# Patient Record
Sex: MALE | Race: WHITE | Employment: OTHER | URBAN - METROPOLITAN AREA
[De-identification: names, ages, dates, MRNs, and addresses within clinical notes are randomized per-mention and may not be internally consistent; named-entity substitution may affect disease eponyms.]

---

## 2017-01-02 ENCOUNTER — ANTICOAGULATION THERAPY VISIT (OUTPATIENT)
Dept: ANTICOAGULATION | Facility: CLINIC | Age: 82
End: 2017-01-02
Payer: COMMERCIAL

## 2017-01-02 DIAGNOSIS — Z79.01 LONG-TERM (CURRENT) USE OF ANTICOAGULANTS: Primary | ICD-10-CM

## 2017-01-02 DIAGNOSIS — I48.20 CHRONIC ATRIAL FIBRILLATION (H): ICD-10-CM

## 2017-01-02 LAB — INR POINT OF CARE: 2.3 (ref 0.86–1.14)

## 2017-01-02 PROCEDURE — 85610 PROTHROMBIN TIME: CPT | Mod: QW

## 2017-01-02 PROCEDURE — 36416 COLLJ CAPILLARY BLOOD SPEC: CPT

## 2017-01-02 PROCEDURE — 99207 ZZC NO CHARGE NURSE ONLY: CPT

## 2017-01-02 NOTE — PROGRESS NOTES
ANTICOAGULATION FOLLOW-UP CLINIC VISIT    Patient Name:  Lori Mehta  Date:  1/2/2017  Contact Type:  Face to Face    SUBJECTIVE:     Patient Findings     Positives No Problem Findings           OBJECTIVE    INR PROTIME   Date Value Ref Range Status   01/02/2017 2.3* 0.86 - 1.14 Final       ASSESSMENT / PLAN  INR assessment THER    Recheck INR In: 6 WEEKS    INR Location Clinic      Anticoagulation Summary as of 1/2/2017     INR goal 2.0-3.0   Selected INR 2.3 (1/2/2017)   Maintenance plan 5 mg (5 mg x 1) on Tue; 7.5 mg (5 mg x 1.5) all other days   Full instructions 5 mg on Tue; 7.5 mg all other days   Weekly total 50 mg   No change documented Lakeisha Rodriges RN   Plan last modified Lakeisha Rodriges RN (11/21/2016)   Next INR check 2/14/2017   Target end date     Indications   Chronic atrial fibrillation (HCC) [I48.2]  Long-term (current) use of anticoagulants [Z79.01] [Z79.01]         Anticoagulation Episode Summary     INR check location     Preferred lab     Send INR reminders to [facility] POOL    Comments 5 mg tablets, no bandaid, takes in AM, likes BP done.       Anticoagulation Care Providers     Provider Role Specialty Phone number    Candido Freedman DO [facility] Internal Medicine [phone]            See the Encounter Report to view Anticoagulation Flowsheet and Dosing Calendar (Go to Encounters tab in chart review, and find the Anticoagulation Therapy Visit)    Dosage adjustment made based on physician directed care plan.    Lakeisha Rodriges RN

## 2017-02-10 ENCOUNTER — TRANSFERRED RECORDS (OUTPATIENT)
Dept: HEALTH INFORMATION MANAGEMENT | Facility: CLINIC | Age: 82
End: 2017-02-10

## 2017-02-14 ENCOUNTER — ANTICOAGULATION THERAPY VISIT (OUTPATIENT)
Dept: ANTICOAGULATION | Facility: CLINIC | Age: 82
End: 2017-02-14
Payer: COMMERCIAL

## 2017-02-14 VITALS — SYSTOLIC BLOOD PRESSURE: 138 MMHG | HEART RATE: 79 BPM | DIASTOLIC BLOOD PRESSURE: 73 MMHG

## 2017-02-14 DIAGNOSIS — I48.20 CHRONIC ATRIAL FIBRILLATION (H): ICD-10-CM

## 2017-02-14 DIAGNOSIS — Z79.01 LONG-TERM (CURRENT) USE OF ANTICOAGULANTS: ICD-10-CM

## 2017-02-14 LAB — INR POINT OF CARE: 2.3 (ref 0.86–1.14)

## 2017-02-14 PROCEDURE — 99207 ZZC NO CHARGE NURSE ONLY: CPT

## 2017-02-14 PROCEDURE — 36416 COLLJ CAPILLARY BLOOD SPEC: CPT

## 2017-02-14 PROCEDURE — 85610 PROTHROMBIN TIME: CPT | Mod: QW

## 2017-02-14 NOTE — PROGRESS NOTES
ANTICOAGULATION FOLLOW-UP CLINIC VISIT    Patient Name:  Lori Mehta  Date:  2/14/2017  Contact Type:  Face to Face    SUBJECTIVE:     Patient Findings     Positives No Problem Findings           OBJECTIVE    INR Protime   Date Value Ref Range Status   02/14/2017 2.3 (A) 0.86 - 1.14 Final       ASSESSMENT / PLAN  INR assessment THER    Recheck INR In: 6 WEEKS    INR Location Clinic      Anticoagulation Summary as of 2/14/2017     INR goal 2.0-3.0   Today's INR 2.3   Maintenance plan 5 mg (5 mg x 1) on Tue; 7.5 mg (5 mg x 1.5) all other days   Full instructions 5 mg on Tue; 7.5 mg all other days   Weekly total 50 mg   No change documented Lakeisha Rodriges RN   Plan last modified Lakeisha Rodriges RN (11/21/2016)   Next INR check 3/28/2017   Target end date     Indications   Chronic atrial fibrillation (HCC) [I48.2]  Long-term (current) use of anticoagulants [Z79.01] [Z79.01]         Anticoagulation Episode Summary     INR check location     Preferred lab     Send INR reminders to [facility] POOL    Comments 5 mg tablets, no bandaid, takes in AM, likes BP done.       Anticoagulation Care Providers     Provider Role Specialty Phone number    Candido Freedman DO [facility] Internal Medicine [phone]            See the Encounter Report to view Anticoagulation Flowsheet and Dosing Calendar (Go to Encounters tab in chart review, and find the Anticoagulation Therapy Visit)    Dosage adjustment made based on physician directed care plan.    Lakeisha Rodriges RN

## 2017-02-14 NOTE — MR AVS SNAPSHOT
Lori Mehta   2/14/2017 10:30 AM   Anticoagulation Therapy Visit    Description:  88 year old male   Provider:  PH ANTI COAG   Department:  Ph Anticoag           INR as of 2/14/2017     Today's INR 2.3      Anticoagulation Summary as of 2/14/2017     INR goal 2.0-3.0   Today's INR 2.3   Full instructions 5 mg on Tue; 7.5 mg all other days   Next INR check 3/28/2017    Indications   Chronic atrial fibrillation (HCC) [I48.2]  Long-term (current) use of anticoagulants [Z79.01] [Z79.01]         Your next Anticoagulation Clinic appointment(s)     Feb 14, 2017 10:30 AM CST   Anticoagulation Visit with PH ANTI COAG   [facility] ([address] [address]   [phone]            Mar 28, 2017 10:30 AM CDT   Anticoagulation Visit with PH ANTI COAG   [facility] ([address] [address]   [phone]              Contact Numbers     Clinic Number:         February 2017 Details    Sun Mon Tue Wed Thu Fri Sat        1               2               3               4                 5               6               7               8               9               10               11                 12               13               14      5 mg   See details      15      7.5 mg         16      7.5 mg         17      7.5 mg         18      7.5 mg           19      7.5 mg         20      7.5 mg         21      5 mg         22      7.5 mg         23      7.5 mg         24      7.5 mg         25      7.5 mg           26      7.5 mg         27      7.5 mg         28      5 mg              Date Details   02/14 This INR check               How to take your warfarin dose     To take:  5 mg Take 1 of the 5 mg tablets.    To take:  7.5 mg Take 1.5 of the 5 mg tablets.           March 2017 Details    Sun Mon Tue Wed Thu Fri Sat        1      7.5 mg         2      7.5 mg         3      7.5 mg          4      7.5 mg           5      7.5 mg         6      7.5 mg         7      5 mg         8      7.5 mg         9      7.5 mg         10      7.5 mg         11      7.5 mg           12      7.5 mg         13      7.5 mg         14      5 mg         15      7.5 mg         16      7.5 mg         17      7.5 mg         18      7.5 mg           19      7.5 mg         20      7.5 mg         21      5 mg         22      7.5 mg         23      7.5 mg         24      7.5 mg         25      7.5 mg           26      7.5 mg         27      7.5 mg         28            29               30               31                 Date Details   No additional details    Date of next INR:  3/28/2017         How to take your warfarin dose     To take:  5 mg Take 1 of the 5 mg tablets.    To take:  7.5 mg Take 1.5 of the 5 mg tablets.

## 2017-03-07 ENCOUNTER — OFFICE VISIT (OUTPATIENT)
Dept: INTERNAL MEDICINE | Facility: CLINIC | Age: 82
End: 2017-03-07
Payer: COMMERCIAL

## 2017-03-07 VITALS
TEMPERATURE: 97.6 F | HEIGHT: 67 IN | OXYGEN SATURATION: 96 % | HEART RATE: 98 BPM | SYSTOLIC BLOOD PRESSURE: 138 MMHG | BODY MASS INDEX: 28.41 KG/M2 | RESPIRATION RATE: 20 BRPM | WEIGHT: 181 LBS | DIASTOLIC BLOOD PRESSURE: 64 MMHG

## 2017-03-07 DIAGNOSIS — I10 HYPERTENSION GOAL BP (BLOOD PRESSURE) < 140/90: ICD-10-CM

## 2017-03-07 DIAGNOSIS — I48.20 CHRONIC ATRIAL FIBRILLATION (H): ICD-10-CM

## 2017-03-07 DIAGNOSIS — E78.5 HYPERLIPIDEMIA LDL GOAL <100: ICD-10-CM

## 2017-03-07 DIAGNOSIS — M10.00 IDIOPATHIC GOUT, UNSPECIFIED CHRONICITY, UNSPECIFIED SITE: ICD-10-CM

## 2017-03-07 DIAGNOSIS — N40.1 BENIGN NON-NODULAR PROSTATIC HYPERPLASIA WITH LOWER URINARY TRACT SYMPTOMS: ICD-10-CM

## 2017-03-07 DIAGNOSIS — Z00.00 ENCOUNTER FOR ROUTINE ADULT HEALTH EXAMINATION WITHOUT ABNORMAL FINDINGS: Primary | ICD-10-CM

## 2017-03-07 LAB
ALBUMIN SERPL-MCNC: 3.7 G/DL (ref 3.4–5)
ALP SERPL-CCNC: 69 U/L (ref 40–150)
ALT SERPL W P-5'-P-CCNC: 26 U/L (ref 0–70)
ANION GAP SERPL CALCULATED.3IONS-SCNC: 9 MMOL/L (ref 3–14)
AST SERPL W P-5'-P-CCNC: 29 U/L (ref 0–45)
BILIRUB SERPL-MCNC: 0.7 MG/DL (ref 0.2–1.3)
BUN SERPL-MCNC: 25 MG/DL (ref 7–30)
CALCIUM SERPL-MCNC: 9.5 MG/DL (ref 8.5–10.1)
CHLORIDE SERPL-SCNC: 106 MMOL/L (ref 94–109)
CHOLEST SERPL-MCNC: 151 MG/DL
CO2 SERPL-SCNC: 29 MMOL/L (ref 20–32)
CREAT SERPL-MCNC: 1.33 MG/DL (ref 0.66–1.25)
GFR SERPL CREATININE-BSD FRML MDRD: 51 ML/MIN/1.7M2
GLUCOSE SERPL-MCNC: 105 MG/DL (ref 70–99)
HDLC SERPL-MCNC: 66 MG/DL
LDLC SERPL CALC-MCNC: 69 MG/DL
NONHDLC SERPL-MCNC: 85 MG/DL
POTASSIUM SERPL-SCNC: 3.9 MMOL/L (ref 3.4–5.3)
PROT SERPL-MCNC: 7.7 G/DL (ref 6.8–8.8)
SODIUM SERPL-SCNC: 144 MMOL/L (ref 133–144)
TRIGL SERPL-MCNC: 80 MG/DL

## 2017-03-07 PROCEDURE — 80061 LIPID PANEL: CPT | Performed by: INTERNAL MEDICINE

## 2017-03-07 PROCEDURE — G0439 PPPS, SUBSEQ VISIT: HCPCS | Performed by: INTERNAL MEDICINE

## 2017-03-07 PROCEDURE — 80053 COMPREHEN METABOLIC PANEL: CPT | Performed by: INTERNAL MEDICINE

## 2017-03-07 PROCEDURE — 36415 COLL VENOUS BLD VENIPUNCTURE: CPT | Performed by: INTERNAL MEDICINE

## 2017-03-07 RX ORDER — TAMSULOSIN HYDROCHLORIDE 0.4 MG/1
0.4 CAPSULE ORAL DAILY
Qty: 90 CAPSULE | Refills: 3 | Status: SHIPPED | OUTPATIENT
Start: 2017-03-07 | End: 2018-02-13

## 2017-03-07 RX ORDER — ALLOPURINOL 100 MG/1
100 TABLET ORAL DAILY
Qty: 90 TABLET | Refills: 3 | Status: SHIPPED | OUTPATIENT
Start: 2017-03-07 | End: 2018-02-13

## 2017-03-07 RX ORDER — AMLODIPINE BESYLATE 10 MG/1
10 TABLET ORAL DAILY
Qty: 90 TABLET | Refills: 3 | Status: SHIPPED | OUTPATIENT
Start: 2017-03-07 | End: 2018-02-13

## 2017-03-07 RX ORDER — BIOTIN 800 MCG
TABLET ORAL DAILY
COMMUNITY
End: 2018-02-13

## 2017-03-07 RX ORDER — SIMVASTATIN 20 MG
60 TABLET ORAL AT BEDTIME
Qty: 270 TABLET | Refills: 3 | Status: SHIPPED | OUTPATIENT
Start: 2017-03-07 | End: 2018-02-13

## 2017-03-07 ASSESSMENT — PAIN SCALES - GENERAL: PAINLEVEL: NO PAIN (0)

## 2017-03-07 NOTE — MR AVS SNAPSHOT
After Visit Summary   3/7/2017    Lori Mehta    MRN: [id number]           Patient Information     Date Of Birth          6/27/1928        Visit Information        Provider Department      3/7/2017 8:30 AM Wale Short MD [facility]        Today's Diagnoses     Hyperlipidemia LDL goal <100          Care Instructions      Preventive Health Recommendations:       Male Ages 65 and over    Yearly exam:             See your health care provider every year in order to  o   Review health changes.   o   Discuss preventive care.    o   Review your medicines if your doctor has prescribed any.    Talk with your health care provider about whether you should have a test to screen for prostate cancer (PSA).    Every 3 years, have a diabetes test (fasting glucose). If you are at risk for diabetes, you should have this test more often.    Every 5 years, have a cholesterol test. Have this test more often if you are at risk for high cholesterol or heart disease.     Every 10 years, have a colonoscopy. Or, have a yearly FIT test (stool test). These exams will check for colon cancer.    Talk to with your health care provider about screening for Abdominal Aortic Aneurysm if you have a family history of AAA or have a history of smoking.  Shots:     Get a flu shot each year.     Get a tetanus shot every 10 years.     Talk to your doctor about your pneumonia vaccines. There are now two you should receive - Pneumovax (PPSV 23) and Prevnar (PCV 13).    Talk to your doctor about a shingles vaccine.     Talk to your doctor about the hepatitis B vaccine.  Nutrition:     Eat at least 5 servings of fruits and vegetables each day.     Eat whole-grain bread, whole-wheat pasta and brown rice instead of white grains and rice.     Talk to your doctor about Calcium and Vitamin D.   Lifestyle    Exercise for at least 150 minutes a week (30 minutes a day, 5 days a week). This will help you control your weight and  "prevent disease.     Limit alcohol to one drink per day.     No smoking.     Wear sunscreen to prevent skin cancer.     See your dentist every six months for an exam and cleaning.     See your eye doctor every 1 to 2 years to screen for conditions such as glaucoma, macular degeneration and cataracts.        Follow-ups after your visit        Your next 10 appointments already scheduled     Mar 28, 2017 10:30 AM CDT   Anticoagulation Visit with PH ANTI COAG   [facility]    [address] [place] [id number] [phone]              Who to contact     If you have questions or need follow up information about today's clinic visit or your schedule please contact [facility] directly at [phone].  Normal or non-critical lab and imaging results will be communicated to you by [employer], letter or phone within 4 business days after the clinic has received the results. If you do not hear from us within 7 days, please contact the clinic through [employer] or phone. If you have a critical or abnormal lab result, we will notify you by phone as soon as possible.  Submit refill requests through [facility] or call your pharmacy and they will forward the refill request to us. Please allow 3 business days for your refill to be completed.          Additional Information About Your Visit        [facility] Information     [facility] lets you send messages to your doctor, view your test results, renew your prescriptions, schedule appointments and more. To sign up, go to [email or web address] . Click on \"Log in\" on the left side of the screen, which will take you to the Welcome page. Then click on \"Sign up Now\" on the right side of the page.     You will be asked to enter the access code listed below, as well as some personal information. Please follow the directions to create your username and password.     Your access code is: [email or web address]  Expires: 6/5/2017  8:53 AM   " "  Your access code will  in 90 days. If you need help or a new code, please call your [facility] clinic or [phone].        Care EveryWhere ID     This is your Care EveryWhere ID. This could be used by other organizations to access your [facility] medical records  [id number]        Your Vitals Were     Pulse Temperature Respirations Height Pulse Oximetry BMI (Body Mass Index)    98 97.6  F (36.4  C) (Temporal) 20 5' 7\" (1.702 m) 96% 28.35 kg/m2       Blood Pressure from Last 3 Encounters:   17 156/68   17 138/73   16 129/78    Weight from Last 3 Encounters:   17 181 lb (82.1 kg)   16 173 lb (78.5 kg)   16 178 lb (80.7 kg)              We Performed the Following     Lipid Profile with reflex to direct LDL        Primary Care Provider Office Phone # Fax #    Wale Short [phone]       [facility] [address] [place] [address]        Thank you!     Thank you for choosing [facility]  for your care. Our goal is always to provide you with excellent care. Hearing back from our patients is one way we can continue to improve our services. Please take a few minutes to complete the written survey that you may receive in the mail after your visit with us. Thank you!             Your Updated Medication List - Protect others around you: Learn how to safely use, store and throw away your medicines at www.disposemymeds.org.          This list is accurate as of: 3/7/17  8:53 AM.  Always use your most recent med list.                   Brand Name Dispense Instructions for use    allopurinol 100 MG tablet    ZYLOPRIM    90 tablet    TAKE ONE TABLET BY MOUTH ONCE DAILY       amLODIPine 10 MG tablet    NORVASC    90 tablet    Take 1 tablet (10 mg) by mouth daily       Brewers Yeast Tabs      Take by mouth daily 7 1/2 grain - takes three       Bromelains 500 MG Tabs      Take 3 tablets by mouth. 3-4 daily       CAPSICUM (CAYENNE) PO    "   Take 450 mg by mouth daily       COENZYME Q10      1 DAILY       hydrochlorothiazide 25 MG tablet    HYDRODIURIL    90 tablet    TAKE ONE TABLET BY MOUTH ONCE DAILY       omega 3 1200 MG Caps      2 CAP DAILY       SAW PALMETTO EXTRACT PO      Take 1,000 mg by mouth       Selenium 200 MCG Caps      1 CAPSULE DAILY       simvastatin 20 MG tablet    ZOCOR    270 tablet    Take 3 tablets (60 mg) by mouth At Bedtime       tamsulosin 0.4 MG capsule    FLOMAX    60 capsule    Take 1 capsule (0.4 mg) by mouth daily       VITAMIN B12 TR 1000 MCG Tbcr      1 TAB DAILY       vitamin C 500 MG Chew      Take  by mouth. 1-3 daily       vitamin E 400 UNIT capsule      1 CAPSULE DAILY       warfarin 5 MG tablet    COUMADIN    125 tablet    Take 5 mg on Tuesday  and 7.5 mg all other days, or as directed by the coumadin clinic.       Zinc 30 MG Tabs      1 TABLET DAILY

## 2017-03-07 NOTE — NURSING NOTE
"Chief Complaint   Patient presents with     Wellness Visit       Initial /68 (BP Location: Right arm, Patient Position: Porter, Cuff Size: Adult Large)  Pulse 98  Temp 97.6  F (36.4  C) (Temporal)  Resp 20  Ht 5' 7\" (1.702 m)  Wt 181 lb (82.1 kg)  SpO2 96%  BMI 28.35 kg/m2 Estimated body mass index is 28.35 kg/(m^2) as calculated from the following:    Height as of this encounter: 5' 7\" (1.702 m).    Weight as of this encounter: 181 lb (82.1 kg).  Medication Reconciliation: complete   Adrianna Collins MA    "

## 2017-03-07 NOTE — PROGRESS NOTES
SUBJECTIVE:                                                            Lori Mehta is a 88 year old male who presents for Preventive Visit.    Are you in the first 12 months of your Medicare Part B coverage?  No    Healthy Habits:    Do you get at least three servings of calcium containing foods daily (dairy, green leafy vegetables, etc.)? Maybe two    Amount of exercise or daily activities, outside of work: no program but stays active     Problems taking medications regularly No    Medication side effects: No    Have you had an eye exam in the past two years? yes    Do you see a dentist twice per year? no    Do you have sleep apnea, excessive snoring or daytime drowsiness?no    COGNITIVE SCREEN  1) Repeat 3 items (Banana, Sunrise, Chair)    2) Clock draw: NORMAL  3) 3 item recall: Recalls 1 object   Results: NORMAL clock, 1-2 items recalled: COGNITIVE IMPAIRMENT LESS LIKELY    Mini-CogTM Copyright CARINE Hoyt. Licensed by the author for use in [facility]; reprinted with permission (rajni@[email or web address]). All rights reserved.      Was in the ER twice for vertigo, was given meclizine and never needed the medication.    Coumadin is doing ok,  BP is a little high, taking his medications.      Reviewed and updated as needed this visit by clinical staff  Tobacco  Allergies  Meds         Reviewed and updated as needed this visit by Provider        Social History   Substance Use Topics     Smoking status: Never Smoker     Smokeless tobacco: Never Used     Alcohol use No       The patient does not drink >3 drinks per day nor >7 drinks per week.    Today's PHQ-2 Score:   PHQ-2 ( 1999 [employer]) 3/7/2017 12/8/2015   Q1: Little interest or pleasure in doing things 1 0   Q2: Feeling down, depressed or hopeless 1 0   PHQ-2 Score 2 0       Do you feel safe in your environment - Yes    Do you have a Health Care Directive?: Yes: Patient states has Advance Directive and will bring in a copy to clinic.    Current providers  sharing in care for this patient include:   Patient Care Team:  Wale Short MD as PCP - General (Internal Medicine)      Hearing impairment: Yes, suppose to wear wear hearing    Ability to successfully perform activities of daily living: Yes, no assistance needed     Fall risk:  Fallen 2 or more times in the past year?: No  Any fall with injury in the past year?: No    Home safety:  none identified    The following health maintenance items are reviewed in Epic and correct as of today:  Health Maintenance   Topic Date Due     PNEUMOCOCCAL (2 of 2 - PCV13) 04/07/2010     ADVANCE DIRECTIVE PLANNING Q5 YRS (NO INBASKET)  09/20/2016     MICROALBUMIN Q1 YEAR( NO INBASKET)  10/30/2016     OP ANNUAL INR REFERRAL  05/24/2017     CREATININE Q1 YEAR (NO INBASKET)  07/11/2017     FALL RISK ASSESSMENT  07/19/2017     LIPID MONITORING Q1 YEAR( NO INBASKET)  08/16/2017     INFLUENZA VACCINE (SYSTEM ASSIGNED)  09/01/2017     TETANUS IMMUNIZATION (SYSTEM ASSIGNED)  04/07/2019         Pneumonia Vaccine:patient doesn't want the prevnar 13     ROS:  C: NEGATIVE for fever, chills, change in weight  I: NEGATIVE for worrisome rashes, moles or lesions  E: NEGATIVE for vision changes or irritation  E/M: NEGATIVE for ear, mouth and throat problems  R: NEGATIVE for significant cough or SOB  B: NEGATIVE for masses, tenderness or discharge  CV: NEGATIVE for chest pain, palpitations or peripheral edema  GI: NEGATIVE for nausea, abdominal pain, heartburn, or change in bowel habits   male :nocturia x 3  M: NEGATIVE for significant arthralgias or myalgia  N: NEGATIVE for weakness, dizziness or paresthesias  E: NEGATIVE for temperature intolerance, skin/hair changes  H: NEGATIVE for bleeding problems  P: NEGATIVE for changes in mood or affect    Problem list, Medication list, Allergies, and Medical/Social/Surgical histories reviewed in [facility] and updated as appropriate.  OBJECTIVE:                                                            BP  "156/68 (BP Location: Right arm, Patient Position: Porter, Cuff Size: Adult Large)  Pulse 98  Temp 97.6  F (36.4  C) (Temporal)  Resp 20  Ht 5' 7\" (1.702 m)  Wt 181 lb (82.1 kg)  SpO2 96%  BMI 28.35 kg/m2 Estimated body mass index is 28.35 kg/(m^2) as calculated from the following:    Height as of this encounter: 5' 7\" (1.702 m).    Weight as of this encounter: 181 lb (82.1 kg).  EXAM:   GENERAL: healthy, alert and no distress  EYES: Eyes grossly normal to inspection, PERRL and conjunctivae and sclerae normal  HENT: ear canals and TM's normal, nose and mouth without ulcers or lesions  NECK: no adenopathy, no asymmetry, masses, or scars and thyroid normal to palpation  RESP: lungs clear to auscultation - no rales, rhonchi or wheezes  CV: irregularly irregular rhythm  ABDOMEN: soft, nontender, no hepatosplenomegaly, no masses and bowel sounds normal  MS: no gross musculoskeletal defects noted, no edema  SKIN: no suspicious lesions or rashes  NEURO: Normal strength and tone, mentation intact and speech normal  PSYCH: mentation appears normal, affect normal/bright    ASSESSMENT / PLAN:                                                                ICD-10-CM    1. Encounter for routine adult health examination without abnormal findings Z00.00    2. Hyperlipidemia LDL goal <100 E78.5 Lipid Profile with reflex to direct LDL     simvastatin (ZOCOR) 20 MG tablet   3. Hypertension goal BP (blood pressure) < 140/90 I10 amLODIPine (NORVASC) 10 MG tablet     Comprehensive metabolic panel   4. Idiopathic gout, unspecified chronicity, unspecified site M10.00 allopurinol (ZYLOPRIM) 100 MG tablet   5. Benign non-nodular prostatic hyperplasia with lower urinary tract symptoms N40.1 tamsulosin (FLOMAX) 0.4 MG capsule   6. Chronic atrial fibrillation (HCC) I48.2      Taking coumadin and doing ok,  Can't take sleeping pills as he feels bad the next day.      Lipids are good today.    End of Life Planning:  Patient currently has an advanced " "directive: Yes.  Practitioner is supportive of decision.    COUNSELING:  Reviewed preventive health counseling, as reflected in patient instructions       Regular exercise       Healthy diet/nutrition        Estimated body mass index is 28.35 kg/(m^2) as calculated from the following:    Height as of this encounter: 5' 7\" (1.702 m).    Weight as of this encounter: 181 lb (82.1 kg).     reports that he has never smoked. He has never used smokeless tobacco.      Appropriate preventive services were discussed with this patient, including applicable screening as appropriate for cardiovascular disease, diabetes, osteopenia/osteoporosis, and glaucoma.  As appropriate for age/gender, discussed screening for colorectal cancer, prostate cancer, breast cancer, and cervical cancer. Checklist reviewing preventive services available has been given to the patient.    Reviewed patients plan of care and provided an AVS. The Basic Care Plan (routine screening as documented in Health Maintenance) for Lori meets the Care Plan requirement. This Care Plan has been established and reviewed with the Patient.    Counseling Resources:  ATP IV Guidelines  Pooled Cohorts Equation Calculator  Breast Cancer Risk Calculator  FRAX Risk Assessment  ICSI Preventive Guidelines  Dietary Guidelines for Americans, 2010  USDA's MyPlate  ASA Prophylaxis  Lung CA Screening    Wale Short MD  [facility]  "

## 2017-03-22 DIAGNOSIS — I10 ESSENTIAL HYPERTENSION WITH GOAL BLOOD PRESSURE LESS THAN 140/90: ICD-10-CM

## 2017-03-22 RX ORDER — HYDROCHLOROTHIAZIDE 25 MG/1
25 TABLET ORAL DAILY
Qty: 90 TABLET | Refills: 3 | Status: SHIPPED | OUTPATIENT
Start: 2017-03-22 | End: 2018-02-13

## 2017-03-22 NOTE — TELEPHONE ENCOUNTER
Reason for call:  Medication  Reason for Call:  Medication or medication refill:     Do you use a [facility]?  Name of the pharmacy and phone number for the current request:  [facility] - [phone]    Name of the medication requested: hydrochlorothiazide (HYDRODIURIL) 25 MG tablet    Other request: pt was in on 3/7/17 for a pe and the provider had forgot to refill this medication. Please advise.     Can we leave a detailed message on this number? YES    Phone number patient can be reached at: Home number on file [phone] (home)    Best Time: anytime    Call taken on 3/22/2017 at 10:21 AM by Mer Andersen

## 2017-03-28 ENCOUNTER — ANTICOAGULATION THERAPY VISIT (OUTPATIENT)
Dept: ANTICOAGULATION | Facility: CLINIC | Age: 82
End: 2017-03-28
Payer: COMMERCIAL

## 2017-03-28 VITALS — SYSTOLIC BLOOD PRESSURE: 145 MMHG | DIASTOLIC BLOOD PRESSURE: 74 MMHG | HEART RATE: 76 BPM

## 2017-03-28 DIAGNOSIS — Z79.01 LONG-TERM (CURRENT) USE OF ANTICOAGULANTS: ICD-10-CM

## 2017-03-28 DIAGNOSIS — I48.20 CHRONIC ATRIAL FIBRILLATION (H): ICD-10-CM

## 2017-03-28 LAB — INR POINT OF CARE: 2.1 (ref 0.86–1.14)

## 2017-03-28 PROCEDURE — 85610 PROTHROMBIN TIME: CPT | Mod: QW

## 2017-03-28 PROCEDURE — 36416 COLLJ CAPILLARY BLOOD SPEC: CPT

## 2017-03-28 PROCEDURE — 99207 ZZC NO CHARGE NURSE ONLY: CPT

## 2017-03-28 NOTE — PROGRESS NOTES
ANTICOAGULATION FOLLOW-UP CLINIC VISIT    Patient Name:  Lori Mehta  Date:  3/28/2017  Contact Type:  Face to Face    SUBJECTIVE:     Patient Findings     Positives No Problem Findings           OBJECTIVE    INR Protime   Date Value Ref Range Status   03/28/2017 2.1 (A) 0.86 - 1.14 Final       ASSESSMENT / PLAN  INR assessment THER    Recheck INR In: 6 WEEKS    INR Location Clinic      Anticoagulation Summary as of 3/28/2017     INR goal 2.0-3.0   Today's INR 2.1   Maintenance plan 5 mg (5 mg x 1) on Tue; 7.5 mg (5 mg x 1.5) all other days   Full instructions 5 mg on Tue; 7.5 mg all other days   Weekly total 50 mg   No change documented Lakeisha Rodriges RN   Plan last modified Lakeisha Rodriges RN (11/21/2016)   Next INR check 5/9/2017   Target end date     Indications   Chronic atrial fibrillation (HCC) [I48.2]  Long-term (current) use of anticoagulants [Z79.01] [Z79.01]         Anticoagulation Episode Summary     INR check location     Preferred lab     Send INR reminders to [facility] POOL    Comments 5 mg tablets, no bandaid, takes in AM, likes BP done.       Anticoagulation Care Providers     Provider Role Specialty Phone number    Candido Freedman DO [facility] Internal Medicine [phone]            See the Encounter Report to view Anticoagulation Flowsheet and Dosing Calendar (Go to Encounters tab in chart review, and find the Anticoagulation Therapy Visit)    Dosage adjustment made based on physician directed care plan.      Lakeisha Rodriges RN

## 2017-03-28 NOTE — MR AVS SNAPSHOT
Lori Mehta   3/28/2017 10:30 AM   Anticoagulation Therapy Visit    Description:  88 year old male   Provider:  NAWAF ANTI COAG   Department:  [facility]           INR as of 3/28/2017     Today's INR 2.1      Anticoagulation Summary as of 3/28/2017     INR goal 2.0-3.0   Today's INR 2.1   Full instructions 5 mg on Tue; 7.5 mg all other days   Next INR check 5/9/2017    Indications   Chronic atrial fibrillation (HCC) [I48.2]  Long-term (current) use of anticoagulants [Z79.01] [Z79.01]         Your next Anticoagulation Clinic appointment(s)     May 09, 2017 10:30 AM CDT   Anticoagulation [facility] with NAWAF ANTI COANAVI   [facility]    [address] [place] [address] [phone]              Contact Numbers     Clinic Number:         March 2017 Details    Sun Mon Tue Wed Thu Fri Sat        1               2               3               4                 5               6               7               8               9               10               11                 12               13               14               15               16               17               18                 19               20               21               22               23               24               25                 26               27               28      5 mg   See details      29      7.5 mg         30      7.5 mg         31      7.5 mg           Date Details   03/28 This INR check               How to take your warfarin dose     To take:  5 mg Take 1 of the 5 mg tablets.    To take:  7.5 mg Take 1.5 of the 5 mg tablets.           April 2017 Details    Sun Mon Tue Wed Thu Fri Sat           1      7.5 mg           2      7.5 mg         3      7.5 mg         4      5 mg         5      7.5 mg         6      7.5 mg         7      7.5 mg         8      7.5 mg           9      7.5 mg         10      7.5 mg         11      5 mg         12      7.5 mg         13      7.5 mg          14      7.5 mg         15      7.5 mg           16      7.5 mg         17      7.5 mg         18      5 mg         19      7.5 mg         20      7.5 mg         21      7.5 mg         22      7.5 mg           23      7.5 mg         24      7.5 mg         25      5 mg         26      7.5 mg         27      7.5 mg         28      7.5 mg         29      7.5 mg           30      7.5 mg                Date Details   No additional details            How to take your warfarin dose     To take:  5 mg Take 1 of the 5 mg tablets.    To take:  7.5 mg Take 1.5 of the 5 mg tablets.           May 2017 Details    Sun Mon Tue Wed Thu Fri Sat      1      7.5 mg         2      5 mg         3      7.5 mg         4      7.5 mg         5      7.5 mg         6      7.5 mg           7      7.5 mg         8      7.5 mg         9            10               11               12               13                 14               15               16               17               18               19               20                 21               22               23               24               25               26               27                 28               29               30               31                   Date Details   No additional details    Date of next INR:  5/9/2017         How to take your warfarin dose     To take:  5 mg Take 1 of the 5 mg tablets.    To take:  7.5 mg Take 1.5 of the 5 mg tablets.

## 2017-04-06 ENCOUNTER — HOSPITAL ENCOUNTER (OUTPATIENT)
Facility: CLINIC | Age: 82
Discharge: HOME | End: 2017-04-06
Attending: OPHTHALMOLOGY | Admitting: OPHTHALMOLOGY
Payer: MEDICARE

## 2017-04-06 ENCOUNTER — SURGERY (OUTPATIENT)
Age: 82
End: 2017-04-06

## 2017-04-06 DIAGNOSIS — H26.40 SECONDARY CATARACT: Primary | ICD-10-CM

## 2017-04-06 PROCEDURE — 66821 AFTER CATARACT LASER SURGERY: CPT | Mod: LT | Performed by: OPHTHALMOLOGY

## 2017-04-06 PROCEDURE — 25000132 ZZH RX MED GY IP 250 OP 250 PS 637: Mod: GY | Performed by: OPHTHALMOLOGY

## 2017-04-06 RX ORDER — PHENYLEPHRINE HYDROCHLORIDE 25 MG/ML
1 SOLUTION/ DROPS OPHTHALMIC ONCE
Status: COMPLETED | OUTPATIENT
Start: 2017-04-06 | End: 2017-04-06

## 2017-04-06 RX ORDER — TROPICAMIDE 10 MG/ML
1 SOLUTION/ DROPS OPHTHALMIC ONCE
Status: COMPLETED | OUTPATIENT
Start: 2017-04-06 | End: 2017-04-06

## 2017-04-06 RX ORDER — PROPARACAINE HYDROCHLORIDE 5 MG/ML
1 SOLUTION/ DROPS OPHTHALMIC ONCE
Status: COMPLETED | OUTPATIENT
Start: 2017-04-06 | End: 2017-04-06

## 2017-04-06 RX ORDER — BRIMONIDINE TARTRATE 2 MG/ML
1 SOLUTION/ DROPS OPHTHALMIC ONCE
Status: COMPLETED | OUTPATIENT
Start: 2017-04-06 | End: 2017-04-06

## 2017-04-06 RX ADMIN — PROPARACAINE HYDROCHLORIDE 1 DROP: 5 SOLUTION/ DROPS OPHTHALMIC at 08:21

## 2017-04-06 RX ADMIN — BRIMONIDINE TARTRATE 1 DROP: 2 SOLUTION/ DROPS OPHTHALMIC at 08:22

## 2017-04-06 RX ADMIN — BRIMONIDINE TARTRATE 1 DROP: 2 SOLUTION/ DROPS OPHTHALMIC at 09:08

## 2017-04-06 RX ADMIN — TROPICAMIDE 1 DROP: 10 SOLUTION/ DROPS OPHTHALMIC at 08:21

## 2017-04-06 RX ADMIN — PHENYLEPHRINE HYDROCHLORIDE 1 DROP: 25 SOLUTION/ DROPS OPHTHALMIC at 08:21

## 2017-04-06 NOTE — OP NOTE
Operative Note:    Date of Service: April 6, 2017    Attending: Mathew Croft M.D.     Preoperative diagnosis: posterior capsular opacity (PCO), left eye  Postoperative diagnosis: same  Procedure: YAG laser posterior capsulotomy, left eye  Anesthesia: Topical proparacaine  Complications: none    After the patient's identification and the correct surgical site were confirmed, the patient was seated at the YAG laser. Visual expectations were again discussed at length with patient. A drop of proparacaine was instilled to the affected eye. The pupil had been previously dilated. 34 shots at 1 pulse per burst and 2 mJ of power were used to open the posterior capsule. The patient tolerated the procedure well, and there were no apparent complications.  A single drop of brimonidine was instilled to the eye after the procedure. Return precautions were discussed with the patient, who expressed understanding.

## 2017-05-04 ENCOUNTER — TRANSFERRED RECORDS (OUTPATIENT)
Dept: HEALTH INFORMATION MANAGEMENT | Facility: CLINIC | Age: 82
End: 2017-05-04

## 2017-05-09 ENCOUNTER — ANTICOAGULATION THERAPY VISIT (OUTPATIENT)
Dept: ANTICOAGULATION | Facility: CLINIC | Age: 82
End: 2017-05-09
Payer: COMMERCIAL

## 2017-05-09 DIAGNOSIS — I48.20 CHRONIC ATRIAL FIBRILLATION (H): ICD-10-CM

## 2017-05-09 DIAGNOSIS — Z79.01 LONG-TERM (CURRENT) USE OF ANTICOAGULANTS: ICD-10-CM

## 2017-05-09 LAB — INR POINT OF CARE: 2.7 (ref 0.86–1.14)

## 2017-05-09 PROCEDURE — 85610 PROTHROMBIN TIME: CPT | Mod: QW

## 2017-05-09 PROCEDURE — 36416 COLLJ CAPILLARY BLOOD SPEC: CPT

## 2017-05-09 PROCEDURE — 99207 ZZC NO CHARGE NURSE ONLY: CPT

## 2017-05-09 NOTE — MR AVS SNAPSHOT
Lori Mehta   5/9/2017 10:30 AM   Anticoagulation Therapy Visit    Description:  88 year old male   Provider:  NAWAF ANTI COAG   Department:  [facility]           INR as of 5/9/2017     Today's INR 2.7      Anticoagulation Summary as of 5/9/2017     INR goal 2.0-3.0   Today's INR 2.7   Full instructions 5 mg on Tue; 7.5 mg all other days   Next INR check 6/20/2017    Indications   Chronic atrial fibrillation (HCC) [I48.2]  Long-term (current) use of anticoagulants [Z79.01] [Z79.01]         Your next Anticoagulation Clinic appointment(s)     Jun 20, 2017 10:30 AM CDT   An[facility]agulation [facility] with NAWAF ANTI COAG   [facility]    [address] [place] [address] [phone]              Contact Numbers     Clinic Number:         May 2017 Details    Sun Mon Tue Wed Thu Fri Sat      1               2               3               4               5               6                 7               8               9      5 mg   See details      10      7.5 mg         11      7.5 mg         12      7.5 mg         13      7.5 mg           14      7.5 mg         15      7.5 mg         16      5 mg         17      7.5 mg         18      7.5 mg         19      7.5 mg         20      7.5 mg           21      7.5 mg         22      7.5 mg         23      5 mg         24      7.5 mg         25      7.5 mg         26      7.5 mg         27      7.5 mg           28      7.5 mg         29      7.5 mg         30      5 mg         31      7.5 mg             Date Details   05/09 This INR check               How to take your warfarin dose     To take:  5 mg Take 1 of the 5 mg tablets.    To take:  7.5 mg Take 1.5 of the 5 mg tablets.           June 2017 Details    Sun Mon Tue Wed Thu Fri Sat         1      7.5 mg         2      7.5 mg         3      7.5 mg           4      7.5 mg         5      7.5 mg         6      5 mg         7      7.5 mg         8      7.5 mg         9       7.5 mg         10      7.5 mg           11      7.5 mg         12      7.5 mg         13      5 mg         14      7.5 mg         15      7.5 mg         16      7.5 mg         17      7.5 mg           18      7.5 mg         19      7.5 mg         20            21               22               23               24                 25               26               27               28               29               30                 Date Details   No additional details    Date of next INR:  6/20/2017         How to take your warfarin dose     To take:  5 mg Take 1 of the 5 mg tablets.    To take:  7.5 mg Take 1.5 of the 5 mg tablets.

## 2017-05-09 NOTE — PROGRESS NOTES
ANTICOAGULATION FOLLOW-UP CLINIC VISIT    Patient Name:  Lori Mehta  Date:  5/9/2017  Contact Type:  Face to Face    SUBJECTIVE:     Patient Findings     Positives No Problem Findings           OBJECTIVE    INR Protime   Date Value Ref Range Status   05/09/2017 2.7 (A) 0.86 - 1.14 Final       ASSESSMENT / PLAN  INR assessment THER    Recheck INR In: 6 WEEKS    INR Location Clinic      Anticoagulation Summary as of 5/9/2017     INR goal 2.0-3.0   Today's INR 2.7   Maintenance plan 5 mg (5 mg x 1) on Tue; 7.5 mg (5 mg x 1.5) all other days   Full instructions 5 mg on Tue; 7.5 mg all other days   Weekly total 50 mg   No change documented Azalia Stallings RN   Plan last modified Lakeisha Rodriges RN (11/21/2016)   Next INR check 6/20/2017   Target end date     Indications   Chronic atrial fibrillation (HCC) [I48.2]  Long-term (current) use of anticoagulants [Z79.01] [Z79.01]         Anticoagulation Episode Summary     INR check location     Preferred lab     Send INR reminders to [facility] PO[facility]    Comments 5 mg tablets, no bandaid, takes in AM, likes BP done.       Anticoagulation Care Providers     Provider Role Specialty Phone number    Candido Freedman DO [facility] [phone]            See the Encounter Report to view Anticoagulation Flowsheet and Dosing Calendar (Go to Encounters tab in chart review, and find the Anticoagulation Therapy Visit)    Dosage adjustment made based on physician directed care plan.    Azalia Stallings RN

## 2017-06-20 ENCOUNTER — ANTICOAGULATION THERAPY VISIT (OUTPATIENT)
Dept: ANTICOAGULATION | Facility: CLINIC | Age: 82
End: 2017-06-20
Payer: COMMERCIAL

## 2017-06-20 VITALS — DIASTOLIC BLOOD PRESSURE: 79 MMHG | SYSTOLIC BLOOD PRESSURE: 142 MMHG | HEART RATE: 83 BPM

## 2017-06-20 DIAGNOSIS — Z79.01 LONG-TERM (CURRENT) USE OF ANTICOAGULANTS: ICD-10-CM

## 2017-06-20 DIAGNOSIS — I48.20 CHRONIC ATRIAL FIBRILLATION (H): ICD-10-CM

## 2017-06-20 LAB — INR POINT OF CARE: 2.6 (ref 0.86–1.14)

## 2017-06-20 PROCEDURE — 99207 ZZC NO CHARGE NURSE ONLY: CPT

## 2017-06-20 PROCEDURE — 85610 PROTHROMBIN TIME: CPT | Mod: QW

## 2017-06-20 PROCEDURE — 36416 COLLJ CAPILLARY BLOOD SPEC: CPT

## 2017-06-20 RX ORDER — WARFARIN SODIUM 5 MG/1
TABLET ORAL
Qty: 125 TABLET | Refills: 3 | Status: SHIPPED | OUTPATIENT
Start: 2017-06-20 | End: 2017-06-20

## 2017-06-20 RX ORDER — WARFARIN SODIUM 5 MG/1
TABLET ORAL
Qty: 125 TABLET | Refills: 3 | Status: SHIPPED | OUTPATIENT
Start: 2017-06-20 | End: 2017-10-03

## 2017-06-20 NOTE — PROGRESS NOTES
ANTICOAGULATION FOLLOW-UP CLINIC VISIT    Patient Name:  Lori Mehta  Date:  6/20/2017  Contact Type:  Face to Face    SUBJECTIVE:     Patient Findings     Positives No Problem Findings           OBJECTIVE    INR Protime   Date Value Ref Range Status   06/20/2017 2.6 (A) 0.86 - 1.14 Final       ASSESSMENT / PLAN  INR assessment THER    Recheck INR In: 6 WEEKS    INR Location Clinic      Anticoagulation Summary as of 6/20/2017     INR goal 2.0-3.0   Today's INR 2.6   Maintenance plan 5 mg (5 mg x 1) on Tue; 7.5 mg (5 mg x 1.5) all other days   Full instructions 5 mg on Tue; 7.5 mg all other days   Weekly total 50 mg   Plan last modified Lakeisha Rodriguez RN (11/21/2016)   Next INR check 8/1/2017   Target end date     Indications   Chronic atrial fibrillation (HCC) [I48.2]  Long-term (current) use of anticoagulants [Z79.01] [Z79.01]         Anticoagulation Episode Summary     INR check location     Preferred lab     Send INR reminders to [facility] PO[facility]    Comments 5 mg tablets, no bandaid, takes in AM, likes BP done.       Anticoagulation Care Providers     Provider Role Specialty Phone number    Tano Candidoperla Ro DO [facility] Internal Medicine [phone]            See the Encounter Report to view Anticoagulation Flowsheet and Dosing Calendar (Go to Encounters tab in chart review, and find the Anticoagulation Therapy Visit)    Dosage adjustment made based on physician directed care plan.      Lakeisha Rodriguez, CAN

## 2017-06-20 NOTE — MR AVS SNAPSHOT
Lori Mehta   6/20/2017 10:30 AM   Anticoagulation Therapy Visit    Description:  88 year old male   Provider:  NAWAF ANTI COAG   Department:  [facility]           INR as of 6/20/2017     Today's INR 2.6      Anticoagulation Summary as of 6/20/2017     INR goal 2.0-3.0   Today's INR 2.6   Full instructions 5 mg on Tue; 7.5 mg all other days   Next INR check 8/1/2017    Indications   Chronic atrial fibrillation (HCC) [I48.2]  Long-term (current) use of anticoagulants [Z79.01] [Z79.01]         Your next Anticoagulation Clinic appointment(s)     Aug 01, 2017 10:30 AM CDT   Anticoagulation Visit with NAWAF ANTI COAG   [facility]    [address] [place] [address] [phone]              Contact Numbers     Clinic Number:         June 2017 Details    Sun Mon Tue Wed Thu Fri Sat         1               2               3                 4               5               6               7               8               9               10                 11               12               13               14               15               16               17                 18               19               20      5 mg   See details      21      7.5 mg         22      7.5 mg         23      7.5 mg         24      7.5 mg           25      7.5 mg         26      7.5 mg         27      5 mg         28      7.5 mg         29      7.5 mg         30      7.5 mg           Date Details   06/20 This INR check               How to take your warfarin dose     To take:  5 mg Take 1 of the 5 mg tablets.    To take:  7.5 mg Take 1.5 of the 5 mg tablets.           July 2017 Details    Sun Mon Tue Wed Thu Fri Sat           1      7.5 mg           2      7.5 mg         3      7.5 mg         4      5 mg         5      7.5 mg         6      7.5 mg         7      7.5 mg         8      7.5 mg           9      7.5 mg         10      7.5 mg         11      5 mg         12      7.5 mg          13      7.5 mg         14      7.5 mg         15      7.5 mg           16      7.5 mg         17      7.5 mg         18      5 mg         19      7.5 mg         20      7.5 mg         21      7.5 mg         22      7.5 mg           23      7.5 mg         24      7.5 mg         25      5 mg         26      7.5 mg         27      7.5 mg         28      7.5 mg         29      7.5 mg           30      7.5 mg         31      7.5 mg               Date Details   No additional details            How to take your warfarin dose     To take:  5 mg Take 1 of the 5 mg tablets.    To take:  7.5 mg Take 1.5 of the 5 mg tablets.           August 2017 Details    Sun Mon Tue Wed Thu Fri Sat       1            2               3               4               5                 6               7               8               9               10               11               12                 13               14               15               16               17               18               19                 20               21               22               23               24               25               26                 27               28               29               30               31                  Date Details   No additional details    Date of next INR:  8/1/2017         How to take your warfarin dose     To take:  5 mg Take 1 of the 5 mg tablets.

## 2017-08-01 ENCOUNTER — ANTICOAGULATION THERAPY VISIT (OUTPATIENT)
Dept: ANTICOAGULATION | Facility: CLINIC | Age: 82
End: 2017-08-01
Payer: COMMERCIAL

## 2017-08-01 ENCOUNTER — TELEPHONE (OUTPATIENT)
Dept: ANTICOAGULATION | Facility: CLINIC | Age: 82
End: 2017-08-01

## 2017-08-01 VITALS — DIASTOLIC BLOOD PRESSURE: 73 MMHG | HEART RATE: 64 BPM | SYSTOLIC BLOOD PRESSURE: 144 MMHG

## 2017-08-01 DIAGNOSIS — I48.20 CHRONIC ATRIAL FIBRILLATION (H): ICD-10-CM

## 2017-08-01 DIAGNOSIS — Z79.01 LONG-TERM (CURRENT) USE OF ANTICOAGULANTS: ICD-10-CM

## 2017-08-01 DIAGNOSIS — I48.20 CHRONIC ATRIAL FIBRILLATION (H): Primary | ICD-10-CM

## 2017-08-01 LAB — INR POINT OF CARE: 2.3 (ref 0.86–1.14)

## 2017-08-01 PROCEDURE — 36416 COLLJ CAPILLARY BLOOD SPEC: CPT

## 2017-08-01 PROCEDURE — 85610 PROTHROMBIN TIME: CPT | Mod: QW

## 2017-08-01 PROCEDURE — 99207 ZZC NO CHARGE NURSE ONLY: CPT

## 2017-08-01 NOTE — MR AVS SNAPSHOT
Lori Mehta   8/1/2017 10:30 AM   Anticoagulation Therapy Visit    Description:  89 year old male   Provider:  NAWAF ANTI COAG   Department:  [facility]           INR as of 8/1/2017     Today's INR 2.3      Anticoagulation Summary as of 8/1/2017     INR goal 2.0-3.0   Today's INR 2.3   Full instructions 5 mg on Tue; 7.5 mg all other days   Next INR check 9/5/2017    Indications   Chronic atrial fibrillation (HCC) [I48.2]  Long-term (current) use of anticoagulants [Z79.01] [Z79.01]         Your next Anticoagulation Clinic appointment(s)     Sep 05, 2017 11:15 AM CDT   Anticoagulation Visit with NAWAF ANTI COAG   [facility]    [address] [place] [address] [phone]              Contact Numbers     Clinic Number:         August 2017 Details    Sun Mon Tue Wed Thu Fri Sat       1      5 mg   See details      2      7.5 mg         3      7.5 mg         4      7.5 mg         5      7.5 mg           6      7.5 mg         7      7.5 mg         8      5 mg         9      7.5 mg         10      7.5 mg         11      7.5 mg         12      7.5 mg           13      7.5 mg         14      7.5 mg         15      5 mg         16      7.5 mg         17      7.5 mg         18      7.5 mg         19      7.5 mg           20      7.5 mg         21      7.5 mg         22      5 mg         23      7.5 mg         24      7.5 mg         25      7.5 mg         26      7.5 mg           27      7.5 mg         28      7.5 mg         29      5 mg         30      7.5 mg         31      7.5 mg            Date Details   08/01 This INR check               How to take your warfarin dose     To take:  5 mg Take 1 of the 5 mg tablets.    To take:  7.5 mg Take 1.5 of the 5 mg tablets.           September 2017 Details    Sun Mon Tue Wed Thu Fri Sat          1      7.5 mg         2      7.5 mg           3      7.5 mg         4      7.5 mg         5            6               7                8               9                 10               11               12               13               14               15               16                 17               18               19               20               21               22               23                 24               25               26               27               28               29               30                Date Details   No additional details    Date of next INR:  9/5/2017         How to take your warfarin dose     To take:  5 mg Take 1 of the 5 mg tablets.    To take:  7.5 mg Take 1.5 of the 5 mg tablets.

## 2017-08-01 NOTE — TELEPHONE ENCOUNTER
Please review and renew this patients INR referral, orders pending. Thank you!      Lakeisha Rodriguez RN

## 2017-08-01 NOTE — PROGRESS NOTES
ANTICOAGULATION FOLLOW-UP CLINIC VISIT    Patient Name:  Lori Mehta  Date:  8/1/2017  Contact Type:  Face to Face    SUBJECTIVE:     Patient Findings     Positives No Problem Findings           OBJECTIVE    INR Protime   Date Value Ref Range Status   08/01/2017 2.3 (A) 0.86 - 1.14 Final       ASSESSMENT / PLAN  INR assessment THER    Recheck INR In: 5 WEEKS    INR Location Clinic      Anticoagulation Summary as of 8/1/2017     INR goal 2.0-3.0   Today's INR 2.3   Maintenance plan 5 mg (5 mg x 1) on Tue; 7.5 mg (5 mg x 1.5) all other days   Full instructions 5 mg on Tue; 7.5 mg all other days   Weekly total 50 mg   No change documented Lakeisha Rodriguez RN   Plan last modified Lakeisha Rodriguez RN (11/21/2016)   Next INR check 9/5/2017   Target end date     Indications   Chronic atrial fibrillation (HCC) [I48.2]  Long-term (current) use of anticoagulants [Z79.01] [Z79.01]         Anticoagulation Episode Summary     INR check location     Preferred lab     Send INR reminders to [facility] POOL    Comments 5 mg tablets, no bandaid, takes in AM, likes BP done.       Anticoagulation Care Providers     Provider Role Specialty Phone number    Candido Freedman DO [facility] Internal Medicine [phone]            See the Encounter Report to view Anticoagulation Flowsheet and Dosing Calendar (Go to Encounters tab in chart review, and find the Anticoagulation Therapy Visit)    Dosage adjustment made based on physician directed care plan.        Lakeisha Rodriguez RN

## 2017-09-12 ENCOUNTER — ANTICOAGULATION THERAPY VISIT (OUTPATIENT)
Dept: ANTICOAGULATION | Facility: CLINIC | Age: 82
End: 2017-09-12
Payer: COMMERCIAL

## 2017-09-12 VITALS — SYSTOLIC BLOOD PRESSURE: 146 MMHG | DIASTOLIC BLOOD PRESSURE: 80 MMHG | HEART RATE: 84 BPM

## 2017-09-12 DIAGNOSIS — Z79.01 LONG-TERM (CURRENT) USE OF ANTICOAGULANTS: ICD-10-CM

## 2017-09-12 DIAGNOSIS — I48.20 CHRONIC ATRIAL FIBRILLATION (H): ICD-10-CM

## 2017-09-12 LAB — INR POINT OF CARE: 1.6 (ref 0.86–1.14)

## 2017-09-12 PROCEDURE — 36416 COLLJ CAPILLARY BLOOD SPEC: CPT

## 2017-09-12 PROCEDURE — 99207 ZZC NO CHARGE NURSE ONLY: CPT

## 2017-09-12 PROCEDURE — 85610 PROTHROMBIN TIME: CPT | Mod: QW

## 2017-09-12 NOTE — MR AVS SNAPSHOT
Lori Mehta   9/12/2017 10:30 AM   Anticoagulation Therapy Visit    Description:  89 year old male   Provider:  NAWAF ANTI COANAVI   Department:  [facility]           INR as of 9/12/2017     Today's INR 1.6!      Anticoagulation Summary as of 9/12/2017     INR goal 2.0-3.0   Today's INR 1.6!   Full instructions 7.5 mg every day   Next INR check 10/2/2017    Indications   Chronic atrial fibrillation (HCC) [I48.2]  Long-term (current) use of anticoagulants [Z79.01] [Z79.01]         Your next Anticoagulation Clinic appointment(s)     Oct 03, 2017  9:45 AM CDT   Anticoagulation Visit with NAWAF ANTI BILL   [facility]    [address] [place] [address] [phone]              Contact Numbers     Clinic Number:         September 2017 Details    Sun Mon Tue Wed Thu Fri Sat          1               2                 3               4               5               6               7               8               9                 10               11               12      7.5 mg   See details      13      7.5 mg         14      7.5 mg         15      7.5 mg         16      7.5 mg           17      7.5 mg         18      7.5 mg         19      7.5 mg         20      7.5 mg         21      7.5 mg         22      7.5 mg         23      7.5 mg           24      7.5 mg         25      7.5 mg         26      7.5 mg         27      7.5 mg         28      7.5 mg         29      7.5 mg         30      7.5 mg          Date Details   09/12 This INR check               How to take your warfarin dose     To take:  7.5 mg Take 1.5 of the 5 mg tablets.           October 2017 Details    Sun Mon Tue Wed Thu Fri Sat     1      7.5 mg         2            3               4               5               6               7                 8               9               10               11               12               13               14                 15               16                17               18               19               20               21                 22               23               24               25               26               27               28                 29               30               31                    Date Details   No additional details    Date of next INR:  10/2/2017         How to take your warfarin dose     To take:  7.5 mg Take 1.5 of the 5 mg tablets.

## 2017-09-12 NOTE — PROGRESS NOTES
ANTICOAGULATION FOLLOW-UP CLINIC VISIT    Patient Name:  Lori Mehta  Date:  9/12/2017  Contact Type:  Face to Face    SUBJECTIVE:     Patient Findings     Positives Change in diet/appetite (more green beans lately. ), OTC meds (started a new vitamin anout 3-4 days ago - may have Marly K, but pt doesnt rememeber the name of the vitamin )           OBJECTIVE    INR Protime   Date Value Ref Range Status   09/12/2017 1.6 (A) 0.86 - 1.14 Corrected       ASSESSMENT / PLAN  INR assessment SUB    Recheck INR In: 3 WEEKS    INR Location Clinic      Anticoagulation Summary as of 9/12/2017     INR goal 2.0-3.0   Today's INR    Maintenance plan 7.5 mg (5 mg x 1.5) every day   Full instructions 7.5 mg every day   Weekly total 52.5 mg   Plan last modified Lakeisha Rodriguez RN (9/12/2017)   Next INR check 10/2/2017   Target end date     Indications   Chronic atrial fibrillation (HCC) [I48.2]  Long-term (current) use of anticoagulants [Z79.01] [Z79.01]         Anticoagulation Episode Summary     INR check location     Preferred lab     Send INR reminders to [facility] POOL    Comments 5 mg tablets, no bandaid, takes in AM, likes BP done.       Anticoagulation Care Providers     Provider Role Specialty Phone number    Candido Freedman DO [facility] Internal Medicine [phone]            See the Encounter Report to view Anticoagulation Flowsheet and Dosing Calendar (Go to Encounters tab in chart review, and find the Anticoagulation Therapy Visit)    Dosage adjustment made based on physician directed care plan.      Lakeisha Rodriguez, CAN

## 2017-10-03 ENCOUNTER — ANTICOAGULATION THERAPY VISIT (OUTPATIENT)
Dept: ANTICOAGULATION | Facility: CLINIC | Age: 82
End: 2017-10-03
Payer: COMMERCIAL

## 2017-10-03 VITALS — SYSTOLIC BLOOD PRESSURE: 142 MMHG | HEART RATE: 72 BPM | DIASTOLIC BLOOD PRESSURE: 77 MMHG

## 2017-10-03 DIAGNOSIS — I48.20 CHRONIC ATRIAL FIBRILLATION (H): ICD-10-CM

## 2017-10-03 DIAGNOSIS — Z79.01 LONG-TERM (CURRENT) USE OF ANTICOAGULANTS: ICD-10-CM

## 2017-10-03 LAB — INR POINT OF CARE: 2.3 (ref 0.86–1.14)

## 2017-10-03 PROCEDURE — 85610 PROTHROMBIN TIME: CPT | Mod: QW

## 2017-10-03 PROCEDURE — 36416 COLLJ CAPILLARY BLOOD SPEC: CPT

## 2017-10-03 PROCEDURE — 99207 ZZC NO CHARGE NURSE ONLY: CPT

## 2017-10-03 RX ORDER — WARFARIN SODIUM 5 MG/1
TABLET ORAL
Qty: 125 TABLET | Refills: 3 | COMMUNITY
Start: 2017-10-03 | End: 2018-02-07

## 2017-10-03 NOTE — MR AVS SNAPSHOT
Lori Mehta   10/3/2017 9:45 AM   Anticoagulation Therapy Visit    Description:  89 year old male   Provider:  NAWAF ANTI COAG   Department:  [facility]           INR as of 10/3/2017     Today's INR 2.3      Anticoagulation Summary as of 10/3/2017     INR goal 2.0-3.0   Today's INR 2.3   Full instructions 7.5 mg every day   Next INR check 11/14/2017    Indications   Chronic atrial fibrillation (HCC) [I48.2]  Long-term (current) use of anticoagulants [Z79.01] [Z79.01]         Your next Anticoagulation Clinic appointment(s)     Nov 14, 2017  9:45 AM CST   Anticoagulation Visit with PH ANTI COAG   [facility]    [address] [place] [id number] [phone]              Contact Numbers     Clinic Number:         October 2017 Details    Sun Mon Tue Wed Thu Fri Sat     1               2               3      7.5 mg   See details      4      7.5 mg         5      7.5 mg         6      7.5 mg         7      7.5 mg           8      7.5 mg         9      7.5 mg         10      7.5 mg         11      7.5 mg         12      7.5 mg         13      7.5 mg         14      7.5 mg           15      7.5 mg         16      7.5 mg         17      7.5 mg         18      7.5 mg         19      7.5 mg         20      7.5 mg         21      7.5 mg           22      7.5 mg         23      7.5 mg         24      7.5 mg         25      7.5 mg         26      7.5 mg         27      7.5 mg         28      7.5 mg           29      7.5 mg         30      7.5 mg         31      7.5 mg              Date Details   10/03 This INR check               How to take your warfarin dose     To take:  7.5 mg Take 1.5 of the 5 mg tablets.           November 2017 Details    Sun Mon Tue Wed Thu Fri Sat        1      7.5 mg         2      7.5 mg         3      7.5 mg         4      7.5 mg           5      7.5 mg         6      7.5 mg         7      7.5 mg         8      7.5 mg         9      7.5  mg         10      7.5 mg         11      7.5 mg           12      7.5 mg         13      7.5 mg         14            15               16               17               18                 19               20               21               22               23               24               25                 26               27               28               29               30                  Date Details   No additional details    Date of next INR:  11/14/2017         How to take your warfarin dose     To take:  7.5 mg Take 1.5 of the 5 mg tablets.

## 2017-10-03 NOTE — PROGRESS NOTES
ANTICOAGULATION FOLLOW-UP CLINIC VISIT    Patient Name:  Lori Mehat  Date:  10/3/2017  Contact Type:  Face to Face    SUBJECTIVE:     Patient Findings     Positives No Problem Findings           OBJECTIVE    INR Protime   Date Value Ref Range Status   10/03/2017 2.3 (A) 0.86 - 1.14 Final       ASSESSMENT / PLAN  INR assessment THER    Recheck INR In: 6 WEEKS    INR Location Clinic      Anticoagulation Summary as of 10/3/2017     INR goal 2.0-3.0   Today's INR 2.3   Maintenance plan 7.5 mg (5 mg x 1.5) every day   Full instructions 7.5 mg every day   Weekly total 52.5 mg   No change documented Lakeisha Rodriguez RN   Plan last modified Lakeisha Rodriguez RN (9/12/2017)   Next INR check 11/14/2017   Target end date     Indications   Chronic atrial fibrillation (HCC) [I48.2]  Long-term (current) use of anticoagulants [Z79.01] [Z79.01]         Anticoagulation Episode Summary     INR check location     Preferred lab     Send INR reminders to [facility] POOL    Comments 5 mg tablets, no bandaid, takes in AM, likes BP done.       Anticoagulation Care Providers     Provider Role Specialty Phone number    Candido Freedman DO [facility] Internal [facility] [phone]            See the Encounter Report to view Anticoagulation Flowsheet and Dosing Calendar (Go to Encounters tab in chart review, and find the Anticoagulation Therapy Visit)    Dosage adjustment made based on physician directed care plan.        Lakeisha Rodriguez RN

## 2017-11-14 ENCOUNTER — ANTICOAGULATION THERAPY VISIT (OUTPATIENT)
Dept: ANTICOAGULATION | Facility: CLINIC | Age: 82
End: 2017-11-14
Payer: COMMERCIAL

## 2017-11-14 DIAGNOSIS — Z79.01 LONG-TERM (CURRENT) USE OF ANTICOAGULANTS: ICD-10-CM

## 2017-11-14 DIAGNOSIS — I48.20 CHRONIC ATRIAL FIBRILLATION (H): ICD-10-CM

## 2017-11-14 LAB — INR POINT OF CARE: 2.2 (ref 0.86–1.14)

## 2017-11-14 PROCEDURE — 36416 COLLJ CAPILLARY BLOOD SPEC: CPT

## 2017-11-14 PROCEDURE — 99207 ZZC NO CHARGE NURSE ONLY: CPT

## 2017-11-14 PROCEDURE — 85610 PROTHROMBIN TIME: CPT | Mod: QW

## 2017-11-14 NOTE — PROGRESS NOTES
ANTICOAGULATION FOLLOW-UP CLINIC VISIT    Patient Name:  Lori Mehta  Date:  11/14/2017  Contact Type:  Face to Face    SUBJECTIVE:     Patient Findings     Positives No Problem Findings           OBJECTIVE    INR Protime   Date Value Ref Range Status   11/14/2017 2.2 (A) 0.86 - 1.14 Final       ASSESSMENT / PLAN  INR assessment THER    Recheck INR In: 6 WEEKS    INR Location Clinic      Anticoagulation Summary as of 11/14/2017     INR goal 2.0-3.0   Today's INR 2.2   Maintenance plan 7.5 mg (5 mg x 1.5) every day   Full instructions 7.5 mg every day   Weekly total 52.5 mg   No change documented Lakeisha Rodriguez RN   Plan last modified Lakeisha Rodriguez RN (9/12/2017)   Next INR check 12/27/2017   Target end date     Indications   Chronic atrial fibrillation (HCC) [I48.2]  Long-term (current) use of anticoagulants [Z79.01] [Z79.01]         Anticoagulation Episode Summary     INR check location     Preferred lab     Send INR reminders to [facility] POOL    Comments 5 mg tablets, no bandaid, takes in AM, likes BP done.       Anticoagulation Care Providers     Provider Role Specialty Phone number    Candido Freedman DO [facility] Internal Medicine [phone]            See the Encounter Report to view Anticoagulation Flowsheet and Dosing Calendar (Go to Encounters tab in chart review, and find the Anticoagulation Therapy Visit)    Dosage adjustment made based on physician directed care plan.        Lakeisha Rodriguez RN

## 2017-11-14 NOTE — MR AVS SNAPSHOT
Lori Mehta   11/14/2017 9:45 AM   Anticoagulation Therapy Visit    Description:  89 year old male   Provider:  NAWAF ANTI COANAVI   Department:  [facility]           INR as of 11/14/2017     Today's INR 2.2      Anticoagulation Summary as of 11/14/2017     INR goal 2.0-3.0   Today's INR 2.2   Full instructions 7.5 mg every day   Next INR check 12/27/2017    Indications   Chronic atrial fibrillation (HCC) [I48.2]  Long-term (current) use of anticoagulants [Z79.01] [Z79.01]         Your next Anticoagulation Clinic appointment(s)     Dec 27, 2017  9:45 AM CST   Anticoagulation Visit with PH ANTI COAG   [facility] ([facility])    [address] [place] [id number] [phone]              Contact Numbers     Clinic Number:         November 2017 Details    Sun Mon Tue Wed Thu Fri Sat        1               2               3               4                 5               6               7               8               9               10               11                 12               13               14      7.5 mg   See details      15      7.5 mg         16      7.5 mg         17      7.5 mg         18      7.5 mg           19      7.5 mg         20      7.5 mg         21      7.5 mg         22      7.5 mg         23      7.5 mg         24      7.5 mg         25      7.5 mg           26      7.5 mg         27      7.5 mg         28      7.5 mg         29      7.5 mg         30      7.5 mg            Date Details   11/14 This INR check               How to take your warfarin dose     To take:  7.5 mg Take 1.5 of the 5 mg tablets.           December 2017 Details    Sun Mon Tue Wed Thu Fri Sat          1      7.5 mg         2      7.5 mg           3      7.5 mg         4      7.5 mg         5      7.5 mg         6      7.5 mg         7      7.5 mg         8      7.5 mg         9      7.5 mg           10      7.5 mg         11      7.5 mg         12      7.5 mg          13      7.5 mg         14      7.5 mg         15      7.5 mg         16      7.5 mg           17      7.5 mg         18      7.5 mg         19      7.5 mg         20      7.5 mg         21      7.5 mg         22      7.5 mg         23      7.5 mg           24      7.5 mg         25      7.5 mg         26      7.5 mg         27            28               29               30                 31                      Date Details   No additional details    Date of next INR:  12/27/2017         How to take your warfarin dose     To take:  7.5 mg Take 1.5 of the 5 mg tablets.

## 2017-12-13 DIAGNOSIS — I10 HYPERTENSION GOAL BP (BLOOD PRESSURE) < 140/90: ICD-10-CM

## 2017-12-13 RX ORDER — AMLODIPINE BESYLATE 10 MG/1
TABLET ORAL
Qty: 90 TABLET | Refills: 1 | Status: SHIPPED | OUTPATIENT
Start: 2017-12-13 | End: 2018-02-13

## 2017-12-13 NOTE — TELEPHONE ENCOUNTER
Requested Prescriptions   Pending Prescriptions Disp Refills     amLODIPine (NORVASC) 10 MG tablet [Pharmacy Med Name: AMLODIPINE BESYLATE 10MG TABS] 90 tablet 3     Sig: TAKE ONE TABLET BY MOUTH ONCE DAILY    Calcium Channel Blockers Protocol  Failed    12/13/2017  8:41 AM       Failed - Normal serum creatinine on file in past 12 months    Recent Labs   Lab Test  03/07/17   0823   CR  1.33*            Passed - Blood pressure under 140/90    BP Readings from Last 3 Encounters:   10/03/17 142/77   09/12/17 146/80   08/01/17 144/73                Passed - Recent or future visit with authorizing provider    Patient had office visit in the last year or has a visit in the next 30 days with authorizing provider.  See chart review.              Passed - Patient is age 18 or older        Pt calls, asking for this to be filled today, as he only has 2 pills left.

## 2017-12-13 NOTE — TELEPHONE ENCOUNTER
Prescription approved per [facility] Refill Protocol........CAN John

## 2017-12-27 ENCOUNTER — ANTICOAGULATION THERAPY VISIT (OUTPATIENT)
Dept: ANTICOAGULATION | Facility: CLINIC | Age: 82
End: 2017-12-27
Payer: COMMERCIAL

## 2017-12-27 VITALS — HEART RATE: 73 BPM | DIASTOLIC BLOOD PRESSURE: 72 MMHG | SYSTOLIC BLOOD PRESSURE: 135 MMHG

## 2017-12-27 DIAGNOSIS — Z79.01 LONG-TERM (CURRENT) USE OF ANTICOAGULANTS: ICD-10-CM

## 2017-12-27 DIAGNOSIS — I48.20 CHRONIC ATRIAL FIBRILLATION (H): ICD-10-CM

## 2017-12-27 LAB — INR POINT OF CARE: 3.3 (ref 0.86–1.14)

## 2017-12-27 PROCEDURE — 99207 ZZC NO CHARGE NURSE ONLY: CPT

## 2017-12-27 PROCEDURE — 36416 COLLJ CAPILLARY BLOOD SPEC: CPT

## 2017-12-27 PROCEDURE — 85610 PROTHROMBIN TIME: CPT | Mod: QW

## 2017-12-27 NOTE — PROGRESS NOTES
ANTICOAGULATION FOLLOW-UP CLINIC VISIT    Patient Name:  Lori Mehta  Date:  12/27/2017  Contact Type:  Face to Face    SUBJECTIVE:     Patient Findings     Positives Change in diet/appetite (Reports possibly fewer greens in diet), No Problem Findings    Comments Pt states he prefers a slight dose change as opposed to increasing greens in diet.  Pt discussed wanting to change dose to 5 mg 1 time a week and 7.5 mg ROW, advised pt if we did a dose change that I would like to see him in about 2 weeks.  Pt did not want to come back any sooner than 3 weeks.  Advised pt that normally spikes like this are small changes (such as diet with recent holiday--which he feels is accurate) that we made a slight 1x dosage change and INR is back in range at next appt.  Pt is agreeable to this plan. Advised pt to follow up sooner if any changes or concerns.             OBJECTIVE    INR Protime   Date Value Ref Range Status   12/27/2017 3.3 (A) 0.86 - 1.14 Final       ASSESSMENT / PLAN  INR assessment SUPRA    Recheck INR In: 3 WEEKS    INR Location Clinic      Anticoagulation Summary as of 12/27/2017     INR goal 2.0-3.0   Today's INR 3.3!   Maintenance plan 7.5 mg (5 mg x 1.5) every day   Full instructions 12/28: 5 mg; Otherwise 7.5 mg every day   Weekly total 52.5 mg   Plan last modified Lakeisha Rodriguez, RN (9/12/2017)   Next INR check 1/17/2018   Target end date     Indications   Chronic atrial fibrillation (HCC) [I48.2]  Long-term (current) use of anticoagulants [Z79.01] [Z79.01]         Anticoagulation Episode Summary     INR check location     Preferred lab     Send INR reminders to [facility] POOL    Comments 5 mg tablets, no bandaid, takes in AM, likes BP done.       Anticoagulation Care Providers     Provider Role Specialty Phone number    Candido Freedman DO [facility] Internal Medicine [phone]            See the Encounter Report to view Anticoagulation Flowsheet and Dosing Calendar (Go to Encounters tab  in chart review, and find the Anticoagulation Therapy Visit)    Dosage adjustment made based on physician directed care plan.    Gildardo Adame RN

## 2017-12-27 NOTE — MR AVS SNAPSHOT
Lori Mehta   12/27/2017 9:45 AM   Anticoagulation Therapy Visit    Description:  89 year old male   Provider:  NAWAF ANTI COAG   Department:  [facility]           INR as of 12/27/2017     Today's INR 3.3!      Anticoagulation Summary as of 12/27/2017     INR goal 2.0-3.0   Today's INR 3.3!   Full instructions 12/28: 5 mg; Otherwise 7.5 mg every day   Next INR check 1/17/2018    Indications   Chronic atrial fibrillation (HCC) [I48.2]  Long-term (current) use of anticoagulants [Z79.01] [Z79.01]         Your next Anticoagulation Clinic appointment(s)     Jan 17, 2018  9:45 AM CST   Anticoagulation Visit with NAWAF ANTI BILL   [facility]    [address] [place] [id number] [phone]              Contact Numbers     Clinic Number:         December 2017 Details    Sun Mon Tue Wed Thu Fri Sat          1               2                 3               4               5               6               7               8               9                 10               11               12               13               14               15               16                 17               18               19               20               21               22               23                 24               25               26               27      7.5 mg   See details      28      5 mg         29      7.5 mg         30      7.5 mg           31      7.5 mg                Date Details   12/27 This INR check               How to take your warfarin dose     To take:  5 mg Take 1 of the 5 mg tablets.    To take:  7.5 mg Take 1.5 of the 5 mg tablets.           January 2018 Details    Sun Mon Tue Wed Thu Fri Sat      1      7.5 mg         2      7.5 mg         3      7.5 mg         4      7.5 mg         5      7.5 mg         6      7.5 mg           7      7.5 mg         8      7.5 mg         9      7.5 mg         10      7.5 mg         11      7.5 mg         12       7.5 mg         13      7.5 mg           14      7.5 mg         15      7.5 mg         16      7.5 mg         17            18               19               20                 21               22               23               24               25               26               27                 28               29               30               31                   Date Details   No additional details    Date of next INR:  1/17/2018         How to take your warfarin dose     To take:  7.5 mg Take 1.5 of the 5 mg tablets.

## 2018-01-17 ENCOUNTER — ANTICOAGULATION THERAPY VISIT (OUTPATIENT)
Dept: ANTICOAGULATION | Facility: CLINIC | Age: 83
End: 2018-01-17
Payer: COMMERCIAL

## 2018-01-17 DIAGNOSIS — I48.20 CHRONIC ATRIAL FIBRILLATION (H): ICD-10-CM

## 2018-01-17 DIAGNOSIS — Z79.01 LONG-TERM (CURRENT) USE OF ANTICOAGULANTS: ICD-10-CM

## 2018-01-17 LAB — INR POINT OF CARE: 2.4 (ref 0.86–1.14)

## 2018-01-17 PROCEDURE — 99207 ZZC NO CHARGE NURSE ONLY: CPT

## 2018-01-17 PROCEDURE — 85610 PROTHROMBIN TIME: CPT | Mod: QW

## 2018-01-17 PROCEDURE — 36416 COLLJ CAPILLARY BLOOD SPEC: CPT

## 2018-01-17 NOTE — MR AVS SNAPSHOT
Lori Mehta   1/17/2018 9:45 AM   Anticoagulation Therapy Visit    Description:  89 year old male   Provider:  NAWAF ANTI COANAVI   Department:  [facility]           INR as of 1/17/2018     Today's INR 2.4      Anticoagulation Summary as of 1/17/2018     INR goal 2.0-3.0   Today's INR 2.4   Full instructions 7.5 mg every day   Next INR check 2/28/2018    Indications   Chronic atrial fibrillation (HCC) [I48.2]  Long-term (current) use of anticoagulants [Z79.01] [Z79.01]         Your next Anticoagulation Clinic appointment(s)     Feb 28, 2018  9:45 AM CST   Anticoagulation Visit with NAWAF ANTI COAG   [facility] ([facility])    [address] [place] [id number] [phone]              Contact Numbers     Clinic Number:         January 2018 Details    Sun Mon Tue Wed Thu Fri Sat      1               2               3               4               5               6                 7               8               9               10               11               12               13                 14               15               16               17      7.5 mg   See details      18      7.5 mg         19      7.5 mg         20      7.5 mg           21      7.5 mg         22      7.5 mg         23      7.5 mg         24      7.5 mg         25      7.5 mg         26      7.5 mg         27      7.5 mg           28      7.5 mg         29      7.5 mg         30      7.5 mg         31      7.5 mg             Date Details   01/17 This INR check               How to take your warfarin dose     To take:  7.5 mg Take 1.5 of the 5 mg tablets.           February 2018 Details    Sun Mon Tue Wed Thu Fri Sat         1      7.5 mg         2      7.5 mg         3      7.5 mg           4      7.5 mg         5      7.5 mg         6      7.5 mg         7      7.5 mg         8      7.5 mg         9      7.5 mg         10      7.5 mg           11      7.5 mg         12      7.5 mg         13       7.5 mg         14      7.5 mg         15      7.5 mg         16      7.5 mg         17      7.5 mg           18      7.5 mg         19      7.5 mg         20      7.5 mg         21      7.5 mg         22      7.5 mg         23      7.5 mg         24      7.5 mg           25      7.5 mg         26      7.5 mg         27      7.5 mg         28                Date Details   No additional details    Date of next INR:  2/28/2018         How to take your warfarin dose     To take:  7.5 mg Take 1.5 of the 5 mg tablets.

## 2018-01-17 NOTE — PROGRESS NOTES
ANTICOAGULATION FOLLOW-UP CLINIC VISIT    Patient Name:  Lori Mehta  Date:  1/17/2018  Contact Type:  Face to Face    SUBJECTIVE:     Patient Findings     Positives No Problem Findings           OBJECTIVE    INR Protime   Date Value Ref Range Status   01/17/2018 2.4 (A) 0.86 - 1.14 Final       ASSESSMENT / PLAN  INR assessment THER    Recheck INR In: 6 WEEKS    INR Location Clinic      Anticoagulation Summary as of 1/17/2018     INR goal 2.0-3.0   Today's INR 2.4   Maintenance plan 7.5 mg (5 mg x 1.5) every day   Full instructions 7.5 mg every day   Weekly total 52.5 mg   No change documented Lakeisha Rodriguez RN   Plan last modified Lakeisha Rodriguez RN (9/12/2017)   Next INR check 2/28/2018   Target end date     Indications   Chronic atrial fibrillation (HCC) [I48.2]  Long-term (current) use of anticoagulants [Z79.01] [Z79.01]         Anticoagulation Episode Summary     INR check location     Preferred lab     Send INR reminders to [facility] POOL    Comments 5 mg tablets, no bandaid, takes in AM, likes BP done.       Anticoagulation Care Providers     Provider Role Specialty Phone number    Candido Freedman DO [facility] Internal Medicine [phone]            See the Encounter Report to view Anticoagulation Flowsheet and Dosing Calendar (Go to Encounters tab in chart review, and find the Anticoagulation Therapy Visit)    Dosage adjustment made based on physician directed care plan.      Lakeisha Rodriguez RN

## 2018-02-07 ENCOUNTER — TELEPHONE (OUTPATIENT)
Dept: FAMILY MEDICINE | Facility: CLINIC | Age: 83
End: 2018-02-07

## 2018-02-07 DIAGNOSIS — I48.20 CHRONIC ATRIAL FIBRILLATION (H): ICD-10-CM

## 2018-02-07 RX ORDER — WARFARIN SODIUM 5 MG/1
TABLET ORAL
Qty: 130 TABLET | Refills: 0 | Status: SHIPPED | OUTPATIENT
Start: 2018-02-07 | End: 2018-08-01

## 2018-02-07 NOTE — TELEPHONE ENCOUNTER
Reason for Call:  Other prescription    Detailed comments: Lori has questions about his coumadin prescription.  He is thinking the prescription he just picked up is incorrect.  Please call and advise.    Phone Number Patient can be reached at: Home number on file [phone] (home)    Best Time: any    Can we leave a detailed message on this number? YES    Call taken on 2/7/2018 at 2:37 PM by Lakeisha Buitrago

## 2018-02-07 NOTE — TELEPHONE ENCOUNTER
Pt states that [facility] refilled his bottle as him taking 5 mg once a week and 7.5 mg the rest of the days. He is currently taking 7.5 mg daily, so he is going to run short. I have refilled a new RX that states he should be taking 1.5 tabs a day so that if he runs out early, there will be a new RX at [facility] for him  Lakeisha Rodriguez RN

## 2018-02-13 ENCOUNTER — OFFICE VISIT (OUTPATIENT)
Dept: INTERNAL MEDICINE | Facility: CLINIC | Age: 83
End: 2018-02-13
Payer: COMMERCIAL

## 2018-02-13 ENCOUNTER — TELEPHONE (OUTPATIENT)
Dept: FAMILY MEDICINE | Facility: CLINIC | Age: 83
End: 2018-02-13

## 2018-02-13 VITALS
TEMPERATURE: 97.7 F | SYSTOLIC BLOOD PRESSURE: 138 MMHG | BODY MASS INDEX: 28.72 KG/M2 | OXYGEN SATURATION: 94 % | DIASTOLIC BLOOD PRESSURE: 70 MMHG | WEIGHT: 183 LBS | RESPIRATION RATE: 20 BRPM | HEIGHT: 67 IN | HEART RATE: 98 BPM

## 2018-02-13 DIAGNOSIS — Z79.01 LONG-TERM (CURRENT) USE OF ANTICOAGULANTS: ICD-10-CM

## 2018-02-13 DIAGNOSIS — F43.22 ADJUSTMENT DISORDER WITH ANXIOUS MOOD: ICD-10-CM

## 2018-02-13 DIAGNOSIS — I10 HYPERTENSION GOAL BP (BLOOD PRESSURE) < 140/90: ICD-10-CM

## 2018-02-13 DIAGNOSIS — N18.30 CKD (CHRONIC KIDNEY DISEASE) STAGE 3, GFR 30-59 ML/MIN (H): ICD-10-CM

## 2018-02-13 DIAGNOSIS — Z00.00 ENCOUNTER FOR ROUTINE ADULT HEALTH EXAMINATION WITHOUT ABNORMAL FINDINGS: Primary | ICD-10-CM

## 2018-02-13 DIAGNOSIS — I10 ESSENTIAL HYPERTENSION WITH GOAL BLOOD PRESSURE LESS THAN 140/90: ICD-10-CM

## 2018-02-13 DIAGNOSIS — E78.5 HYPERLIPIDEMIA LDL GOAL <100: ICD-10-CM

## 2018-02-13 DIAGNOSIS — M10.00 IDIOPATHIC GOUT, UNSPECIFIED CHRONICITY, UNSPECIFIED SITE: ICD-10-CM

## 2018-02-13 DIAGNOSIS — N40.1 BENIGN NON-NODULAR PROSTATIC HYPERPLASIA WITH LOWER URINARY TRACT SYMPTOMS: ICD-10-CM

## 2018-02-13 LAB
ALBUMIN SERPL-MCNC: 3.8 G/DL (ref 3.4–5)
ALP SERPL-CCNC: 69 U/L (ref 40–150)
ALT SERPL W P-5'-P-CCNC: 24 U/L (ref 0–70)
ANION GAP SERPL CALCULATED.3IONS-SCNC: 9 MMOL/L (ref 3–14)
AST SERPL W P-5'-P-CCNC: 23 U/L (ref 0–45)
BILIRUB SERPL-MCNC: 0.6 MG/DL (ref 0.2–1.3)
BUN SERPL-MCNC: 24 MG/DL (ref 7–30)
CALCIUM SERPL-MCNC: 9.7 MG/DL (ref 8.5–10.1)
CHLORIDE SERPL-SCNC: 104 MMOL/L (ref 94–109)
CHOLEST SERPL-MCNC: 155 MG/DL
CO2 SERPL-SCNC: 27 MMOL/L (ref 20–32)
CREAT SERPL-MCNC: 1.35 MG/DL (ref 0.66–1.25)
ERYTHROCYTE [DISTWIDTH] IN BLOOD BY AUTOMATED COUNT: 13.4 % (ref 10–15)
GFR SERPL CREATININE-BSD FRML MDRD: 50 ML/MIN/1.7M2
GLUCOSE SERPL-MCNC: 113 MG/DL (ref 70–99)
HCT VFR BLD AUTO: 39.5 % (ref 40–53)
HDLC SERPL-MCNC: 66 MG/DL
HGB BLD-MCNC: 12.8 G/DL (ref 13.3–17.7)
LDLC SERPL CALC-MCNC: 70 MG/DL
MCH RBC QN AUTO: 31.1 PG (ref 26.5–33)
MCHC RBC AUTO-ENTMCNC: 32.4 G/DL (ref 31.5–36.5)
MCV RBC AUTO: 96 FL (ref 78–100)
NONHDLC SERPL-MCNC: 89 MG/DL
PLATELET # BLD AUTO: 214 10E9/L (ref 150–450)
POTASSIUM SERPL-SCNC: 3.9 MMOL/L (ref 3.4–5.3)
PROT SERPL-MCNC: 8.1 G/DL (ref 6.8–8.8)
RBC # BLD AUTO: 4.11 10E12/L (ref 4.4–5.9)
SODIUM SERPL-SCNC: 140 MMOL/L (ref 133–144)
TRIGL SERPL-MCNC: 97 MG/DL
WBC # BLD AUTO: 7.8 10E9/L (ref 4–11)

## 2018-02-13 PROCEDURE — 99213 OFFICE O/P EST LOW 20 MIN: CPT | Mod: 25 | Performed by: INTERNAL MEDICINE

## 2018-02-13 PROCEDURE — 85027 COMPLETE CBC AUTOMATED: CPT | Performed by: INTERNAL MEDICINE

## 2018-02-13 PROCEDURE — 80061 LIPID PANEL: CPT | Performed by: INTERNAL MEDICINE

## 2018-02-13 PROCEDURE — 36415 COLL VENOUS BLD VENIPUNCTURE: CPT | Performed by: INTERNAL MEDICINE

## 2018-02-13 PROCEDURE — 80053 COMPREHEN METABOLIC PANEL: CPT | Performed by: INTERNAL MEDICINE

## 2018-02-13 PROCEDURE — G0439 PPPS, SUBSEQ VISIT: HCPCS | Performed by: INTERNAL MEDICINE

## 2018-02-13 RX ORDER — HYDROCHLOROTHIAZIDE 25 MG/1
25 TABLET ORAL DAILY
Qty: 90 TABLET | Refills: 3 | Status: SHIPPED | OUTPATIENT
Start: 2018-02-13 | End: 2019-02-04

## 2018-02-13 RX ORDER — AMLODIPINE BESYLATE 10 MG/1
10 TABLET ORAL DAILY
Qty: 90 TABLET | Refills: 3 | Status: SHIPPED | OUTPATIENT
Start: 2018-02-13 | End: 2019-02-12

## 2018-02-13 RX ORDER — TAMSULOSIN HYDROCHLORIDE 0.4 MG/1
0.4 CAPSULE ORAL DAILY
Qty: 90 CAPSULE | Refills: 3 | Status: SHIPPED | OUTPATIENT
Start: 2018-02-13 | End: 2019-02-12

## 2018-02-13 RX ORDER — SIMVASTATIN 20 MG
60 TABLET ORAL AT BEDTIME
Qty: 270 TABLET | Refills: 3 | Status: SHIPPED | OUTPATIENT
Start: 2018-02-13 | End: 2019-02-12

## 2018-02-13 RX ORDER — ALLOPURINOL 100 MG/1
100 TABLET ORAL DAILY
Qty: 90 TABLET | Refills: 3 | Status: SHIPPED | OUTPATIENT
Start: 2018-02-13 | End: 2019-02-12

## 2018-02-13 RX ORDER — CITALOPRAM HYDROBROMIDE 10 MG/1
10 TABLET ORAL DAILY
Qty: 90 TABLET | Refills: 3 | Status: SHIPPED | OUTPATIENT
Start: 2018-02-13 | End: 2019-02-04

## 2018-02-13 ASSESSMENT — PAIN SCALES - GENERAL: PAINLEVEL: NO PAIN (0)

## 2018-02-13 NOTE — MR AVS SNAPSHOT
After Visit Summary   2/13/2018    Lori Mehta    MRN: [id number]           Patient Information     Date Of Birth          6/27/1928        Visit Information        Provider Department      2/13/2018 9:00 AM Wale Short MD [facility]        Today's Diagnoses     CKD (chronic kidney disease) stage 3, GFR 30-59 ml/min    -  1    Long-term (current) use of anticoagulants [Z79.01]        Essential hypertension with goal blood pressure less than 140/90          Care Instructions      Preventive Health Recommendations:       Male Ages 65 and over    Yearly exam:             See your health care provider every year in order to  o   Review health changes.   o   Discuss preventive care.    o   Review your medicines if your doctor has prescribed any.    Talk with your health care provider about whether you should have a test to screen for prostate cancer (PSA).    Every 3 years, have a diabetes test (fasting glucose). If you are at risk for diabetes, you should have this test more often.    Every 5 years, have a cholesterol test. Have this test more often if you are at risk for high cholesterol or heart disease.     Every 10 years, have a colonoscopy. Or, have a yearly FIT test (stool test). These exams will check for colon cancer.    Talk to with your health care provider about screening for Abdominal Aortic Aneurysm if you have a family history of AAA or have a history of smoking.  Shots:     Get a flu shot each year.     Get a tetanus shot every 10 years.     Talk to your doctor about your pneumonia vaccines. There are now two you should receive - Pneumovax (PPSV 23) and Prevnar (PCV 13).    Talk to your doctor about a shingles vaccine.     Talk to your doctor about the hepatitis B vaccine.  Nutrition:     Eat at least 5 servings of fruits and vegetables each day.     Eat whole-grain bread, whole-wheat pasta and brown rice instead of white grains and rice.     Talk to your doctor  "about Calcium and Vitamin D.   Lifestyle    Exercise for at least 150 minutes a week (30 minutes a day, 5 days a week). This will help you control your weight and prevent disease.     Limit alcohol to one drink per day.     No smoking.     Wear sunscreen to prevent skin cancer.     See your dentist every six months for an exam and cleaning.     See your eye doctor every 1 to 2 years to screen for conditions such as glaucoma, macular degeneration and cataracts.          Follow-ups after your visit        Your next 10 appointments already scheduled     Feb 13, 2018  9:00 AM CST   PHYSICAL with Wale Short MD   [facility] ([address] [phone]            Feb 28, 2018  9:45 AM CST   Anticoagulation Visit with PH ANTI COAG   [facility] ([address] [phone]              Who to contact     If you have questions or need follow up information about today's clinic visit or your schedule please contact [facility] directly at [phone].  Normal or non-critical lab and imaging results will be communicated to you by Exam18hart, letter or phone within 4 business days after the clinic has received the results. If you do not hear from us within 7 days, please contact the clinic through ZYOMYXt or phone. If you have a critical or abnormal lab result, we will notify you by phone as soon as possible.  Submit refill requests through "[employer] or call your pharmacy and they will forward the refill request to us. Please allow 3 business days for your refill to be completed.          Additional Information About Your Visit        Exam18hart Information     "[employer] lets you send messages to your doctor, view your test results, renew your prescriptions, schedule appointments and more. To sign up, go to [email or web address]/"[email or web address] . Click on \"Log in\" on the left " "side of the screen, which will take you to the Welcome page. Then click on \"Sign up Now\" on the right side of the page.     You will be asked to enter the access code listed below, as well as some personal information. Please follow the directions to create your username and password.     Your access code is: [email or web address]  Expires: 2018  8:47 AM     Your access code will  in 90 days. If you need help or a new code, please call your [place] clinic or [phone].        Care EveryWhere ID     This is your Care EveryWhere ID. This could be used by other organizations to access your [place] medical records  [id number]        Your Vitals Were     Pulse Temperature Respirations Height Pulse Oximetry BMI (Body Mass Index)    98 97.7  F (36.5  C) (Temporal) 20 5' 7\" (1.702 m) 94% 28.66 kg/m2       Blood Pressure from Last 3 Encounters:   18 160/70   17 135/72   10/03/17 142/77    Weight from Last 3 Encounters:   18 183 lb (83 kg)   17 181 lb (82.1 kg)   16 173 lb (78.5 kg)              We Performed the Following     CBC with platelets     Comprehensive metabolic panel     Lipid Profile        Primary Care Provider Office Phone # Fax #    Wale Short [phone]       [facility] [address] [place] [address]        Equal Access to Services     [facility] : Hadii aad ku hadasho Soomaali, waaxda luqadaha, qaybta kaalmada adeegyada, prakash hale . So [facility] [phone].    ATENCIÓN: Si habla español, tiene a rae disposición servicios gratuitos de asistencia lingüística. Llame al [phone].    We comply with applicable federal civil rights laws and [place] laws. We do not discriminate on the basis of race, color, national origin, age, disability, sex, sexual orientation, or gender identity.            Thank you!     Thank you for choosing [facility]  for your care. Our goal is always to provide " you with excellent care. Hearing back from our patients is one way we can continue to improve our services. Please take a few minutes to complete the written survey that you may receive in the mail after your visit with us. Thank you!             Your Updated Medication List - Protect others around you: Learn how to safely use, store and throw away your medicines at www.disposemymeds.org.          This list is accurate as of 2/13/18  8:47 AM.  Always use your most recent med list.                   Brand Name Dispense Instructions for use Diagnosis    allopurinol 100 MG tablet    ZYLOPRIM    90 tablet    Take 1 tablet (100 mg) by mouth daily    Idiopathic gout, unspecified chronicity, unspecified site       amLODIPine 10 MG tablet    NORVASC    90 tablet    TAKE ONE TABLET BY MOUTH ONCE DAILY    Hypertension goal BP (blood pressure) < 140/90       Bromelains 500 MG Tabs      Take 3 tablets by mouth. 3-4 daily        CAPSICUM (CAYENNE) PO      Take 450 mg by mouth daily        COENZYME Q10      1 DAILY        hydrochlorothiazide 25 MG tablet    HYDRODIURIL    90 tablet    Take 1 tablet (25 mg) by mouth daily    Essential hypertension with goal blood pressure less than 140/90       omega 3 1200 MG Caps      2 CAP DAILY        SAW PALMETTO EXTRACT PO      Take 1,000 mg by mouth        Selenium 200 MCG Caps      1 CAPSULE DAILY        simvastatin 20 MG tablet    ZOCOR    270 tablet    Take 3 tablets (60 mg) by mouth At Bedtime    Hyperlipidemia LDL goal <100       tamsulosin 0.4 MG capsule    FLOMAX    90 capsule    Take 1 capsule (0.4 mg) by mouth daily    Benign non-nodular prostatic hyperplasia with lower urinary tract symptoms       VITAMIN B12 TR 1000 MCG Tbcr      1 TAB DAILY        vitamin C 500 MG Chew      Take  by mouth. 1-3 daily        vitamin E 400 UNIT capsule      1 CAPSULE DAILY        warfarin 5 MG tablet    COUMADIN    130 tablet    Take 7.5 daily, or as directed by the coumadin clinic.    Chronic  atrial fibrillation (H)       Zinc 30 MG Tabs      1 TABLET DAILY

## 2018-02-13 NOTE — TELEPHONE ENCOUNTER
Kareem was in to see Dr Short today.  Calls to report that tamsulosin (FLOMAX) 0.4 MG capsule was ordered, and [facility].  He asks that this medication be removed form his medication list so it will not be ordered by mistake again.

## 2018-02-13 NOTE — PROGRESS NOTES
SUBJECTIVE:   Lori Mehta is a 89 year old male who presents for Preventive Visit.    Are you in the first 12 months of your Medicare Part B coverage?  No    Healthy Habits:    Do you get at least three servings of calcium containing foods daily (dairy, green leafy vegetables, etc.)? yes    Amount of exercise or daily activities, outside of work: stays active    Problems taking medications regularly No    Medication side effects: No    Have you had an eye exam in the past two years? yes    Do you see a dentist twice per year? Once a year    Do you have sleep apnea, excessive snoring or daytime drowsiness?no      Ability to successfully perform activities of daily living: Yes, no assistance needed    Home safety:  none identified     Hearing impairment: Yes, does have hearing aids but doesn't like them    Fall risk:  Fallen 2 or more times in the past year?: No  Any fall with injury in the past year?: No    He questions his allopurinol, had gout before he forgot about.    Not walking as well, legs feel tired, he blames his hip replacement from 4 years ago.    He feels some anxiousness.. Hard to sleep, worries about things.    BP is better at home he says, once was 166 systolic, normally 128-130 range.    Did labs earlier today.      COGNITIVE SCREEN  1) Repeat 3 items (Banana, Sunrise, Chair)    2) Clock draw: NORMAL  3) 3 item recall: Recalls 2 objects   Results: NORMAL clock, 1-2 items recalled: COGNITIVE IMPAIRMENT LESS LIKELY    Mini-CogTM Copyright CARINE Hoyt. Licensed by the author for use in [facility]; reprinted with permission (rajni@.[facility]). All rights reserved.        Reviewed and updated as needed this visit by clinical staff  Tobacco  Allergies  Meds         Reviewed and updated as needed this visit by Provider        Social History   Substance Use Topics     Smoking status: Never Smoker     Smokeless tobacco: Never Used     Alcohol use 0.6 oz/week     1 Standard drinks or equivalent  per week       If you drink alcohol do you typically have >3 drinks per day or >7 drinks per week? No                        Today's PHQ-2 Score:   PHQ-2 ( 1999 [employer]) 2/13/2018 3/7/2017   Q1: Little interest or pleasure in doing things 1 1   Q2: Feeling down, depressed or hopeless 1 1   PHQ-2 Score 2 2       Do you feel safe in your environment - Yes    Do you have a Health Care Directive?: Yes: Advance Directive has been received and scanned.    Current providers sharing in care for this patient include:   Patient Care Team:  Wale Short MD as PCP - General (Internal Medicine)    The following health maintenance items are reviewed in Epic and correct as of today:  Health Maintenance   Topic Date Due     PNEUMOCOCCAL (2 of 2 - PCV13) 04/07/2010     ADVANCE DIRECTIVE PLANNING Q5 YRS  09/20/2016     MICROALBUMIN Q1 YEAR  10/30/2016     OP ANNUAL INR REFERRAL  05/24/2017     INFLUENZA VACCINE (SYSTEM ASSIGNED)  09/01/2017     CREATININE Q1 YEAR  03/07/2018     FALL RISK ASSESSMENT  03/07/2018     LIPID MONITORING Q1 YEAR  03/07/2018     TETANUS IMMUNIZATION (SYSTEM ASSIGNED)  04/07/2019         Pneumonia Vaccine:patient doesn't want it    ROS:  C: NEGATIVE for fever, chills, change in weight  I: NEGATIVE for worrisome rashes, moles or lesions  E: NEGATIVE for vision changes or irritation  E/M: NEGATIVE for ear, mouth and throat problems  R: NEGATIVE for significant cough or SOB  B: NEGATIVE for masses, tenderness or discharge  CV: NEGATIVE for chest pain, palpitations or peripheral edema  GI: NEGATIVE for nausea, abdominal pain, heartburn, or change in bowel habits  : bph is a little better, less urianry frequency and better stream  MUSCULOSKELETAL:some weakness in his legs  N: NEGATIVE for weakness, dizziness or paresthesias  E: NEGATIVE for temperature intolerance, skin/hair changes  H: NEGATIVE for bleeding problems  P: NEGATIVE for changes in mood or affect    OBJECTIVE:   /70  Pulse 98  Temp 97.7  " F (36.5  C) (Temporal)  Resp 20  Ht 5' 7\" (1.702 m)  Wt 183 lb (83 kg)  SpO2 94%  BMI 28.66 kg/m2 Estimated body mass index is 28.66 kg/(m^2) as calculated from the following:    Height as of this encounter: 5' 7\" (1.702 m).    Weight as of this encounter: 183 lb (83 kg).  EXAM:   GENERAL: healthy, alert and no distress  EYES: Eyes grossly normal to inspection, PERRL and conjunctivae and sclerae normal  HENT: ear canals and TM's normal, nose and mouth without ulcers or lesions  NECK: no adenopathy, no asymmetry, masses, or scars and thyroid normal to palpation  RESP: lungs clear to auscultation - no rales, rhonchi or wheezes  CV: frequent extasystoles with compensatory pause, no murmur, click or rub, peripheral pulses strong and no peripheral edema  ABDOMEN: soft, nontender, no hepatosplenomegaly, no masses and bowel sounds normal  MS: no gross musculoskeletal defects noted, no edema  SKIN: no suspicious lesions or rashes  NEURO: Normal strength and tone, mentation intact and speech normal  PSYCH: mentation appears normal, affect normal/bright    ASSESSMENT / PLAN:       ICD-10-CM    1. Encounter for routine adult health examination without abnormal findings Z00.00    2. CKD (chronic kidney disease) stage 3, GFR 30-59 ml/min N18.3 Lipid Profile     Comprehensive metabolic panel     CBC with platelets   3. Long-term (current) use of anticoagulants [Z79.01] Z79.01 Lipid Profile     Comprehensive metabolic panel     CBC with platelets   4. Essential hypertension with goal blood pressure less than 140/90 I10 Lipid Profile     Comprehensive metabolic panel     CBC with platelets     hydrochlorothiazide (HYDRODIURIL) 25 MG tablet   5. Adjustment disorder with anxious mood F43.22 citalopram (CELEXA) 10 MG tablet   6. Hypertension goal BP (blood pressure) < 140/90 I10 amLODIPine (NORVASC) 10 MG tablet   7. Hyperlipidemia LDL goal <100 E78.5 simvastatin (ZOCOR) 20 MG tablet   8. Benign non-nodular prostatic hyperplasia " "with lower urinary tract symptoms N40.1 tamsulosin (FLOMAX) 0.4 MG capsule   9. Idiopathic gout, unspecified chronicity, unspecified site M10.00 allopurinol (ZYLOPRIM) 100 MG tablet     Patient is ok, some weakness in his legs but doesn't want PT, still upset about his hip replacement 4 years ago.      He has anxious feelings, not agreeing with his wife of 63 years. Will try some citalopram for the anxiety and see if this helps.    BPH will continue flomax for symptoms of frequency and hesitancy.  End of Life Planning:  Patient currently has an advanced directive: Yes.  Practitioner is supportive of decision.    COUNSELING:  Reviewed preventive health counseling, as reflected in patient instructions       Regular exercise        Estimated body mass index is 28.66 kg/(m^2) as calculated from the following:    Height as of this encounter: 5' 7\" (1.702 m).    Weight as of this encounter: 183 lb (83 kg).       reports that he has never smoked. He has never used smokeless tobacco.      Appropriate preventive services were discussed with this patient, including applicable screening as appropriate for cardiovascular disease, diabetes, osteopenia/osteoporosis, and glaucoma.  As appropriate for age/gender, discussed screening for colorectal cancer, prostate cancer, breast cancer, and cervical cancer. Checklist reviewing preventive services available has been given to the patient.    Reviewed patients plan of care and provided an AVS. The Basic Care Plan (routine screening as documented in Health Maintenance) for Lori meets the Care Plan requirement. This Care Plan has been established and reviewed with the Patient.    Counseling Resources:  ATP IV Guidelines  Pooled Cohorts Equation Calculator  Breast Cancer Risk Calculator  FRAX Risk Assessment  ICSI Preventive Guidelines  Dietary Guidelines for Americans, 2010  USDA's MyPlate  ASA Prophylaxis  Lung CA Screening    Wale Short MD  [facility]  "

## 2018-02-13 NOTE — NURSING NOTE
"Chief Complaint   Patient presents with     Wellness Visit       Initial /70  Pulse 98  Temp 97.7  F (36.5  C) (Temporal)  Resp 20  Ht 5' 7\" (1.702 m)  Wt 183 lb (83 kg)  SpO2 94%  BMI 28.66 kg/m2 Estimated body mass index is 28.66 kg/(m^2) as calculated from the following:    Height as of this encounter: 5' 7\" (1.702 m).    Weight as of this encounter: 183 lb (83 kg).  Medication Reconciliation: complete    "

## 2018-02-28 ENCOUNTER — ANTICOAGULATION THERAPY VISIT (OUTPATIENT)
Dept: ANTICOAGULATION | Facility: CLINIC | Age: 83
End: 2018-02-28
Payer: COMMERCIAL

## 2018-02-28 DIAGNOSIS — Z79.01 LONG-TERM (CURRENT) USE OF ANTICOAGULANTS: ICD-10-CM

## 2018-02-28 DIAGNOSIS — I48.20 CHRONIC ATRIAL FIBRILLATION (H): ICD-10-CM

## 2018-02-28 LAB — INR POINT OF CARE: 2.3 (ref 0.86–1.14)

## 2018-02-28 PROCEDURE — 99207 ZZC NO CHARGE NURSE ONLY: CPT

## 2018-02-28 PROCEDURE — 36416 COLLJ CAPILLARY BLOOD SPEC: CPT

## 2018-02-28 PROCEDURE — 85610 PROTHROMBIN TIME: CPT | Mod: QW

## 2018-02-28 NOTE — MR AVS SNAPSHOT
Lori Mehta   2/28/2018 9:45 AM   Anticoagulation Therapy Visit    Description:  89 year old male   Provider:  PH ANTI COAG   Department:  [facility]           INR as of 2/28/2018     Today's INR 2.3      Anticoagulation Summary as of 2/28/2018     INR goal 2.0-3.0   Today's INR 2.3   Full instructions 7.5 mg every day   Next INR check 4/11/2018    Indications   Chronic atrial fibrillation (HCC) [I48.2]  Long-term (current) use of anticoagulants [Z79.01] [Z79.01]         Your next Anticoagulation Clinic appointment(s)     Apr 11, 2018  9:45 AM CDT   [facility]    [address] [place] [id number] [phone]              Contact Numbers     Clinic Number:         February 2018 Details    Sun Mon Tue Wed Thu Fri Sat         1               2               3                 4               5               6               7               8               9               10                 11               12               13               14               15               16               17                 18               19               20               21               22               23               24                 25               26               27               28      7.5 mg   See details          Date Details   02/28 This INR check               How to take your warfarin dose     To take:  7.5 mg Take 1.5 of the 5 mg tablets.           March 2018 Details    Sun Mon Tue Wed Thu Fri Sat         1      7.5 mg         2      7.5 mg         3      7.5 mg           4      7.5 mg         5      7.5 mg         6      7.5 mg         7      7.5 mg         8      7.5 mg         9      7.5 mg         10      7.5 mg           11      7.5 mg         12      7.5 mg         13      7.5 mg         14      7.5 mg         15      7.5 mg         16      7.5 mg         17      7.5 mg           18      7.5 mg          19      7.5 mg         20      7.5 mg         21      7.5 mg         22      7.5 mg         23      7.5 mg         24      7.5 mg           25      7.5 mg         26      7.5 mg         27      7.5 mg         28      7.5 mg         29      7.5 mg         30      7.5 mg         31      7.5 mg          Date Details   No additional details            How to take your warfarin dose     To take:  7.5 mg Take 1.5 of the 5 mg tablets.           April 2018 Details    Sun Mon Tue Wed Thu Fri Sat     1      7.5 mg         2      7.5 mg         3      7.5 mg         4      7.5 mg         5      7.5 mg         6      7.5 mg         7      7.5 mg           8      7.5 mg         9      7.5 mg         10      7.5 mg         11            12               13               14                 15               16               17               18               19               20               21                 22               23               24               25               26               27               28                 29               30                     Date Details   No additional details    Date of next INR:  4/11/2018         How to take your warfarin dose     To take:  7.5 mg Take 1.5 of the 5 mg tablets.

## 2018-02-28 NOTE — PROGRESS NOTES
ANTICOAGULATION FOLLOW-UP CLINIC VISIT    Patient Name:  Lori Mehta  Date:  2/28/2018  Contact Type:  Face to Face    SUBJECTIVE:     Patient Findings     Positives No Problem Findings           OBJECTIVE    INR Protime   Date Value Ref Range Status   02/28/2018 2.3 (A) 0.86 - 1.14 Final       ASSESSMENT / PLAN  INR assessment THER    Recheck INR In: 6 WEEKS    INR Location Clinic      Anticoagulation Summary as of 2/28/2018     INR goal 2.0-3.0   Today's INR 2.3   Maintenance plan 7.5 mg (5 mg x 1.5) every day   Full instructions 7.5 mg every day   Weekly total 52.5 mg   No change documented Lakeisha Rodriguez RN   Plan last modified Lakeisha Rodriguez RN (9/12/2017)   Next INR check 4/11/2018   Target end date     Indications   Chronic atrial fibrillation (HCC) [I48.2]  Long-term (current) use of anticoagulants [Z79.01] [Z79.01]         Anticoagulation Episode Summary     INR check location     Preferred lab     Send INR reminders to MIHM POOL    Comments 5 mg tablets, no bandaid, takes in AM, likes BP done.       Anticoagulation Care Providers     Provider Role Specialty Phone number    Candido Freedman DO [facility] Internal Medicine [phone]            See the Encounter Report to view Anticoagulation Flowsheet and Dosing Calendar (Go to Encounters tab in chart review, and find the Anticoagulation Therapy Visit)    Dosage adjustment made based on physician directed care plan.3      Lakeisha Rodriguez RN

## 2018-03-02 ENCOUNTER — TELEPHONE (OUTPATIENT)
Dept: INTERNAL MEDICINE | Facility: CLINIC | Age: 83
End: 2018-03-02

## 2018-03-02 NOTE — TELEPHONE ENCOUNTER
Reason for Call:  Other call back    Detailed comments: Patient's wife Fatoumata called and is requesting a call back from Dr. Short. She said that she was looking over the after visit summary from Lori' last visit with Dr. Short and it said on there that he has chronic kidney disease stage 3. She is wondering what this means and how bad it is. Please call back to advise.     Phone Number Patient can be reached at: Home number on file [phone] (home)    Best Time: before 3:15pm today     Can we leave a detailed message on this number? YES    Call taken on 3/2/2018 at 1:34 PM by Yimi An

## 2018-03-02 NOTE — TELEPHONE ENCOUNTER
That is not severe but something we follow. Don't worry about it, he is good for his age at that level.  We use it to remind us to dose some medications differently.

## 2018-03-28 ENCOUNTER — OFFICE VISIT (OUTPATIENT)
Dept: FAMILY MEDICINE | Facility: CLINIC | Age: 83
End: 2018-03-28
Payer: COMMERCIAL

## 2018-03-28 VITALS
DIASTOLIC BLOOD PRESSURE: 60 MMHG | TEMPERATURE: 97.6 F | SYSTOLIC BLOOD PRESSURE: 154 MMHG | OXYGEN SATURATION: 99 % | HEART RATE: 94 BPM | WEIGHT: 186.4 LBS | BODY MASS INDEX: 29.19 KG/M2

## 2018-03-28 DIAGNOSIS — I63.9 CEREBROVASCULAR ACCIDENT (CVA), UNSPECIFIED MECHANISM (H): ICD-10-CM

## 2018-03-28 DIAGNOSIS — N18.30 CKD (CHRONIC KIDNEY DISEASE) STAGE 3, GFR 30-59 ML/MIN (H): ICD-10-CM

## 2018-03-28 DIAGNOSIS — I10 ESSENTIAL HYPERTENSION WITH GOAL BLOOD PRESSURE LESS THAN 140/90: Primary | ICD-10-CM

## 2018-03-28 PROCEDURE — 99213 OFFICE O/P EST LOW 20 MIN: CPT | Performed by: FAMILY MEDICINE

## 2018-03-28 RX ORDER — LISINOPRIL 2.5 MG/1
2.5 TABLET ORAL DAILY
Qty: 30 TABLET | Refills: 1 | Status: SHIPPED | OUTPATIENT
Start: 2018-03-28 | End: 2018-09-18

## 2018-03-28 ASSESSMENT — PAIN SCALES - GENERAL: PAINLEVEL: NO PAIN (0)

## 2018-03-28 NOTE — MR AVS SNAPSHOT
"              After Visit Summary   3/28/2018    Lori Mehta    MRN: [id number]           Patient Information     Date Of Birth          6/27/1928        Visit Information        Provider Department      3/28/2018 9:40 AM Richard Rader MD [facility]        Today's Diagnoses     Essential hypertension with goal blood pressure less than 140/90    -  1    CKD (chronic kidney disease) stage 3, GFR 30-59 ml/min        Cerebrovascular accident (CVA), unspecified mechanism (H)           Follow-ups after your visit        Follow-up notes from your care team     Return in about 3 months (around 6/28/2018).      Your next 10 appointments already scheduled     Apr 11, 2018  9:45 AM CDT   Anticoagulation Visit with [facility] COAG   [facility]    [address] [place] [id number] [phone]              Who to contact     If you have questions or need follow up information about today's clinic visit or your schedule please contact [facility] directly at [phone].  Normal or non-critical lab and imaging results will be communicated to you by MyChart, letter or phone within 4 business days after the clinic has received the results. If you do not hear from us within 7 days, please contact the clinic through [employer] or phone. If you have a critical or abnormal lab result, we will notify you by phone as soon as possible.  Submit refill requests through [employer] or call your pharmacy and they will forward the refill request to us. Please allow 3 business days for your refill to be completed.          Additional Information About Your Visit        MyChart Information     [employer] lets you send messages to your doctor, view your test results, renew your prescriptions, schedule appointments and more. To sign up, go to [email or web address] . Click on \"Log in\" on the left side of the screen, which will take you to the Welcome page. Then " "click on \"Sign up Now\" on the right side of the page.     You will be asked to enter the access code listed below, as well as some personal information. Please follow the directions to create your username and password.     Your access code is: [email or web address]  Expires: 2018  9:47 AM     Your access code will  in 90 days. If you need help or a new code, please call your [facility] clinic or [phone].        Care EveryWhere ID     This is your Care EveryWhere ID. This could be used by other organizations to access your [facility] medical records  [id number]        Your Vitals Were     Pulse Temperature Pulse Oximetry BMI (Body Mass Index)          94 97.6  F (36.4  C) (Temporal) 99% 29.19 kg/m2         Blood Pressure from Last 3 Encounters:   18 154/60   18 138/70   17 135/72    Weight from Last 3 Encounters:   18 186 lb 6.4 oz (84.6 kg)   18 183 lb (83 kg)   17 181 lb (82.1 kg)              Today, you had the following     No orders found for display         Today's Medication Changes          These changes are accurate as of 3/28/18 11:59 PM.  If you have any questions, ask your nurse or doctor.               Start taking these medicines.        Dose/Directions    lisinopril 2.5 MG tablet   Commonly known as:  PRINIVIL/Zestril   Used for:  Essential hypertension with goal blood pressure less than 140/90   Started by:  Rcihard Rader MD        Dose:  2.5 mg   Take 1 tablet (2.5 mg) by mouth daily   Quantity:  30 tablet   Refills:  1            Where to get your medicines      These medications were sent to [facility] - [place], [place] - [address]  [address], [facility] [address]     Phone:  [phone]     lisinopril 2.5 MG tablet                Primary Care Provider Office Phone # Fax #    Wale Short [phone]       [address] [facility] [address]        Equal Access to Services     DANNY GROSSMAN AH: Dallin Martinez, " waaxda luqadaha, qaybta kaalmada francisco, prakash bainaarivas ah. So [facility] [phone].    ATENCIÓN: Si myron spence, tiene a rae disposición servicios gratuitos de asistencia lingüística. Tenisha al [phone].    We comply with applicable federal civil rights laws and [place] laws. We do not discriminate on the basis of race, color, national origin, age, disability, sex, sexual orientation, or gender identity.            Thank you!     Thank you for choosing [facility]  for your care. Our goal is always to provide you with excellent care. Hearing back from our patients is one way we can continue to improve our services. Please take a few minutes to complete the written survey that you may receive in the mail after your visit with us. Thank you!             Your Updated Medication List - Protect others around you: Learn how to safely use, store and throw away your medicines at www.disposemymeds.org.          This list is accurate as of 3/28/18 11:59 PM.  Always use your most recent med list.                   Brand Name Dispense Instructions for use Diagnosis    allopurinol 100 MG tablet    ZYLOPRIM    90 tablet    Take 1 tablet (100 mg) by mouth daily    Idiopathic gout, unspecified chronicity, unspecified site       amLODIPine 10 MG tablet    NORVASC    90 tablet    Take 1 tablet (10 mg) by mouth daily    Hypertension goal BP (blood pressure) < 140/90       Bromelains 500 MG Tabs      Take 3 tablets by mouth. 3-4 daily        CAPSICUM (CAYENNE) PO      Take 450 mg by mouth daily        citalopram 10 MG tablet    celeXA    90 tablet    Take 1 tablet (10 mg) by mouth daily    Adjustment disorder with anxious mood       COENZYME Q10      1 DAILY        hydrochlorothiazide 25 MG tablet    HYDRODIURIL    90 tablet    Take 1 tablet (25 mg) by mouth daily    Essential hypertension with goal blood pressure less than 140/90       lisinopril 2.5 MG tablet    PRINIVIL/Zestril    30  tablet    Take 1 tablet (2.5 mg) by mouth daily    Essential hypertension with goal blood pressure less than 140/90       MELATONIN PO      Take 10 mg by mouth At Bedtime        omega 3 1200 MG Caps      2 CAP DAILY        SAW PALMETTO EXTRACT PO      Take 1,000 mg by mouth        Selenium 200 MCG Caps      1 CAPSULE DAILY        simvastatin 20 MG tablet    ZOCOR    270 tablet    Take 3 tablets (60 mg) by mouth At Bedtime    Hyperlipidemia LDL goal <100       tamsulosin 0.4 MG capsule    FLOMAX    90 capsule    Take 1 capsule (0.4 mg) by mouth daily    Benign non-nodular prostatic hyperplasia with lower urinary tract symptoms       VITAMIN B12 TR 1000 MCG Tbcr      1 TAB DAILY        vitamin C 500 MG Chew      Take  by mouth. 1-3 daily        vitamin E 400 UNIT capsule      1 CAPSULE DAILY        warfarin 5 MG tablet    COUMADIN    130 tablet    Take 7.5 daily, or as directed by the coumadin clinic.    Chronic atrial fibrillation (H)       Zinc 30 MG Tabs      1 TABLET DAILY

## 2018-03-28 NOTE — PROGRESS NOTES
"  SUBJECTIVE:   Lori Mehta is a 89 year old male who presents to clinic today for the following health issues:      Hypertension Follow-up      Outpatient blood pressures are being checked at home.  Results are 130's-160/60-70's .    Low Salt Diet: no added salt      Amount of exercise or physical activity: \"all the time\"    Problems taking medications regularly: No    Medication side effects: none    Diet: regular (no restrictions)    Lori is here today with his wife for follow-up on high blood pressure.  Is known to have high blood pressure, chronic kidney disease and history of TIA.  Been taking amlodipine 10 mg daily with hydrochlorothiazide 25 mg daily for high blood pressure.  He was on atenolol before and did well.  It was stopped because of bradycardia.  Been monitoring his blood pressure at home and running from 130s-160s/60s-70s, mostly 150s/60s.  He is concerned about it, he thinks it is too high.  Denies of headache or dizziness.  No chest pain or shortness of breath.  No nausea, vomiting, diarrhea or constipation.  No leg swelling, orthopnea or dyspnea.  No other concern.    Problem list and histories reviewed & adjusted, as indicated.  Additional history: as documented    Current Outpatient Prescriptions   Medication Sig Dispense Refill     MELATONIN PO Take 10 mg by mouth At Bedtime       lisinopril (PRINIVIL/ZESTRIL) 2.5 MG tablet Take 1 tablet (2.5 mg) by mouth daily 30 tablet 1     citalopram (CELEXA) 10 MG tablet Take 1 tablet (10 mg) by mouth daily 90 tablet 3     amLODIPine (NORVASC) 10 MG tablet Take 1 tablet (10 mg) by mouth daily 90 tablet 3     hydrochlorothiazide (HYDRODIURIL) 25 MG tablet Take 1 tablet (25 mg) by mouth daily 90 tablet 3     simvastatin (ZOCOR) 20 MG tablet Take 3 tablets (60 mg) by mouth At Bedtime 270 tablet 3     tamsulosin (FLOMAX) 0.4 MG capsule Take 1 capsule (0.4 mg) by mouth daily 90 capsule 3     allopurinol (ZYLOPRIM) 100 MG tablet Take 1 tablet (100 mg) " by mouth daily 90 tablet 3     warfarin (COUMADIN) 5 MG tablet Take 7.5 daily, or as directed by the coumadin clinic. 130 tablet 0     Saw Palmetto, Serenoa repens, (SAW PALMETTO EXTRACT PO) Take 1,000 mg by mouth       CAPSICUM, CAYENNE, PO Take 450 mg by mouth daily       Bromelains 500 MG TABS Take 3 tablets by mouth. 3-4 daily       Ascorbic Acid (VITAMIN C) 500 MG CHEW Take  by mouth. 1-3 daily       OMEGA 3 1200 MG OR CAPS 2 CAP DAILY  0     COENZYME Q10 1 DAILY       ZINC 30 MG OR TABS 1 TABLET DAILY       VITAMIN B12 TR 1000 MCG OR TBCR 1 TAB DAILY  0     VITAMIN E 400 UNIT OR CAPS 1 CAPSULE DAILY       SELENIUM 200 MCG OR CAPS 1 CAPSULE DAILY       Allergies   Allergen Reactions     Paroxetine      Other reaction(s): Intolerance     Terfenadine      Other reaction(s): Other  Unable to void       Reviewed and updated as needed this visit by clinical staff  Tobacco  Allergies  Meds  Med Hx  Surg Hx  Fam Hx  Soc Hx      Reviewed and updated as needed this visit by Provider         ROS:  Constitutional, HEENT, cardiovascular, pulmonary, gi and gu systems are negative, except as otherwise noted.    OBJECTIVE:     /60 (Cuff Size: Adult Regular)  Pulse 94  Temp 97.6  F (36.4  C) (Temporal)  Wt 186 lb 6.4 oz (84.6 kg)  SpO2 99%  BMI 29.19 kg/m2  Body mass index is 29.19 kg/(m^2).  GENERAL: healthy, alert and no distress  RESP: lungs clear to auscultation - no rales, rhonchi or wheezes  CV: regular rate and rhythm, no murmur, no peripheral edema and peripheral pulses strong    Diagnostic Test Results:  Results for orders placed or performed in visit on 02/28/18   INR point of care   Result Value Ref Range    INR Protime 2.3 (A) 0.86 - 1.14       ASSESSMENT/PLAN:       ICD-10-CM    1. Essential hypertension with goal blood pressure less than 140/90 I10 lisinopril (PRINIVIL/ZESTRIL) 2.5 MG tablet   2. CKD (chronic kidney disease) stage 3, GFR 30-59 ml/min N18.3    3. Cerebrovascular accident (CVA),  unspecified mechanism (H) I63.9      His renal function has been stable, stage III.  He has a history of TIA last year.  Blood pressure has been on the higher side.  Goal for his blood pressure to be in the 130s-140s/80s.  Avoid beta-blocker due to low heart rate at baseline.  Continue with amlodipine and hydrochlorothiazide.  Adding low-dose of lisinopril.  Side effect discussed.  Encouraged to continue his normal activities as tolerated.  Avoid high salt diet.  Continue to monitor blood pressure and call in if is persistently above 140/90.  Recheck the kidney function test in a month or two.  Patient feels comfortable with the plan and all his questions were answered.    Richard Schaffer Mai, MD  [facility]

## 2018-03-28 NOTE — NURSING NOTE
"Chief Complaint   Patient presents with     Hypertension       Initial Pulse 94  Temp 97.6  F (36.4  C) (Temporal)  Wt 186 lb 6.4 oz (84.6 kg)  SpO2 99%  BMI 29.19 kg/m2 Estimated body mass index is 29.19 kg/(m^2) as calculated from the following:    Height as of 2/13/18: 5' 7\" (1.702 m).    Weight as of this encounter: 186 lb 6.4 oz (84.6 kg).  Medication Reconciliation: complete   Brynn Figueroa CMA (AAMA)      "

## 2018-04-11 ENCOUNTER — MEDICAL CORRESPONDENCE (OUTPATIENT)
Dept: HEALTH INFORMATION MANAGEMENT | Facility: CLINIC | Age: 83
End: 2018-04-11

## 2018-04-11 ENCOUNTER — ANTICOAGULATION THERAPY VISIT (OUTPATIENT)
Dept: ANTICOAGULATION | Facility: CLINIC | Age: 83
End: 2018-04-11
Payer: COMMERCIAL

## 2018-04-11 VITALS — HEART RATE: 71 BPM | SYSTOLIC BLOOD PRESSURE: 115 MMHG | DIASTOLIC BLOOD PRESSURE: 66 MMHG

## 2018-04-11 DIAGNOSIS — Z79.01 LONG-TERM (CURRENT) USE OF ANTICOAGULANTS: ICD-10-CM

## 2018-04-11 DIAGNOSIS — I48.20 CHRONIC ATRIAL FIBRILLATION (H): ICD-10-CM

## 2018-04-11 LAB — INR POINT OF CARE: 2.2 (ref 0.86–1.14)

## 2018-04-11 PROCEDURE — 85610 PROTHROMBIN TIME: CPT | Mod: QW

## 2018-04-11 PROCEDURE — 36416 COLLJ CAPILLARY BLOOD SPEC: CPT

## 2018-04-11 PROCEDURE — 99207 ZZC NO CHARGE NURSE ONLY: CPT

## 2018-04-11 NOTE — MR AVS SNAPSHOT
Lori Mehta   4/11/2018 9:45 AM   Anticoagulation Therapy Visit    Description:  89 year old male   Provider:  NAWAF ANTI COAG   Department:  [facility]           INR as of 4/11/2018     Today's INR 2.2      Anticoagulation Summary as of 4/11/2018     INR goal 2.0-3.0   Today's INR 2.2   Full instructions 7.5 mg every day   Next INR check 5/23/2018    Indications   Chronic atrial fibrillation (HCC) [I48.2]  Long-term (current) use of anticoagulants [Z79.01] [Z79.01]         Your next Anticoagulation Clinic appointment(s)     May 23, 2018  9:45 AM CDT   [facility]    [address] [place] [id number] [phone]              Contact Numbers     Clinic Number:         April 2018 Details    Sun Mon Tue Wed Thu Fri Sat     1               2               3               4               5               6               7                 8               9               10               11      7.5 mg   See details      12      7.5 mg         13      7.5 mg         14      7.5 mg           15      7.5 mg         16      7.5 mg         17      7.5 mg         18      7.5 mg         19      7.5 mg         20      7.5 mg         21      7.5 mg           22      7.5 mg         23      7.5 mg         24      7.5 mg         25      7.5 mg         26      7.5 mg         27      7.5 mg         28      7.5 mg           29      7.5 mg         30      7.5 mg               Date Details   04/11 This INR check               How to take your warfarin dose     To take:  7.5 mg Take 1.5 of the 5 mg tablets.           May 2018 Details    Sun Mon Tue Wed Thu Fri Sat       1      7.5 mg         2      7.5 mg         3      7.5 mg         4      7.5 mg         5      7.5 mg           6      7.5 mg         7      7.5 mg         8      7.5 mg         9      7.5 mg         10      7.5 mg         11      7.5 mg         12      7.5 mg            13      7.5 mg         14      7.5 mg         15      7.5 mg         16      7.5 mg         17      7.5 mg         18      7.5 mg         19      7.5 mg           20      7.5 mg         21      7.5 mg         22      7.5 mg         23            24               25               26                 27               28               29               30               31                  Date Details   No additional details    Date of next INR:  5/23/2018         How to take your warfarin dose     To take:  7.5 mg Take 1.5 of the 5 mg tablets.

## 2018-04-11 NOTE — PROGRESS NOTES
ANTICOAGULATION FOLLOW-UP CLINIC VISIT    Patient Name:  Lori Mehta  Date:  4/11/2018  Contact Type:  Face to Face    SUBJECTIVE:     Patient Findings     Positives No Problem Findings           OBJECTIVE    INR Protime   Date Value Ref Range Status   04/11/2018 2.2 (A) 0.86 - 1.14 Final       ASSESSMENT / PLAN  INR assessment THER    Recheck INR In: 6 WEEKS    INR Location Clinic      Anticoagulation Summary as of 4/11/2018     INR goal 2.0-3.0   Today's INR 2.2   Maintenance plan 7.5 mg (5 mg x 1.5) every day   Full instructions 7.5 mg every day   Weekly total 52.5 mg   No change documented Lakeisha Rodriguez RN   Plan last modified Lakeisha Rodriguez RN (9/12/2017)   Next INR check 5/23/2018   Target end date     Indications   Chronic atrial fibrillation (HCC) [I48.2]  Long-term (current) use of anticoagulants [Z79.01] [Z79.01]         Anticoagulation Episode Summary     INR check location     Preferred lab     Send INR reminders to [facility] POOL    Comments 5 mg tablets, no bandaid, takes in AM, likes BP done.       Anticoagulation Care Providers     Provider Role Specialty Phone number    Candido Freedman DO [facility] Internal Medicine [phone]            See the Encounter Report to view Anticoagulation Flowsheet and Dosing Calendar (Go to Encounters tab in chart review, and find the Anticoagulation Therapy Visit)    Dosage adjustment made based on physician directed care plan.    Lakeisha Rodriguez RN

## 2018-04-16 ENCOUNTER — TELEPHONE (OUTPATIENT)
Dept: FAMILY MEDICINE | Facility: CLINIC | Age: 83
End: 2018-04-16

## 2018-04-16 NOTE — TELEPHONE ENCOUNTER
Please let patient know that his BP looks good, I am happy with it.  No change in medication.  Sorry for the delay, just got the note today.

## 2018-04-16 NOTE — TELEPHONE ENCOUNTER
Consent to communicate on file to speak to patient's wife. I have given her the message from Dr. Rader. Nanci Alex CMA ([facility])

## 2018-04-16 NOTE — TELEPHONE ENCOUNTER
Lori wife Fatoumata called and stated that during his appointment Dr Rader wanted him to monitor his blood pressure for 7-10 days, write them down and bring them in.  They dropped them off a while ago for Dr Rader to review  and have not heard back yet.  Please have Dr Rader review the blood pressure readings and give him a call to let him know if his medication should be adjusted or not.    Thank you,  Carolee SARGENT

## 2018-05-15 ENCOUNTER — TRANSFERRED RECORDS (OUTPATIENT)
Dept: HEALTH INFORMATION MANAGEMENT | Facility: CLINIC | Age: 83
End: 2018-05-15

## 2018-05-23 ENCOUNTER — ANTICOAGULATION THERAPY VISIT (OUTPATIENT)
Dept: ANTICOAGULATION | Facility: CLINIC | Age: 83
End: 2018-05-23
Payer: COMMERCIAL

## 2018-05-23 VITALS — HEART RATE: 77 BPM | DIASTOLIC BLOOD PRESSURE: 56 MMHG | SYSTOLIC BLOOD PRESSURE: 131 MMHG

## 2018-05-23 DIAGNOSIS — Z79.01 LONG-TERM (CURRENT) USE OF ANTICOAGULANTS: ICD-10-CM

## 2018-05-23 DIAGNOSIS — I48.20 CHRONIC ATRIAL FIBRILLATION (H): ICD-10-CM

## 2018-05-23 LAB — INR POINT OF CARE: 3.1 (ref 0.86–1.14)

## 2018-05-23 PROCEDURE — 36416 COLLJ CAPILLARY BLOOD SPEC: CPT

## 2018-05-23 PROCEDURE — 99207 ZZC NO CHARGE NURSE ONLY: CPT

## 2018-05-23 PROCEDURE — 85610 PROTHROMBIN TIME: CPT | Mod: QW

## 2018-05-23 NOTE — MR AVS SNAPSHOT
Lori Mehta   5/23/2018 9:45 AM   Anticoagulation Therapy Visit    Description:  89 year old male   Provider:  NAWAF ANTI COAG   Department:  [facility]           INR as of 5/23/2018     Today's INR 3.1!      Anticoagulation Summary as of 5/23/2018     INR goal 2.0-3.0   Today's INR 3.1!   Full warfarin instructions 5/24: 5 mg; Otherwise 7.5 mg every day   Next INR check 7/3/2018    Indications   Chronic atrial fibrillation (HCC) [I48.2]  Long-term (current) use of anticoagulants [Z79.01] [Z79.01]         Your next Anticoagulation Clinic appointment(s)     Jul 03, 2018  9:45 AM CDT   Anticoagulation Visit with NAWAF ANTI BILL   [facility] ([facility])    [address] [place] [id number] [phone]              Contact Numbers     Clinic Number:         May 2018 Details    Sun Mon Tue Wed Thu Fri Sat       1               2               3               4               5                 6               7               8               9               10               11               12                 13               14               15               16               17               18               19                 20               21               22               23      7.5 mg   See details      24      5 mg         25      7.5 mg         26      7.5 mg           27      7.5 mg         28      7.5 mg         29      7.5 mg         30      7.5 mg         31      7.5 mg            Date Details   05/23 This INR check               How to take your warfarin dose     To take:  5 mg Take 1 of the 5 mg tablets.    To take:  7.5 mg Take 1.5 of the 5 mg tablets.           June 2018 Details    Sun Mon Tue Wed Thu Fri Sat          1      7.5 mg         2      7.5 mg           3      7.5 mg         4      7.5 mg         5      7.5 mg         6      7.5 mg         7      7.5 mg         8      7.5 mg         9      7.5 mg           10      7.5 mg         11      7.5 mg          12      7.5 mg         13      7.5 mg         14      7.5 mg         15      7.5 mg         16      7.5 mg           17      7.5 mg         18      7.5 mg         19      7.5 mg         20      7.5 mg         21      7.5 mg         22      7.5 mg         23      7.5 mg           24      7.5 mg         25      7.5 mg         26      7.5 mg         27      7.5 mg         28      7.5 mg         29      7.5 mg         30      7.5 mg          Date Details   No additional details            How to take your warfarin dose     To take:  7.5 mg Take 1.5 of the 5 mg tablets.           July 2018 Details    Sun Mon Tue Wed Thu Fri Sat     1      7.5 mg         2      7.5 mg         3            4               5               6               7                 8               9               10               11               12               13               14                 15               16               17               18               19               20               21                 22               23               24               25               26               27               28                 29               30               31                    Date Details   No additional details    Date of next INR:  7/3/2018         How to take your warfarin dose     To take:  7.5 mg Take 1.5 of the 5 mg tablets.

## 2018-05-23 NOTE — PROGRESS NOTES
ANTICOAGULATION FOLLOW-UP CLINIC VISIT    Patient Name:  Lori Mehta  Date:  5/23/2018  Contact Type:  Face to Face    SUBJECTIVE:     Patient Findings     Positives No Problem Findings           OBJECTIVE    INR Protime   Date Value Ref Range Status   05/23/2018 3.1 (A) 0.86 - 1.14 Final       ASSESSMENT / PLAN  INR assessment THER    Recheck INR In: 6 WEEKS    INR Location Clinic      Anticoagulation Summary as of 5/23/2018     INR goal 2.0-3.0   Today's INR 3.1!   Warfarin maintenance plan 7.5 mg (5 mg x 1.5) every day   Full warfarin instructions 5/24: 5 mg; Otherwise 7.5 mg every day   Weekly warfarin total 52.5 mg   Plan last modified Lakeisha Rodriguez RN (9/12/2017)   Next INR check 7/3/2018   Target end date     Indications   Chronic atrial fibrillation (HCC) [I48.2]  Long-term (current) use of anticoagulants [Z79.01] [Z79.01]         Anticoagulation Episode Summary     INR check location     Preferred lab     Send INR reminders to [facility] POOL    Comments 5 mg tablets, no bandaid, takes in AM, likes BP done.       Anticoagulation Care Providers     Provider Role Specialty Phone number    Candido Freedman DO [facility] Internal Medicine [phone]            See the Encounter Report to view Anticoagulation Flowsheet and Dosing Calendar (Go to Encounters tab in chart review, and find the Anticoagulation Therapy Visit)    Dosage adjustment made based on physician directed care plan.      Lakeisha Rodriguez, CAN

## 2018-06-18 ENCOUNTER — OFFICE VISIT (OUTPATIENT)
Dept: OTOLARYNGOLOGY | Facility: CLINIC | Age: 83
End: 2018-06-18
Payer: COMMERCIAL

## 2018-06-18 ENCOUNTER — TELEPHONE (OUTPATIENT)
Dept: INTERNAL MEDICINE | Facility: CLINIC | Age: 83
End: 2018-06-18

## 2018-06-18 VITALS
HEIGHT: 67 IN | OXYGEN SATURATION: 95 % | HEART RATE: 90 BPM | DIASTOLIC BLOOD PRESSURE: 60 MMHG | SYSTOLIC BLOOD PRESSURE: 140 MMHG | BODY MASS INDEX: 28.25 KG/M2 | WEIGHT: 180 LBS

## 2018-06-18 DIAGNOSIS — J34.89 NASAL SEPTAL PERFORATION: ICD-10-CM

## 2018-06-18 DIAGNOSIS — R04.0 EPISTAXIS: Primary | ICD-10-CM

## 2018-06-18 DIAGNOSIS — R04.0 EPISTAXIS: ICD-10-CM

## 2018-06-18 LAB
ERYTHROCYTE [DISTWIDTH] IN BLOOD BY AUTOMATED COUNT: 13.2 % (ref 10–15)
HCT VFR BLD AUTO: 36.9 % (ref 40–53)
HGB BLD-MCNC: 12 G/DL (ref 13.3–17.7)
INR PPP: 2.31 (ref 0.86–1.14)
MCH RBC QN AUTO: 31.5 PG (ref 26.5–33)
MCHC RBC AUTO-ENTMCNC: 32.5 G/DL (ref 31.5–36.5)
MCV RBC AUTO: 97 FL (ref 78–100)
PLATELET # BLD AUTO: 202 10E9/L (ref 150–450)
RBC # BLD AUTO: 3.81 10E12/L (ref 4.4–5.9)
WBC # BLD AUTO: 7.9 10E9/L (ref 4–11)

## 2018-06-18 PROCEDURE — 85610 PROTHROMBIN TIME: CPT | Performed by: INTERNAL MEDICINE

## 2018-06-18 PROCEDURE — 31231 NASAL ENDOSCOPY DX: CPT | Performed by: OTOLARYNGOLOGY

## 2018-06-18 PROCEDURE — 85027 COMPLETE CBC AUTOMATED: CPT | Performed by: INTERNAL MEDICINE

## 2018-06-18 PROCEDURE — 36415 COLL VENOUS BLD VENIPUNCTURE: CPT | Performed by: INTERNAL MEDICINE

## 2018-06-18 PROCEDURE — 99203 OFFICE O/P NEW LOW 30 MIN: CPT | Mod: 25 | Performed by: OTOLARYNGOLOGY

## 2018-06-18 ASSESSMENT — PAIN SCALES - GENERAL: PAINLEVEL: NO PAIN (0)

## 2018-06-18 NOTE — LETTER
6/18/2018         RE: Lori Mehta  [address]  [facility] [address]        Dear Colleague,    Thank you for referring your patient, Lori Mehta, to the [facility]. Please see a copy of my visit note below.    ENT Consultation    Lori Mehta is a 89 year old male who is seen in consultation at the request of Dr. Wale Short.      History of Present Illness - Lori Mehta is a 89 year old male presents with a history of recurrent nosebleeds involving his right side.  This started a few weeks ago.  Has not had nosebleeds in the past.  Gentleman is on Coumadin.  His INR is been under 3.  It is can a spontaneous event.  He holds some pressure that helps for a while.  He also put some Vaseline and use humidification but this did not seemed to help.  Denies any significant issues with nasal breathing.  Sinus symptomatology.    Past Medical History -   Past Medical History:   Diagnosis Date     Anxiety      Atrial fibrillation (H) 1/9/2009     BPH      GERD (gastroesophageal reflux disease)      Pure hypercholesterolemia      Unspecified essential hypertension        Current Medications -   Current Outpatient Prescriptions:      allopurinol (ZYLOPRIM) 100 MG tablet, Take 1 tablet (100 mg) by mouth daily, Disp: 90 tablet, Rfl: 3     amLODIPine (NORVASC) 10 MG tablet, Take 1 tablet (10 mg) by mouth daily, Disp: 90 tablet, Rfl: 3     Ascorbic Acid (VITAMIN C) 500 MG CHEW, Take  by mouth. 1-3 daily, Disp: , Rfl:      Bromelains 500 MG TABS, Take 3 tablets by mouth. 3-4 daily, Disp: , Rfl:      CAPSICUM, CAYENNE, PO, Take 450 mg by mouth daily, Disp: , Rfl:      citalopram (CELEXA) 10 MG tablet, Take 1 tablet (10 mg) by mouth daily, Disp: 90 tablet, Rfl: 3     COENZYME Q10, 1 DAILY, Disp: , Rfl:      hydrochlorothiazide (HYDRODIURIL) 25 MG tablet, Take 1 tablet (25 mg) by mouth daily, Disp: 90 tablet, Rfl: 3     lisinopril (PRINIVIL/ZESTRIL) 2.5 MG tablet, Take 1 tablet (2.5 mg) by  mouth daily, Disp: 30 tablet, Rfl: 1     MELATONIN PO, Take 10 mg by mouth At Bedtime, Disp: , Rfl:      OMEGA 3 1200 MG OR CAPS, 2 CAP DAILY, Disp: , Rfl: 0     Saw Palmetto, Serenoa repens, (SAW PALMETTO EXTRACT PO), Take 1,000 mg by mouth, Disp: , Rfl:      SELENIUM 200 MCG OR CAPS, 1 CAPSULE DAILY, Disp: , Rfl:      simvastatin (ZOCOR) 20 MG tablet, Take 3 tablets (60 mg) by mouth At Bedtime, Disp: 270 tablet, Rfl: 3     tamsulosin (FLOMAX) 0.4 MG capsule, Take 1 capsule (0.4 mg) by mouth daily, Disp: 90 capsule, Rfl: 3     VITAMIN B12 TR 1000 MCG OR TBCR, 1 TAB DAILY, Disp: , Rfl: 0     VITAMIN E 400 UNIT OR CAPS, 1 CAPSULE DAILY, Disp: , Rfl:      warfarin (COUMADIN) 5 MG tablet, Take 7.5 daily, or as directed by the coumadin clinic., Disp: 130 tablet, Rfl: 0     ZINC 30 MG OR TABS, 1 TABLET DAILY, Disp: , Rfl:     Allergies -   Allergies   Allergen Reactions     Paroxetine      Other reaction(s): Intolerance     Terfenadine      Other reaction(s): Other  Unable to void       Social History -   Social History     Social History     Marital status:      Spouse name: N/A     Number of children: N/A     Years of education: N/A     Social History Main Topics     Smoking status: Never Smoker     Smokeless tobacco: Never Used     Alcohol use 0.6 oz/week     1 Standard drinks or equivalent per week     Drug use: No     Sexual activity: Not on file     Other Topics Concern     Not on file     Social History Narrative       Family History -   Family History   Problem Relation Age of Onset     DIABETES Brother      Cancer - colorectal No family hx of      C.A.D. Mother      DIABETES Maternal Grandmother      Circulatory Maternal Grandfather      cerebral aneurysm     Hypertension Father      CEREBROVASCULAR DISEASE Father      Breast Cancer Sister       x 2       Review of Systems - As per HPI and PMHx, otherwise review of system review of the head and neck negative.    Physical Exam  /60 (BP Location:  "Right arm, Patient Position: Chair, Cuff Size: Adult Regular)  Pulse 90  Ht 1.702 m (5' 7\")  Wt 81.6 kg (180 lb)  SpO2 95%  BMI 28.19 kg/m2  BMI: Body mass index is 28.19 kg/(m^2).    General - The patient is well nourished and well developed, and appears to have good nutritional status.  Alert and oriented to person and place, answers questions and cooperates with examination appropriately.    SKIN - No suspicious lesions or rashes.  Respiration - No respiratory distress.     Head and Face - Normocephalic and atraumatic, with no gross asymmetry noted of the contour of the facial features.  The facial nerve is intact, with strong symmetric movements.    Voice and Breathing - The patient was breathing comfortably without the use of accessory muscles. There was no wheezing, stridor, or stertor.  The patients voice was clear and strong, and had appropriate pitch and quality.    Ears - Bilateral pinna and EACs with normal appearing overlying skin. Tympanic membrane intact with good mobility on pneumatic otoscopy bilaterally. Bony landmarks of the ossicular chain are normal. The tympanic membranes are normal in appearance. No retraction, perforation, or masses.  No fluid or purulence was seen in the external canal or the middle ear.     Eyes - Extraocular movements intact.  Sclera were not icteric or injected, conjunctiva were pink and moist.    Mouth - Examination of the oral cavity showed pink, healthy oral mucosa. No lesions or ulcerations noted.  The tongue was mobile and midline, and the dentition were in good condition.      Throat - The walls of the oropharynx were smooth, pink, moist, symmetric, and had no lesions or ulcerations.  The tonsillar pillars and soft palate were symmetric.  The uvula was midline on elevation.    Neck - Normal midline excursion of the laryngotracheal complex during swallowing.  Full range of motion on passive movement.  Palpation of the occipital, submental, submandibular, internal " jugular chain, and supraclavicular nodes did not demonstrate any abnormal lymph nodes or masses.  The carotid pulse was palpable bilaterally.  Palpation of the thyroid was soft and smooth, with no nodules or goiter appreciated.  The trachea was mobile and midline.    Nose - External contour is symmetric, no gross deflection or scars.  Nasal mucosa is pink and moist with no abnormal mucus.  The septum was midline and non-obstructive, turbinates of normal size and position.  No polyps, masses, or purulence noted on examination.  I did notice on the right some clotting as well as on the left and anterior septum.    Neuro - Nonfocal neuro exam is normal, CN 2 through 12 intact, normal gait and muscle tone.    Performed in clinic today:  To further evaluate the nasal cavity, I performed rigid nasal endoscopy.  I first sprayed the nasal cavity bilaterally with a mix of lidocaine and neosynephrine.  I then began on the left side using a 2.7mm, 30 degree rigid nasal endoscope.  The septum was straight and Had large anterior perforation and the nasal airway was open.  No abnormal secretions, purulence, or polyps were noted. The left middle turbinate and middle meatus were clearly visualized and normal in appearance.  Looking up, the olfactory cleft was unobstructed.  Going further back, the sphenoethmoid recess was normal in appearance, with healthy appearing mucosa on the face of the sphenoid.  The nasopharynx was unremarkable, and the eustachian tube opening on this side was unobstructed.    I then turned my attention to the right side.  Once again, the septum was straight and Had a large anterior perforation through and through, and the airway was obstructed.  No abnormal secretions, purulence, polyps were noted.  The right middle turbinate and middle meatus were clearly visualized and normal in appearance.  Looking up, the olfactory cleft was unobstructed.  Going further back the right sphenoethmoid recess was normal in  appearance, and eustachian tube opening was unobstructed.   White - [id number] Mytamed  The edges of the perforation was somewhat irritated most likely what the source of the bleeding.  I used silver nitrate to cauterize some of those edges.        A/P - Lori Mehta is a 89 year old male with epistaxis likely coming as a result of the use of Coumadin in conjunction with a septal perforation.  Humidification of the area is essential.  Patient will use saline twice daily for the next several months.  Should like to see him back in a couple months.      Ovidio Garcia MD      Again, thank you for allowing me to participate in the care of your patient.        Sincerely,        Ovidio Garcia MD, MD

## 2018-06-18 NOTE — TELEPHONE ENCOUNTER
ENT will see Lori at 12:45 today. He will stop at lab on his way.  Patient has been notified.      Umm Collins RN

## 2018-06-18 NOTE — MR AVS SNAPSHOT
"              After Visit Summary   6/18/2018    Lori Mehta    MRN: [id number]           Patient Information     Date Of Birth          6/27/1928        Visit Information        Provider Department      6/18/2018 1:00 PM Ovidio Garcia MD [facility]         Follow-ups after your visit        Your next 10 appointments already scheduled     Jul 03, 2018  9:45 AM CDT   Anticoagulation Visit with PH ANTI COAG   [facility]    [address] [place] [id number] [phone]            Jul 16, 2018 11:15 AM CDT   Return Visit with Ovidio Garcia MD   [facility]    [address] [place] [id number] [phone]              Who to contact     If you have questions or need follow up information about today's clinic visit or your schedule please contact [facility] directly at [phone].  Normal or non-critical lab and imaging results will be communicated to you by MyChart, letter or phone within 4 business days after the clinic has received the results. If you do not hear from us within 7 days, please contact the clinic through [employer] or phone. If you have a critical or abnormal lab result, we will notify you by phone as soon as possible.  Submit refill requests through [employer] or call your pharmacy and they will forward the refill request to us. Please allow 3 business days for your refill to be completed.          Additional Information About Your Visit        Care EveryWhere ID     This is your Care EveryWhere ID. This could be used by other organizations to access your [facility] medical records  [id number]        Your Vitals Were     Pulse Height Pulse Oximetry BMI (Body Mass Index)          90 1.702 m (5' 7\") 95% 28.19 kg/m2         Blood Pressure from Last 3 Encounters:   06/18/18 140/60   05/23/18 131/56   04/11/18 115/66    Weight from Last 3 Encounters: "   06/18/18 81.6 kg (180 lb)   03/28/18 84.6 kg (186 lb 6.4 oz)   02/13/18 83 kg (183 lb)              Today, you had the following     No orders found for display       Primary Care Provider Office Phone # Fax #    Wale Short [phone] [address] [facility] [address]        Equal Access to Services     DANNY GROSSMAN : Hadii aad ku hadasho Soomaali, waaxda luqadaha, qaybta kaalmada adeegyada, waxay idiin hayaan adeeg kharash la'aan ah. So [facility] [phone].    ATENCIÓN: Si myron spence, tiene a rae disposición servicios gratuitos de asistencia lingüística. Llame al [phone].    We comply with applicable federal civil rights laws and [place] laws. We do not discriminate on the basis of race, color, national origin, age, disability, sex, sexual orientation, or gender identity.            Thank you!     Thank you for choosing [facility]  for your care. Our goal is always to provide you with excellent care. Hearing back from our patients is one way we can continue to improve our services. Please take a few minutes to complete the written survey that you may receive in the mail after your visit with us. Thank you!             Your Updated Medication List - Protect others around you: Learn how to safely use, store and throw away your medicines at www.disposemymeds.org.          This list is accurate as of 6/18/18  3:43 PM.  Always use your most recent med list.                   Brand Name Dispense Instructions for use Diagnosis    allopurinol 100 MG tablet    ZYLOPRIM    90 tablet    Take 1 tablet (100 mg) by mouth daily    Idiopathic gout, unspecified chronicity, unspecified site       amLODIPine 10 MG tablet    NORVASC    90 tablet    Take 1 tablet (10 mg) by mouth daily    Hypertension goal BP (blood pressure) < 140/90       Bromelains 500 MG Tabs      Take 3 tablets by mouth. 3-4 daily        CAPSICUM (CAYENNE) PO      Take 450 mg by mouth daily         citalopram 10 MG tablet    celeXA    90 tablet    Take 1 tablet (10 mg) by mouth daily    Adjustment disorder with anxious mood       COENZYME Q10      1 DAILY        hydrochlorothiazide 25 MG tablet    HYDRODIURIL    90 tablet    Take 1 tablet (25 mg) by mouth daily    Essential hypertension with goal blood pressure less than 140/90       lisinopril 2.5 MG tablet    PRINIVIL/Zestril    30 tablet    Take 1 tablet (2.5 mg) by mouth daily    Essential hypertension with goal blood pressure less than 140/90       MELATONIN PO      Take 10 mg by mouth At Bedtime        omega 3 1200 MG Caps      2 CAP DAILY        SAW PALMETTO EXTRACT PO      Take 1,000 mg by mouth        Selenium 200 MCG Caps      1 CAPSULE DAILY        simvastatin 20 MG tablet    ZOCOR    270 tablet    Take 3 tablets (60 mg) by mouth At Bedtime    Hyperlipidemia LDL goal <100       tamsulosin 0.4 MG capsule    FLOMAX    90 capsule    Take 1 capsule (0.4 mg) by mouth daily    Benign non-nodular prostatic hyperplasia with lower urinary tract symptoms       VITAMIN B12 TR 1000 MCG Tbcr      1 TAB DAILY        vitamin C 500 MG Chew      Take  by mouth. 1-3 daily        vitamin E 400 UNIT capsule      1 CAPSULE DAILY        warfarin 5 MG tablet    COUMADIN    130 tablet    Take 7.5 daily, or as directed by the coumadin clinic.    Chronic atrial fibrillation (H)       Zinc 30 MG Tabs      1 TABLET DAILY

## 2018-06-18 NOTE — TELEPHONE ENCOUNTER
Reason for Call:  Same Day Appointment, Requested Provider:  Wale Short MD    PCP: Wale Short    Reason for visit: chronic nose bleeds    Duration of symptoms: four days    Have you been treated for this in the past? No    Additional comments: okay to speak to [place], patient is hard of hearing    Can we leave a detailed message on this number? YES    Phone number patient can be reached at: Home number on file [phone] (home)    Best Time: anytime    Call taken on 6/18/2018 at 8:40 AM by Mirela J Carlson      Call taken on 6/18/2018 at 8:39 AM by Mirela Walters

## 2018-06-18 NOTE — PROGRESS NOTES
ENT Consultation    Lori Mehta is a 89 year old male who is seen in consultation at the request of Dr. Wale Short.      History of Present Illness - Lori Mehta is a 89 year old male presents with a history of recurrent nosebleeds involving his right side.  This started a few weeks ago.  Has not had nosebleeds in the past.  Gentleman is on Coumadin.  His INR is been under 3.  It is can a spontaneous event.  He holds some pressure that helps for a while.  He also put some Vaseline and use humidification but this did not seemed to help.  Denies any significant issues with nasal breathing.  Sinus symptomatology.    Past Medical History -   Past Medical History:   Diagnosis Date     Anxiety      Atrial fibrillation (H) 1/9/2009     BPH      GERD (gastroesophageal reflux disease)      Pure hypercholesterolemia      Unspecified essential hypertension        Current Medications -   Current Outpatient Prescriptions:      allopurinol (ZYLOPRIM) 100 MG tablet, Take 1 tablet (100 mg) by mouth daily, Disp: 90 tablet, Rfl: 3     amLODIPine (NORVASC) 10 MG tablet, Take 1 tablet (10 mg) by mouth daily, Disp: 90 tablet, Rfl: 3     Ascorbic Acid (VITAMIN C) 500 MG CHEW, Take  by mouth. 1-3 daily, Disp: , Rfl:      Bromelains 500 MG TABS, Take 3 tablets by mouth. 3-4 daily, Disp: , Rfl:      CAPSICUM, CAYENNE, PO, Take 450 mg by mouth daily, Disp: , Rfl:      citalopram (CELEXA) 10 MG tablet, Take 1 tablet (10 mg) by mouth daily, Disp: 90 tablet, Rfl: 3     COENZYME Q10, 1 DAILY, Disp: , Rfl:      hydrochlorothiazide (HYDRODIURIL) 25 MG tablet, Take 1 tablet (25 mg) by mouth daily, Disp: 90 tablet, Rfl: 3     lisinopril (PRINIVIL/ZESTRIL) 2.5 MG tablet, Take 1 tablet (2.5 mg) by mouth daily, Disp: 30 tablet, Rfl: 1     MELATONIN PO, Take 10 mg by mouth At Bedtime, Disp: , Rfl:      OMEGA 3 1200 MG OR CAPS, 2 CAP DAILY, Disp: , Rfl: 0     Saw Palmetto, Serenoa repens, (SAW PALMETTO EXTRACT PO), Take 1,000 mg by mouth,  "Disp: , Rfl:      SELENIUM 200 MCG OR CAPS, 1 CAPSULE DAILY, Disp: , Rfl:      simvastatin (ZOCOR) 20 MG tablet, Take 3 tablets (60 mg) by mouth At Bedtime, Disp: 270 tablet, Rfl: 3     tamsulosin (FLOMAX) 0.4 MG capsule, Take 1 capsule (0.4 mg) by mouth daily, Disp: 90 capsule, Rfl: 3     VITAMIN B12 TR 1000 MCG OR TBCR, 1 TAB DAILY, Disp: , Rfl: 0     VITAMIN E 400 UNIT OR CAPS, 1 CAPSULE DAILY, Disp: , Rfl:      warfarin (COUMADIN) 5 MG tablet, Take 7.5 daily, or as directed by the coumadin clinic., Disp: 130 tablet, Rfl: 0     ZINC 30 MG OR TABS, 1 TABLET DAILY, Disp: , Rfl:     Allergies -   Allergies   Allergen Reactions     Paroxetine      Other reaction(s): Intolerance     Terfenadine      Other reaction(s): Other  Unable to void       Social History -   Social History     Social History     Marital status:      Spouse name: N/A     Number of children: N/A     Years of education: N/A     Social History Main Topics     Smoking status: Never Smoker     Smokeless tobacco: Never Used     Alcohol use 0.6 oz/week     1 Standard drinks or equivalent per week     Drug use: No     Sexual activity: Not on file     Other Topics Concern     Not on file     Social History Narrative       Family History -   Family History   Problem Relation Age of Onset     DIABETES Brother      Cancer - colorectal No family hx of      C.A.D. Mother      DIABETES Maternal Grandmother      Circulatory Maternal Grandfather      cerebral aneurysm     Hypertension Father      CEREBROVASCULAR DISEASE Father      Breast Cancer Sister       x 2       Review of Systems - As per HPI and PMHx, otherwise review of system review of the head and neck negative.    Physical Exam  /60 (BP Location: Right arm, Patient Position: Chair, Cuff Size: Adult Regular)  Pulse 90  Ht 1.702 m (5' 7\")  Wt 81.6 kg (180 lb)  SpO2 95%  BMI 28.19 kg/m2  BMI: Body mass index is 28.19 kg/(m^2).    General - The patient is well nourished and well " developed, and appears to have good nutritional status.  Alert and oriented to person and place, answers questions and cooperates with examination appropriately.    SKIN - No suspicious lesions or rashes.  Respiration - No respiratory distress.     Head and Face - Normocephalic and atraumatic, with no gross asymmetry noted of the contour of the facial features.  The facial nerve is intact, with strong symmetric movements.    Voice and Breathing - The patient was breathing comfortably without the use of accessory muscles. There was no wheezing, stridor, or stertor.  The patients voice was clear and strong, and had appropriate pitch and quality.    Ears - Bilateral pinna and EACs with normal appearing overlying skin. Tympanic membrane intact with good mobility on pneumatic otoscopy bilaterally. Bony landmarks of the ossicular chain are normal. The tympanic membranes are normal in appearance. No retraction, perforation, or masses.  No fluid or purulence was seen in the external canal or the middle ear.     Eyes - Extraocular movements intact.  Sclera were not icteric or injected, conjunctiva were pink and moist.    Mouth - Examination of the oral cavity showed pink, healthy oral mucosa. No lesions or ulcerations noted.  The tongue was mobile and midline, and the dentition were in good condition.      Throat - The walls of the oropharynx were smooth, pink, moist, symmetric, and had no lesions or ulcerations.  The tonsillar pillars and soft palate were symmetric.  The uvula was midline on elevation.    Neck - Normal midline excursion of the laryngotracheal complex during swallowing.  Full range of motion on passive movement.  Palpation of the occipital, submental, submandibular, internal jugular chain, and supraclavicular nodes did not demonstrate any abnormal lymph nodes or masses.  The carotid pulse was palpable bilaterally.  Palpation of the thyroid was soft and smooth, with no nodules or goiter appreciated.  The  trachea was mobile and midline.    Nose - External contour is symmetric, no gross deflection or scars.  Nasal mucosa is pink and moist with no abnormal mucus.  The septum was midline and non-obstructive, turbinates of normal size and position.  No polyps, masses, or purulence noted on examination.  I did notice on the right some clotting as well as on the left and anterior septum.    Neuro - Nonfocal neuro exam is normal, CN 2 through 12 intact, normal gait and muscle tone.    Performed in clinic today:  To further evaluate the nasal cavity, I performed rigid nasal endoscopy.  I first sprayed the nasal cavity bilaterally with a mix of lidocaine and neosynephrine.  I then began on the left side using a 2.7mm, 30 degree rigid nasal endoscope.  The septum was straight and Had large anterior perforation and the nasal airway was open.  No abnormal secretions, purulence, or polyps were noted. The left middle turbinate and middle meatus were clearly visualized and normal in appearance.  Looking up, the olfactory cleft was unobstructed.  Going further back, the sphenoethmoid recess was normal in appearance, with healthy appearing mucosa on the face of the sphenoid.  The nasopharynx was unremarkable, and the eustachian tube opening on this side was unobstructed.    I then turned my attention to the right side.  Once again, the septum was straight and Had a large anterior perforation through and through, and the airway was obstructed.  No abnormal secretions, purulence, polyps were noted.  The right middle turbinate and middle meatus were clearly visualized and normal in appearance.  Looking up, the olfactory cleft was unobstructed.  Going further back the right sphenoethmoid recess was normal in appearance, and eustachian tube opening was unobstructed.   White - [id number] Mytamed  The edges of the perforation was somewhat irritated most likely what the source of the bleeding.  I used silver nitrate to cauterize some of those  edges.        A/P - Lori DM Mehta is a 89 year old male with epistaxis likely coming as a result of the use of Coumadin in conjunction with a septal perforation.  Humidification of the area is essential.  Patient will use saline twice daily for the next several months.  Should like to see him back in a couple months.      Ovidio Garcia MD

## 2018-06-18 NOTE — TELEPHONE ENCOUNTER
"Lori Mehta is a 89 year old male who calls with concerns with nosebleeds.      NURSING ASSESSMENT:  Description:  nosebleeds  Onset/duration:  Started Saturday. Placed cotton in nare at onset on Saturday. Took out cotton on Sunday and it restarted.  When he blows his nose it restarts.    Precip. factors:  Hx of nasal polyps removed years ago. On coumadin. States his numbers have been fine. Last checked 5/23.  Associated symptoms:  Denies light headed/dizziness.  Improves/worsens symptoms:  none  Pain scale (0-10)   0/10  LMP/preg/breast feeding:     Last exam/Treatment:     Allergies:   Allergies   Allergen Reactions     Paroxetine      Other reaction(s): Intolerance     Terfenadine      Other reaction(s): Other  Unable to void             NURSING PLAN: Routed to provider Yes   \"Recommend to seek medical care within two hours due to Taking blood-thining medications and having persistent nose bleeds.\"  Patient would like to see ENT.     RECOMMENDED DISPOSITION:  See in 4 hours, another person to drive  Will comply with recommendation: N/A  If further questions/concerns or if symptoms do not improve, worsen or new symptoms develop, call your PCP or [facility] as soon as possible.      Guideline used:Nosebleeds.  Telephone Triage Protocols for Nurses, Fifth Edition, Shelby Collins RN    "

## 2018-07-03 ENCOUNTER — ANTICOAGULATION THERAPY VISIT (OUTPATIENT)
Dept: ANTICOAGULATION | Facility: CLINIC | Age: 83
End: 2018-07-03
Payer: COMMERCIAL

## 2018-07-03 VITALS — DIASTOLIC BLOOD PRESSURE: 65 MMHG | HEART RATE: 74 BPM | SYSTOLIC BLOOD PRESSURE: 139 MMHG

## 2018-07-03 DIAGNOSIS — Z79.01 LONG-TERM (CURRENT) USE OF ANTICOAGULANTS: ICD-10-CM

## 2018-07-03 DIAGNOSIS — I48.20 CHRONIC ATRIAL FIBRILLATION (H): ICD-10-CM

## 2018-07-03 LAB — INR POINT OF CARE: 1.9 (ref 0.86–1.14)

## 2018-07-03 PROCEDURE — 36416 COLLJ CAPILLARY BLOOD SPEC: CPT

## 2018-07-03 PROCEDURE — 85610 PROTHROMBIN TIME: CPT | Mod: QW

## 2018-07-03 PROCEDURE — 99207 ZZC NO CHARGE NURSE ONLY: CPT

## 2018-07-03 NOTE — PROGRESS NOTES
ANTICOAGULATION FOLLOW-UP CLINIC VISIT    Patient Name:  Lori Mehta  Date:  7/3/2018  Contact Type:  Face to Face    SUBJECTIVE:     Patient Findings     Positives No Problem Findings           OBJECTIVE    INR Protime   Date Value Ref Range Status   07/03/2018 1.9 (A) 0.86 - 1.14 Final       ASSESSMENT / PLAN  INR assessment THER    Recheck INR In: 6 WEEKS    INR Location Clinic      Anticoagulation Summary as of 7/3/2018     INR goal 2.0-3.0   Today's INR 1.9!   Warfarin maintenance plan 7.5 mg (5 mg x 1.5) every day   Full warfarin instructions 7.5 mg every day   Weekly warfarin total 52.5 mg   No change documented Lakeisha Rodriguez RN   Plan last modified Lakeisha Rodrgiuez RN (9/12/2017)   Next INR check 8/15/2018   Target end date     Indications   Chronic atrial fibrillation (HCC) [I48.2]  Long-term (current) use of anticoagulants [Z79.01] [Z79.01]         Anticoagulation Episode Summary     INR check location     Preferred lab     Send INR reminders to [facility] POOL    Comments 5 mg tablets, no bandaid, takes in AM, likes BP done.       Anticoagulation Care Providers     Provider Role Specialty Phone number    Candido Freedman DO [facility] Internal Medicine [phone]            See the Encounter Report to view Anticoagulation Flowsheet and Dosing Calendar (Go to Encounters tab in chart review, and find the Anticoagulation Therapy Visit)    Dosage adjustment made based on physician directed care plan.      Lakeisha Rodriguez RN

## 2018-07-03 NOTE — MR AVS SNAPSHOT
Lori Mehta   7/3/2018 9:45 AM   Anticoagulation Therapy Visit    Description:  89 year old male   Provider:  [facility]   Department:  [facility]           INR as of 7/3/2018     Today's INR 1.9!      Anticoagulation Summary as of 7/3/2018     INR goal 2.0-3.0   Today's INR 1.9!   Full warfarin instructions 7.5 mg every day   Next INR check 8/15/2018    Indications   Chronic atrial fibrillation (HCC) [I48.2]  Long-term (current) use of anticoagulants [Z79.01] [Z79.01]         Your next Anticoagulation Clinic appointment(s)     Aug 15, 2018 10:30 AM CDT   Anticoagulation Visit with [facility]   [facility]    [address] [place] [id number] [phone]              Contact Numbers     Clinic Number:         July 2018 Details    Sun Mon Tue Wed Thu Fri Sat     1               2               3      7.5 mg   See details      4      7.5 mg         5      7.5 mg         6      7.5 mg         7      7.5 mg           8      7.5 mg         9      7.5 mg         10      7.5 mg         11      7.5 mg         12      7.5 mg         13      7.5 mg         14      7.5 mg           15      7.5 mg         16      7.5 mg         17      7.5 mg         18      7.5 mg         19      7.5 mg         20      7.5 mg         21      7.5 mg           22      7.5 mg         23      7.5 mg         24      7.5 mg         25      7.5 mg         26      7.5 mg         27      7.5 mg         28      7.5 mg           29      7.5 mg         30      7.5 mg         31      7.5 mg              Date Details   07/03 This INR check               How to take your warfarin dose     To take:  7.5 mg Take 1.5 of the 5 mg tablets.           August 2018 Details    Sun Mon Tue Wed Thu Fri Sat        1      7.5 mg         2      7.5 mg         3      7.5 mg         4      7.5 mg           5      7.5 mg         6      7.5 mg         7      7.5 mg         8      7.5 mg         9      7.5  mg         10      7.5 mg         11      7.5 mg           12      7.5 mg         13      7.5 mg         14      7.5 mg         15            16               17               18                 19               20               21               22               23               24               25                 26               27               28               29               30               31                 Date Details   No additional details    Date of next INR:  8/15/2018         How to take your warfarin dose     To take:  7.5 mg Take 1.5 of the 5 mg tablets.

## 2018-08-01 ENCOUNTER — TELEPHONE (OUTPATIENT)
Dept: INTERNAL MEDICINE | Facility: CLINIC | Age: 83
End: 2018-08-01

## 2018-08-01 DIAGNOSIS — I48.20 CHRONIC ATRIAL FIBRILLATION (H): ICD-10-CM

## 2018-08-01 RX ORDER — WARFARIN SODIUM 5 MG/1
TABLET ORAL
Qty: 130 TABLET | Refills: 0 | Status: SHIPPED | OUTPATIENT
Start: 2018-08-01 | End: 2018-10-16

## 2018-08-01 NOTE — TELEPHONE ENCOUNTER
Reason for Call:  Other prescription    Detailed comments: Patient is calling very frustrated that his coumadin has not been filled. He will be out as of today and would like to pick some up this morning. He doesn't understand the run around that he has been getting. Please call him back as soon as you are able.     Phone Number Patient can be reached at: Home number on file [phone] (home)    Best Time: any    Can we leave a detailed message on this number? YES    Call taken on 8/1/2018 at 8:40 AM by Leeanna Robison

## 2018-08-01 NOTE — TELEPHONE ENCOUNTER
Prescription approved per [facility] Refill Protocol.    Azalia Stallings RN, BSN

## 2018-08-02 ENCOUNTER — APPOINTMENT (OUTPATIENT)
Dept: GENERAL RADIOLOGY | Facility: CLINIC | Age: 83
End: 2018-08-02
Attending: FAMILY MEDICINE
Payer: MEDICARE

## 2018-08-02 ENCOUNTER — HOSPITAL ENCOUNTER (EMERGENCY)
Facility: CLINIC | Age: 83
Discharge: HOME | End: 2018-08-02
Attending: FAMILY MEDICINE | Admitting: FAMILY MEDICINE
Payer: MEDICARE

## 2018-08-02 VITALS
OXYGEN SATURATION: 94 % | HEART RATE: 77 BPM | BODY MASS INDEX: 27.57 KG/M2 | WEIGHT: 176 LBS | RESPIRATION RATE: 16 BRPM | TEMPERATURE: 98.1 F | DIASTOLIC BLOOD PRESSURE: 86 MMHG | SYSTOLIC BLOOD PRESSURE: 155 MMHG

## 2018-08-02 DIAGNOSIS — S93.401A SPRAIN OF RIGHT ANKLE, UNSPECIFIED LIGAMENT, INITIAL ENCOUNTER: ICD-10-CM

## 2018-08-02 DIAGNOSIS — S83.91XA SPRAIN OF RIGHT KNEE, UNSPECIFIED LIGAMENT, INITIAL ENCOUNTER: ICD-10-CM

## 2018-08-02 PROCEDURE — A9270 NON-COVERED ITEM OR SERVICE: HCPCS | Mod: GY | Performed by: FAMILY MEDICINE

## 2018-08-02 PROCEDURE — 73562 X-RAY EXAM OF KNEE 3: CPT | Mod: TC,RT

## 2018-08-02 PROCEDURE — 99284 EMERGENCY DEPT VISIT MOD MDM: CPT | Performed by: FAMILY MEDICINE

## 2018-08-02 PROCEDURE — 99284 EMERGENCY DEPT VISIT MOD MDM: CPT | Mod: Z6 | Performed by: FAMILY MEDICINE

## 2018-08-02 PROCEDURE — 73610 X-RAY EXAM OF ANKLE: CPT | Mod: TC,RT

## 2018-08-02 PROCEDURE — 25000132 ZZH RX MED GY IP 250 OP 250 PS 637: Mod: GY | Performed by: FAMILY MEDICINE

## 2018-08-02 RX ORDER — HYDROCODONE BITARTRATE AND ACETAMINOPHEN 5; 325 MG/1; MG/1
1 TABLET ORAL EVERY 8 HOURS PRN
Qty: 15 TABLET | Refills: 0 | Status: SHIPPED | OUTPATIENT
Start: 2018-08-02 | End: 2018-10-17

## 2018-08-02 RX ORDER — HYDROCODONE BITARTRATE AND ACETAMINOPHEN 5; 325 MG/1; MG/1
1-2 TABLET ORAL ONCE
Status: COMPLETED | OUTPATIENT
Start: 2018-08-02 | End: 2018-08-02

## 2018-08-02 RX ADMIN — HYDROCODONE BITARTRATE AND ACETAMINOPHEN 1 TABLET: 5; 325 TABLET ORAL at 09:45

## 2018-08-02 NOTE — ED NOTES
"Trial walk with walker per Dr Mendoza's request. Pt walked in room and out in hallways with walker, jose r well, some pain, \" a little better after pain pill\" on own with supervision only    "

## 2018-08-02 NOTE — ED AVS SNAPSHOT
[facility] Emergency Department    [address] [place] [address]    Phone:  [phone]    Fax:  [phone]                                       Lori Mehta   MRN: [id number]    Department:  [facility] Emergency Department   Date of Visit:  8/2/2018           After Visit Summary Signature Page     I have received my discharge instructions, and my questions have been answered. I have discussed any challenges I see with this plan with the nurse or doctor.    ..........................................................................................................................................  Patient/Patient Representative Signature      ..........................................................................................................................................  Patient Representative Print Name and Relationship to Patient    ..................................................               ................................................  Date                                            Time    ..........................................................................................................................................  Reviewed by Signature/Title    ...................................................              ..............................................  Date                                                            Time

## 2018-08-02 NOTE — ED PROVIDER NOTES
History     Chief Complaint   Patient presents with     Fall     HPI  Lori Mehta is a 89 year old male who resents with right knee and ankle pain.  Patient states 4 days ago he was walking outside and fell into a hole on the ground.  He had some mild pain in his ankle and his knee at the time but the next day it started hurting more.  He has been having increasing worst time getting around.  He has noticed increasing swelling of the ankle and knee.  Patient has tried some plain Tylenol but this did not help much.  He is mainly using a cane to get around with.  Patient denies any hip pain.  Patient states when he fell, he did not hit his head.    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 Previously signed by patient and sheri on 3/1/1995.  scanned and placed behind media tab on 9/14/11.  Please see advance directive for  specifics...Terra Tobin RN         HYPERLIPIDEMIA LDL GOAL <100 10/31/2010     Priority: Medium     Anxiety 12/14/2009     Priority: Medium     Hypertrophy of prostate without urinary obstruction 05/07/2004     Priority: Medium     Problem list name updated by automated process. Provider to review       Esophageal reflux 05/07/2004     Priority: Medium        Past Medical History:    Past Medical History:   Diagnosis Date     Anxiety      Atrial fibrillation (H) 1/9/2009     BPH      GERD (gastroesophageal reflux disease)      Pure hypercholesterolemia      Unspecified essential hypertension        Past Surgical History:    Past Surgical History:   Procedure Laterality Date     ARTHROPLASTY HIP ANTERIOR  11/4/2013    Procedure: ARTHROPLASTY HIP ANTERIOR;  Right Total Hip Arthroplasty - anterior approach;  Surgeon: Mathew Osorio MD;  Location: [facility] OR     CL AFF SURGICAL PATHOLOGY      nasal polyp removal     COLONOSCOPY  6/16/10     HC REMOVAL GALLBLADDER      Cholecystectomy     HC REMOVAL OF HYDROCELE,TUNICA,UNILAT       HC REPAIR ING HERNIA,5+Y/O,REDUCIBL       HC UGI ENDOSCOPY, SIMPLE EXAM  6/16/10     LAPAROSCOPIC HERNIORRHAPHY PREPERITONEAL  9/19/2012    Procedure: LAPAROSCOPIC HERNIORRHAPHY PREPERITONEAL;  Laparoscopic Preperitoneal Left Inguinal Hernia Repair;  Surgeon: Errol Navas MD;  Location: [facility] OR     LASER YAG CAPSULOTOMY Left 4/6/2017    Procedure: LASER YAG CAPSULOTOMY;  Surgeon: Mathew Croft MD;  Location: [facility] OR     OPEN REDUCTION INTERNAL FIXATION FOREARM Left 12/29/2014    Procedure: OPEN REDUCTION INTERNAL FIXATION FOREARM;  Surgeon: Mathew Osorio MD;  Location[facility]     PHACOEMULSIFICATION WITH STANDARD INTRAOCULAR LENS IMPLANT  7/19/2012    Procedure: PHACOEMULSIFICATION WITH STANDARD INTRAOCULAR LENS IMPLANT;  PHACOEMULSIFICATION WITH STANDARD INTRAOCULAR LENS IMPLANT LEFT EYE;  Surgeon: Gabriel Ross MD;  Location[facility]     PHACOEMULSIFICATION WITH  STANDARD INTRAOCULAR LENS IMPLANT  8/16/2012    Procedure: PHACOEMULSIFICATION WITH STANDARD INTRAOCULAR LENS IMPLANT;  RIGHT PHACOEMULSIFICATION WITH STANDARD INTRAOCULAR LENS IMPLANT;  Surgeon: Gabriel Ross MD;  Location: [facility] OR       Family History:    Family History   Problem Relation Age of Onset     Diabetes Brother      Cancer - colorectal No family hx of      C.A.D. Mother      Diabetes Maternal Grandmother      Circulatory Maternal Grandfather      cerebral aneurysm     Hypertension Father      Cerebrovascular Disease Father      Breast Cancer Sister       x 2       Social History:  Marital Status:   [2]  Social History   Substance Use Topics     Smoking status: Never Smoker     Smokeless tobacco: Never Used     Alcohol use 0.6 oz/week     1 Standard drinks or equivalent per week      Comment: beer once a week        Medications:      Acetaminophen (TYLENOL PO)   allopurinol (ZYLOPRIM) 100 MG tablet   amLODIPine (NORVASC) 10 MG tablet   Ascorbic Acid (VITAMIN C) 500 MG CHEW   Bromelains 500 MG TABS   CAPSICUM, CAYENNE, PO   citalopram (CELEXA) 10 MG tablet   COENZYME Q10   hydrochlorothiazide (HYDRODIURIL) 25 MG tablet   lisinopril (PRINIVIL/ZESTRIL) 2.5 MG tablet   MELATONIN PO   OMEGA 3 1200 MG OR CAPS   Saw Palmetto, Serenoa repens, (SAW PALMETTO EXTRACT PO)   SELENIUM 200 MCG OR CAPS   simvastatin (ZOCOR) 20 MG tablet   tamsulosin (FLOMAX) 0.4 MG capsule   VITAMIN B12 TR 1000 MCG OR TBCR   VITAMIN E 400 UNIT OR CAPS   warfarin (COUMADIN) 5 MG tablet   ZINC 30 MG OR TABS         Review of Systems   All other systems reviewed and are negative.      Physical Exam   BP: 123/58  Heart Rate: 96  Temp: 98.1  F (36.7  C)  Resp: 16  Weight: 79.8 kg (176 lb)  SpO2: 95 %      Physical Exam   Constitutional: He appears well-developed and well-nourished. No distress.   HENT:   Head: Normocephalic and atraumatic.   Mouth/Throat: Oropharynx is clear and moist. No oropharyngeal exudate.   Eyes: Conjunctivae  are normal.   Neck: Normal range of motion.   Cardiovascular: Normal rate, regular rhythm and normal heart sounds.    No murmur heard.  Pulmonary/Chest: Effort normal and breath sounds normal. No respiratory distress. He has no wheezes. He has no rales.   Abdominal: Soft. Bowel sounds are normal. He exhibits no distension. There is no tenderness. There is no rebound.   Musculoskeletal:        Right knee: He exhibits decreased range of motion, swelling and effusion. He exhibits no ecchymosis, no deformity, no laceration, no erythema and normal alignment. Tenderness found. Lateral joint line tenderness noted.        Right ankle: He exhibits swelling. He exhibits normal range of motion, no ecchymosis, no deformity, no laceration and normal pulse. No tenderness. Achilles tendon normal.   Skin: He is not diaphoretic.   Nursing note and vitals reviewed.      ED Course     ED Course     Procedures             Results for orders placed or performed during the hospital encounter of 08/02/18 (from the past 24 hour(s))   Ankle XR, G/E 3 views, right    Narrative    RIGHT ANKLE THREE OR MORE VIEWS;   RIGHT KNEE THREE VIEWS  8/2/2018 10:10 AM     HISTORY:  Fall, right knee pain.     COMPARISON: Right ankle x-rays dated 7/17/2015 and 5/17/2013.    FINDINGS:    Right ankle: No acute fracture or malalignment is identified. Ankle  mortise and talar dome are symmetric and well maintained and similar  to the prior study. There is soft tissue swelling around the ankle,  worse laterally and worsened as compared to the prior study dated  5/17/2013. Moderate enthesopathic spurring of the Achilles tendon  insertion at the calcaneus are again noted. Arterial calcifications  are seen and could be secondary to peripheral vascular disease  associated with diabetes mellitus or senescent changes.    Right knee: There is mild medial compartment joint space loss. No  acute fracture or malalignment. There is a moderate-sized knee joint  fluid  collection. Extensive atherosclerotic calcifications are noted,  indicating peripheral vascular disease. There is mild varus angulation  at the knee joint.      Impression    IMPRESSION:  1. No acute fracture or malalignment of the ankle or knee is seen.  2. Increasing lateral soft tissue swelling at the ankle indicates  lateral ankle sprain.  3. Mild medial compartment joint space loss of the right knee with  associated varus angulation of the knee indicates degenerative change.  4. Moderate to large-sized right knee joint effusion is seen. Internal  derangement is not excluded.  5. Peripheral vascular disease.   XR Knee Right 3 Views    Narrative    RIGHT ANKLE THREE OR MORE VIEWS;   RIGHT KNEE THREE VIEWS  8/2/2018 10:10 AM     HISTORY:  Fall, right knee pain.     COMPARISON: Right ankle x-rays dated 7/17/2015 and 5/17/2013.    FINDINGS:    Right ankle: No acute fracture or malalignment is identified. Ankle  mortise and talar dome are symmetric and well maintained and similar  to the prior study. There is soft tissue swelling around the ankle,  worse laterally and worsened as compared to the prior study dated  5/17/2013. Moderate enthesopathic spurring of the Achilles tendon  insertion at the calcaneus are again noted. Arterial calcifications  are seen and could be secondary to peripheral vascular disease  associated with diabetes mellitus or senescent changes.    Right knee: There is mild medial compartment joint space loss. No  acute fracture or malalignment. There is a moderate-sized knee joint  fluid collection. Extensive atherosclerotic calcifications are noted,  indicating peripheral vascular disease. There is mild varus angulation  at the knee joint.      Impression    IMPRESSION:  1. No acute fracture or malalignment of the ankle or knee is seen.  2. Increasing lateral soft tissue swelling at the ankle indicates  lateral ankle sprain.  3. Mild medial compartment joint space loss of the right knee  with  associated varus angulation of the knee indicates degenerative change.  4. Moderate to large-sized right knee joint effusion is seen. Internal  derangement is not excluded.  5. Peripheral vascular disease.       Medications   HYDROcodone-acetaminophen (NORCO) 5-325 MG per tablet 1-2 tablet (1 tablet Oral Given 8/2/18 [id number])   X-ray shows no fracture.  It did show some significant swelling of the ankle and knee.  This most likely related to a sprain of the ankle.  On exam patient did have some pain with varus stressing of the knee.  Patient could certainly have a injury to the lateral meniscus.  Recommend just conservative care at this point.  Ice to the knee and ankle and will will place an Ace wrap.  Patient will follow-up with his doctor next week if things are not improving.    Assessments & Plan (with Medical Decision Making)  Right ankle sprain, right knee sprain     I have reviewed the nursing notes.    I have reviewed the findings, diagnosis, plan and need for follow up with the patient.              8/2/2018   [facility] EMERGENCY DEPARTMENT     Fred Mendoza MD  08/02/18 [id number]

## 2018-08-02 NOTE — ED NOTES
Patient had right hip replacement and states he has always had some right hip pain since replacement.  No tenderness noted today with palpation.

## 2018-08-02 NOTE — ED AVS SNAPSHOT
[facility] Emergency Department    [address] [place] [address] [address] [place] [address]    Phone:  [phone]    Fax:  [phone]                                       Lori Mehta   MRN: [id number]    Department:  [facility] Emergency Department   Date of Visit:  8/2/2018           Patient Information     Date Of Birth          6/27/1928        Your diagnoses for this visit were:     Sprain of right ankle, unspecified ligament, initial encounter     Sprain of right knee, unspecified ligament, initial encounter        You were seen by Fred Mendoza MD.      Follow-up Information     Follow up with Wale Short MD. Schedule an appointment as soon as possible for a visit in 5 days.    Specialty:  Internal Medicine    Why:  If not improving.    Contact information:    [address] [place] [address]  [phone]        Discharge References/Attachments     SPRAIN, ANKLE (ADULT) (ENGLISH)    KNEE SPRAIN (ENGLISH)    ACE WRAP (ENGLISH)      Your next 10 appointments already scheduled     Aug 15, 2018 10:30 AM CDT   [facility]  [facility]    [address] [place] [id number] [phone]              24 Hour Appointment Hotline       To make an appointment at any [facility], call [phone] ([phone]). If you don't have a family doctor or clinic, we will help you find one. [facility] are conveniently located to serve the needs of you and your family.             Review of your medicines      START taking        Dose / Directions Last dose taken    HYDROcodone-acetaminophen 5-325 MG per tablet   Commonly known as:  NORCO   Dose:  1 tablet   Quantity:  15 tablet        Take 1 tablet by mouth every 8 hours as needed for severe pain   Refills:  0          Our records show that you are taking the medicines listed below. If these are incorrect, please call your family doctor or  clinic.        Dose / Directions Last dose taken    allopurinol 100 MG tablet   Commonly known as:  ZYLOPRIM   Dose:  100 mg   Quantity:  90 tablet        Take 1 tablet (100 mg) by mouth daily   Refills:  3        amLODIPine 10 MG tablet   Commonly known as:  NORVASC   Dose:  10 mg   Quantity:  90 tablet        Take 1 tablet (10 mg) by mouth daily   Refills:  3        Bromelains 500 MG Tabs   Dose:  3 tablet        Take 3 tablets by mouth. 3-4 daily   Refills:  0        CAPSICUM (CAYENNE) PO   Dose:  450 mg        Take 450 mg by mouth daily   Refills:  0        citalopram 10 MG tablet   Commonly known as:  celeXA   Dose:  10 mg   Quantity:  90 tablet        Take 1 tablet (10 mg) by mouth daily   Refills:  3        COENZYME Q10        1 DAILY   Refills:  0        hydrochlorothiazide 25 MG tablet   Commonly known as:  HYDRODIURIL   Dose:  25 mg   Quantity:  90 tablet        Take 1 tablet (25 mg) by mouth daily   Refills:  3        lisinopril 2.5 MG tablet   Commonly known as:  PRINIVIL/Zestril   Dose:  2.5 mg   Quantity:  30 tablet        Take 1 tablet (2.5 mg) by mouth daily   Refills:  1        MELATONIN PO   Dose:  10 mg        Take 10 mg by mouth At Bedtime   Refills:  0        omega 3 1200 MG Caps        2 CAP DAILY   Refills:  0        SAW PALMETTO EXTRACT PO   Dose:  1000 mg        Take 1,000 mg by mouth   Refills:  0        Selenium 200 MCG Caps        1 CAPSULE DAILY   Refills:  0        simvastatin 20 MG tablet   Commonly known as:  ZOCOR   Dose:  60 mg   Quantity:  270 tablet        Take 3 tablets (60 mg) by mouth At Bedtime   Refills:  3        tamsulosin 0.4 MG capsule   Commonly known as:  FLOMAX   Dose:  0.4 mg   Quantity:  90 capsule        Take 1 capsule (0.4 mg) by mouth daily   Refills:  3        TYLENOL PO   Dose:  1000 mg        Take 1,000 mg by mouth   Refills:  0        VITAMIN B12 TR 1000 MCG Tbcr        1 TAB DAILY   Refills:  0        vitamin C 500 MG Chew        Take  by mouth. 1-3 daily    Refills:  0        vitamin E 400 UNIT capsule        1 CAPSULE DAILY   Refills:  0        warfarin 5 MG tablet   Commonly known as:  COUMADIN   Quantity:  130 tablet        TAKE ONE AND ONE-HALF TABLETS ( 7.5 MG) DAILY OR AS DIRECTED BY COUMADIN CLINIC   Refills:  0        Zinc 30 MG Tabs        1 TABLET DAILY   Refills:  0                Information about OPIOIDS     PRESCRIPTION OPIOIDS: WHAT YOU NEED TO KNOW   We gave you an opioid (narcotic) pain medicine. It is important to manage your pain, but opioids are not always the best choice. You should first try all the other options your care team gave you. Take this medicine for as short a time (and as few doses) as possible.     These medicines have risks:    DO NOT drive when on new or higher doses of pain medicine. These medicines can affect your alertness and reaction times, and you could be arrested for driving under the influence (DUI). If you need to use opioids long-term, talk to your care team about driving.    DO NOT operate heave machinery    DO NOT do any other dangerous activities while taking these medicines.     DO NOT drink any alcohol while taking these medicines.      If the opioid prescribed includes acetaminophen, DO NOT take with any other medicines that contain acetaminophen. Read all labels carefully. Look for the word  acetaminophen  or  Tylenol.  Ask your pharmacist if you have questions or are unsure.    You can get addicted to pain medicines, especially if you have a history of addiction (chemical, alcohol or substance dependence). Talk to your care team about ways to reduce this risk.    Store your pills in a secure place, locked if possible. We will not replace any lost or stolen medicine. If you don t finish your medicine, please throw away (dispose) as directed by your pharmacist. The [place] [employer] has more information about safe disposal: https://www.pca.[facility].[place].us/living-green/managing-unwanted-medications.      All opioids tend to cause constipation. Drink plenty of water and eat foods that have a lot of fiber, such as fruits, vegetables, prune juice, apple juice and high-fiber cereal. Take a laxative (Miralax, milk of magnesia, Colace, Senna) if you don t move your bowels at least every other day.         Prescriptions were sent or printed at these locations (1 Prescription)                   [facility] - [place], [place] - [address]   [address], [facility] [address]    Telephone:  [phone]   Fax:  [phone]   Hours:                  Printed at Department/Unit printer (1 of 1)         HYDROcodone-acetaminophen (NORCO) 5-325 MG per tablet                Procedures and tests performed during your visit     Ankle XR, G/E 3 views, right    XR Knee Right 3 Views      Orders Needing Specimen Collection     None      Pending Results     Date and Time Order Name Status Description    8/2/2018 0942 Ankle XR, G/E 3 views, right Preliminary     8/2/2018 0942 XR Knee Right 3 Views Preliminary             Pending Culture Results     No orders found from 7/31/2018 to 8/3/2018.            Pending Results Instructions     If you had any lab results that were not finalized at the time of your Discharge, you can call the ED Lab Result RN at [phone]. You will be contacted by this team for any positive Lab results or changes in treatment. The nurses are available 7 days a week from 10A to 6:30P.  You can leave a message 24 hours per day and they will return your call.        Thank you for choosing [facility]       Thank you for choosing [facility] for your care. Our goal is always to provide you with excellent care. Hearing back from our patients is one way we can continue to improve our services. Please take a few minutes to complete the written survey that you may receive in the mail after you visit with us. Thank you!        Care EveryWhere ID     This is your Care EveryWhere ID. This could be used by other  organizations to access your [facility] medical records  [id number]        Equal Access to Services     DANNY GROSSMAN : Dallin Martinez, priscilla power, prakash arreola. So [facility] [phone].    ATENCIÓN: Si habla español, tiene a rae disposición servicios gratuitos de asistencia lingüística. Llame al [phone].    We comply with applicable federal civil rights laws and [place] laws. We do not discriminate on the basis of race, color, national origin, age, disability, sex, sexual orientation, or gender identity.            After Visit Summary       This is your record. Keep this with you and show to your community pharmacist(s) and doctor(s) at your next visit.

## 2018-08-15 ENCOUNTER — ANTICOAGULATION THERAPY VISIT (OUTPATIENT)
Dept: ANTICOAGULATION | Facility: CLINIC | Age: 83
End: 2018-08-15
Payer: COMMERCIAL

## 2018-08-15 ENCOUNTER — TELEPHONE (OUTPATIENT)
Dept: ANTICOAGULATION | Facility: CLINIC | Age: 83
End: 2018-08-15

## 2018-08-15 DIAGNOSIS — I48.20 CHRONIC ATRIAL FIBRILLATION (H): ICD-10-CM

## 2018-08-15 DIAGNOSIS — I63.9 CEREBROVASCULAR ACCIDENT (CVA), UNSPECIFIED MECHANISM (H): ICD-10-CM

## 2018-08-15 DIAGNOSIS — Z79.01 LONG-TERM (CURRENT) USE OF ANTICOAGULANTS: ICD-10-CM

## 2018-08-15 DIAGNOSIS — Z79.01 LONG-TERM (CURRENT) USE OF ANTICOAGULANTS: Primary | ICD-10-CM

## 2018-08-15 LAB — INR POINT OF CARE: 4.1 (ref 0.86–1.14)

## 2018-08-15 PROCEDURE — 85610 PROTHROMBIN TIME: CPT | Mod: QW

## 2018-08-15 PROCEDURE — 36416 COLLJ CAPILLARY BLOOD SPEC: CPT

## 2018-08-15 PROCEDURE — 99207 ZZC NO CHARGE NURSE ONLY: CPT

## 2018-08-15 NOTE — PROGRESS NOTES
ANTICOAGULATION FOLLOW-UP CLINIC VISIT    Patient Name:  Lori Mehta  Date:  8/15/2018  Contact Type:  Face to Face    SUBJECTIVE:     Patient Findings     Positives OTC meds (Pt sprained his ankle and has been taking some more tylenol that usual. He will monitor this closer)           OBJECTIVE    INR Protime   Date Value Ref Range Status   08/15/2018 4.1 (A) 0.86 - 1.14 Final       ASSESSMENT / PLAN  INR assessment SUPRA    Recheck INR In: 6 WEEKS    INR Location Clinic      Anticoagulation Summary as of 8/15/2018     INR goal 2.0-3.0   Today's INR 4.1!   Warfarin maintenance plan 7.5 mg (5 mg x 1.5) every day   Full warfarin instructions 8/16: Hold; Otherwise 7.5 mg every day   Weekly warfarin total 52.5 mg   Plan last modified Lakeisha Rodriguez RN (9/12/2017)   Next INR check 9/26/2018   Target end date     Indications   Chronic atrial fibrillation (HCC) [I48.2]  Long-term (current) use of anticoagulants [Z79.01] [Z79.01]         Anticoagulation Episode Summary     INR check location     Preferred lab     Send INR reminders to MIHM POOL    Comments 5 mg tablets, no bandaid, takes in AM, likes BP done.       Anticoagulation Care Providers     Provider Role Specialty Phone number    Candido Freedman DO [facility] Internal Medicine [phone]            See the Encounter Report to view Anticoagulation Flowsheet and Dosing Calendar (Go to Encounters tab in chart review, and find the Anticoagulation Therapy Visit)    Dosage adjustment made based on physician directed care plan.    Azalia Stallings RN

## 2018-08-15 NOTE — TELEPHONE ENCOUNTER
Please review and renew this patients INR referral, orders pending. Thank you!      Azalia Stallings RN, BSN

## 2018-08-15 NOTE — MR AVS SNAPSHOT
Lori Mehta   8/15/2018 10:30 AM   Anticoagulation Therapy Visit    Description:  89 year old male   Provider:  NAWAF ANTI COAG   Department:  [facility]           INR as of 8/15/2018     Today's INR 4.1!      Anticoagulation Summary as of 8/15/2018     INR goal 2.0-3.0   Today's INR 4.1!   Full warfarin instructions 8/16: Hold; Otherwise 7.5 mg every day   Next INR check 9/26/2018    Indications   Chronic atrial fibrillation (HCC) [I48.2]  Long-term (current) use of anticoagulants [Z79.01] [Z79.01]         Your next Anticoagulation Clinic appointment(s)     Sep 26, 2018 10:30 AM CDT   Anticoagulation Visit with NAWAF ANTI BILL   [facility] ([facility])    [address] [place] [id number] [phone]              Contact Numbers     Clinic Number:         August 2018 Details    Sun Mon Tue Wed Thu Fri Sat        1               2               3               4                 5               6               7               8               9               10               11                 12               13               14               15      7.5 mg   See details      16      Hold         17      7.5 mg         18      7.5 mg           19      7.5 mg         20      7.5 mg         21      7.5 mg         22      7.5 mg         23      7.5 mg         24      7.5 mg         25      7.5 mg           26      7.5 mg         27      7.5 mg         28      7.5 mg         29      7.5 mg         30      7.5 mg         31      7.5 mg           Date Details   08/15 This INR check               How to take your warfarin dose     To take:  7.5 mg Take 1.5 of the 5 mg tablets.    Hold Do not take your warfarin dose. See the Details table to the right for additional instructions.                September 2018 Details    Sun Mon Tue Wed Thu Fri Sat           1      7.5 mg           2      7.5 mg         3      7.5 mg         4      7.5 mg         5      7.5 mg         6       7.5 mg         7      7.5 mg         8      7.5 mg           9      7.5 mg         10      7.5 mg         11      7.5 mg         12      7.5 mg         13      7.5 mg         14      7.5 mg         15      7.5 mg           16      7.5 mg         17      7.5 mg         18      7.5 mg         19      7.5 mg         20      7.5 mg         21      7.5 mg         22      7.5 mg           23      7.5 mg         24      7.5 mg         25      7.5 mg         26            27               28               29                 30                      Date Details   No additional details    Date of next INR:  9/26/2018         How to take your warfarin dose     To take:  7.5 mg Take 1.5 of the 5 mg tablets.

## 2018-08-24 ENCOUNTER — HOSPITAL ENCOUNTER (EMERGENCY)
Facility: CLINIC | Age: 83
Discharge: HOME | End: 2018-08-24
Attending: EMERGENCY MEDICINE | Admitting: EMERGENCY MEDICINE
Payer: MEDICARE

## 2018-08-24 VITALS
SYSTOLIC BLOOD PRESSURE: 168 MMHG | HEIGHT: 67 IN | BODY MASS INDEX: 28.25 KG/M2 | OXYGEN SATURATION: 95 % | RESPIRATION RATE: 20 BRPM | HEART RATE: 76 BPM | TEMPERATURE: 97.9 F | WEIGHT: 180 LBS | DIASTOLIC BLOOD PRESSURE: 78 MMHG

## 2018-08-24 DIAGNOSIS — S61.012A LACERATION OF THUMB, LEFT, INITIAL ENCOUNTER: ICD-10-CM

## 2018-08-24 PROCEDURE — 25000125 ZZHC RX 250: Performed by: EMERGENCY MEDICINE

## 2018-08-24 PROCEDURE — 99284 EMERGENCY DEPT VISIT MOD MDM: CPT | Mod: 25 | Performed by: EMERGENCY MEDICINE

## 2018-08-24 PROCEDURE — 99283 EMERGENCY DEPT VISIT LOW MDM: CPT | Mod: 25 | Performed by: EMERGENCY MEDICINE

## 2018-08-24 PROCEDURE — 25000125 ZZHC RX 250

## 2018-08-24 PROCEDURE — 64450 NJX AA&/STRD OTHER PN/BRANCH: CPT | Mod: Z6 | Performed by: EMERGENCY MEDICINE

## 2018-08-24 PROCEDURE — 64450 NJX AA&/STRD OTHER PN/BRANCH: CPT | Performed by: EMERGENCY MEDICINE

## 2018-08-24 RX ORDER — BACITRACIN ZINC 500 [USP'U]/G
OINTMENT TOPICAL ONCE
Status: COMPLETED | OUTPATIENT
Start: 2018-08-24 | End: 2018-08-24

## 2018-08-24 RX ORDER — BACITRACIN ZINC 500 [USP'U]/G
OINTMENT TOPICAL
Status: COMPLETED
Start: 2018-08-24 | End: 2018-08-24

## 2018-08-24 RX ADMIN — BACITRACIN: 500 OINTMENT TOPICAL at 16:41

## 2018-08-24 RX ADMIN — LIDOCAINE HYDROCHLORIDE 5 ML: 10 INJECTION, SOLUTION INFILTRATION; PERINEURAL at 16:44

## 2018-08-24 RX ADMIN — BACITRACIN ZINC: 500 OINTMENT TOPICAL at 16:41

## 2018-08-24 ASSESSMENT — ENCOUNTER SYMPTOMS: WOUND: 1

## 2018-08-24 NOTE — DISCHARGE INSTRUCTIONS
You have an avulsion laceration to the left thumb.  I did not recommend any stitches today because of the tissue that was missing.  I would recommend keeping it clean and dry and applying antibiotic ointment and fresh nonstick bandage every couple of days.  Return to the ER if new or worsening symptoms.  Recommend follow-up in the clinic in about a week for wound check.

## 2018-08-24 NOTE — ED PROVIDER NOTES
History     Chief Complaint   Patient presents with     Hand Injury     HPI  Lori Mehta is a 89 year old male who presents to the ED complaining of a laceration. Patient states he caught his left thumb in a table saw. The tip of the thumb and part of the nail was amputated but it the blade did not appear to reach the bone. This event happened a couple hours ago. Patient's home care nurses told Lori to come into the ED immediately for treatment.    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 Previously signed by patient and notary on 3/1/1995.  scanned and placed behind media tab on 9/14/11.  Please see advance directive for specifics...Terra Tobin RN         HYPERLIPIDEMIA LDL GOAL <100 10/31/2010     Priority: Medium     Anxiety 12/14/2009     Priority: Medium     Hypertrophy of prostate without urinary obstruction  05/07/2004     Priority: Medium     Problem list name updated by automated process. Provider to review       Esophageal reflux 05/07/2004     Priority: Medium        Past Medical History:    Past Medical History:   Diagnosis Date     Anxiety      Atrial fibrillation (H) 1/9/2009     BPH      GERD (gastroesophageal reflux disease)      Pure hypercholesterolemia      Unspecified essential hypertension        Past Surgical History:    Past Surgical History:   Procedure Laterality Date     ARTHROPLASTY HIP ANTERIOR  11/4/2013    Procedure: ARTHROPLASTY HIP ANTERIOR;  Right Total Hip Arthroplasty - anterior approach;  Surgeon: Mathew Osorio MD;  Location: [facility] OR     CL AFF SURGICAL PATHOLOGY      nasal polyp removal     COLONOSCOPY  6/16/10     HC REMOVAL GALLBLADDER      Cholecystectomy     HC REMOVAL OF HYDROCELE,TUNICA,UNILAT       HC REPAIR ING HERNIA,5+Y/O,REDUCIBL       HC UGI ENDOSCOPY, SIMPLE EXAM  6/16/10     LAPAROSCOPIC HERNIORRHAPHY PREPERITONEAL  9/19/2012    Procedure: LAPAROSCOPIC HERNIORRHAPHY PREPERITONEAL;  Laparoscopic Preperitoneal Left Inguinal Hernia Repair;  Surgeon: Errol Navas MD;  Location: [facility] OR     LASER YAG CAPSULOTOMY Left 4/6/2017    Procedure: LASER YAG CAPSULOTOMY;  Surgeon: Mathew Croft MD;  Location: [facility] OR     OPEN REDUCTION INTERNAL FIXATION FOREARM Left 12/29/2014    Procedure: OPEN REDUCTION INTERNAL FIXATION FOREARM;  Surgeon: Mathew Osorio MD;  Location: [facility] OR     PHACOEMULSIFICATION WITH STANDARD INTRAOCULAR LENS IMPLANT  7/19/2012    Procedure: PHACOEMULSIFICATION WITH STANDARD INTRAOCULAR LENS IMPLANT;  PHACOEMULSIFICATION WITH STANDARD INTRAOCULAR LENS IMPLANT LEFT EYE;  Surgeon: Gabriel Ross MD;  Location: [facility] OR     PHACOEMULSIFICATION WITH STANDARD INTRAOCULAR LENS IMPLANT  8/16/2012    Procedure: PHACOEMULSIFICATION WITH STANDARD INTRAOCULAR LENS IMPLANT;  RIGHT PHACOEMULSIFICATION WITH STANDARD INTRAOCULAR LENS IMPLANT;  Surgeon: Cody  "MD Gabriel;  Location: [facility] OR       Family History:    Family History   Problem Relation Age of Onset     Diabetes Brother      Cancer - colorectal No family hx of      C.A.D. Mother      Diabetes Maternal Grandmother      Circulatory Maternal Grandfather      cerebral aneurysm     Hypertension Father      Cerebrovascular Disease Father      Breast Cancer Sister       x 2       Social History:  Marital Status:   [2]  Social History   Substance Use Topics     Smoking status: Never Smoker     Smokeless tobacco: Never Used     Alcohol use 0.6 oz/week     1 Standard drinks or equivalent per week      Comment: beer once a week        Medications:      Acetaminophen (TYLENOL PO)   allopurinol (ZYLOPRIM) 100 MG tablet   amLODIPine (NORVASC) 10 MG tablet   Ascorbic Acid (VITAMIN C) 500 MG CHEW   Bromelains 500 MG TABS   CAPSICUM, CAYENNE, PO   citalopram (CELEXA) 10 MG tablet   COENZYME Q10   hydrochlorothiazide (HYDRODIURIL) 25 MG tablet   HYDROcodone-acetaminophen (NORCO) 5-325 MG per tablet   lisinopril (PRINIVIL/ZESTRIL) 2.5 MG tablet   MELATONIN PO   OMEGA 3 1200 MG OR CAPS   Saw Palmetto, Serenoa repens, (SAW PALMETTO EXTRACT PO)   SELENIUM 200 MCG OR CAPS   simvastatin (ZOCOR) 20 MG tablet   tamsulosin (FLOMAX) 0.4 MG capsule   VITAMIN B12 TR 1000 MCG OR TBCR   VITAMIN E 400 UNIT OR CAPS   warfarin (COUMADIN) 5 MG tablet   ZINC 30 MG OR TABS         Review of Systems   Skin: Positive for wound.       Physical Exam   BP: 172/84  Pulse: 76  Heart Rate: 81  Temp: 97.9  F (36.6  C)  Resp: 20  Height: 170.2 cm (5' 7\")  Weight: 81.6 kg (180 lb)  SpO2: 95 %      Physical Exam   Constitutional: He is oriented to person, place, and time. He appears well-developed and well-nourished. No distress.   HENT:   Head: Atraumatic.   Mouth/Throat: Oropharynx is clear and moist. No oropharyngeal exudate.   Eyes: Pupils are equal, round, and reactive to light. No scleral icterus.   Cardiovascular: Normal heart sounds and intact " distal pulses.    Pulmonary/Chest: Breath sounds normal. No respiratory distress.   Abdominal: Soft. Bowel sounds are normal. There is no tenderness.   Musculoskeletal: Normal range of motion. He exhibits tenderness. He exhibits no edema.   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   Skin: Skin is warm. He is not diaphoretic.   Wound as above - avulsion laceration to the left thumb with small piece of skin flap left behind. Wound approximately 3 cm oval lesion.   Psychiatric: He has a normal mood and affect.   Nursing note and vitals reviewed.      ED Course     ED Course     Procedures     After risks/benefits discussion, a digital block was performed using 1% lidocaine without epinephrine. Wound debrided, wound cleansed, antibiotic ointment and bandage placed. No bone exposure.     No results found for this or any previous visit (from the past 24 hour(s)).    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  Recommendations for healing by secondary intention discussed.  Antibiotic ointment and nonstick bandage and tube gauze placed.  Recommend follow-up in the clinic but the patient states that he probably will not.  I had offered to tack down the piece of skin but the patient did not want me to because he did not want to follow-up in the clinic for suture removal.  He also declined tetanus vaccination update.  Return to ER precautions discussed.       I have reviewed the nursing notes.    I have reviewed the findings, diagnosis, plan and need for follow up with the patient.      Discharge Medication List as of 8/24/2018  4:55 PM          Final diagnoses:   Laceration of thumb, left, initial encounter     This document serves as a record of services personally  performed by Freddie Moscoso MD. It was created on their behalf by Donald Sanchez, a trained medical scribe. The creation of this record is based on the provider's personal observations and the statements of the patient. This document has been checked and approved by the attending provider.    Note: Chart documentation done in part with Dragon Voice Recognition software. Although reviewed after completion, some word and grammatical errors may remain.    8/24/2018   [facility] EMERGENCY DEPARTMENT     Freddie Moscoso MD  08/24/18 7851

## 2018-08-24 NOTE — ED AVS SNAPSHOT
[facility] Emergency Department    [address] [place] [address] [place] [address]    Phone:  [phone]    Fax:  [phone]                                       Lori Mehta   MRN: [id number]    Department:  [facility] Emergency Department   Date of Visit:  8/24/2018           Patient Information     Date Of Birth          6/27/1928        Your diagnoses for this visit were:     Laceration of thumb, left, initial encounter        You were seen by Freddie Moscoso MD.      Follow-up Information     Follow up with Wale Short MD. Schedule an appointment as soon as possible for a visit in 1 week.    Specialty:  Internal Medicine    Why:  ER follow up    Contact information:    [address] [place] [address] [phone]          Discharge Instructions       You have an avulsion laceration to the left thumb.  I did not recommend any stitches today because of the tissue that was missing.  I would recommend keeping it clean and dry and applying antibiotic ointment and fresh nonstick bandage every couple of days.  Return to the ER if new or worsening symptoms.  Recommend follow-up in the clinic in about a week for wound check.    Your next 10 appointments already scheduled     Sep 26, 2018 10:30 AM CDT   Anticoagulation Visit with PH ANTI COAG   [facility]    [address] [place] [address] [phone]              24 Hour Appointment Hotline       To make an appointment at any [facility] call [phone] ([phone]). If you don't have a family doctor or clinic, we will help you find one. [facility] are conveniently located to serve the needs of you and your family.             Review of your medicines      Our records show that you are taking the medicines listed below. If these are incorrect, please call your family doctor or clinic.        Dose / Directions Last dose taken    allopurinol 100 MG  tablet   Commonly known as:  ZYLOPRIM   Dose:  100 mg   Quantity:  90 tablet        Take 1 tablet (100 mg) by mouth daily   Refills:  3        amLODIPine 10 MG tablet   Commonly known as:  NORVASC   Dose:  10 mg   Quantity:  90 tablet        Take 1 tablet (10 mg) by mouth daily   Refills:  3        Bromelains 500 MG Tabs   Dose:  3 tablet        Take 3 tablets by mouth. 3-4 daily   Refills:  0        CAPSICUM (CAYENNE) PO   Dose:  450 mg        Take 450 mg by mouth daily   Refills:  0        citalopram 10 MG tablet   Commonly known as:  celeXA   Dose:  10 mg   Quantity:  90 tablet        Take 1 tablet (10 mg) by mouth daily   Refills:  3        COENZYME Q10        1 DAILY   Refills:  0        hydrochlorothiazide 25 MG tablet   Commonly known as:  HYDRODIURIL   Dose:  25 mg   Quantity:  90 tablet        Take 1 tablet (25 mg) by mouth daily   Refills:  3        HYDROcodone-acetaminophen 5-325 MG per tablet   Commonly known as:  NORCO   Dose:  1 tablet   Quantity:  15 tablet        Take 1 tablet by mouth every 8 hours as needed for severe pain   Refills:  0        lisinopril 2.5 MG tablet   Commonly known as:  PRINIVIL/Zestril   Dose:  2.5 mg   Quantity:  30 tablet        Take 1 tablet (2.5 mg) by mouth daily   Refills:  1        MELATONIN PO   Dose:  10 mg        Take 10 mg by mouth At Bedtime   Refills:  0        omega 3 1200 MG Caps        2 CAP DAILY   Refills:  0        SAW PALMETTO EXTRACT PO   Dose:  1000 mg        Take 1,000 mg by mouth   Refills:  0        Selenium 200 MCG Caps        1 CAPSULE DAILY   Refills:  0        simvastatin 20 MG tablet   Commonly known as:  ZOCOR   Dose:  60 mg   Quantity:  270 tablet        Take 3 tablets (60 mg) by mouth At Bedtime   Refills:  3        tamsulosin 0.4 MG capsule   Commonly known as:  FLOMAX   Dose:  0.4 mg   Quantity:  90 capsule        Take 1 capsule (0.4 mg) by mouth daily   Refills:  3        TYLENOL PO   Dose:  1000 mg        Take 1,000 mg by mouth   Refills:  0  "       VITAMIN B12 TR 1000 MCG Tbcr        1 TAB DAILY   Refills:  0        vitamin C 500 MG Chew        Take  by mouth. 1-3 daily   Refills:  0        vitamin E 400 UNIT capsule        1 CAPSULE DAILY   Refills:  0        warfarin 5 MG tablet   Commonly known as:  COUMADIN   Quantity:  130 tablet        TAKE ONE AND ONE-HALF TABLETS ( 7.5 MG) DAILY OR AS DIRECTED BY COUMADIN CLINIC   Refills:  0        Zinc 30 MG Tabs        1 TABLET DAILY   Refills:  0                Orders Needing Specimen Collection     None      Pending Results     No orders found from 8/22/2018 to 8/25/2018.            Pending Culture Results     No orders found from 8/22/2018 to 8/25/2018.            Pending Results Instructions     If you had any lab results that were not finalized at the time of your Discharge, you can call the ED Lab Result RN at [phone]. You will be contacted by this team for any positive Lab results or changes in treatment. The nurses are available 7 days a week from 10A to 6:30P.  You can leave a message 24 hours per day and they will return your call.        Thank you for choosing [facility]       Thank you for choosing [facility] for your care. Our goal is always to provide you with excellent care. Hearing back from our patients is one way we can continue to improve our services. Please take a few minutes to complete the written survey that you may receive in the mail after you visit with us. Thank you!        [employer] Information     [employer] lets you send messages to your doctor, view your test results, renew your prescriptions, schedule appointments and more. To sign up, go to [email or web address] . Click on \"Log in\" on the left side of the screen, which will take you to the Welcome page. Then click on \"Sign up Now\" on the right side of the page.     You will be asked to enter the access code listed below, as well as some personal information. Please follow the directions to create your username and password.   "   Your access code is: [email or web address]  Expires: 2018  4:55 PM     Your access code will  in 90 days. If you need help or a new code, please call your [facility] clinic or [phone].        Care EveryWhere ID     This is your Care EveryWhere ID. This could be used by other organizations to access your [facility] medical records  [id number]        Equal Access to Services     [facility] : Hadii melanie hamiltono Soangel, waaxda luric, qaybta kaalmada francisco, prakash hale . So [facility] [phone].    ATENCIÓN: Si habla bebe, tiene a rae disposición servicios gratuitos de asistencia lingüística. Llame al [phone].    We comply with applicable federal civil rights laws and [place] laws. We do not discriminate on the basis of race, color, national origin, age, disability, sex, sexual orientation, or gender identity.            After Visit Summary       This is your record. Keep this with you and show to your community pharmacist(s) and doctor(s) at your next visit.

## 2018-08-24 NOTE — ED AVS SNAPSHOT
[facility] Emergency Department    [address] [place] [address]    Phone:  [phone]    Fax:  [phone]                                       Lori Mehta   MRN: [id number]    Department:  [facility] Emergency Department   Date of Visit:  8/24/2018           After Visit Summary Signature Page     I have received my discharge instructions, and my questions have been answered. I have discussed any challenges I see with this plan with the nurse or doctor.    ..........................................................................................................................................  Patient/Patient Representative Signature      ..........................................................................................................................................  Patient Representative Print Name and Relationship to Patient    ..................................................               ................................................  Date                                            Time    ..........................................................................................................................................  Reviewed by Signature/Title    ...................................................              ..............................................  Date                                                            Time          22EPIC Rev 08/18

## 2018-09-18 DIAGNOSIS — I10 ESSENTIAL HYPERTENSION WITH GOAL BLOOD PRESSURE LESS THAN 140/90: ICD-10-CM

## 2018-09-18 NOTE — TELEPHONE ENCOUNTER
"Requested Prescriptions   Pending Prescriptions Disp Refills     lisinopril (PRINIVIL/ZESTRIL) 2.5 MG tablet [Pharmacy Med Name: LISINOPRIL 2.5MG TABS] 30 tablet 1     Sig: TAKE ONE TABLET BY MOUTH ONCE DAILY    ACE Inhibitors (Including Combos) Protocol Failed    9/18/2018  8:41 AM       Failed - Blood pressure under 140/90 in past 12 months    BP Readings from Last 3 Encounters:   08/24/18 168/78   08/02/18 155/86   07/03/18 139/65                Failed - Normal serum creatinine on file in past 12 months    Recent Labs   Lab Test  02/13/18   0822   CR  1.35*            Passed - Recent (12 mo) or future (30 days) visit within the authorizing provider's specialty    Patient had office visit in the last 12 months or has a visit in the next 30 days with authorizing provider or within the authorizing provider's specialty.  See \"Patient Info\" tab in inbasket, or \"Choose Columns\" in Meds & Orders section of the refill encounter.           Passed - Patient is age 18 or older       Passed - Normal serum potassium on file in past 12 months    Recent Labs   Lab Test  02/13/18   0822   POTASSIUM  3.9             Last Written Prescription Date:  3/28/18  Last Fill Quantity: 30,  # refills: 1   Last office visit: 3/28/2018 with prescribing provider:  Dior   Future Office Visit:      "

## 2018-09-19 RX ORDER — LISINOPRIL 2.5 MG/1
TABLET ORAL
Qty: 30 TABLET | Refills: 1 | Status: SHIPPED | OUTPATIENT
Start: 2018-09-19 | End: 2019-02-12

## 2018-09-19 NOTE — TELEPHONE ENCOUNTER
Routing refill request to provider for review/approval because:  Labs out of range:  Creatinine.   Blood pressures elevated above goal.     Maisha Joseph RN

## 2018-09-26 ENCOUNTER — ANTICOAGULATION THERAPY VISIT (OUTPATIENT)
Dept: ANTICOAGULATION | Facility: CLINIC | Age: 83
End: 2018-09-26
Payer: COMMERCIAL

## 2018-09-26 DIAGNOSIS — Z79.01 LONG-TERM (CURRENT) USE OF ANTICOAGULANTS: ICD-10-CM

## 2018-09-26 DIAGNOSIS — I48.20 CHRONIC ATRIAL FIBRILLATION (H): ICD-10-CM

## 2018-09-26 LAB — INR POINT OF CARE: 4.6 (ref 0.86–1.14)

## 2018-09-26 PROCEDURE — 85610 PROTHROMBIN TIME: CPT | Mod: QW

## 2018-09-26 PROCEDURE — 36416 COLLJ CAPILLARY BLOOD SPEC: CPT

## 2018-09-26 PROCEDURE — 99207 ZZC NO CHARGE NURSE ONLY: CPT

## 2018-09-26 NOTE — PROGRESS NOTES
ANTICOAGULATION FOLLOW-UP CLINIC VISIT    Patient Name:  Lori Mehta  Date:  9/26/2018  Contact Type:  Face to Face    SUBJECTIVE:     Patient Findings     Positives Unexplained INR or factor level change           OBJECTIVE    INR Protime   Date Value Ref Range Status   09/26/2018 4.6 (A) 0.86 - 1.14 Final       ASSESSMENT / PLAN  INR assessment SUPRA    Recheck INR In: 3 WEEKS    INR Location Clinic      Anticoagulation Summary as of 9/26/2018     INR goal 2.0-3.0   Today's INR 4.6!   Warfarin maintenance plan 5 mg (5 mg x 1) on Mon, Fri; 7.5 mg (5 mg x 1.5) all other days   Full warfarin instructions 9/27: Hold; Otherwise 5 mg on Mon, Fri; 7.5 mg all other days   Weekly warfarin total 47.5 mg   Plan last modified Azalia Stallings RN (9/26/2018)   Next INR check 10/16/2018   Target end date     Indications   Chronic atrial fibrillation (HCC) [I48.2]  Long-term (current) use of anticoagulants [Z79.01] [Z79.01]         Anticoagulation Episode Summary     INR check location     Preferred lab     Send INR reminders to MIHM POOL    Comments 5 mg tablets, no bandaid, takes in AM, likes BP done.       Anticoagulation Care Providers     Provider Role Specialty Phone number    Candido Freedman DO [facility] Internal Medicine [phone]            See the Encounter Report to view Anticoagulation Flowsheet and Dosing Calendar (Go to Encounters tab in chart review, and find the Anticoagulation Therapy Visit)    Dosage adjustment made based on physician directed care plan.    Azalia Stallings RN

## 2018-09-26 NOTE — MR AVS SNAPSHOT
Lori Mehta   9/26/2018 10:30 AM   Anticoagulation Therapy Visit    Description:  89 year old male   Provider:  PH ANTI COAG   Department:  Ph Anticoag           INR as of 9/26/2018     Today's INR 4.6!      Anticoagulation Summary as of 9/26/2018     INR goal 2.0-3.0   Today's INR 4.6!   Full warfarin instructions 9/27: Hold; Otherwise 5 mg on Mon, Fri; 7.5 mg all other days   Next INR check 10/16/2018    Indications   Chronic atrial fibrillation (HCC) [I48.2]  Long-term (current) use of anticoagulants [Z79.01] [Z79.01]         Your next Anticoagulation Clinic appointment(s)     Sep 26, 2018 10:30 AM CDT   Anticoagulation Visit with PH ANTI COAG   [address] [address]   [phone]            Oct 16, 2018  9:45 AM CDT   Anticoagulation Visit with PH ANTI COAG   [address] [address]   [phone]              Contact Numbers     Clinic Number:         September 2018 Details    Sun Mon Tue Wed Thu Fri Sat           1                 2               3               4               5               6               7               8                 9               10               11               12               13               14               15                 16               17               18               19               20               21               22                 23               24               25               26      7.5 mg   See details      27      Hold         28      5 mg         29      7.5 mg           30      7.5 mg                Date Details   09/26 This INR check               How to take your warfarin dose     To take:  5 mg Take 1 of the 5 mg tablets.    To take:  7.5 mg Take 1.5 of the 5 mg tablets.    Hold Do not take your warfarin dose. See the Details table to the right for additional instructions.                 October 2018 Details    Sun Mon Tue Wed Thu Fri Sat      1      5 mg         2      7.5 mg         3      7.5 mg         4      7.5 mg         5      5 mg         6      7.5 mg           7      7.5 mg         8      5 mg         9      7.5 mg         10      7.5 mg         11      7.5 mg         12      5 mg         13      7.5 mg           14      7.5 mg         15      5 mg         16            17               18               19               20                 21               22               23               24               25               26               27                 28               29               30               31                   Date Details   No additional details    Date of next INR:  10/16/2018         How to take your warfarin dose     To take:  5 mg Take 1 of the 5 mg tablets.    To take:  7.5 mg Take 1.5 of the 5 mg tablets.

## 2018-10-16 ENCOUNTER — ANTICOAGULATION THERAPY VISIT (OUTPATIENT)
Dept: ANTICOAGULATION | Facility: CLINIC | Age: 83
End: 2018-10-16
Payer: COMMERCIAL

## 2018-10-16 DIAGNOSIS — I48.20 CHRONIC ATRIAL FIBRILLATION (H): ICD-10-CM

## 2018-10-16 LAB — INR POINT OF CARE: 2.3 (ref 0.86–1.14)

## 2018-10-16 PROCEDURE — 36416 COLLJ CAPILLARY BLOOD SPEC: CPT

## 2018-10-16 PROCEDURE — 85610 PROTHROMBIN TIME: CPT | Mod: QW

## 2018-10-16 PROCEDURE — 99207 ZZC NO CHARGE NURSE ONLY: CPT

## 2018-10-16 RX ORDER — WARFARIN SODIUM 5 MG/1
TABLET ORAL
Qty: 130 TABLET | Refills: 0 | COMMUNITY
Start: 2018-10-16 | End: 2019-02-12

## 2018-10-16 NOTE — MR AVS SNAPSHOT
Lori Mehta   10/16/2018 9:45 AM   Anticoagulation Therapy Visit    Description:  89 year old male   Provider:  NAWAF ANTI COAG   Department:  [facility]           INR as of 10/16/2018     Today's INR 2.3      Anticoagulation Summary as of 10/16/2018     INR goal 2.0-3.0   Today's INR 2.3   Full warfarin instructions 5 mg on Mon, Fri; 7.5 mg all other days   Next INR check 11/13/2018    Indications   Chronic atrial fibrillation (HCC) [I48.2]  Long-term (current) use of anticoagulants [Z79.01] [Z79.01]         Your next Anticoagulation Clinic appointment(s)     Nov 13, 2018  9:15 AM CST   Anticoagulation Visit with NAWAF ANTI BILL   [facility]    [address] [place] [id number] [phone]              Contact Numbers     Clinic Number:         October 2018 Details    Sun Mon Tue Wed Thu Fri Sat      1               2               3               4               5               6                 7               8               9               10               11               12               13                 14               15               16      7.5 mg   See details      17      7.5 mg         18      7.5 mg         19      5 mg         20      7.5 mg           21      7.5 mg         22      5 mg         23      7.5 mg         24      7.5 mg         25      7.5 mg         26      5 mg         27      7.5 mg           28      7.5 mg         29      5 mg         30      7.5 mg         31      7.5 mg             Date Details   10/16 This INR check               How to take your warfarin dose     To take:  5 mg Take 1 of the 5 mg tablets.    To take:  7.5 mg Take 1.5 of the 5 mg tablets.           November 2018 Details    Sun Mon Tue Wed Thu Fri Sat         1      7.5 mg         2      5 mg         3      7.5 mg           4      7.5 mg         5      5 mg         6      7.5 mg         7      7.5 mg         8      7.5 mg         9      5 mg          10      7.5 mg           11      7.5 mg         12      5 mg         13            14               15               16               17                 18               19               20               21               22               23               24                 25               26               27               28               29               30                 Date Details   No additional details    Date of next INR:  11/13/2018         How to take your warfarin dose     To take:  5 mg Take 1 of the 5 mg tablets.    To take:  7.5 mg Take 1.5 of the 5 mg tablets.

## 2018-10-16 NOTE — PROGRESS NOTES
ANTICOAGULATION FOLLOW-UP CLINIC VISIT    Patient Name:  Lori Mehta  Date:  10/16/2018  Contact Type:  Face to Face    SUBJECTIVE:     Patient Findings     Positives No Problem Findings           OBJECTIVE    INR Protime   Date Value Ref Range Status   10/16/2018 2.3 (A) 0.86 - 1.14 Final       ASSESSMENT / PLAN  INR assessment THER    Recheck INR In: 4 WEEKS    INR Location Clinic      Anticoagulation Summary as of 10/16/2018     INR goal 2.0-3.0   Today's INR 2.3   Warfarin maintenance plan 5 mg (5 mg x 1) on Mon, Fri; 7.5 mg (5 mg x 1.5) all other days   Full warfarin instructions 5 mg on Mon, Fri; 7.5 mg all other days   Weekly warfarin total 47.5 mg   No change documented Lakeisha Rodriguez RN   Plan last modified Azalia Stallings RN (9/26/2018)   Next INR check 11/13/2018   Target end date     Indications   Chronic atrial fibrillation (HCC) [I48.2]  Long-term (current) use of anticoagulants [Z79.01] [Z79.01]         Anticoagulation Episode Summary     INR check location     Preferred lab     Send INR reminders to MIHM POOL    Comments 5 mg tablets, no bandaid, takes in AM, likes BP done.       Anticoagulation Care Providers     Provider Role Specialty Phone number    Candido Freedman DO [facility] Internal Medicine [phone]            See the Encounter Report to view Anticoagulation Flowsheet and Dosing Calendar (Go to Encounters tab in chart review, and find the Anticoagulation Therapy Visit)    Dosage adjustment made based on physician directed care plan.      Lakeisha Rodriguez, CAN

## 2018-10-17 ENCOUNTER — OFFICE VISIT (OUTPATIENT)
Dept: INTERNAL MEDICINE | Facility: CLINIC | Age: 83
End: 2018-10-17
Payer: COMMERCIAL

## 2018-10-17 VITALS
WEIGHT: 184 LBS | RESPIRATION RATE: 16 BRPM | TEMPERATURE: 98.3 F | HEART RATE: 92 BPM | DIASTOLIC BLOOD PRESSURE: 68 MMHG | SYSTOLIC BLOOD PRESSURE: 138 MMHG | BODY MASS INDEX: 28.82 KG/M2 | OXYGEN SATURATION: 97 %

## 2018-10-17 DIAGNOSIS — S61.012D LACERATION OF LEFT THUMB WITHOUT FOREIGN BODY WITHOUT DAMAGE TO NAIL, SUBSEQUENT ENCOUNTER: ICD-10-CM

## 2018-10-17 DIAGNOSIS — S69.92XA INJURY OF LEFT INDEX FINGER, INITIAL ENCOUNTER: Primary | ICD-10-CM

## 2018-10-17 PROCEDURE — 99213 OFFICE O/P EST LOW 20 MIN: CPT | Performed by: INTERNAL MEDICINE

## 2018-10-17 ASSESSMENT — PAIN SCALES - GENERAL: PAINLEVEL: NO PAIN (0)

## 2018-10-17 NOTE — MR AVS SNAPSHOT
After Visit Summary   10/17/2018    Lori Mehta    MRN: [id number]           Patient Information     Date Of Birth          6/27/1928        Visit Information        Provider Department      10/17/2018 3:15 PM Wale Short MD [facility]         Follow-ups after your visit        Your next 10 appointments already scheduled     Oct 17, 2018  3:15 PM CDT   SHORT with Wale Short MD   [facility] ([facility])    [address] [place] [id number] [phone]            Nov 13, 2018  9:15 AM CST   Anticoagulation Visit with PH ANTI COAG   [facility] ([facility])    [address] [place] [address] [phone]              Who to contact     If you have questions or need follow up information about today's clinic visit or your schedule please contact [facility] directly at [phone].  Normal or non-critical lab and imaging results will be communicated to you by MyChart, letter or phone within 4 business days after the clinic has received the results. If you do not hear from us within 7 days, please contact the clinic through MyChart or phone. If you have a critical or abnormal lab result, we will notify you by phone as soon as possible.  Submit refill requests through [employer] or call your pharmacy and they will forward the refill request to us. Please allow 3 business days for your refill to be completed.          Additional Information About Your Visit        Care EveryWhere ID     This is your Care EveryWhere ID. This could be used by other organizations to access your [facility] medical records  [id number]        Your Vitals Were     Pulse Temperature Respirations Pulse Oximetry BMI (Body Mass Index)       92 98.3  F (36.8  C) (Temporal) 16 97% 28.82 kg/m2        Blood Pressure from Last 3 Encounters:   10/17/18 138/68   08/24/18 168/78   08/02/18 155/86    Weight from  Last 3 Encounters:   10/17/18 184 lb (83.5 kg)   08/24/18 180 lb (81.6 kg)   08/02/18 176 lb (79.8 kg)              Today, you had the following     No orders found for display       Primary Care Provider Office Phone # Fax #    Wale Short [phone]       [address] [facility] [address]        Equal Access to Services     DANNY GROSSMAN : Hadii aad ku hadasho Soomaali, waaxda luqadaha, qaybta kaalmada adeegyada, waxay idiin hayaan adeeg khelainash la'samn . So [facility] [phone].    ATENCIÓN: Si cruzla bebe, tiene a rae disposición servicios gratuitos de asistencia lingüística. Llame al [phone].    We comply with applicable federal civil rights laws and [place] laws. We do not discriminate on the basis of race, color, national origin, age, disability, sex, sexual orientation, or gender identity.            Thank you!     Thank you for choosing [facility]  for your care. Our goal is always to provide you with excellent care. Hearing back from our patients is one way we can continue to improve our services. Please take a few minutes to complete the written survey that you may receive in the mail after your visit with us. Thank you!             Your Updated Medication List - Protect others around you: Learn how to safely use, store and throw away your medicines at www.disposemymeds.org.          This list is accurate as of 10/17/18  3:13 PM.  Always use your most recent med list.                   Brand Name Dispense Instructions for use Diagnosis    allopurinol 100 MG tablet    ZYLOPRIM    90 tablet    Take 1 tablet (100 mg) by mouth daily    Idiopathic gout, unspecified chronicity, unspecified site       amLODIPine 10 MG tablet    NORVASC    90 tablet    Take 1 tablet (10 mg) by mouth daily    Hypertension goal BP (blood pressure) < 140/90       Bromelains 500 MG Tabs      Take 3 tablets by mouth. 3-4 daily        CAPSICUM (CAYENNE) PO      Take 450 mg by mouth daily         citalopram 10 MG tablet    celeXA    90 tablet    Take 1 tablet (10 mg) by mouth daily    Adjustment disorder with anxious mood       COENZYME Q10      1 DAILY        hydrochlorothiazide 25 MG tablet    HYDRODIURIL    90 tablet    Take 1 tablet (25 mg) by mouth daily    Essential hypertension with goal blood pressure less than 140/90       lisinopril 2.5 MG tablet    PRINIVIL/Zestril    30 tablet    TAKE ONE TABLET BY MOUTH ONCE DAILY    Essential hypertension with goal blood pressure less than 140/90       MELATONIN PO      Take 10 mg by mouth At Bedtime        omega 3 1200 MG Caps      2 CAP DAILY        SAW PALMETTO EXTRACT PO      Take 1,000 mg by mouth        Selenium 200 MCG Caps      1 CAPSULE DAILY        simvastatin 20 MG tablet    ZOCOR    270 tablet    Take 3 tablets (60 mg) by mouth At Bedtime    Hyperlipidemia LDL goal <100       tamsulosin 0.4 MG capsule    FLOMAX    90 capsule    Take 1 capsule (0.4 mg) by mouth daily    Benign non-nodular prostatic hyperplasia with lower urinary tract symptoms       TYLENOL PO      Take 1,000 mg by mouth        VITAMIN B12 TR 1000 MCG Tbcr      1 TAB DAILY        vitamin C 500 MG Chew      Take  by mouth. 1-3 daily        vitamin E 400 UNIT capsule      1 CAPSULE DAILY        warfarin 5 MG tablet    COUMADIN    130 tablet    Take 5 mg on Mon, Fri and 7.5 mg all other days, or as directed by the coumadin clinic.    Chronic atrial fibrillation (H)       Zinc 30 MG Tabs      1 TABLET DAILY

## 2018-10-17 NOTE — PROGRESS NOTES
SUBJECTIVE:   Lori Mehta is a 89 year old male who presents to clinic today for the following health issues:    Chief Complaint   Patient presents with     Finger     having trouble with left pointer finger after thumb laceration in August     Joint Pain     Patient is here approximately a month after cutting his left thumb with a table saw.  He was seen in the emergency room evaluated and offered a stitch instead he let it heal by secondary intention.  It is doing well.    Since that time he is also had issues with his left index finger not being able to extend fully he is 30 degrees short on full extension.    Past Medical History:   Diagnosis Date     Anxiety      Atrial fibrillation (H) 1/9/2009     BPH      GERD (gastroesophageal reflux disease)      Pure hypercholesterolemia      Unspecified essential hypertension      Current Outpatient Prescriptions   Medication     Acetaminophen (TYLENOL PO)     allopurinol (ZYLOPRIM) 100 MG tablet     amLODIPine (NORVASC) 10 MG tablet     Ascorbic Acid (VITAMIN C) 500 MG CHEW     Bromelains 500 MG TABS     CAPSICUM, CAYENNE, PO     citalopram (CELEXA) 10 MG tablet     COENZYME Q10     hydrochlorothiazide (HYDRODIURIL) 25 MG tablet     lisinopril (PRINIVIL/ZESTRIL) 2.5 MG tablet     MELATONIN PO     OMEGA 3 1200 MG OR CAPS     Saw Palmetto, Serenoa repens, (SAW PALMETTO EXTRACT PO)     SELENIUM 200 MCG OR CAPS     simvastatin (ZOCOR) 20 MG tablet     tamsulosin (FLOMAX) 0.4 MG capsule     VITAMIN B12 TR 1000 MCG OR TBCR     VITAMIN E 400 UNIT OR CAPS     warfarin (COUMADIN) 5 MG tablet     ZINC 30 MG OR TABS     No current facility-administered medications for this visit.      Physical Exam  /68  Pulse 92  Temp 98.3  F (36.8  C) (Temporal)  Resp 16  Wt 184 lb (83.5 kg)  SpO2 97%  BMI 28.82 kg/m2  General Appearance-healthy, alert, no distress  Left thumb has a healing wound which is healing in nicely under the left nail, some decreased sensation to  palpation  His left index finger has strength for extension but still cannot lift the last 30 degrees of extension.    ASSESSMENT:  Patient with issues of his left index finger offered him occupational therapy to try to strengthen this he was not interested in that.  We will have him see orthopedics to discuss a possible surgery otherwise not very excited to pursue surgery but he will get their opinion.    Thumb laceration is healing nicely and should improve over time.    Electronically signed by Wale Short MD

## 2018-10-25 ENCOUNTER — RADIANT APPOINTMENT (OUTPATIENT)
Dept: GENERAL RADIOLOGY | Facility: CLINIC | Age: 83
End: 2018-10-25
Attending: ORTHOPAEDIC SURGERY
Payer: COMMERCIAL

## 2018-10-25 ENCOUNTER — OFFICE VISIT (OUTPATIENT)
Dept: ORTHOPEDICS | Facility: CLINIC | Age: 83
End: 2018-10-25
Payer: COMMERCIAL

## 2018-10-25 VITALS
HEIGHT: 67 IN | RESPIRATION RATE: 16 BRPM | WEIGHT: 184 LBS | BODY MASS INDEX: 28.88 KG/M2 | HEART RATE: 92 BPM | SYSTOLIC BLOOD PRESSURE: 140 MMHG | TEMPERATURE: 97.5 F | DIASTOLIC BLOOD PRESSURE: 62 MMHG

## 2018-10-25 DIAGNOSIS — S56.419A RUPTURE OF EXTENSOR TENDON OF FINGER: Primary | ICD-10-CM

## 2018-10-25 DIAGNOSIS — M79.642 PAIN OF LEFT HAND: ICD-10-CM

## 2018-10-25 PROCEDURE — 99203 OFFICE O/P NEW LOW 30 MIN: CPT | Performed by: ORTHOPAEDIC SURGERY

## 2018-10-25 PROCEDURE — 73130 X-RAY EXAM OF HAND: CPT | Mod: TC

## 2018-10-25 ASSESSMENT — PAIN SCALES - GENERAL: PAINLEVEL: NO PAIN (0)

## 2018-10-25 NOTE — LETTER
10/25/2018         RE: Lori Mehta  [address] [facility] [address]        Dear Colleague,    Thank you for referring your patient, Lori Mehta, to the [facility]. Please see a copy of my visit note below.    ORTHOPEDIC CONSULT      Chief Complaint: Lori Mehta is a 89 year old male who is being seen for Chief Complaint   Patient presents with     Musculoskeletal Problem     left hand pain     Consult       History of Present Illness:   Presents with a couple of months worth of issues with his left index finger.  No significant pain.  However he is unable to fully extend his finger.  He also has some difficulty with flexion.  Although feels that he can work his finger into flexion.  He injured his thumb with a small laceration a few months ago with a table saw.  It healed by what sounds like secondary intention.  He did not injure his index finger at that time.  History of a wrist open reduction internal fixation back in 2014.      Patient's past medical, surgical, social and family histories reviewed.     Past Medical History:   Diagnosis Date     Anxiety      Atrial fibrillation (H) 1/9/2009     BPH      GERD (gastroesophageal reflux disease)      Pure hypercholesterolemia      Unspecified essential hypertension        Past Surgical History:   Procedure Laterality Date     ARTHROPLASTY HIP ANTERIOR  11/4/2013    Procedure: ARTHROPLASTY HIP ANTERIOR;  Right Total Hip Arthroplasty - anterior approach;  Surgeon: Mathew Osorio MD;  Location: [facility]     CL AFF SURGICAL PATHOLOGY      nasal polyp removal     COLONOSCOPY  6/16/10     HC REMOVAL GALLBLADDER      Cholecystectomy     HC REMOVAL OF HYDROCELE,TUNICA,UNILAT       HC REPAIR ING HERNIA,5+Y/O,REDUCIBL       HC UGI ENDOSCOPY, SIMPLE EXAM  6/16/10     LAPAROSCOPIC HERNIORRHAPHY PREPERITONEAL  9/19/2012    Procedure: LAPAROSCOPIC HERNIORRHAPHY PREPERITONEAL;  Laparoscopic Preperitoneal Left Inguinal Hernia Repair;   Surgeon: Errol Navas MD;  [facility]     LASER YAG CAPSULOTOMY Left 4/6/2017    Procedure: LASER YAG CAPSULOTOMY;  Surgeon: Mathew Croft MD;  Location: [facility]     OPEN REDUCTION INTERNAL FIXATION FOREARM Left 12/29/2014    Procedure: OPEN REDUCTION INTERNAL FIXATION FOREARM;  Surgeon: Mathew Osorio MD;  [facility]     PHACOEMULSIFICATION WITH STANDARD INTRAOCULAR LENS IMPLANT  7/19/2012    Procedure: PHACOEMULSIFICATION WITH STANDARD INTRAOCULAR LENS IMPLANT;  PHACOEMULSIFICATION WITH STANDARD INTRAOCULAR LENS IMPLANT LEFT EYE;  Surgeon: Gabriel Ross MD;  [facility]     PHACOEMULSIFICATION WITH STANDARD INTRAOCULAR LENS IMPLANT  8/16/2012    Procedure: PHACOEMULSIFICATION WITH STANDARD INTRAOCULAR LENS IMPLANT;  RIGHT PHACOEMULSIFICATION WITH STANDARD INTRAOCULAR LENS IMPLANT;  Surgeon: Gabriel Ross MD;  Location[facility]       Medications:    Current Outpatient Prescriptions on File Prior to Visit:  Acetaminophen (TYLENOL PO) Take 1,000 mg by mouth   allopurinol (ZYLOPRIM) 100 MG tablet Take 1 tablet (100 mg) by mouth daily   amLODIPine (NORVASC) 10 MG tablet Take 1 tablet (10 mg) by mouth daily   Ascorbic Acid (VITAMIN C) 500 MG CHEW Take  by mouth. 1-3 daily   Bromelains 500 MG TABS Take 3 tablets by mouth. 3-4 daily   CAPSICUM, CAYENNE, PO Take 450 mg by mouth daily   citalopram (CELEXA) 10 MG tablet Take 1 tablet (10 mg) by mouth daily   COENZYME Q10 1 DAILY   hydrochlorothiazide (HYDRODIURIL) 25 MG tablet Take 1 tablet (25 mg) by mouth daily   lisinopril (PRINIVIL/ZESTRIL) 2.5 MG tablet TAKE ONE TABLET BY MOUTH ONCE DAILY   MELATONIN PO Take 10 mg by mouth At Bedtime   OMEGA 3 1200 MG OR CAPS 2 CAP DAILY   Saw Palmetto, Serenoa repens, (SAW PALMETTO EXTRACT PO) Take 1,000 mg by mouth   SELENIUM 200 MCG OR CAPS 1 CAPSULE DAILY   simvastatin (ZOCOR) 20 MG tablet Take 3 tablets (60 mg) by mouth At Bedtime   tamsulosin (FLOMAX) 0.4 MG capsule Take 1 capsule (0.4 mg) by  "mouth daily   VITAMIN B12 TR 1000 MCG OR TBCR 1 TAB DAILY   VITAMIN E 400 UNIT OR CAPS 1 CAPSULE DAILY   warfarin (COUMADIN) 5 MG tablet Take 5 mg on Mon, Fri and 7.5 mg all other days, or as directed by the coumadin clinic.   ZINC 30 MG OR TABS 1 TABLET DAILY     No current facility-administered medications on file prior to visit.     Allergies   Allergen Reactions     Paroxetine      Other reaction(s): Intolerance     Terfenadine      Other reaction(s): Other  Unable to void       Social History     Occupational History     Not on file.     Social History Main Topics     Smoking status: Never Smoker     Smokeless tobacco: Never Used     Alcohol use 0.6 oz/week     1 Standard drinks or equivalent per week      Comment: beer once a week     Drug use: No     Sexual activity: No       Family History   Problem Relation Age of Onset     Diabetes Brother      Cancer - colorectal No family hx of      C.A.D. Mother      Diabetes Maternal Grandmother      Circulatory Maternal Grandfather      cerebral aneurysm     Hypertension Father      Cerebrovascular Disease Father      Breast Cancer Sister       x 2       REVIEW OF SYSTEMS  10 point review systems performed otherwise negative as noted as per history of present illness.    Physical Exam:  Vitals: /62 (BP Location: Left arm, Patient Position: Sitting, Cuff Size: Adult Regular)  Pulse 92  Temp 97.5  F (36.4  C) (Temporal)  Resp 16  Ht 5' 7\" (1.702 m)  Wt 184 lb (83.5 kg)  BMI 28.82 kg/m2  BMI= Body mass index is 28.82 kg/(m^2).  Constitutional: healthy, alert and no acute distress   Psychiatric: mentation appears normal and affect normal/bright  NEURO: no focal deficits  RESP: Normal with easy respirations and no use of accessory muscles to breathe, no audible wheezing or retractions  CV: LUE:   no edema         Regular rate and rhythm by palpation  SKIN: No erythema, rashes, excoriation, or breakdown. No evidence of infection.   JOINT/EXTREMITIES:left hand: " No focal areas of tenderness.  Index finger is held approximately 15 degrees shy of full extension.  He is unable to actively extend the finger although passive motion is there.  I cannot appreciate any tendon excursion or movement over the MCP area.  There is no apparent subluxation.  There is no instability to the joint.  Fingers warm and dry with good capillary refill.     GAIT: not tested     Diagnostic Modalities:  left hand X-ray: No fractures or dislocations.  Advanced degenerative changes throughout the hand multiple joints.  No masses.  Previous distal radius plate in place.  Independent visualization of the images was performed.      Impression: left spontaneous index finger extensor tendon rupture    Plan:  All of the above pertinent physical exam and imaging modalities findings was reviewed with Lori and his wife.    Options discussed.  He has no pain.  Major issue is full extension.  I discussed his options.  Given his age as well as his symptoms have recommended continuing conservative care.  I would recommend against surgical repair.  He is agreeable to this.  Continue activity as tolerated.      Return to clinic PRN, or sooner as needed for changes.  Re-x-ray on return: No    Christiano Diaz D.O.    Again, thank you for allowing me to participate in the care of your patient.        Sincerely,        Jeronimo Diza, DO

## 2018-10-25 NOTE — MR AVS SNAPSHOT
"              After Visit Summary   10/25/2018    Lori Mehta    MRN: [id number]           Patient Information     Date Of Birth          6/27/1928        Visit Information        Provider Department      10/25/2018 11:20 AM Jeronimo Diaz, DO [facility]        Today's Diagnoses     Pain of left hand    -  1       Follow-ups after your visit        Your next 10 appointments already scheduled     Nov 13, 2018  9:15 AM CST   [facility] with [facility]   [facility]    [address] [place] [id number] [phone]              Who to contact     If you have questions or need follow up information about today's clinic visit or your schedule please contact [facility] directly at [phone].  Normal or non-critical lab and imaging results will be communicated to you by MyChart, letter or phone within 4 business days after the clinic has received the results. If you do not hear from us within 7 days, please contact the clinic through MyChart or phone. If you have a critical or abnormal lab result, we will notify you by phone as soon as possible.  Submit refill requests through [employer] or call your pharmacy and they will forward the refill request to us. Please allow 3 business days for your refill to be completed.          Additional Information About Your Visit        Care EveryWhere ID     This is your Care EveryWhere ID. This could be used by other organizations to access your [facility] medical records  [id number]        Your Vitals Were     Pulse Temperature Respirations Height BMI (Body Mass Index)       92 97.5  F (36.4  C) (Temporal) 16 1.702 m (5' 7\") 28.82 kg/m2        Blood Pressure from Last 3 Encounters:   10/25/18 140/62   10/17/18 138/68   08/24/18 168/78    Weight from Last 3 Encounters:   10/25/18 83.5 kg (184 lb)   10/17/18 83.5 kg (184 lb)   08/24/18 81.6 kg (180 lb)               " Primary Care Provider Office Phone # Fax #    Wale Short [phone] [address] [facility] [address]        Equal Access to Services     MIGUELLORRIE NGOC : Hadcarol ann melanie leger cindy Martinez, wawilliamsda luqadaha, qaybta kaalmada francisco, prakash myers varunjavier frye lajoanarivas mills. So [facility] [phone].    ATENCIÓN: Si habla español, tiene a rae disposición servicios gratuitos de asistencia lingüística. Llame al [phone].    We comply with applicable federal civil rights laws and [place] laws. We do not discriminate on the basis of race, color, national origin, age, disability, sex, sexual orientation, or gender identity.            Thank you!     Thank you for choosing [facility]  for your care. Our goal is always to provide you with excellent care. Hearing back from our patients is one way we can continue to improve our services. Please take a few minutes to complete the written survey that you may receive in the mail after your visit with us. Thank you!             Your Updated Medication List - Protect others around you: Learn how to safely use, store and throw away your medicines at www.disposemymeds.org.          This list is accurate as of 10/25/18 11:22 AM.  Always use your most recent med list.                   Brand Name Dispense Instructions for use Diagnosis    allopurinol 100 MG tablet    ZYLOPRIM    90 tablet    Take 1 tablet (100 mg) by mouth daily    Idiopathic gout, unspecified chronicity, unspecified site       amLODIPine 10 MG tablet    NORVASC    90 tablet    Take 1 tablet (10 mg) by mouth daily    Hypertension goal BP (blood pressure) < 140/90       Bromelains 500 MG Tabs      Take 3 tablets by mouth. 3-4 daily        CAPSICUM (CAYENNE) PO      Take 450 mg by mouth daily        citalopram 10 MG tablet    celeXA    90 tablet    Take 1 tablet (10 mg) by mouth daily    Adjustment disorder with anxious mood       COENZYME Q10      1 DAILY         hydrochlorothiazide 25 MG tablet    HYDRODIURIL    90 tablet    Take 1 tablet (25 mg) by mouth daily    Essential hypertension with goal blood pressure less than 140/90       lisinopril 2.5 MG tablet    PRINIVIL/Zestril    30 tablet    TAKE ONE TABLET BY MOUTH ONCE DAILY    Essential hypertension with goal blood pressure less than 140/90       MELATONIN PO      Take 10 mg by mouth At Bedtime        omega 3 1200 MG Caps      2 CAP DAILY        SAW PALMETTO EXTRACT PO      Take 1,000 mg by mouth        Selenium 200 MCG Caps      1 CAPSULE DAILY        simvastatin 20 MG tablet    ZOCOR    270 tablet    Take 3 tablets (60 mg) by mouth At Bedtime    Hyperlipidemia LDL goal <100       tamsulosin 0.4 MG capsule    FLOMAX    90 capsule    Take 1 capsule (0.4 mg) by mouth daily    Benign non-nodular prostatic hyperplasia with lower urinary tract symptoms       TYLENOL PO      Take 1,000 mg by mouth        VITAMIN B12 TR 1000 MCG Tbcr      1 TAB DAILY        vitamin C 500 MG Chew      Take  by mouth. 1-3 daily        vitamin E 400 UNIT capsule      1 CAPSULE DAILY        warfarin 5 MG tablet    COUMADIN    130 tablet    Take 5 mg on Mon, Fri and 7.5 mg all other days, or as directed by the coumadin clinic.    Chronic atrial fibrillation (H)       Zinc 30 MG Tabs      1 TABLET DAILY

## 2018-10-25 NOTE — PROGRESS NOTES
ORTHOPEDIC CONSULT      Chief Complaint: Lori Mehta is a 89 year old male who is being seen for Chief Complaint   Patient presents with     Musculoskeletal Problem     left hand pain     Consult       History of Present Illness:   Presents with a couple of months worth of issues with his left index finger.  No significant pain.  However he is unable to fully extend his finger.  He also has some difficulty with flexion.  Although feels that he can work his finger into flexion.  He injured his thumb with a small laceration a few months ago with a table saw.  It healed by what sounds like secondary intention.  He did not injure his index finger at that time.  History of a wrist open reduction internal fixation back in 2014.      Patient's past medical, surgical, social and family histories reviewed.     Past Medical History:   Diagnosis Date     Anxiety      Atrial fibrillation (H) 1/9/2009     BPH      GERD (gastroesophageal reflux disease)      Pure hypercholesterolemia      Unspecified essential hypertension        Past Surgical History:   Procedure Laterality Date     ARTHROPLASTY HIP ANTERIOR  11/4/2013    Procedure: ARTHROPLASTY HIP ANTERIOR;  Right Total Hip Arthroplasty - anterior approach;  Surgeon: Mathew Osorio MD;  Location: [facility]     CL AFF SURGICAL PATHOLOGY      nasal polyp removal     COLONOSCOPY  6/16/10     HC REMOVAL GALLBLADDER      Cholecystectomy     HC REMOVAL OF HYDROCELE,TUNICA,UNILAT       HC REPAIR ING HERNIA,5+Y/O,REDUCIBL       HC UGI ENDOSCOPY, SIMPLE EXAM  6/16/10     LAPAROSCOPIC HERNIORRHAPHY PREPERITONEAL  9/19/2012    Procedure: LAPAROSCOPIC HERNIORRHAPHY PREPERITONEAL;  Laparoscopic Preperitoneal Left Inguinal Hernia Repair;  Surgeon: Errol Navas MD;  [facility]     LASER YAG CAPSULOTOMY Left 4/6/2017    Procedure: LASER YAG CAPSULOTOMY;  Surgeon: Mathew Croft MD;  [facility]     OPEN REDUCTION INTERNAL FIXATION FOREARM Left 12/29/2014     Procedure: OPEN REDUCTION INTERNAL FIXATION FOREARM;  Surgeon: Mathew Osorio MD;  [facility]     PHACOEMULSIFICATION WITH STANDARD INTRAOCULAR LENS IMPLANT  7/19/2012    Procedure: PHACOEMULSIFICATION WITH STANDARD INTRAOCULAR LENS IMPLANT;  PHACOEMULSIFICATION WITH STANDARD INTRAOCULAR LENS IMPLANT LEFT EYE;  Surgeon: Gabriel Ross MD;  [facility]     PHACOEMULSIFICATION WITH STANDARD INTRAOCULAR LENS IMPLANT  8/16/2012    Procedure: PHACOEMULSIFICATION WITH STANDARD INTRAOCULAR LENS IMPLANT;  RIGHT PHACOEMULSIFICATION WITH STANDARD INTRAOCULAR LENS IMPLANT;  Surgeon: Gabriel Ross MD;  Location: [facility] OR       Medications:    Current Outpatient Prescriptions on File Prior to Visit:  Acetaminophen (TYLENOL PO) Take 1,000 mg by mouth   allopurinol (ZYLOPRIM) 100 MG tablet Take 1 tablet (100 mg) by mouth daily   amLODIPine (NORVASC) 10 MG tablet Take 1 tablet (10 mg) by mouth daily   Ascorbic Acid (VITAMIN C) 500 MG CHEW Take  by mouth. 1-3 daily   Bromelains 500 MG TABS Take 3 tablets by mouth. 3-4 daily   CAPSICUM, CAYENNE, PO Take 450 mg by mouth daily   citalopram (CELEXA) 10 MG tablet Take 1 tablet (10 mg) by mouth daily   COENZYME Q10 1 DAILY   hydrochlorothiazide (HYDRODIURIL) 25 MG tablet Take 1 tablet (25 mg) by mouth daily   lisinopril (PRINIVIL/ZESTRIL) 2.5 MG tablet TAKE ONE TABLET BY MOUTH ONCE DAILY   MELATONIN PO Take 10 mg by mouth At Bedtime   OMEGA 3 1200 MG OR CAPS 2 CAP DAILY   Saw Palmetto, Serenoa repens, (SAW PALMETTO EXTRACT PO) Take 1,000 mg by mouth   SELENIUM 200 MCG OR CAPS 1 CAPSULE DAILY   simvastatin (ZOCOR) 20 MG tablet Take 3 tablets (60 mg) by mouth At Bedtime   tamsulosin (FLOMAX) 0.4 MG capsule Take 1 capsule (0.4 mg) by mouth daily   VITAMIN B12 TR 1000 MCG OR TBCR 1 TAB DAILY   VITAMIN E 400 UNIT OR CAPS 1 CAPSULE DAILY   warfarin (COUMADIN) 5 MG tablet Take 5 mg on Mon, Fri and 7.5 mg all other days, or as directed by the coumadin clinic.   ZINC 30 MG OR TABS  "1 TABLET DAILY     No current facility-administered medications on file prior to visit.     Allergies   Allergen Reactions     Paroxetine      Other reaction(s): Intolerance     Terfenadine      Other reaction(s): Other  Unable to void       Social History     Occupational History     Not on file.     Social History Main Topics     Smoking status: Never Smoker     Smokeless tobacco: Never Used     Alcohol use 0.6 oz/week     1 Standard drinks or equivalent per week      Comment: beer once a week     Drug use: No     Sexual activity: No       Family History   Problem Relation Age of Onset     Diabetes Brother      Cancer - colorectal No family hx of      C.A.D. Mother      Diabetes Maternal Grandmother      Circulatory Maternal Grandfather      cerebral aneurysm     Hypertension Father      Cerebrovascular Disease Father      Breast Cancer Sister       x 2       REVIEW OF SYSTEMS  10 point review systems performed otherwise negative as noted as per history of present illness.    Physical Exam:  Vitals: /62 (BP Location: Left arm, Patient Position: Sitting, Cuff Size: Adult Regular)  Pulse 92  Temp 97.5  F (36.4  C) (Temporal)  Resp 16  Ht 5' 7\" (1.702 m)  Wt 184 lb (83.5 kg)  BMI 28.82 kg/m2  BMI= Body mass index is 28.82 kg/(m^2).  Constitutional: healthy, alert and no acute distress   Psychiatric: mentation appears normal and affect normal/bright  NEURO: no focal deficits  RESP: Normal with easy respirations and no use of accessory muscles to breathe, no audible wheezing or retractions  CV: LUE:   no edema         Regular rate and rhythm by palpation  SKIN: No erythema, rashes, excoriation, or breakdown. No evidence of infection.   JOINT/EXTREMITIES:left hand: No focal areas of tenderness.  Index finger is held approximately 15 degrees shy of full extension.  He is unable to actively extend the finger although passive motion is there.  I cannot appreciate any tendon excursion or movement over the MCP " area.  There is no apparent subluxation.  There is no instability to the joint.  Fingers warm and dry with good capillary refill.     GAIT: not tested     Diagnostic Modalities:  left hand X-ray: No fractures or dislocations.  Advanced degenerative changes throughout the hand multiple joints.  No masses.  Previous distal radius plate in place.  Independent visualization of the images was performed.      Impression: left spontaneous index finger extensor tendon rupture    Plan:  All of the above pertinent physical exam and imaging modalities findings was reviewed with Lori and his wife.    Options discussed.  He has no pain.  Major issue is full extension.  I discussed his options.  Given his age as well as his symptoms have recommended continuing conservative care.  I would recommend against surgical repair.  He is agreeable to this.  Continue activity as tolerated.      Return to clinic PRN, or sooner as needed for changes.  Re-x-ray on return: No    Christiano Diaz D.O.

## 2018-10-31 DIAGNOSIS — I48.20 CHRONIC ATRIAL FIBRILLATION (H): ICD-10-CM

## 2018-10-31 RX ORDER — WARFARIN SODIUM 5 MG/1
TABLET ORAL
Qty: 120 TABLET | Refills: 3 | Status: SHIPPED | OUTPATIENT
Start: 2018-10-31 | End: 2019-07-30

## 2018-11-20 ENCOUNTER — ANTICOAGULATION THERAPY VISIT (OUTPATIENT)
Dept: ANTICOAGULATION | Facility: CLINIC | Age: 83
End: 2018-11-20
Payer: COMMERCIAL

## 2018-11-20 DIAGNOSIS — I48.20 CHRONIC ATRIAL FIBRILLATION (H): ICD-10-CM

## 2018-11-20 LAB — INR POINT OF CARE: 2.2 (ref 0.86–1.14)

## 2018-11-20 PROCEDURE — 99207 ZZC NO CHARGE NURSE ONLY: CPT

## 2018-11-20 PROCEDURE — 36416 COLLJ CAPILLARY BLOOD SPEC: CPT

## 2018-11-20 PROCEDURE — 85610 PROTHROMBIN TIME: CPT | Mod: QW

## 2018-11-20 NOTE — MR AVS SNAPSHOT
Lori Mehta   11/20/2018 9:30 AM   Anticoagulation Therapy Visit    Description:  89 year old male   Provider:  NA[facility] ANTI COAG   Department:  [facility]           INR as of 11/20/2018     Today's INR 2.2      Anticoagulation Summary as of 11/20/2018     INR goal 2.0-3.0   Today's INR 2.2   Full warfarin instructions 5 mg on Mon, Fri; 7.5 mg all other days   Next INR check 12/31/2018    Indications   Chronic atrial fibrillation (HCC) [I48.2]  Long-term (current) use of anticoagulants [Z79.01] [Z79.01]         Your next Anticoagulation Clinic appointment(s)     Dec 31, 2018  9:30 AM CST   Anticoagulation Visit with NAWAF ANTI BILL   [facility] ([facility])    [address] [place] [id number] [phone]              Contact Numbers     Clinic Number:         November 2018 Details    Sun Mon Tue Wed Thu Fri Sat         1               2               3                 4               5               6               7               8               9               10                 11               12               13               14               15               16               17                 18               19               20      7.5 mg   See details      21      7.5 mg         22      7.5 mg         23      5 mg         24      7.5 mg           25      7.5 mg         26      5 mg         27      7.5 mg         28      7.5 mg         29      7.5 mg         30      5 mg           Date Details   11/20 This INR check               How to take your warfarin dose     To take:  5 mg Take 1 of the 5 mg tablets.    To take:  7.5 mg Take 1.5 of the 5 mg tablets.           December 2018 Details    Sun Mon Tue Wed Thu Fri Sat           1      7.5 mg           2      7.5 mg         3      5 mg         4      7.5 mg         5      7.5 mg         6      7.5 mg         7      5 mg         8      7.5 mg           9      7.5 mg         10      5 mg         11      7.5 mg          12      7.5 mg         13      7.5 mg         14      5 mg         15      7.5 mg           16      7.5 mg         17      5 mg         18      7.5 mg         19      7.5 mg         20      7.5 mg         21      5 mg         22      7.5 mg           23      7.5 mg         24      5 mg         25      7.5 mg         26      7.5 mg         27      7.5 mg         28      5 mg         29      7.5 mg           30      7.5 mg         31                  Date Details   No additional details    Date of next INR:  12/31/2018         How to take your warfarin dose     To take:  5 mg Take 1 of the 5 mg tablets.    To take:  7.5 mg Take 1.5 of the 5 mg tablets.

## 2018-11-20 NOTE — PROGRESS NOTES
ANTICOAGULATION FOLLOW-UP CLINIC VISIT    Patient Name:  Lori Mehta  Date:  11/20/2018  Contact Type:  Face to Face    SUBJECTIVE:     Patient Findings     Positives No Problem Findings           OBJECTIVE    INR Protime   Date Value Ref Range Status   11/20/2018 2.2 (A) 0.86 - 1.14 Final       ASSESSMENT / PLAN  INR assessment THER    Recheck INR In: 6 WEEKS    INR Location Clinic      Anticoagulation Summary as of 11/20/2018     INR goal 2.0-3.0   Today's INR 2.2   Warfarin maintenance plan 5 mg (5 mg x 1) on Mon, Fri; 7.5 mg (5 mg x 1.5) all other days   Full warfarin instructions 5 mg on Mon, Fri; 7.5 mg all other days   Weekly warfarin total 47.5 mg   No change documented Lakeisha Rodriguez RN   Plan last modified Azalia Stallings RN (9/26/2018)   Next INR check 12/31/2018   Target end date     Indications   Chronic atrial fibrillation (HCC) [I48.2]  Long-term (current) use of anticoagulants [Z79.01] [Z79.01]         Anticoagulation Episode Summary     INR check location     Preferred lab     Send INR reminders to MIHM POOL    Comments 5 mg tablets, no bandaid, takes in AM, likes BP done.       Anticoagulation Care Providers     Provider Role Specialty Phone number    Candido Freedman DO [facility] Internal Medicine [phone]            See the Encounter Report to view Anticoagulation Flowsheet and Dosing Calendar (Go to Encounters tab in chart review, and find the Anticoagulation Therapy Visit)    Dosage adjustment made based on physician directed care plan.      Lakeisha Rodriguez, CAN

## 2018-11-26 ENCOUNTER — TELEPHONE (OUTPATIENT)
Dept: FAMILY MEDICINE | Facility: CLINIC | Age: 83
End: 2018-11-26

## 2018-11-26 NOTE — TELEPHONE ENCOUNTER
Spoke with wife and she was looking to get a copy of their living will as they have misplaced theirs. They want to have it on the fridge in a folder ready to read if anything were to happen. Nydia Gloria, CMA

## 2018-11-26 NOTE — TELEPHONE ENCOUNTER
Reason for Call:  Other regarding DNR / DNI form    Detailed comments: Spouse Fatoumata calls asking how to obtain this form, as she and Kareem not not want measures taken to preserve life.     Phone Number Patient can be reached at: Home number on file [phone] (home)    Best Time: any time    Can we leave a detailed message on this number? YES    Call taken on 11/26/2018 at 2:16 PM by Sridevi Leyva

## 2018-11-26 NOTE — TELEPHONE ENCOUNTER
Call out to pt wife who reports that her and  have DNR on file but they are requesting something for their fridge that indicates the DNR status if ever something happen to her or pt at home for the ambulance or fire department.

## 2018-12-31 ENCOUNTER — ANTICOAGULATION THERAPY VISIT (OUTPATIENT)
Dept: ANTICOAGULATION | Facility: CLINIC | Age: 83
End: 2018-12-31
Payer: COMMERCIAL

## 2018-12-31 DIAGNOSIS — I48.20 CHRONIC ATRIAL FIBRILLATION (H): ICD-10-CM

## 2018-12-31 LAB — INR POINT OF CARE: 2.5 (ref 0.86–1.14)

## 2018-12-31 PROCEDURE — 99207 ZZC NO CHARGE NURSE ONLY: CPT

## 2018-12-31 PROCEDURE — 36416 COLLJ CAPILLARY BLOOD SPEC: CPT

## 2018-12-31 PROCEDURE — 85610 PROTHROMBIN TIME: CPT | Mod: QW

## 2018-12-31 NOTE — PROGRESS NOTES
ANTICOAGULATION FOLLOW-UP CLINIC VISIT    Patient Name:  Lori Mehta  Date:  2018  Contact Type:  Face to Face    SUBJECTIVE:     Patient Findings     Positives:   No Problem Findings           OBJECTIVE    INR Protime   Date Value Ref Range Status   2018 2.5 (A) 0.86 - 1.14 Final       ASSESSMENT / PLAN  INR assessment THER    Recheck INR In: 6 WEEKS    INR Location Clinic      Anticoagulation Summary  As of 2018    INR goal:   2.0-3.0   TTR:   77.9 % (2.7 y)   INR used for dosin.5 (2018)   Warfarin maintenance plan:   5 mg (5 mg x 1) every Mon, Fri; 7.5 mg (5 mg x 1.5) all other days   Full warfarin instructions:   5 mg every Mon, Fri; 7.5 mg all other days   Weekly warfarin total:   47.5 mg   No change documented:   Lakeisha Rodriguez RN   Plan last modified:   Azalia Stallings RN (2018)   Next INR check:   2/15/2019   Target end date:       Indications    Chronic atrial fibrillation (HCC) [I48.2]  Long-term (current) use of anticoagulants [Z79.01] [Z79.01]             Anticoagulation Episode Summary     INR check location:       Preferred lab:       Send INR reminders to:   [facility] JORI    Comments:   5 mg tablets, no bandaid, takes in AM, likes BP done.       Anticoagulation Care Providers     Provider Role Specialty Phone number    Candido FreedmanDO [facility] Internal Medicine [phone]            See the Encounter Report to view Anticoagulation Flowsheet and Dosing Calendar (Go to Encounters tab in chart review, and find the Anticoagulation Therapy Visit)    Dosage adjustment made based on physician directed care plan.      Lakeisha Rodriguez, CAN

## 2019-02-02 DIAGNOSIS — I10 ESSENTIAL HYPERTENSION WITH GOAL BLOOD PRESSURE LESS THAN 140/90: ICD-10-CM

## 2019-02-02 DIAGNOSIS — F43.22 ADJUSTMENT DISORDER WITH ANXIOUS MOOD: ICD-10-CM

## 2019-02-02 NOTE — TELEPHONE ENCOUNTER
Patient called today.    Patient has an upcoming appointment at the  Clinic on February 15, 2019.    Patient will be out of his medication prior to this appointment for Hydrochloothiozide and Celexa.    Please contact patient for med request.    Thank you.    Central Scheduling  Fanta DEE

## 2019-02-04 RX ORDER — HYDROCHLOROTHIAZIDE 25 MG/1
25 TABLET ORAL DAILY
Qty: 90 TABLET | Refills: 3 | Status: SHIPPED | OUTPATIENT
Start: 2019-02-04 | End: 2020-03-11

## 2019-02-04 RX ORDER — CITALOPRAM HYDROBROMIDE 10 MG/1
10 TABLET ORAL DAILY
Qty: 90 TABLET | Refills: 3 | Status: SHIPPED | OUTPATIENT
Start: 2019-02-04 | End: 2019-02-12

## 2019-02-12 ENCOUNTER — OFFICE VISIT (OUTPATIENT)
Dept: INTERNAL MEDICINE | Facility: CLINIC | Age: 84
End: 2019-02-12
Payer: COMMERCIAL

## 2019-02-12 ENCOUNTER — TELEPHONE (OUTPATIENT)
Dept: INTERNAL MEDICINE | Facility: CLINIC | Age: 84
End: 2019-02-12

## 2019-02-12 ENCOUNTER — ANTICOAGULATION THERAPY VISIT (OUTPATIENT)
Dept: ANTICOAGULATION | Facility: CLINIC | Age: 84
End: 2019-02-12
Payer: COMMERCIAL

## 2019-02-12 VITALS — DIASTOLIC BLOOD PRESSURE: 68 MMHG | SYSTOLIC BLOOD PRESSURE: 128 MMHG | HEART RATE: 85 BPM

## 2019-02-12 VITALS
HEART RATE: 84 BPM | HEIGHT: 67 IN | SYSTOLIC BLOOD PRESSURE: 136 MMHG | RESPIRATION RATE: 16 BRPM | WEIGHT: 186 LBS | BODY MASS INDEX: 29.19 KG/M2 | DIASTOLIC BLOOD PRESSURE: 60 MMHG | TEMPERATURE: 98.1 F | OXYGEN SATURATION: 94 %

## 2019-02-12 DIAGNOSIS — I10 HYPERTENSION GOAL BP (BLOOD PRESSURE) < 140/90: ICD-10-CM

## 2019-02-12 DIAGNOSIS — M10.00 IDIOPATHIC GOUT, UNSPECIFIED CHRONICITY, UNSPECIFIED SITE: ICD-10-CM

## 2019-02-12 DIAGNOSIS — R53.83 FATIGUE, UNSPECIFIED TYPE: ICD-10-CM

## 2019-02-12 DIAGNOSIS — I10 ESSENTIAL HYPERTENSION WITH GOAL BLOOD PRESSURE LESS THAN 140/90: ICD-10-CM

## 2019-02-12 DIAGNOSIS — Z00.00 MEDICARE ANNUAL WELLNESS VISIT, SUBSEQUENT: Primary | ICD-10-CM

## 2019-02-12 DIAGNOSIS — E78.5 HYPERLIPIDEMIA LDL GOAL <100: ICD-10-CM

## 2019-02-12 DIAGNOSIS — I48.20 CHRONIC ATRIAL FIBRILLATION (H): ICD-10-CM

## 2019-02-12 DIAGNOSIS — F43.22 ADJUSTMENT DISORDER WITH ANXIOUS MOOD: ICD-10-CM

## 2019-02-12 DIAGNOSIS — L29.9 ITCHING: ICD-10-CM

## 2019-02-12 DIAGNOSIS — N40.1 BENIGN NON-NODULAR PROSTATIC HYPERPLASIA WITH LOWER URINARY TRACT SYMPTOMS: ICD-10-CM

## 2019-02-12 DIAGNOSIS — Z23 NEED FOR PNEUMOCOCCAL VACCINATION: ICD-10-CM

## 2019-02-12 LAB
ALBUMIN SERPL-MCNC: 3.6 G/DL (ref 3.4–5)
ALP SERPL-CCNC: 78 U/L (ref 40–150)
ALT SERPL W P-5'-P-CCNC: 29 U/L (ref 0–70)
ANION GAP SERPL CALCULATED.3IONS-SCNC: 8 MMOL/L (ref 3–14)
AST SERPL W P-5'-P-CCNC: 31 U/L (ref 0–45)
BILIRUB SERPL-MCNC: 0.6 MG/DL (ref 0.2–1.3)
BUN SERPL-MCNC: 38 MG/DL (ref 7–30)
CALCIUM SERPL-MCNC: 9.6 MG/DL (ref 8.5–10.1)
CHLORIDE SERPL-SCNC: 104 MMOL/L (ref 94–109)
CO2 SERPL-SCNC: 27 MMOL/L (ref 20–32)
CREAT SERPL-MCNC: 1.61 MG/DL (ref 0.66–1.25)
ERYTHROCYTE [DISTWIDTH] IN BLOOD BY AUTOMATED COUNT: 14.1 % (ref 10–15)
GFR SERPL CREATININE-BSD FRML MDRD: 37 ML/MIN/{1.73_M2}
GLUCOSE SERPL-MCNC: 120 MG/DL (ref 70–99)
HCT VFR BLD AUTO: 37.4 % (ref 40–53)
HGB BLD-MCNC: 12.2 G/DL (ref 13.3–17.7)
INR POINT OF CARE: 1.8 (ref 0.9–1.1)
MCH RBC QN AUTO: 31.4 PG (ref 26.5–33)
MCHC RBC AUTO-ENTMCNC: 32.6 G/DL (ref 31.5–36.5)
MCV RBC AUTO: 96 FL (ref 78–100)
PLATELET # BLD AUTO: 217 10E9/L (ref 150–450)
POTASSIUM SERPL-SCNC: 3.9 MMOL/L (ref 3.4–5.3)
PROT SERPL-MCNC: 8.1 G/DL (ref 6.8–8.8)
RBC # BLD AUTO: 3.88 10E12/L (ref 4.4–5.9)
SODIUM SERPL-SCNC: 139 MMOL/L (ref 133–144)
T4 FREE SERPL-MCNC: 1.13 NG/DL (ref 0.76–1.46)
TSH SERPL DL<=0.005 MIU/L-ACNC: 5.06 MU/L (ref 0.4–4)
WBC # BLD AUTO: 7.1 10E9/L (ref 4–11)

## 2019-02-12 PROCEDURE — 85027 COMPLETE CBC AUTOMATED: CPT | Performed by: INTERNAL MEDICINE

## 2019-02-12 PROCEDURE — 99207 ZZC NO CHARGE NURSE ONLY: CPT

## 2019-02-12 PROCEDURE — 84439 ASSAY OF FREE THYROXINE: CPT | Performed by: INTERNAL MEDICINE

## 2019-02-12 PROCEDURE — G0009 ADMIN PNEUMOCOCCAL VACCINE: HCPCS | Performed by: INTERNAL MEDICINE

## 2019-02-12 PROCEDURE — 84443 ASSAY THYROID STIM HORMONE: CPT | Performed by: INTERNAL MEDICINE

## 2019-02-12 PROCEDURE — 90670 PCV13 VACCINE IM: CPT | Performed by: INTERNAL MEDICINE

## 2019-02-12 PROCEDURE — 80053 COMPREHEN METABOLIC PANEL: CPT | Performed by: INTERNAL MEDICINE

## 2019-02-12 PROCEDURE — 85610 PROTHROMBIN TIME: CPT | Mod: QW

## 2019-02-12 PROCEDURE — 99397 PER PM REEVAL EST PAT 65+ YR: CPT | Mod: 25 | Performed by: INTERNAL MEDICINE

## 2019-02-12 PROCEDURE — 36416 COLLJ CAPILLARY BLOOD SPEC: CPT

## 2019-02-12 RX ORDER — SIMVASTATIN 20 MG
60 TABLET ORAL AT BEDTIME
Qty: 270 TABLET | Refills: 3 | Status: SHIPPED | OUTPATIENT
Start: 2019-02-12 | End: 2020-02-12

## 2019-02-12 RX ORDER — TRIAMCINOLONE ACETONIDE 1 MG/G
CREAM TOPICAL 2 TIMES DAILY
Qty: 80 G | Refills: 1 | Status: SHIPPED | OUTPATIENT
Start: 2019-02-12 | End: 2019-05-22

## 2019-02-12 RX ORDER — ALLOPURINOL 100 MG/1
100 TABLET ORAL DAILY
Qty: 90 TABLET | Refills: 3 | Status: SHIPPED | OUTPATIENT
Start: 2019-02-12 | End: 2019-11-26

## 2019-02-12 RX ORDER — CITALOPRAM HYDROBROMIDE 20 MG/1
20 TABLET ORAL DAILY
Qty: 90 TABLET | Refills: 3 | Status: SHIPPED | OUTPATIENT
Start: 2019-02-12 | End: 2019-06-19

## 2019-02-12 RX ORDER — AMLODIPINE BESYLATE 10 MG/1
10 TABLET ORAL DAILY
Qty: 90 TABLET | Refills: 3 | Status: SHIPPED | OUTPATIENT
Start: 2019-02-12 | End: 2019-06-19

## 2019-02-12 RX ORDER — LISINOPRIL 2.5 MG/1
2.5 TABLET ORAL DAILY
Qty: 90 TABLET | Refills: 3 | Status: SHIPPED | OUTPATIENT
Start: 2019-02-12 | End: 2019-06-19

## 2019-02-12 RX ORDER — TAMSULOSIN HYDROCHLORIDE 0.4 MG/1
0.4 CAPSULE ORAL DAILY
Qty: 90 CAPSULE | Refills: 3 | Status: SHIPPED | OUTPATIENT
Start: 2019-02-12 | End: 2020-01-01

## 2019-02-12 ASSESSMENT — PAIN SCALES - GENERAL: PAINLEVEL: NO PAIN (0)

## 2019-02-12 ASSESSMENT — MIFFLIN-ST. JEOR: SCORE: 1462.32

## 2019-02-12 NOTE — PROGRESS NOTES
Screening Questionnaire for Adult Immunization    Are you sick today?   No   Do you have allergies to medications, food, a vaccine component or latex?   Yes   Have you ever had a serious reaction after receiving a vaccination?   No   Do you have a long-term health problem with heart disease, lung disease, asthma, kidney disease, metabolic disease (e.g. diabetes), anemia, or other blood disorder?   No   Do you have cancer, leukemia, HIV/AIDS, or any other immune system problem?   No   In the past 3 months, have you taken medications that affect  your immune system, such as prednisone, other steroids, or anticancer drugs; drugs for the treatment of rheumatoid arthritis, Crohn s disease, or psoriasis; or have you had radiation treatments?   No   Have you had a seizure, or a brain or other nervous system problem?   No   During the past year, have you received a transfusion of blood or blood     products, or been given immune (gamma) globulin or antiviral drug?   No   For women: Are you pregnant or is there a chance you could become        pregnant during the next month?   No   Have you received any vaccinations in the past 4 weeks?   No     Immunization questionnaire was positive for at least one answer.  Notified Yes.        Per orders of Dr. Wale Short , injection of Prevnar given by Adrianna Collins. Patient instructed to remain in clinic for 15 minutes afterwards, and to report any adverse reaction to me immediately.       Screening performed by Adrianna Collins on 2/12/2019 at 12:22 PM.

## 2019-02-12 NOTE — TELEPHONE ENCOUNTER
No answer, no machine. Try calling back tomorrow. ACase/MA        ----- Message from Wale Short MD sent at 2/12/2019  4:30 PM CST -----  Labs are good, kidneys are slightly worse then before but ok.  Continue his medications.

## 2019-02-12 NOTE — PATIENT INSTRUCTIONS
Preventive Health Recommendations:     See your health care provider every year to    Review health changes.     Discuss preventive care.      Review your medicines if your doctor has prescribed any.    Talk with your health care provider about whether you should have a test to screen for prostate cancer (PSA).    Every 3 years, have a diabetes test (fasting glucose). If you are at risk for diabetes, you should have this test more often.    Every 5 years, have a cholesterol test. Have this test more often if you are at risk for high cholesterol or heart disease.     Every 10 years, have a colonoscopy. Or, have a yearly FIT test (stool test). These exams will check for colon cancer.    Talk to with your health care provider about screening for Abdominal Aortic Aneurysm if you have a family history of AAA or have a history of smoking.  Shots:     Get a flu shot each year.     Get a tetanus shot every 10 years.     Talk to your doctor about your pneumonia vaccines. There are now two you should receive - Pneumovax (PPSV 23) and Prevnar (PCV 13).    Talk to your pharmacist about a shingles vaccine.     Talk to your doctor about the hepatitis B vaccine.  Nutrition:     Eat at least 5 servings of fruits and vegetables each day.     Eat whole-grain bread, whole-wheat pasta and brown rice instead of white grains and rice.     Get adequate Calcium and Vitamin D.   Lifestyle    Exercise for at least 150 minutes a week (30 minutes a day, 5 days a week). This will help you control your weight and prevent disease.     Limit alcohol to one drink per day.     No smoking.     Wear sunscreen to prevent skin cancer.     See your dentist every six months for an exam and cleaning.     See your eye doctor every 1 to 2 years to screen for conditions such as glaucoma, macular degeneration and cataracts.    Personalized Prevention Plan  You are due for the preventive services outlined below.  Your care team is available to assist you in  scheduling these services.  If you have already completed any of these items, please share that information with your care team to update in your medical record.    Health Maintenance Due   Topic Date Due     Diptheria Tetanus Pertussis (DTAP/TDAP/TD) Vaccine (1 - Tdap) [birth year]27/1953     Zoster (Shingles) Vaccine (1 of 2) 06/27/1978     Discuss Advance Directive Planning  09/20/2016     Microalbumin Lab - yearly  10/30/2016     INR CLINIC REFERRAL - yearly  05/24/2017     Flu Vaccine (1) 09/01/2018     Creatinine Lab - yearly  02/13/2019     Basic Metabolic Lab - yearly  02/13/2019     FALL RISK ASSESSMENT  02/13/2019     Cholesterol Lab - yearly  02/13/2019     Depression Assessment 2 - yearly  02/13/2019

## 2019-02-12 NOTE — TELEPHONE ENCOUNTER
The patient called back and information has been given. Asked for report to be mailed to home address.    Thank you,  Laurel Robison   for [facility]

## 2019-02-12 NOTE — PROGRESS NOTES
"SUBJECTIVE:   Lori Mehta is a 89 year old male who presents for Preventive Visit.  Are you in the first 12 months of your Medicare Part B coverage?  No    Physical Health:    In general, how would you rate your overall physical health? fair    Outside of work, how many days during the week do you exercise? none    Outside of work, approximately how many minutes a day do you exercise?not applicable    If you drink alcohol do you typically have >3 drinks per day or >7 drinks per week? No    Do you usually eat at least 4 servings of fruit and vegetables a day, include whole grains & fiber and avoid regularly eating high fat or \"junk\" foods? Yes    Do you have any problems taking medications regularly?  No    Do you have any side effects from medications? none    Needs assistance for the following daily activities: no assistance needed    Which of the following safety concerns are present in your home?  none identified     Hearing impairment: Yes, has hearing aids but they don't work    In the past 6 months, have you been bothered by leaking of urine? yes    Mental Health:    In general, how would you rate your overall mental or emotional health? excellent  PHQ-2 Score: 2    Different pains in his hips and right shoulder.  Left index finger has tendon rupture, ortho said do conservative therapy.    Itching in the ankles and [facility], works well.  Get the refill.      Needs more anxiety medication, 10mg helped the mood.      Do you feel safe in your environment? Yes    Do you have a Health Care Directive? No: Advance care planning was reviewed with patient; patient declined at this time.    Additional concerns to address?      Fall risk:  Fallen 2 or more times in the past year?: No  Any fall with injury in the past year?: No    Cognitive Screenin) Repeat 3 items (Leader, Season, Table)    2) Clock draw: NORMAL  3) 3 item recall: Recalls NO objects   Results: 0 items recalled: PROBABLE COGNITIVE " IMPAIRMENT, **INFORM PROVIDER**  Failing is memory  Wife thinks driving is ok, doesn't get lost.     Mini-CogTM Copyright S Evelina. Licensed by the author for use in [facility]; reprinted with permission (rajni@[facility]). All rights reserved.      Do you have sleep apnea, excessive snoring or daytime drowsiness?: no    Reviewed and updated as needed this visit by clinical staff  Tobacco  Allergies  Meds       Reviewed and updated as needed this visit by Provider        Social History     Tobacco Use     Smoking status: Never Smoker     Smokeless tobacco: Never Used   Substance Use Topics     Alcohol use: Yes     Alcohol/week: 0.6 oz     Types: 1 Standard drinks or equivalent per week     Comment: beer once a week                         Current providers sharing in care for this patient include:   Patient Care Team:  Wale Short MD as PCP - General (Internal Medicine)  Wale Short MD as PCP - Assigned PCP    The following health maintenance items are reviewed in Epic and correct as of today:  Health Maintenance   Topic Date Due     DTAP/TDAP/TD IMMUNIZATION (1 - Tdap) 06/27/1953     ZOSTER IMMUNIZATION (1 of 2) 06/27/1978     ADVANCE DIRECTIVE PLANNING Q5 YRS  09/20/2016     MICROALBUMIN Q1 YEAR  10/30/2016     OP ANNUAL INR REFERRAL  05/24/2017     INFLUENZA VACCINE (1) 09/01/2018     CREATININE Q1 YEAR  02/13/2019     BMP Q1 YR  02/13/2019     FALL RISK ASSESSMENT  02/13/2019     LIPID MONITORING Q1 YEAR  02/13/2019     PHQ-2 Q1 YR  02/13/2019     HEMOGLOBIN Q1 YR  06/18/2019     IPV IMMUNIZATION  Aged Out     MENINGITIS IMMUNIZATION  Aged Out       Pneumonia Vaccine:update prevnar 13 today    ROS:  CONSTITUTIONAL: NEGATIVE for fever, chills, change in weight  INTEGUMENTARY/SKIN: NEGATIVE for worrisome rashes, moles or lesions  EYES: NEGATIVE for vision changes or irritation  ENT/MOUTH: NEGATIVE for ear, mouth and throat problems  RESP: NEGATIVE for significant cough or SOB  BREAST: NEGATIVE  "for masses, tenderness or discharge  CV: NEGATIVE for chest pain, palpitations or peripheral edema  GI: NEGATIVE for nausea, abdominal pain, heartburn, or change in bowel habits  : NEGATIVE for frequency, dysuria, or hematuria  MUSCULOSKELETAL: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    Weight as of this encounter: 84.4 kg (186 lb).  EXAM:   GENERAL: healthy, alert and no distress  HENT: ear canals and TM's normal, nose and mouth without ulcers or lesions  NECK: no adenopathy, no asymmetry, masses, or scars and thyroid normal to palpation  RESP: lungs clear to auscultation - no rales, rhonchi or wheezes  CV: irregularly irregular rhythm, normal S1 S2, no S3 or S4, no murmur, click or rub, peripheral pulses strong and no peripheral edema  ABDOMEN: soft, nontender, no hepatosplenomegaly, no masses and bowel sounds normal  MS: no gross musculoskeletal defects noted, no edema  SKIN: mild erythema on the ankles  NEURO: Normal strength and tone, mentation intact and speech normal  PSYCH: mentation appears normal, affect normal/bright    ASSESSMENT / PLAN:       ICD-10-CM    1. Medicare annual wellness visit, subsequent Z00.00    2. Itching L29.9 triamcinolone (KENALOG) 0.1 % external cream     T4 free     T4 free   3. Fatigue, unspecified type R53.83 Comprehensive metabolic panel     CBC with platelets     TSH with free T4 reflex   4. 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 Idiopathic gout, unspecified chronicity, unspecified site M10.00 allopurinol (ZYLOPRIM) 100 MG tablet   6. Hypertension goal BP (blood pressure) < 140/90 I10 amLODIPine (NORVASC) 10 MG tablet   7. Essential hypertension with goal blood pressure less than 140/90 I10 lisinopril (PRINIVIL/ZESTRIL) 2.5 MG tablet   8. Hyperlipidemia LDL goal <100 E78.5 simvastatin (ZOCOR) 20 MG tablet   9. Benign non-nodular prostatic hyperplasia with lower urinary tract symptoms N40.1 tamsulosin (FLOMAX) 0.4 MG capsule   10. Need for pneumococcal vaccination Z23 Pneumococcal vaccine 13 valent PCV13 IM (Prevnar) [96748]     ADMIN: Vaccine, Initial (15460)       Patient is here for physical, he is doing okay getting a little bit more weakness but he is 89 years old.  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  He has a ruptured tendon of his left index finger but the recommendation is not to repair this.    We will check his labs for fatigue check a thyroid check his hemoglobin since he is on Coumadin.  Did recommend he take vitamin D and stop his vitamin E  For his mood and anxiety we will increase his citalopram it did help him last year but will increase from 10-20 mg.          End of Life Planning:  Patient currently has an advanced directive: Yes.  Practitioner is supportive of decision.    COUNSELING:  Reviewed preventive health counseling, as reflected in patient instructions       Regular exercise       Healthy diet/nutrition       Immunizations    Vaccinated for: Pneumococcal          BP Readings from Last 1 Encounters:   02/12/19 128/68     Estimated body mass index is 29.13 kg/m  as calculated from the following:    Height as of this encounter: 1.702 m (5' 7\").    Weight as of this encounter: 84.4 kg (186 lb).           reports that  has never smoked. he has never used smokeless tobacco.      Appropriate preventive services were discussed with this " patient, including applicable screening as appropriate for cardiovascular disease, diabetes, osteopenia/osteoporosis, and glaucoma.  As appropriate for age/gender, discussed screening for colorectal cancer, prostate cancer, breast cancer, and cervical cancer. Checklist reviewing preventive services available has been given to the patient.    Reviewed patients plan of care and provided an AVS. The Basic Care Plan (routine screening as documented in Health Maintenance) for Lori meets the Care Plan requirement. This Care Plan has been established and reviewed with the Patient.    Counseling Resources:  ATP IV Guidelines  Pooled Cohorts Equation Calculator  Breast Cancer Risk Calculator  FRAX Risk Assessment  ICSI Preventive Guidelines  Dietary Guidelines for Americans, 2010  USDA's MyPlate  ASA Prophylaxis  Lung CA Screening    Wale Short MD  [facility]

## 2019-02-12 NOTE — NURSING NOTE
Prior to injection, verified patient identity using patient's name and date of birth.  Due to injection administration, patient instructed to remain in clinic for 15 minutes  afterwards, and to report any adverse reaction to me immediately.    Prevnar    Drug Amount Wasted:  None.  Vial/Syringe: Single dose vial  Expiration Date:  7/20

## 2019-03-05 ENCOUNTER — ANTICOAGULATION THERAPY VISIT (OUTPATIENT)
Dept: ANTICOAGULATION | Facility: CLINIC | Age: 84
End: 2019-03-05
Payer: COMMERCIAL

## 2019-03-05 VITALS — HEART RATE: 70 BPM | SYSTOLIC BLOOD PRESSURE: 133 MMHG | DIASTOLIC BLOOD PRESSURE: 76 MMHG

## 2019-03-05 DIAGNOSIS — I48.20 CHRONIC ATRIAL FIBRILLATION (H): ICD-10-CM

## 2019-03-05 LAB — INR POINT OF CARE: 2.7 (ref 0.9–1.1)

## 2019-03-05 PROCEDURE — 85610 PROTHROMBIN TIME: CPT | Mod: QW

## 2019-03-05 PROCEDURE — 99207 ZZC NO CHARGE NURSE ONLY: CPT

## 2019-03-05 PROCEDURE — 36416 COLLJ CAPILLARY BLOOD SPEC: CPT

## 2019-03-05 NOTE — PROGRESS NOTES
ANTICOAGULATION FOLLOW-UP CLINIC VISIT    Patient Name:  Lori Mehta  Date:  3/5/2019  Contact Type:  Face to Face    SUBJECTIVE:     Patient Findings     Positives:   No Problem Findings    Comments:   No longer taking the hemp capsules (for pain)  Lakeisha Rodriguez RN             OBJECTIVE    INR Protime   Date Value Ref Range Status   2019 2.7 (A) 0.9 - 1.1 Final       ASSESSMENT / PLAN  INR assessment THER    Recheck INR In: 6 WEEKS    INR Location Clinic      Anticoagulation Summary  As of 3/5/2019    INR goal:   2.0-3.0   TTR:   77.7 % (2.9 y)   INR used for dosin.7 (3/5/2019)   Warfarin maintenance plan:   5 mg (5 mg x 1) every Mon; 7.5 mg (5 mg x 1.5) all other days   Full warfarin instructions:   5 mg every Mon; 7.5 mg all other days   Weekly warfarin total:   50 mg   No change documented:   Lakeisha Rodriguez RN   Plan last modified:   Lakeisha Rodriguez RN (2019)   Next INR check:   2019   Target end date:       Indications    Chronic atrial fibrillation (HCC) [I48.2]  Long-term (current) use of anticoagulants [Z79.01] [Z79.01]             Anticoagulation Episode Summary     INR check location:       Preferred lab:       Send INR reminders to:   [facility]    Comments:   5 mg tablets, no bandaid, takes in AM, likes BP done.       Anticoagulation Care Providers     Provider Role Specialty Phone number    Wale Short MD [facility] Internal Medicine [phone]            See the Encounter Report to view Anticoagulation Flowsheet and Dosing Calendar (Go to Encounters tab in chart review, and find the Anticoagulation Therapy Visit)    Dosage adjustment made based on physician directed care plan.    Lakeisha Rodriguez RN

## 2019-03-19 ENCOUNTER — HOSPITAL ENCOUNTER (EMERGENCY)
Facility: CLINIC | Age: 84
Discharge: HOME | End: 2019-03-19
Attending: PHYSICIAN ASSISTANT | Admitting: PHYSICIAN ASSISTANT
Payer: MEDICARE

## 2019-03-19 VITALS
HEIGHT: 67 IN | SYSTOLIC BLOOD PRESSURE: 164 MMHG | DIASTOLIC BLOOD PRESSURE: 73 MMHG | HEART RATE: 79 BPM | TEMPERATURE: 96.1 F | OXYGEN SATURATION: 98 % | BODY MASS INDEX: 30.5 KG/M2 | WEIGHT: 194.31 LBS

## 2019-03-19 DIAGNOSIS — M75.01 ADHESIVE CAPSULITIS OF RIGHT SHOULDER: ICD-10-CM

## 2019-03-19 PROCEDURE — 20610 DRAIN/INJ JOINT/BURSA W/O US: CPT | Mod: RT | Performed by: PHYSICIAN ASSISTANT

## 2019-03-19 PROCEDURE — 99283 EMERGENCY DEPT VISIT LOW MDM: CPT | Mod: 25 | Performed by: PHYSICIAN ASSISTANT

## 2019-03-19 RX ORDER — BUPIVACAINE HYDROCHLORIDE 5 MG/ML
1 INJECTION, SOLUTION EPIDURAL; INTRACAUDAL ONCE
Status: DISCONTINUED | OUTPATIENT
Start: 2019-03-19 | End: 2019-03-19 | Stop reason: HOSPADM

## 2019-03-19 RX ORDER — METHYLPREDNISOLONE ACETATE 40 MG/ML
40 INJECTION, SUSPENSION INTRA-ARTICULAR; INTRALESIONAL; INTRAMUSCULAR; SOFT TISSUE ONCE
Status: DISCONTINUED | OUTPATIENT
Start: 2019-03-19 | End: 2019-03-19 | Stop reason: HOSPADM

## 2019-03-19 ASSESSMENT — MIFFLIN-ST. JEOR: SCORE: 1500.03

## 2019-03-19 NOTE — ED TRIAGE NOTES
He has has had R shoulder pain for the past 2 months [facility] and told they would inject it for pain control but haven't  scheduled it yet.  He is here because the pain is intolerable

## 2019-03-19 NOTE — ED AVS SNAPSHOT
[facility] Emergency Department  [address] [place] [address]  Phone:  [phone]  Fax:  [phone]                                    Lori Mehta   MRN: [id number]    Department:  [facility] Emergency Department   Date of Visit:  3/19/2019           After Visit Summary Signature Page    I have received my discharge instructions, and my questions have been answered. I have discussed any challenges I see with this plan with the nurse or doctor.    ..........................................................................................................................................  Patient/Patient Representative Signature      ..........................................................................................................................................  Patient Representative Print Name and Relationship to Patient    ..................................................               ................................................  Date                                   Time    ..........................................................................................................................................  Reviewed by Signature/Title    ...................................................              ..............................................  Date                                               Time          22EPIC Rev 08/18

## 2019-03-19 NOTE — DISCHARGE INSTRUCTIONS
"It was a pleasure working with you today!  I hope your condition improves rapidly!     Please ice your shoulder for 20 minutes every 1-2 hours this evening prior to bed.  You can do this tomorrow as well.  This will help prevent a \"steroid flare \".    It is okay to use Tylenol as needed for discomfort.    Please perform the range of motion exercises on a regular basis that we discussed.    Please see Dr. Diaz for a follow-up to see how things are going.  He could provide a referral to physical therapy for you if this is needed for further treatment of your condition.  "

## 2019-03-19 NOTE — ED PROVIDER NOTES
History     Chief Complaint   Patient presents with     Shoulder Pain     The history is provided by the patient.     Lori Mehta is a 89 year old male who presents to the emergency department for shoulder pain. Patient reports having right shoulder pain for 2 months. [facility] and said they would not inject a cortisone injection with him prior to seeing the x ray results. They just received a letter in the mail regarding the x ray results informing them there was no fracture, however, he does have arthritis. So his wife called today to try and make an appointment for the cortisone injection but his doctor was not it. He is seen today in the ED due to his shoulder pain being intolerable. He is unable to reach or lift above his shoulder. He rates his pain on a 10/10. He did take 2 500 mg of Tylenol around 1200 today and it did help his pain.    Allergies:  Allergies   Allergen Reactions     Paroxetine      Other reaction(s): Intolerance     Terfenadine      Other reaction(s): Other  Unable to void       Problem List:    Patient Active Problem List    Diagnosis Date Noted     Cerebrovascular accident (CVA), unspecified mechanism (H) 07/19/2016     Priority: Medium     Essential hypertension with goal blood pressure less than 140/90 07/19/2016     Priority: Medium     Diplopia 07/11/2016     Priority: Medium     Gout 07/11/2016     Priority: Medium     Long-term (current) use of anticoagulants [Z79.01] 04/08/2016     Priority: Medium     Chronic atrial fibrillation (HCC) 12/17/2015     Priority: Medium     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 Previously signed by patient and notary on 3/1/1995.  scanned and placed behind media tab on 9/14/11.  Please see advance directive for specifics...Terra Tobin RN         HYPERLIPIDEMIA LDL GOAL <100 10/31/2010     Priority: Medium     Anxiety 12/14/2009     Priority: Medium     Hypertrophy of prostate without urinary obstruction 05/07/2004     Priority: Medium     Problem list name updated by automated process. Provider to review       Esophageal reflux 05/07/2004     Priority: Medium        Past Medical History:    Past Medical History:   Diagnosis Date     Anxiety      Atrial fibrillation (H) 1/9/2009     BPH      GERD (gastroesophageal reflux disease)      Pure hypercholesterolemia      Unspecified essential hypertension        Past Surgical History:    Past Surgical History:   Procedure Laterality Date     ARTHROPLASTY HIP ANTERIOR  11/4/2013    Procedure: ARTHROPLASTY HIP ANTERIOR;  Right Total Hip Arthroplasty - anterior approach;  Surgeon: Mathew Osorio MD;  Location: [facility]     CL AFF SURGICAL PATHOLOGY      nasal polyp removal     COLONOSCOPY  6/16/10     HC REMOVAL GALLBLADDER      Cholecystectomy     HC REMOVAL OF HYDROCELE,TUNICA,UNILAT       HC REPAIR ING HERNIA,5+Y/O,REDUCIBL       HC UGI ENDOSCOPY, SIMPLE EXAM  6/16/10     LAPAROSCOPIC HERNIORRHAPHY PREPERITONEAL  9/19/2012    Procedure: LAPAROSCOPIC HERNIORRHAPHY PREPERITONEAL;  Laparoscopic Preperitoneal Left Inguinal Hernia Repair;  Surgeon: Errol Navas MD;  [facility]     LASER YAG CAPSULOTOMY Left 4/6/2017    Procedure: LASER YAG CAPSULOTOMY;  Surgeon: Mathew Croft MD;  [facility]     OPEN REDUCTION INTERNAL FIXATION FOREARM  Left 12/29/2014    Procedure: OPEN REDUCTION INTERNAL FIXATION FOREARM;  Surgeon: Mathew Osorio MD;  [facility]     PHACOEMULSIFICATION WITH STANDARD INTRAOCULAR LENS IMPLANT  7/19/2012    Procedure: PHACOEMULSIFICATION WITH STANDARD INTRAOCULAR LENS IMPLANT;  PHACOEMULSIFICATION WITH STANDARD INTRAOCULAR LENS IMPLANT LEFT EYE;  Surgeon: Gabriel Ross MD;  [facility]     PHACOEMULSIFICATION WITH STANDARD INTRAOCULAR LENS IMPLANT  8/16/2012    Procedure: PHACOEMULSIFICATION WITH STANDARD INTRAOCULAR LENS IMPLANT;  RIGHT PHACOEMULSIFICATION WITH STANDARD INTRAOCULAR LENS IMPLANT;  Surgeon: Gabriel Ross MD;  [facility]       Family History:    Family History   Problem Relation Age of Onset     Diabetes Brother      C.A.D. Mother      Diabetes Maternal Grandmother      Circulatory Maternal Grandfather         cerebral aneurysm     Hypertension Father      Cerebrovascular Disease Father      Breast Cancer Sister          x 2     Cancer - colorectal No family hx of        Social History:  Marital Status:   [2]  Social History     Tobacco Use     Smoking status: Never Smoker     Smokeless tobacco: Never Used   Substance Use Topics     Alcohol use: Yes     Alcohol/week: 0.6 oz     Types: 1 Standard drinks or equivalent per week     Comment: beer once a week     Drug use: No        Medications:      Acetaminophen (TYLENOL PO)   allopurinol (ZYLOPRIM) 100 MG tablet   amLODIPine (NORVASC) 10 MG tablet   Ascorbic Acid (VITAMIN C) 500 MG CHEW   Bromelains 500 MG TABS   CAPSICUM, CAYENNE, PO   citalopram (CELEXA) 20 MG tablet   COENZYME Q10   hemp seed oil   hydrochlorothiazide (HYDRODIURIL) 25 MG tablet   lisinopril (PRINIVIL/ZESTRIL) 2.5 MG tablet   MELATONIN PO   OMEGA 3 1200 MG OR CAPS   Saw Palmetto, Serenoa repens, (SAW PALMETTO EXTRACT PO)   SELENIUM 200 MCG OR CAPS   simvastatin (ZOCOR) 20 MG tablet   tamsulosin (FLOMAX) 0.4 MG capsule   triamcinolone (KENALOG) 0.1 % external cream  "  VITAMIN B12 TR 1000 MCG OR TBCR   VITAMIN E 400 UNIT OR CAPS   warfarin (COUMADIN) 5 MG tablet   ZINC 30 MG OR TABS         Review of Systems   All other systems reviewed and are negative.      Physical Exam   BP: 164/73  Pulse: 79  Temp: 96.1  F (35.6  C)  Height: 170.2 cm (5' 7\")  Weight: 88.1 kg (194 lb 5 oz)  SpO2: 98 %      Physical Exam   Constitutional: He is oriented to person, place, and time. He appears well-developed and well-nourished. No distress.   HENT:   Head: Normocephalic and atraumatic.   Eyes: EOM are normal. No scleral icterus.   Neck: Normal range of motion. Neck supple.   Cardiovascular: Normal rate and regular rhythm.   Pulmonary/Chest: Effort normal and breath sounds normal.   Abdominal: Soft. Bowel sounds are normal.   Neurological: He is alert and oriented to person, place, and time.   Skin: Skin is warm and dry. No rash noted. He is not diaphoretic.   Psychiatric: He has a normal mood and affect. His behavior is normal. Thought content normal.   Nursing note and vitals reviewed.  Patient appears comfortable and in NAD.  No obvious abnormality, ecchymosis, or swelling on inspection of the shoulder.  Limited range of motion.  He can only abduct and flex to about 80 degrees without moving his body.  Has pain with extremes of motion.  No crepitus noted.  Nontender to palpation along all the bone structures of the shoulder.  No tenderness with palpation over the bony structures of the shoulder.  No deformity.  right SHOULDER EXAM:Impingment sign with internal rotation  Pain with flexion  Pain with abduction  no weakness in rotation, flexion, extension, abduction or adduction  EXT:  Strength is equal and appropriate on testing of the biceps, triceps, and  strength.  Distal pulses are 2+. Sensation intact to light touch intact.     ED Course        Procedures    PROCEDURE:  JOINT INJECTION.    After a discussion of risks, benefits and side effects of procedure, informed patient consent was " obtained.  The right posterior subacromial space was prepped and draped in the usual clean fashion with betadine swabs applied x 3.       INJECTION:  Using 9 cc of 0.5% bupivacaine mixed  with 40 mg of depo medrol (Lot # - [id number], Expiration date - 8/2019), the right posterior subacromial space was successfully injected without complication.  Patient did experience some pain relief following injection.  There was good ROM of the joint post injection.  We did discuss the possibility of a 'steroid flare' over the next 3-4 days.  Recommended ice and OTC anti-inflammatory if this occurs.  Patient will monitor for signs of infection and call or present if this occurs.  The patient indicates understanding of the above issues and was in agreement with the plan.                  Critical Care time:  none               No results found for this or any previous visit (from the past 24 hour(s)).    Medications   methylPREDNISolone (DEPO-MEDROL) injection 40 mg (40 mg INTRA-ARTICULAR Handoff 3/19/19 1829)   bupivacaine (MARCAINE) 0.5% preservative free injection (5 mg Intradermal Handoff 3/19/19 1829)       Assessments & Plan (with Medical Decision Making)  Adhesive capsulitis of right shoulder     89 year old male presents for evaluation of ongoing shoulder pain and immobility for the past 2 months without known injury.  He was seen at the [facility] 1 week ago and underwent full x-ray series which returned normal per patient report.  They received a letter stating that it was normal, and when they called to set up a steroid injection, they were told that they could do this in a couple weeks.  Patient would like something done now rather than later.  Denies any injury.  No falls.  Has had pain and lack of range of motion.  [facility] told him that there was no significant arthritis in the joint.  On exam he has no significant tenderness throughout the shoulder region.  Decreased range of motion.   He can flex to about 80 degrees and cannot abduct much more than that without moving his whole body..  Strength is intact.  His symptoms  consistent with rotator cuff tendinitis and adhesive capsulitis.  I think it is appropriate to move forward with a subacromial steroid injection to help relieve the discomfort, but I did openly discuss with him that this will not help his mobility completely.  This will require a rather extensive physical therapy course, and could and in a operative fix if not improving with physical therapy.  He wanted to proceed with the injection first.  Risks and benefits discussed.  Please see the procedure as noted above.  Injection carried out with 9 cc of 0.5% bupivacaine without epinephrine and 40 mg of Depo-Medrol with good success.  He reported improvement in his pain afterwards.  We discussed range of motion exercises to perform at home, and also discussed home physical therapy exercises that he could perform initially.  We encouraged extensive icing in the first 24 hours to prevent a steroid flare.  Other potential side effects discussed with him.  Follow-up with his orthopedist for further evaluation.  If he would comply with physical therapy, then a referral could be placed at that time.  At this time, the patient has openly stated that he would not consider physical therapy.  We did discuss that his decreased mobility would be a chronic issue if he did not pursue physical therapy and continued range of motion activities.  He was comfortable with.  His wife  and daughter were present during this conversation.  He was discharged in stable and improved condition.     I have reviewed the nursing notes.    I have reviewed the findings, diagnosis, plan and need for follow up with the patient.          Medication List      There are no discharge medications for this visit.         Final diagnoses:   Adhesive capsulitis of right shoulder     This document serves as a record of services  personally performed by Real Solano PA-C. It was created on their behalf by Angélica Schmitz, a trained medical scribe. The creation of this record is based on the provider's personal observations and the statements of the patient. This document has been checked and approved by the attending provider.    Note: Chart documentation done in part with Dragon Voice Recognition software. Although reviewed after completion, some word and grammatical errors may remain.    3/19/2019   Real Solano PA-C   [facility] EMERGENCY DEPARTMENT     Real Solano PA-C  03/19/19 2005

## 2019-04-17 ENCOUNTER — ANTICOAGULATION THERAPY VISIT (OUTPATIENT)
Dept: ANTICOAGULATION | Facility: CLINIC | Age: 84
End: 2019-04-17
Payer: COMMERCIAL

## 2019-04-17 ENCOUNTER — OFFICE VISIT (OUTPATIENT)
Dept: ORTHOPEDICS | Facility: CLINIC | Age: 84
End: 2019-04-17
Payer: COMMERCIAL

## 2019-04-17 VITALS — SYSTOLIC BLOOD PRESSURE: 134 MMHG | HEART RATE: 83 BPM | DIASTOLIC BLOOD PRESSURE: 72 MMHG

## 2019-04-17 VITALS
WEIGHT: 184 LBS | DIASTOLIC BLOOD PRESSURE: 54 MMHG | SYSTOLIC BLOOD PRESSURE: 154 MMHG | HEIGHT: 67 IN | BODY MASS INDEX: 28.88 KG/M2

## 2019-04-17 DIAGNOSIS — I48.20 CHRONIC ATRIAL FIBRILLATION (H): ICD-10-CM

## 2019-04-17 DIAGNOSIS — M12.811 ROTATOR CUFF ARTHROPATHY OF RIGHT SHOULDER: Primary | ICD-10-CM

## 2019-04-17 LAB — INR POINT OF CARE: 2.4 (ref 0.9–1.1)

## 2019-04-17 PROCEDURE — 85610 PROTHROMBIN TIME: CPT | Mod: QW

## 2019-04-17 PROCEDURE — 99213 OFFICE O/P EST LOW 20 MIN: CPT | Performed by: ORTHOPAEDIC SURGERY

## 2019-04-17 PROCEDURE — 99207 ZZC NO CHARGE NURSE ONLY: CPT

## 2019-04-17 PROCEDURE — 36416 COLLJ CAPILLARY BLOOD SPEC: CPT

## 2019-04-17 ASSESSMENT — PAIN SCALES - GENERAL: PAINLEVEL: EXTREME PAIN (8)

## 2019-04-17 ASSESSMENT — MIFFLIN-ST. JEOR: SCORE: 1453.25

## 2019-04-17 NOTE — PROGRESS NOTES
ANTICOAGULATION FOLLOW-UP CLINIC VISIT    Patient Name:  Lori Mehta  Date:  2019  Contact Type:  Face to Face    SUBJECTIVE:     Patient Findings     Comments:   The patient was assessed for diet, medication, and activity level changes, missed or extra doses, bruising or bleeding, with no problem findings.  Lakeisha Rodriguez RN               OBJECTIVE    INR Protime   Date Value Ref Range Status   2019 2.4 (A) 0.9 - 1.1 Final       ASSESSMENT / PLAN  INR assessment THER    Recheck INR In: 6 WEEKS    INR Location Clinic      Anticoagulation Summary  As of 2019    INR goal:   2.0-3.0   TTR:   78.5 % (3 y)   INR used for dosin.4 (2019)   Warfarin maintenance plan:   5 mg (5 mg x 1) every Mon; 7.5 mg (5 mg x 1.5) all other days   Full warfarin instructions:   5 mg every Mon; 7.5 mg all other days   Weekly warfarin total:   50 mg   No change documented:   Lakeisha Rodriguez RN   Plan last modified:   Lakeisha Rodriguez RN (2019)   Next INR check:   2019   Target end date:       Indications    Chronic atrial fibrillation (HCC) [I48.2]  Long-term (current) use of anticoagulants [Z79.01] [Z79.01]             Anticoagulation Episode Summary     INR check location:       Preferred lab:       Send INR reminders to:   J[facility]    Comments:   5 mg tablets, no bandaid, takes in AM, likes BP done.       Anticoagulation Care Providers     Provider Role Specialty Phone number    Wale Short MD [facility] Internal Medicine [phone]            See the Encounter Report to view Anticoagulation Flowsheet and Dosing Calendar (Go to Encounters tab in chart review, and find the Anticoagulation Therapy Visit)    Dosage adjustment made based on physician directed care plan.      Lakeisha Rodriguez RN

## 2019-04-17 NOTE — PROGRESS NOTES
ORTHOPEDIC CONSULT      Chief Complaint: Lori Mehta is a 89 year old male who is being seen for Chief Complaint   Patient presents with     Shoulder Pain     right shoulder pain       History of Present Illness:   Presents with his wife with complaints of shoulder pain.  He reports the pain is been approximately 6 weeks.  No pre-existing pain he is on Coumadin.  Moderate to severe generalized aching shoulder pain required an ER visit in a which she received a steroid injection March 19.  [facility]  Is been taken Tylenol with no improvement.      Patient's past medical, surgical, social and family histories reviewed.     Past Medical History:   Diagnosis Date     Anxiety      Atrial fibrillation (H) 1/9/2009     BPH      GERD (gastroesophageal reflux disease)      Pure hypercholesterolemia      Unspecified essential hypertension        Past Surgical History:   Procedure Laterality Date     ARTHROPLASTY HIP ANTERIOR  11/4/2013    Procedure: ARTHROPLASTY HIP ANTERIOR;  Right Total Hip Arthroplasty - anterior approach;  Surgeon: Mathew Osorio MD;  [facility]     CL AFF SURGICAL PATHOLOGY      nasal polyp removal     COLONOSCOPY  6/16/10     HC REMOVAL GALLBLADDER      Cholecystectomy     HC REMOVAL OF HYDROCELE,TUNICA,UNILAT       HC REPAIR ING HERNIA,5+Y/O,REDUCIBL       HC UGI ENDOSCOPY, SIMPLE EXAM  6/16/10     LAPAROSCOPIC HERNIORRHAPHY PREPERITONEAL  9/19/2012    Procedure: LAPAROSCOPIC HERNIORRHAPHY PREPERITONEAL;  Laparoscopic Preperitoneal Left Inguinal Hernia Repair;  Surgeon: Errol Navas MD;  [facility]     LASER YAG CAPSULOTOMY Left 4/6/2017    Procedure: LASER YAG CAPSULOTOMY;  Surgeon: Mathew Croft MD;  [facility]     OPEN REDUCTION INTERNAL FIXATION FOREARM Left 12/29/2014    Procedure: OPEN REDUCTION INTERNAL FIXATION FOREARM;  Surgeon: Mathew Osorio MD;  [facility]     PHACOEMULSIFICATION WITH STANDARD INTRAOCULAR LENS  IMPLANT  7/19/2012    Procedure: PHACOEMULSIFICATION WITH STANDARD INTRAOCULAR LENS IMPLANT;  PHACOEMULSIFICATION WITH STANDARD INTRAOCULAR LENS IMPLANT LEFT EYE;  Surgeon: Gabriel Ross MD;  [facility]     PHACOEMULSIFICATION WITH STANDARD INTRAOCULAR LENS IMPLANT  8/16/2012    Procedure: PHACOEMULSIFICATION WITH STANDARD INTRAOCULAR LENS IMPLANT;  RIGHT PHACOEMULSIFICATION WITH STANDARD INTRAOCULAR LENS IMPLANT;  Surgeon: Gabriel Ross MD;  [facility]       Medications:    Current Outpatient Medications on File Prior to Visit:  Acetaminophen (TYLENOL PO) Take 1,000 mg by mouth   allopurinol (ZYLOPRIM) 100 MG tablet Take 1 tablet (100 mg) by mouth daily   amLODIPine (NORVASC) 10 MG tablet Take 1 tablet (10 mg) by mouth daily   Ascorbic Acid (VITAMIN C) 500 MG CHEW Take  by mouth. 1-3 daily   Bromelains 500 MG TABS Take 3 tablets by mouth. 3-4 daily   CAPSICUM, CAYENNE, PO Take 450 mg by mouth daily   citalopram (CELEXA) 20 MG tablet Take 1 tablet (20 mg) by mouth daily   COENZYME Q10 1 DAILY   hemp seed oil Take by mouth daily as needed   hydrochlorothiazide (HYDRODIURIL) 25 MG tablet Take 1 tablet (25 mg) by mouth daily   lisinopril (PRINIVIL/ZESTRIL) 2.5 MG tablet Take 1 tablet (2.5 mg) by mouth daily   MELATONIN PO Take 10 mg by mouth At Bedtime   OMEGA 3 1200 MG OR CAPS 2 CAP DAILY   Saw Palmetto, Serenoa repens, (SAW PALMETTO EXTRACT PO) Take 1,000 mg by mouth   SELENIUM 200 MCG OR CAPS 1 CAPSULE DAILY   simvastatin (ZOCOR) 20 MG tablet Take 3 tablets (60 mg) by mouth At Bedtime   tamsulosin (FLOMAX) 0.4 MG capsule Take 1 capsule (0.4 mg) by mouth daily   triamcinolone (KENALOG) 0.1 % external cream Apply topically 2 times daily   VITAMIN B12 TR 1000 MCG OR TBCR 1 TAB DAILY   VITAMIN E 400 UNIT OR CAPS 1 CAPSULE DAILY   warfarin (COUMADIN) 5 MG tablet Take 5 mg on Mon, Fri and 7.5 mg all other days, or as directed by the Coumadin clinic.   ZINC 30 MG OR TABS 1 TABLET DAILY     No current  "facility-administered medications on file prior to visit.     Allergies   Allergen Reactions     Paroxetine      Other reaction(s): Intolerance     Terfenadine      Other reaction(s): Other  Unable to void       Social History     Occupational History     Not on file   Tobacco Use     Smoking status: Never Smoker     Smokeless tobacco: Never Used   Substance and Sexual Activity     Alcohol use: Yes     Alcohol/week: 0.6 oz     Types: 1 Standard drinks or equivalent per week     Comment: beer once a week     Drug use: No     Sexual activity: Never       Family History   Problem Relation Age of Onset     Diabetes Brother      C.A.D. Mother      Diabetes Maternal Grandmother      Circulatory Maternal Grandfather         cerebral aneurysm     Hypertension Father      Cerebrovascular Disease Father      Breast Cancer Sister          x 2     Cancer - colorectal No family hx of        REVIEW OF SYSTEMS  10 point review systems performed otherwise negative as noted as per history of present illness.    Physical Exam:  Vitals: /54   Ht 1.702 m (5' 7\")   Wt 83.5 kg (184 lb)   BMI 28.82 kg/m    BMI= Body mass index is 28.82 kg/m .  Constitutional: healthy, alert and no acute distress   Psychiatric: mentation appears normal and affect normal/bright  NEURO: no focal deficits  RESP: Normal with easy respirations and no use of accessory muscles to breathe, no audible wheezing or retractions  CV: RUE:  no edema         Regular rate and rhythm by palpation  SKIN: No erythema, rashes, excoriation, or breakdown. No evidence of infection.   JOINT/EXTREMITIES:right shoulder: Active motion limited to approximately 40/30.  Passively able to take him to 150/140.  Pain and weakness with supraspinatus.  Global tenderness.  No evidence of infection.     GAIT: not tested     Diagnostic Modalities:  right shoulder X-ray: Bone-on-bone arthritis glenohumeral joint with osteophyte formation.  Humeral head is high riding.  Independent " "visualization of the images was performed.      Impression: right shoulder rotator cuff arthropathy    Plan:  All of the above pertinent physical exam and imaging modalities findings was reviewed with Lori.    I discussed his options with him as well as his wife.  Unfortunately steroid injection received only provided very short-term relief.  He has been taking Tylenol.  I recommended some physical therapy at which he declined.  If he needed anything stronger than Tylenol I will defer to his family doctor given his advanced age.  Did discuss shoulder replacement certainly could place referral-\"I would die before I had surgery\".        Return to clinic PRN, or sooner as needed for changes.  Re-x-ray on return: No    Christiano Diaz D.O.  "

## 2019-04-17 NOTE — LETTER
4/17/2019         RE: Lori Mehta  [address] [facility] [address]        Dear Colleague,    Thank you for referring your patient, Lori Mehta, to the [facility]. Please see a copy of my visit note below.    ORTHOPEDIC CONSULT      Chief Complaint: Lori Mehta is a 89 year old male who is being seen for Chief Complaint   Patient presents with     Shoulder Pain     right shoulder pain       History of Present Illness:   Presents with his wife with complaints of shoulder pain.  He reports the pain is been approximately 6 weeks.  No pre-existing pain he is on Coumadin.  Moderate to severe generalized aching shoulder pain required an ER visit in a which she received a steroid injection March 19.  [facility]  Is been taken Tylenol with no improvement.      Patient's past medical, surgical, social and family histories reviewed.     Past Medical History:   Diagnosis Date     Anxiety      Atrial fibrillation (H) 1/9/2009     BPH      GERD (gastroesophageal reflux disease)      Pure hypercholesterolemia      Unspecified essential hypertension        Past Surgical History:   Procedure Laterality Date     ARTHROPLASTY HIP ANTERIOR  11/4/2013    Procedure: ARTHROPLASTY HIP ANTERIOR;  Right Total Hip Arthroplasty - anterior approach;  Surgeon: Mathew Osorio MD;  Location: [facility] OR     CL AFF SURGICAL PATHOLOGY      nasal polyp removal     COLONOSCOPY  6/16/10     HC REMOVAL GALLBLADDER      Cholecystectomy     HC REMOVAL OF HYDROCELE,TUNICA,UNILAT       HC REPAIR ING HERNIA,5+Y/O,REDUCIBL       HC UGI ENDOSCOPY, SIMPLE EXAM  6/16/10     LAPAROSCOPIC HERNIORRHAPHY PREPERITONEAL  9/19/2012    Procedure: LAPAROSCOPIC HERNIORRHAPHY PREPERITONEAL;  Laparoscopic Preperitoneal Left Inguinal Hernia Repair;  Surgeon: Errol Navas MD;  Location: [facility] OR     LASER YAG CAPSULOTOMY Left 4/6/2017    Procedure: LASER YAG CAPSULOTOMY;  Surgeon: Mathew Croft,  MD;  Location: [facility] OR     OPEN REDUCTION INTERNAL FIXATION FOREARM Left 12/29/2014    Procedure: OPEN REDUCTION INTERNAL FIXATION FOREARM;  Surgeon: Mathew Osorio MD;  Location: [facility]     PHACOEMULSIFICATION WITH STANDARD INTRAOCULAR LENS IMPLANT  7/19/2012    Procedure: PHACOEMULSIFICATION WITH STANDARD INTRAOCULAR LENS IMPLANT;  PHACOEMULSIFICATION WITH STANDARD INTRAOCULAR LENS IMPLANT LEFT EYE;  Surgeon: Gabriel Ross MD;  Location: [facility]     PHACOEMULSIFICATION WITH STANDARD INTRAOCULAR LENS IMPLANT  8/16/2012    Procedure: PHACOEMULSIFICATION WITH STANDARD INTRAOCULAR LENS IMPLANT;  RIGHT PHACOEMULSIFICATION WITH STANDARD INTRAOCULAR LENS IMPLANT;  Surgeon: Gabriel Ross MD;  Location: [facility] OR       Medications:    Current Outpatient Medications on File Prior to Visit:  Acetaminophen (TYLENOL PO) Take 1,000 mg by mouth   allopurinol (ZYLOPRIM) 100 MG tablet Take 1 tablet (100 mg) by mouth daily   amLODIPine (NORVASC) 10 MG tablet Take 1 tablet (10 mg) by mouth daily   Ascorbic Acid (VITAMIN C) 500 MG CHEW Take  by mouth. 1-3 daily   Bromelains 500 MG TABS Take 3 tablets by mouth. 3-4 daily   CAPSICUM, CAYENNE, PO Take 450 mg by mouth daily   citalopram (CELEXA) 20 MG tablet Take 1 tablet (20 mg) by mouth daily   COENZYME Q10 1 DAILY   hemp seed oil Take by mouth daily as needed   hydrochlorothiazide (HYDRODIURIL) 25 MG tablet Take 1 tablet (25 mg) by mouth daily   lisinopril (PRINIVIL/ZESTRIL) 2.5 MG tablet Take 1 tablet (2.5 mg) by mouth daily   MELATONIN PO Take 10 mg by mouth At Bedtime   OMEGA 3 1200 MG OR CAPS 2 CAP DAILY   Saw Palmetto, Serenoa repens, (SAW PALMETTO EXTRACT PO) Take 1,000 mg by mouth   SELENIUM 200 MCG OR CAPS 1 CAPSULE DAILY   simvastatin (ZOCOR) 20 MG tablet Take 3 tablets (60 mg) by mouth At Bedtime   tamsulosin (FLOMAX) 0.4 MG capsule Take 1 capsule (0.4 mg) by mouth daily   triamcinolone (KENALOG) 0.1 % external cream Apply topically 2 times daily   VITAMIN B12 TR 1000 MCG  "OR TBCR 1 TAB DAILY   VITAMIN E 400 UNIT OR CAPS 1 CAPSULE DAILY   warfarin (COUMADIN) 5 MG tablet Take 5 mg on Mon, Fri and 7.5 mg all other days, or as directed by the Coumadin clinic.   ZINC 30 MG OR TABS 1 TABLET DAILY     No current facility-administered medications on file prior to visit.     Allergies   Allergen Reactions     Paroxetine      Other reaction(s): Intolerance     Terfenadine      Other reaction(s): Other  Unable to void       Social History     Occupational History     Not on file   Tobacco Use     Smoking status: Never Smoker     Smokeless tobacco: Never Used   Substance and Sexual Activity     Alcohol use: Yes     Alcohol/week: 0.6 oz     Types: 1 Standard drinks or equivalent per week     Comment: beer once a week     Drug use: No     Sexual activity: Never       Family History   Problem Relation Age of Onset     Diabetes Brother      C.A.D. Mother      Diabetes Maternal Grandmother      Circulatory Maternal Grandfather         cerebral aneurysm     Hypertension Father      Cerebrovascular Disease Father      Breast Cancer Sister          x 2     Cancer - colorectal No family hx of        REVIEW OF SYSTEMS  10 point review systems performed otherwise negative as noted as per history of present illness.    Physical Exam:  Vitals: /54   Ht 1.702 m (5' 7\")   Wt 83.5 kg (184 lb)   BMI 28.82 kg/m     BMI= Body mass index is 28.82 kg/m .  Constitutional: healthy, alert and no acute distress   Psychiatric: mentation appears normal and affect normal/bright  NEURO: no focal deficits  RESP: Normal with easy respirations and no use of accessory muscles to breathe, no audible wheezing or retractions  CV: RUE:  no edema         Regular rate and rhythm by palpation  SKIN: No erythema, rashes, excoriation, or breakdown. No evidence of infection.   JOINT/EXTREMITIES:right shoulder: Active motion limited to approximately 40/30.  Passively able to take him to 150/140.  Pain and weakness with " "supraspinatus.  Global tenderness.  No evidence of infection.     GAIT: not tested     Diagnostic Modalities:  right shoulder X-ray: Bone-on-bone arthritis glenohumeral joint with osteophyte formation.  Humeral head is high riding.  Independent visualization of the images was performed.      Impression: right shoulder rotator cuff arthropathy    Plan:  All of the above pertinent physical exam and imaging modalities findings was reviewed with Lori.    I discussed his options with him as well as his wife.  Unfortunately steroid injection received only provided very short-term relief.  He has been taking Tylenol.  I recommended some physical therapy at which he declined.  If he needed anything stronger than Tylenol I will defer to his family doctor given his advanced age.  Did discuss shoulder replacement certainly could place referral-\"I would die before I had surgery\".        Return to clinic PRN, or sooner as needed for changes.  Re-x-ray on return: No    Christiano Diaz D.O.    Again, thank you for allowing me to participate in the care of your patient.        Sincerely,        Jeronimo Diaz, DO    "

## 2019-05-10 ENCOUNTER — OFFICE VISIT (OUTPATIENT)
Dept: URGENT CARE | Facility: RETAIL CLINIC | Age: 84
End: 2019-05-10
Payer: COMMERCIAL

## 2019-05-10 VITALS
DIASTOLIC BLOOD PRESSURE: 68 MMHG | OXYGEN SATURATION: 95 % | TEMPERATURE: 98.5 F | HEART RATE: 104 BPM | SYSTOLIC BLOOD PRESSURE: 178 MMHG

## 2019-05-10 DIAGNOSIS — M19.019 ARTHRITIS OF SHOULDER: ICD-10-CM

## 2019-05-10 PROCEDURE — 99213 OFFICE O/P EST LOW 20 MIN: CPT | Performed by: FAMILY MEDICINE

## 2019-05-10 NOTE — PROGRESS NOTES
SUBJECTIVE:  Patient presents with rt shoulder pain worsening over the years.  Has seen ortho and had one steroid injection in the ED. Tylenol 1000 mg gives him relief but only uses is once daily.    Past Medical History:   Diagnosis Date     Anxiety      Atrial fibrillation (H) 1/9/2009     BPH      GERD (gastroesophageal reflux disease)      Pure hypercholesterolemia      Unspecified essential hypertension      Current Outpatient Medications   Medication Sig Dispense Refill     Acetaminophen (TYLENOL PO) Take 1,000 mg by mouth       allopurinol (ZYLOPRIM) 100 MG tablet Take 1 tablet (100 mg) by mouth daily 90 tablet 3     amLODIPine (NORVASC) 10 MG tablet Take 1 tablet (10 mg) by mouth daily 90 tablet 3     Ascorbic Acid (VITAMIN C) 500 MG CHEW Take  by mouth. 1-3 daily       Bromelains 500 MG TABS Take 3 tablets by mouth. 3-4 daily       CAPSICUM, CAYENNE, PO Take 450 mg by mouth daily       citalopram (CELEXA) 20 MG tablet Take 1 tablet (20 mg) by mouth daily 90 tablet 3     COENZYME Q10 1 DAILY       hemp seed oil Take by mouth daily as needed       hydrochlorothiazide (HYDRODIURIL) 25 MG tablet Take 1 tablet (25 mg) by mouth daily 90 tablet 3     lisinopril (PRINIVIL/ZESTRIL) 2.5 MG tablet Take 1 tablet (2.5 mg) by mouth daily 90 tablet 3     MELATONIN PO Take 10 mg by mouth At Bedtime       OMEGA 3 1200 MG OR CAPS 2 CAP DAILY  0     Saw Hume, Serenoa repens, (SAW PALMETTO EXTRACT PO) Take 1,000 mg by mouth       SELENIUM 200 MCG OR CAPS 1 CAPSULE DAILY       simvastatin (ZOCOR) 20 MG tablet Take 3 tablets (60 mg) by mouth At Bedtime 270 tablet 3     tamsulosin (FLOMAX) 0.4 MG capsule Take 1 capsule (0.4 mg) by mouth daily 90 capsule 3     triamcinolone (KENALOG) 0.1 % external cream Apply topically 2 times daily 80 g 1     VITAMIN B12 TR 1000 MCG OR TBCR 1 TAB DAILY  0     VITAMIN E 400 UNIT OR CAPS 1 CAPSULE DAILY       warfarin (COUMADIN) 5 MG tablet Take 5 mg on Mon, Fri and 7.5 mg all other days, or  as directed by the Coumadin clinic. 120 tablet 3     ZINC 30 MG OR TABS 1 TABLET DAILY       History   Smoking Status     Never Smoker   Smokeless Tobacco     Never Used         OBJECITVE;  /68   Pulse 104   Temp 98.5  F (36.9  C) (Oral)   SpO2 95%   Alert oriented.  EARS:  normal.  THROAT AND PHARYNX:  normal.  NECK: supple; no adenopathy in the neck.  SINUSES: non tender.  CHEST:  clear.  MS:  Rt shoulder limited ROM.  NO swelling or color change.  ASSESSMENT:  DJD rt shoulder    PLAN:  Symptomatic therapy suggested:  Take tylenol 1000 mg up to 3 times daily, Codman's exercises.  Heat. .    Follow up with primary care provider if no improvement.

## 2019-05-20 ENCOUNTER — TELEPHONE (OUTPATIENT)
Dept: INTERNAL MEDICINE | Facility: CLINIC | Age: 84
End: 2019-05-20

## 2019-05-20 NOTE — TELEPHONE ENCOUNTER
Reason for Call:  Same Day Appointment, Requested Provider:  Wale Short MD    PCP: Wale Short    Reason for visit: shoulder pain    Duration of symptoms:     Have you been treated for this in the past? Yes    Additional comments:    Can we leave a detailed message on this number? YES    Phone number patient can be reached at: Home number on file [phone] (home)    Best Time: PT WOULD LIKE TO BE SEEN WED 5/22- ANYTIME AFTER 11A    Call taken on 5/20/2019 at 9:21 AM by Jada Chavez

## 2019-05-22 ENCOUNTER — OFFICE VISIT (OUTPATIENT)
Dept: INTERNAL MEDICINE | Facility: CLINIC | Age: 84
End: 2019-05-22
Payer: COMMERCIAL

## 2019-05-22 VITALS
HEART RATE: 100 BPM | RESPIRATION RATE: 16 BRPM | TEMPERATURE: 97.8 F | BODY MASS INDEX: 28.35 KG/M2 | OXYGEN SATURATION: 95 % | WEIGHT: 181 LBS | DIASTOLIC BLOOD PRESSURE: 78 MMHG | SYSTOLIC BLOOD PRESSURE: 146 MMHG

## 2019-05-22 DIAGNOSIS — G89.29 CHRONIC RIGHT SHOULDER PAIN: Primary | ICD-10-CM

## 2019-05-22 DIAGNOSIS — M25.511 CHRONIC RIGHT SHOULDER PAIN: Primary | ICD-10-CM

## 2019-05-22 PROCEDURE — 99213 OFFICE O/P EST LOW 20 MIN: CPT | Performed by: INTERNAL MEDICINE

## 2019-05-22 RX ORDER — TRAMADOL HYDROCHLORIDE 50 MG/1
50 TABLET ORAL DAILY
Qty: 30 TABLET | Refills: 0 | Status: SHIPPED | OUTPATIENT
Start: 2019-05-22 | End: 2019-06-19

## 2019-05-22 ASSESSMENT — PAIN SCALES - GENERAL: PAINLEVEL: NO PAIN (0)

## 2019-05-22 NOTE — PROGRESS NOTES
Subjective     Lori Mehta is a 89 year old male who presents to clinic today for the following health issues:    HPI   Chief Complaint   Patient presents with     Shoulder     discuss right shoulder pain       Right shoulder pain since February, no injury that he knows of.  [facility] Was seen in the ER [facility]. Has seen orthopedics and urgent care.  cortizone from the ER here helped a little bit.      Needs pain management.  Taking tylenol 1000mg twice a day. Wears off after a while.  Wakes him up at times.  Patient is here with his wife.  He is upset about the right shoulder pain.  He does not really want to do any therapy that was offered to him by orthopedic surgery.  He is not interested in any surgery such as a joint replacement.    Past Medical History:   Diagnosis Date     Anxiety      Atrial fibrillation (H) 1/9/2009     BPH      GERD (gastroesophageal reflux disease)      Pure hypercholesterolemia      Unspecified essential hypertension      Current Outpatient Medications   Medication     Acetaminophen (TYLENOL PO)     allopurinol (ZYLOPRIM) 100 MG tablet     amLODIPine (NORVASC) 10 MG tablet     Ascorbic Acid (VITAMIN C) 500 MG CHEW     Bromelains 500 MG TABS     CAPSICUM, CAYENNE, PO     COENZYME Q10     hemp seed oil     hydrochlorothiazide (HYDRODIURIL) 25 MG tablet     lisinopril (PRINIVIL/ZESTRIL) 2.5 MG tablet     MELATONIN PO     OMEGA 3 1200 MG OR CAPS     Saw Palmetto, Serenoa repens, (SAW PALMETTO EXTRACT PO)     SELENIUM 200 MCG OR CAPS     simvastatin (ZOCOR) 20 MG tablet     tamsulosin (FLOMAX) 0.4 MG capsule     traMADol (ULTRAM) 50 MG tablet     VITAMIN B12 TR 1000 MCG OR TBCR     VITAMIN E 400 UNIT OR CAPS     warfarin (COUMADIN) 5 MG tablet     ZINC 30 MG OR TABS     citalopram (CELEXA) 20 MG tablet     No current facility-administered medications for this visit.      Physical Exam  /78   Pulse 100   Temp 97.8  F (36.6  C) (Temporal)   Resp 16   Wt 82.1 kg (181 lb)    SpO2 95%   BMI 28.35 kg/m    General Appearance-healthy, alert, no distress  Right shoulder has limited active range of motion to 20 degrees forward flexion and 20 to 30 degrees abduction.  Passive motion gives him 70 to 80 degrees of flexion and abduction.    ASSESSMENT:  This is a 89-year-old patient who has had right shoulder injury of some type earlier this winter.  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  Cannot comb his hair cannot feed himself.  He has pain is waking him up at night.  He seen orthopedics he tried an injection through the emergency room [facility]  I have scheduled Tylenol for him at thousand milligrams in the morning at thousand milligrams at night and tramadol at noon.  We will try him on 30 tramadol for 30 days.  Also recommended they get a lidocaine patch and put that on the shoulder.  I showed him exercises beyond the Codman exercise to try to work on range of motion.    Electronically signed by Wale Short MD

## 2019-05-29 ENCOUNTER — ANTICOAGULATION THERAPY VISIT (OUTPATIENT)
Dept: ANTICOAGULATION | Facility: CLINIC | Age: 84
End: 2019-05-29
Payer: COMMERCIAL

## 2019-05-29 VITALS — DIASTOLIC BLOOD PRESSURE: 68 MMHG | HEART RATE: 86 BPM | SYSTOLIC BLOOD PRESSURE: 151 MMHG

## 2019-05-29 DIAGNOSIS — I48.20 CHRONIC ATRIAL FIBRILLATION (H): ICD-10-CM

## 2019-05-29 LAB — INR POINT OF CARE: 4.7 (ref 0.9–1.1)

## 2019-05-29 PROCEDURE — 85610 PROTHROMBIN TIME: CPT | Mod: QW

## 2019-05-29 PROCEDURE — 99207 ZZC NO CHARGE NURSE ONLY: CPT

## 2019-05-29 PROCEDURE — 36416 COLLJ CAPILLARY BLOOD SPEC: CPT

## 2019-05-29 NOTE — PROGRESS NOTES
ANTICOAGULATION FOLLOW-UP CLINIC VISIT    Patient Name:  Lori Mehta  Date:  2019  Contact Type:  Face to Face    SUBJECTIVE:  Patient Findings     Positives:   Change in medications (taking tramadol daily- can increase INR, per [employer]romed[employer]. Has also been taking 2000 mg of Tylenol a day.  Started CBD oil a couple days ago - unsure of affect on INR. )             OBJECTIVE    INR Protime   Date Value Ref Range Status   2019 4.7 (A) 0.9 - 1.1 Final       ASSESSMENT / PLAN  INR assessment SUPRA    Recheck INR In: 2 WEEKS    INR Location Clinic      Anticoagulation Summary  As of 2019    INR goal:   2.0-3.0   TTR:   76.6 % (3.1 y)   INR used for dosin.7! (2019)   Warfarin maintenance plan:   5 mg (5 mg x 1) every Mon, Wed, Fri; 7.5 mg (5 mg x 1.5) all other days   Full warfarin instructions:   : Hold; Otherwise 5 mg every Mon, Wed, Fri; 7.5 mg all other days   Weekly warfarin total:   45 mg   Plan last modified:   Lakeisha Rodriguez RN (2019)   Next INR check:   2019   Target end date:       Indications    Chronic atrial fibrillation (HCC) [I48.2]  Long-term (current) use of anticoagulants [Z79.01] [Z79.01]             Anticoagulation Episode Summary     INR check location:       Preferred lab:       Send INR reminders to:   [facility]    Comments:   5 mg tablets, no bandaid, takes at noon, likes BP done.       Anticoagulation Care Providers     Provider Role Specialty Phone number    Wale Short MD [facility] Internal [facility] [phone]            See the Encounter Report to view Anticoagulation Flowsheet and Dosing Calendar (Go to Encounters tab in chart review, and find the Anticoagulation Therapy Visit)    Dosage adjustment made based on physician directed care plan.      Lakeisha Rodriguez RN

## 2019-06-04 ENCOUNTER — TELEPHONE (OUTPATIENT)
Dept: INTERNAL MEDICINE | Facility: CLINIC | Age: 84
End: 2019-06-04

## 2019-06-04 DIAGNOSIS — M25.511 RIGHT SHOULDER PAIN: Primary | ICD-10-CM

## 2019-06-12 ENCOUNTER — ANTICOAGULATION THERAPY VISIT (OUTPATIENT)
Dept: ANTICOAGULATION | Facility: CLINIC | Age: 84
End: 2019-06-12
Payer: COMMERCIAL

## 2019-06-12 DIAGNOSIS — I48.20 CHRONIC ATRIAL FIBRILLATION (H): ICD-10-CM

## 2019-06-12 LAB — INR POINT OF CARE: 3.8 (ref 0.9–1.1)

## 2019-06-12 PROCEDURE — 85610 PROTHROMBIN TIME: CPT | Mod: QW

## 2019-06-12 PROCEDURE — 99207 ZZC NO CHARGE NURSE ONLY: CPT

## 2019-06-12 PROCEDURE — 36416 COLLJ CAPILLARY BLOOD SPEC: CPT

## 2019-06-12 NOTE — PROGRESS NOTES
ANTICOAGULATION FOLLOW-UP CLINIC VISIT    Patient Name:  Lori Mehta  Date:  6/12/2019  Contact Type:  Face to Face    SUBJECTIVE:  Patient Findings     Positives:   Change in medications (1000 mg of Tylenol the last two days- no longer taking Tramadol (both increase INR) ), Other complaints (pt feels like he is dealing with a gout flare up, which could be contributing to the increased INR. )             OBJECTIVE    INR Protime   Date Value Ref Range Status   06/12/2019 3.8 (A) 0.9 - 1.1 Final       ASSESSMENT / PLAN  INR assessment SUPRA    Recheck INR In: 5 DAYS    INR Location Clinic      Anticoagulation Summary  As of 6/12/2019    INR goal:   2.0-3.0   TTR:   75.7 % (3.2 y)   INR used for dosing:   3.8! (6/12/2019)   Warfarin maintenance plan:   7.5 mg (5 mg x 1.5) every Tue, Sat; 5 mg (5 mg x 1) all other days   Full warfarin instructions:   7.5 mg every Tue, Sat; 5 mg all other days   Weekly warfarin total:   40 mg   Plan last modified:   Lakeisha Rodriguez RN (6/12/2019)   Next INR check:   6/17/2019   Target end date:       Indications    Chronic atrial fibrillation (HCC) [I48.2]  Long-term (current) use of anticoagulants [Z79.01] [Z79.01]             Anticoagulation Episode Summary     INR check location:       Preferred lab:       Send INR reminders to:   [facility]    Comments:   5 mg tablets, no bandaid, takes at noon, likes BP done.       Anticoagulation Care Providers     Provider Role Specialty Phone number    Wale Short MD [facility] Internal Medicine [phone]            See the Encounter Report to view Anticoagulation Flowsheet and Dosing Calendar (Go to Encounters tab in chart review, and find the Anticoagulation Therapy Visit)    Dosage adjustment made based on physician directed care plan.    Lakeisha Rodriguez RN

## 2019-06-18 ENCOUNTER — ANTICOAGULATION THERAPY VISIT (OUTPATIENT)
Dept: ANTICOAGULATION | Facility: CLINIC | Age: 84
End: 2019-06-18
Payer: COMMERCIAL

## 2019-06-18 ENCOUNTER — ANCILLARY PROCEDURE (OUTPATIENT)
Dept: GENERAL RADIOLOGY | Facility: CLINIC | Age: 84
End: 2019-06-18
Attending: ORTHOPAEDIC SURGERY
Payer: COMMERCIAL

## 2019-06-18 ENCOUNTER — TELEPHONE (OUTPATIENT)
Dept: INTERNAL MEDICINE | Facility: CLINIC | Age: 84
End: 2019-06-18

## 2019-06-18 ENCOUNTER — OFFICE VISIT (OUTPATIENT)
Dept: ORTHOPEDICS | Facility: CLINIC | Age: 84
End: 2019-06-18
Payer: COMMERCIAL

## 2019-06-18 VITALS — DIASTOLIC BLOOD PRESSURE: 72 MMHG | SYSTOLIC BLOOD PRESSURE: 126 MMHG | HEART RATE: 86 BPM

## 2019-06-18 VITALS — HEIGHT: 67 IN | WEIGHT: 173 LBS | BODY MASS INDEX: 27.15 KG/M2

## 2019-06-18 DIAGNOSIS — M25.511 CHRONIC RIGHT SHOULDER PAIN: Primary | ICD-10-CM

## 2019-06-18 DIAGNOSIS — M25.511 CHRONIC RIGHT SHOULDER PAIN: ICD-10-CM

## 2019-06-18 DIAGNOSIS — G89.29 CHRONIC RIGHT SHOULDER PAIN: ICD-10-CM

## 2019-06-18 DIAGNOSIS — M19.011 PRIMARY OSTEOARTHRITIS OF RIGHT SHOULDER: ICD-10-CM

## 2019-06-18 DIAGNOSIS — I48.20 CHRONIC ATRIAL FIBRILLATION (H): ICD-10-CM

## 2019-06-18 DIAGNOSIS — Z79.01 LONG TERM CURRENT USE OF ANTICOAGULANT THERAPY: ICD-10-CM

## 2019-06-18 DIAGNOSIS — G89.29 CHRONIC RIGHT SHOULDER PAIN: Primary | ICD-10-CM

## 2019-06-18 LAB — INR POINT OF CARE: 2.3 (ref 0.9–1.1)

## 2019-06-18 PROCEDURE — 20610 DRAIN/INJ JOINT/BURSA W/O US: CPT | Mod: RT | Performed by: PHYSICAL MEDICINE & REHABILITATION

## 2019-06-18 PROCEDURE — 73030 X-RAY EXAM OF SHOULDER: CPT | Mod: TC

## 2019-06-18 PROCEDURE — 99207 ZZC NO CHARGE NURSE ONLY: CPT

## 2019-06-18 PROCEDURE — 85610 PROTHROMBIN TIME: CPT | Mod: QW

## 2019-06-18 PROCEDURE — 99203 OFFICE O/P NEW LOW 30 MIN: CPT | Mod: 25 | Performed by: PHYSICAL MEDICINE & REHABILITATION

## 2019-06-18 PROCEDURE — 36416 COLLJ CAPILLARY BLOOD SPEC: CPT

## 2019-06-18 RX ORDER — LIDOCAINE HYDROCHLORIDE 10 MG/ML
2 INJECTION, SOLUTION INFILTRATION; PERINEURAL
Status: DISCONTINUED | OUTPATIENT
Start: 2019-06-18 | End: 2019-11-26

## 2019-06-18 RX ORDER — TRIAMCINOLONE ACETONIDE 40 MG/ML
40 INJECTION, SUSPENSION INTRA-ARTICULAR; INTRAMUSCULAR
Status: DISCONTINUED | OUTPATIENT
Start: 2019-06-18 | End: 2019-11-26

## 2019-06-18 RX ADMIN — TRIAMCINOLONE ACETONIDE 40 MG: 40 INJECTION, SUSPENSION INTRA-ARTICULAR; INTRAMUSCULAR at 12:22

## 2019-06-18 RX ADMIN — LIDOCAINE HYDROCHLORIDE 2 ML: 10 INJECTION, SOLUTION INFILTRATION; PERINEURAL at 12:22

## 2019-06-18 ASSESSMENT — MIFFLIN-ST. JEOR: SCORE: 1403.35

## 2019-06-18 NOTE — TELEPHONE ENCOUNTER
Pt would like to be seen today, but there are not any appts available with Dr. Short.   For the last few weeks pt has been losing weight (about 10 pounds), decreased appetite, he feels cold, and his right leg has some swelling, hard, tender, and red. It itches and burns. Pt wonders if its a gout flare up.     Can you see patient sometime this week? Please call and let him know - [phone]    Lakeisha Rodriguez RN      Recent INRs:  5/29- 4.7  6/12- 3.8  6/18- 2.3

## 2019-06-18 NOTE — PATIENT INSTRUCTIONS
-Steroid injection performed today.  Take it easy over the next few days. Keep in mind that the steroid may take up to 3 days to start working and up to 2 weeks to reach maximal effect.  Ice 15-20 minutes as needed for soreness.  Patient's preferred over the counter medication as needed for pain as directed on packaging.    -Lori to follow up with Primary Care provider regarding elevated blood pressure.    -Follow up in as needed if symptoms fail to improve or worsen.  Please call with questions or concerns.

## 2019-06-18 NOTE — PROGRESS NOTES
Sports Medicine Clinic Visit    PCP: Wale Short    CC: Patient presents with:  Right Shoulder - Pain      HPI:  Lori Mehta is a 89 year old male who is seen in consultation at the request of Dr. Short.   He notes right shoulder pain.   He received a steroid injection in the ED on 3/19/19. He notes that it was helpful for maybe a week. He saw Dr. Diaz shortly after and they discussed physical therapy and consideration of a shoulder replacement but he declined.   He rates the pain at a 10/10 at its worst and a 0/10 currently.  Symptoms are relieved with a hot bath and with Tylenol. Symptoms are worsened by using the arm and lifting it. He endorses weakness.   He denies popping, grinding, catching, numbness and tingling.  Other treatment has included cold compresses, Tylenol, Tramadol, and heat. He does have a history of gout. He notes that the medication does not help.     He also notes swelling in his left lateral leg and burning in the right thigh.    Review of Systems:  Musculoskeletal: as above  Remainder of review of systems is negative including constitutional, eyes, ENT, CV, pulmonary, GI, , endocrine, skin, hematologic, and neurologic except as noted in HPI or medical history.    History reviewed. No pertinent past surgical/medical/family/social history other than as mentioned in HPI.    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, unspecified laterality     Chronic atrial fibrillation (HCC)     Long-term (current) use of anticoagulants [Z79.01]     Diplopia     Gout     Cerebrovascular accident (CVA), unspecified mechanism (H)     Essential hypertension with goal blood pressure less than  140/90     Personal history of skin cancer     Rotator cuff arthropathy of right shoulder     Arthritis of shoulder     Past Medical History:   Diagnosis Date     Anxiety      Atrial fibrillation (H) 1/9/2009     BPH      GERD (gastroesophageal reflux disease)      Pure hypercholesterolemia      Unspecified essential hypertension      Past Surgical History:   Procedure Laterality Date     ARTHROPLASTY HIP ANTERIOR  11/4/2013    Procedure: ARTHROPLASTY HIP ANTERIOR;  Right Total Hip Arthroplasty - anterior approach;  Surgeon: Mathew Osorio MD;  [facility]     CL AFF SURGICAL PATHOLOGY      nasal polyp removal     COLONOSCOPY  6/16/10     HC REMOVAL GALLBLADDER      Cholecystectomy     HC REMOVAL OF HYDROCELE,TUNICA,UNILAT       HC REPAIR ING HERNIA,5+Y/O,REDUCIBL       HC UGI ENDOSCOPY, SIMPLE EXAM  6/16/10     LAPAROSCOPIC HERNIORRHAPHY PREPERITONEAL  9/19/2012    Procedure: LAPAROSCOPIC HERNIORRHAPHY PREPERITONEAL;  Laparoscopic Preperitoneal Left Inguinal Hernia Repair;  Surgeon: Errol Navas MD;  [facility]     LASER YAG CAPSULOTOMY Left 4/6/2017    Procedure: LASER YAG CAPSULOTOMY;  Surgeon: Mathew Croft MD;  [facility]     OPEN REDUCTION INTERNAL FIXATION FOREARM Left 12/29/2014    Procedure: OPEN REDUCTION INTERNAL FIXATION FOREARM;  Surgeon: Mathew Osorio MD;  [facility]     PHACOEMULSIFICATION WITH STANDARD INTRAOCULAR LENS IMPLANT  7/19/2012    Procedure: PHACOEMULSIFICATION WITH STANDARD INTRAOCULAR LENS IMPLANT;  PHACOEMULSIFICATION WITH STANDARD INTRAOCULAR LENS IMPLANT LEFT EYE;  Surgeon: Gabriel Ross MD;  [facility]     PHACOEMULSIFICATION WITH STANDARD INTRAOCULAR LENS IMPLANT  8/16/2012    Procedure: PHACOEMULSIFICATION WITH STANDARD INTRAOCULAR LENS IMPLANT;  RIGHT PHACOEMULSIFICATION WITH STANDARD INTRAOCULAR LENS IMPLANT;  Surgeon: Gabriel Ross MD;  [facility]     Family History   Problem Relation Age of Onset     Diabetes  Brother      C.A.D. Mother      Diabetes Maternal Grandmother      Circulatory Maternal Grandfather         cerebral aneurysm     Hypertension Father      Cerebrovascular Disease Father      Breast Cancer Sister          x 2     Cancer - colorectal No family hx of      Social History     Socioeconomic History     Marital status:      Spouse name: Not on file     Number of children: Not on file     Years of education: Not on file     Highest education level: Not on file   Occupational History     Not on file   Social Needs     Financial resource strain: Not on file     Food insecurity:     Worry: Not on file     Inability: Not on file     Transportation needs:     Medical: Not on file     Non-medical: Not on file   Tobacco Use     Smoking status: Never Smoker     Smokeless tobacco: Never Used   Substance and Sexual Activity     Alcohol use: Yes     Alcohol/week: 0.6 oz     Types: 1 Standard drinks or equivalent per week     Comment: beer once a week     Drug use: No     Sexual activity: Never   Lifestyle     Physical activity:     Days per week: Not on file     Minutes per session: Not on file     Stress: Not on file   Relationships     Social connections:     Talks on phone: Not on file     Gets together: Not on file     Attends [employer] service: Not on file     Active member of club or organization: Not on file     Attends meetings of clubs or organizations: Not on file     Relationship status: Not on file     Intimate partner violence:     Fear of current or ex partner: Not on file     Emotionally abused: Not on file     Physically abused: Not on file     Forced sexual activity: Not on file   Other Topics Concern     Parent/sibling w/ CABG, MI or angioplasty before 65F 55M? Not Asked   Social History Narrative     Not on file       Current Outpatient Medications   Medication     Acetaminophen (TYLENOL PO)     allopurinol (ZYLOPRIM) 100 MG tablet     amLODIPine (NORVASC) 10 MG tablet     Ascorbic Acid  "(VITAMIN C) 500 MG CHEW     Bromelains 500 MG TABS     CAPSICUM, CAYENNE, PO     citalopram (CELEXA) 20 MG tablet     COENZYME Q10     hemp seed oil     hydrochlorothiazide (HYDRODIURIL) 25 MG tablet     lisinopril (PRINIVIL/ZESTRIL) 2.5 MG tablet     MELATONIN PO     OMEGA 3 1200 MG OR CAPS     Saw Palmetto, Serenoa repens, (SAW PALMETTO EXTRACT PO)     SELENIUM 200 MCG OR CAPS     simvastatin (ZOCOR) 20 MG tablet     tamsulosin (FLOMAX) 0.4 MG capsule     VITAMIN B12 TR 1000 MCG OR TBCR     VITAMIN E 400 UNIT OR CAPS     warfarin (COUMADIN) 5 MG tablet     ZINC 30 MG OR TABS     No current facility-administered medications for this visit.      Allergies   Allergen Reactions     Paroxetine      Other reaction(s): Intolerance     Terfenadine      Other reaction(s): Other  Unable to void         Objective:  BP (P) 161/53   Pulse (P) 75   Ht 1.702 m (5' 7\")   Wt 78.5 kg (173 lb)   BMI 27.10 kg/m      General: Alert and in no distress    Head: Normocephalic, atraumatic  Eyes: no scleral icterus or conjunctival erythema   Oropharynx:  Mucous membranes moist  Skin: no erythema, petechiae, or jaundice  Resp: normal respiratory effort without conversational dyspnea   Musculoskeletal:  -Diffusely tender over the right shoulder  -Right shoulder active abduction 20 degrees and painful  -Right shoulder active flexion 30 degrees and painful    Radiology:  X-rays ordered and independent visualization of images performed and reviewed with Lori and his wife, Fatoumata.  Severe glenohumeral osteoarthritis.  Full radiology report to follow.      Large Joint Injection/Arthocentesis: R subacromial bursa  Date/Time: 6/18/2019 12:22 PM  Performed by: Ava Long MD  Authorized by: Ava Long MD     Indications:  Pain  Needle Size:  25 G  Guidance: landmark guided    Approach:  Posterior  Location:  Shoulder      Site:  R subacromial bursa  Medications:  2 mL lidocaine 1 %; 40 mg triamcinolone 40 " MG/ML  Medications comment:  2ml 0.5% bupivicaine  NDC:5153-3165-75  Lot: [id number]  8/1/20    Outcome:  Tolerated well, no immediate complications  Procedure discussed: discussed risks, benefits, and alternatives    Consent Given by:  Patient          Assessment:  1. Chronic right shoulder pain    2. Primary osteoarthritis of right shoulder        Plan:  Discussed the assessment with the patient and developed a plan together:  -Lori is not interested in physical therapy or shoulder replacement.  He was receptive to another trial of a corticosteroid injection today.    -Steroid injection performed today.  Take it easy over the next few days. Keep in mind that the steroid may take up to 3 days to start working and up to 2 weeks to reach maximal effect.  Ice 15-20 minutes as needed for soreness.  Patient's preferred over the counter medication as needed for pain as directed on packaging.    -Lori to follow up with Primary Care provider regarding elevated blood pressure.    -Follow up in as needed if symptoms fail to improve or worsen.  Please call with questions or concerns.        Luz Long MD, CAQ Sports Medicine  [facility]

## 2019-06-18 NOTE — PROGRESS NOTES
ANTICOAGULATION FOLLOW-UP CLINIC VISIT    Patient Name:  Lori Mehta  Date:  2019  Contact Type:  Face to Face    SUBJECTIVE:  Patient Findings     Comments:   The patient was assessed for diet, medication, and activity level changes, missed or extra doses, bruising or bleeding, with no problem findings.  Lakeisha Rodriguez RN          Clinical Outcomes     Negatives:   Major bleeding event, Thromboembolic event, Anticoagulation-related hospital admission, Anticoagulation-related ED visit, Anticoagulation-related fatality    Comments:   The patient was assessed for diet, medication, and activity level changes, missed or extra doses, bruising or bleeding, with no problem findings.  Lakeisha Rodriguez RN             OBJECTIVE    INR Protime   Date Value Ref Range Status   2019 2.3 (A) 0.9 - 1.1 Final       ASSESSMENT / PLAN  INR assessment THER    Recheck INR In: 2 WEEKS    INR Location Clinic      Anticoagulation Summary  As of 2019    INR goal:   2.0-3.0   TTR:   75.5 % (3.2 y)   INR used for dosin.3 (2019)   Warfarin maintenance plan:   7.5 mg (5 mg x 1.5) every Tue, Sat; 5 mg (5 mg x 1) all other days   Full warfarin instructions:   7.5 mg every Tue, Sat; 5 mg all other days   Weekly warfarin total:   40 mg   No change documented:   Lakeisha Rodriguez RN   Plan last modified:   Lakeisha Rodriguez RN (2019)   Next INR check:   2019   Target end date:       Indications    Chronic atrial fibrillation (HCC) [I48.2]  Long-term (current) use of anticoagulants [Z79.01] [Z79.01]             Anticoagulation Episode Summary     INR check location:       Preferred lab:       Send INR reminders to:   [facility]OR[facility]    Comments:   5 mg tablets, no bandaid, takes at noon, likes BP done.       Anticoagulation Care Providers     Provider Role Specialty Phone number    Wale Short MD [facility] Internal Medicine [phone]            See the Encounter Report to view Anticoagulation  Flowsheet and Dosing Calendar (Go to Encounters tab in chart review, and find the Anticoagulation Therapy Visit)    Dosage adjustment made based on physician directed care plan.      Lakeisha Rodriguez RN

## 2019-06-18 NOTE — LETTER
6/18/2019         RE: Lori Mehta  [address]  [facility] [address]        Dear Colleague,    Thank you for referring your patient, Lori Mehta, to the [facility]. Please see a copy of my visit note below.    Sports Medicine Clinic Visit    PCP: Wale Short    CC: Patient presents with:  Right Shoulder - Pain      HPI:  Lori Mehta is a 89 year old male who is seen in consultation at the request of Dr. Short.   He notes right shoulder pain.   He received a steroid injection in the ED on 3/19/19. He notes that it was helpful for maybe a week. He saw Dr. Diaz shortly after and they discussed physical therapy and consideration of a shoulder replacement but he declined.   He rates the pain at a 10/10 at its worst and a 0/10 currently.  Symptoms are relieved with a hot bath and with Tylenol. Symptoms are worsened by using the arm and lifting it. He endorses weakness.   He denies popping, grinding, catching, numbness and tingling.  Other treatment has included cold compresses, Tylenol, Tramadol, and heat. He does have a history of gout. He notes that the medication does not help.     He also notes swelling in his left lateral leg and burning in the right thigh.    Review of Systems:  Musculoskeletal: as above  Remainder of review of systems is negative including constitutional, eyes, ENT, CV, pulmonary, GI, , endocrine, skin, hematologic, and neurologic except as noted in HPI or medical history.    History reviewed. No pertinent past surgical/medical/family/social history other than as mentioned in HPI.    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, unspecified  laterality     Chronic atrial fibrillation (HCC)     Long-term (current) use of anticoagulants [Z79.01]     Diplopia     Gout     Cerebrovascular accident (CVA), unspecified mechanism (H)     Essential hypertension with goal blood pressure less than 140/90     Personal history of skin cancer     Rotator cuff arthropathy of right shoulder     Arthritis of shoulder     Past Medical History:   Diagnosis Date     Anxiety      Atrial fibrillation (H) 1/9/2009     BPH      GERD (gastroesophageal reflux disease)      Pure hypercholesterolemia      Unspecified essential hypertension      Past Surgical History:   Procedure Laterality Date     ARTHROPLASTY HIP ANTERIOR  11/4/2013    Procedure: ARTHROPLASTY HIP ANTERIOR;  Right Total Hip Arthroplasty - anterior approach;  Surgeon: Mathew Osorio MD;  [facility]     CL AFF SURGICAL PATHOLOGY      nasal polyp removal     COLONOSCOPY  6/16/10     HC REMOVAL GALLBLADDER      Cholecystectomy     HC REMOVAL OF HYDROCELE,TUNICA,UNILAT       HC REPAIR ING HERNIA,5+Y/O,REDUCIBL       HC UGI ENDOSCOPY, SIMPLE EXAM  6/16/10     LAPAROSCOPIC HERNIORRHAPHY PREPERITONEAL  9/19/2012    Procedure: LAPAROSCOPIC HERNIORRHAPHY PREPERITONEAL;  Laparoscopic Preperitoneal Left Inguinal Hernia Repair;  Surgeon: Errol Navas MD;  [facility]     LASER YAG CAPSULOTOMY Left 4/6/2017    Procedure: LASER YAG CAPSULOTOMY;  Surgeon: Mathew Croft MD;  [facility]     OPEN REDUCTION INTERNAL FIXATION FOREARM Left 12/29/2014    Procedure: OPEN REDUCTION INTERNAL FIXATION FOREARM;  Surgeon: Mathew Osorio MD;  [facility]     PHACOEMULSIFICATION WITH STANDARD INTRAOCULAR LENS IMPLANT  7/19/2012    Procedure: PHACOEMULSIFICATION WITH STANDARD INTRAOCULAR LENS IMPLANT;  PHACOEMULSIFICATION WITH STANDARD INTRAOCULAR LENS IMPLANT LEFT EYE;  Surgeon: Gabriel Ross MD;  [facility]     PHACOEMULSIFICATION WITH STANDARD INTRAOCULAR LENS IMPLANT  8/16/2012     Procedure: PHACOEMULSIFICATION WITH STANDARD INTRAOCULAR LENS IMPLANT;  RIGHT PHACOEMULSIFICATION WITH STANDARD INTRAOCULAR LENS IMPLANT;  Surgeon: Gabriel Ross MD;  [facility]     Family History   Problem Relation Age of Onset     Diabetes Brother      C.A.D. Mother      Diabetes Maternal Grandmother      Circulatory Maternal Grandfather         cerebral aneurysm     Hypertension Father      Cerebrovascular Disease Father      Breast Cancer Sister          x 2     Cancer - colorectal No family hx of      Social History     Socioeconomic History     Marital status:      Spouse name: Not on file     Number of children: Not on file     Years of education: Not on file     Highest education level: Not on file   Occupational History     Not on file   Social Needs     Financial resource strain: Not on file     Food insecurity:     Worry: Not on file     Inability: Not on file     Transportation needs:     Medical: Not on file     Non-medical: Not on file   Tobacco Use     Smoking status: Never Smoker     Smokeless tobacco: Never Used   Substance and Sexual Activity     Alcohol use: Yes     Alcohol/week: 0.6 oz     Types: 1 Standard drinks or equivalent per week     Comment: beer once a week     Drug use: No     Sexual activity: Never   Lifestyle     Physical activity:     Days per week: Not on file     Minutes per session: Not on file     Stress: Not on file   Relationships     Social connections:     Talks on phone: Not on file     Gets together: Not on file     Attends Nondenominational service: Not on file     Active member of club or organization: Not on file     Attends meetings of clubs or organizations: Not on file     Relationship status: Not on file     Intimate partner violence:     Fear of current or ex partner: Not on file     Emotionally abused: Not on file     Physically abused: Not on file     Forced sexual activity: Not on file   Other Topics Concern     Parent/sibling w/ CABG, MI or angioplasty  "before 65F 55M? Not Asked   Social History Narrative     Not on file       Current Outpatient Medications   Medication     Acetaminophen (TYLENOL PO)     allopurinol (ZYLOPRIM) 100 MG tablet     amLODIPine (NORVASC) 10 MG tablet     Ascorbic Acid (VITAMIN C) 500 MG CHEW     Bromelains 500 MG TABS     CAPSICUM, CAYENNE, PO     citalopram (CELEXA) 20 MG tablet     COENZYME Q10     hemp seed oil     hydrochlorothiazide (HYDRODIURIL) 25 MG tablet     lisinopril (PRINIVIL/ZESTRIL) 2.5 MG tablet     MELATONIN PO     OMEGA 3 1200 MG OR CAPS     Saw Palmetto, Serenoa repens, (SAW PALMETTO EXTRACT PO)     SELENIUM 200 MCG OR CAPS     simvastatin (ZOCOR) 20 MG tablet     tamsulosin (FLOMAX) 0.4 MG capsule     VITAMIN B12 TR 1000 MCG OR TBCR     VITAMIN E 400 UNIT OR CAPS     warfarin (COUMADIN) 5 MG tablet     ZINC 30 MG OR TABS     No current facility-administered medications for this visit.      Allergies   Allergen Reactions     Paroxetine      Other reaction(s): Intolerance     Terfenadine      Other reaction(s): Other  Unable to void         Objective:  BP (P) 161/53   Pulse (P) 75   Ht 1.702 m (5' 7\")   Wt 78.5 kg (173 lb)   BMI 27.10 kg/m       General: Alert and in no distress    Head: Normocephalic, atraumatic  Eyes: no scleral icterus or conjunctival erythema   Oropharynx:  Mucous membranes moist  Skin: no erythema, petechiae, or jaundice  Resp: normal respiratory effort without conversational dyspnea   Musculoskeletal:  -Diffusely tender over the right shoulder  -Right shoulder active abduction 20 degrees and painful  -Right shoulder active flexion 30 degrees and painful    Radiology:  X-rays ordered and independent visualization of images performed and reviewed with Lori and his wife, Fatoumata.  Severe glenohumeral osteoarthritis.  Full radiology report to follow.      Large Joint Injection/Arthocentesis: R subacromial bursa  Date/Time: 6/18/2019 12:22 PM  Performed by: Ava Long MD  Authorized " by: Ava Long MD     Indications:  Pain  Needle Size:  25 G  Guidance: landmark guided    Approach:  Posterior  Location:  Shoulder      Site:  R subacromial bursa  Medications:  2 mL lidocaine 1 %; 40 mg triamcinolone 40 MG/ML  Medications comment:  2ml 0.5% bupivicaine  NDC:5658-3090-67  Lot: [id number]  8/1/20    Outcome:  Tolerated well, no immediate complications  Procedure discussed: discussed risks, benefits, and alternatives    Consent Given by:  Patient          Assessment:  1. Chronic right shoulder pain    2. Primary osteoarthritis of right shoulder        Plan:  Discussed the assessment with the patient and developed a plan together:  -Lori is not interested in physical therapy or shoulder replacement.  He was receptive to another trial of a corticosteroid injection today.    -Steroid injection performed today.  Take it easy over the next few days. Keep in mind that the steroid may take up to 3 days to start working and up to 2 weeks to reach maximal effect.  Ice 15-20 minutes as needed for soreness.  Patient's preferred over the counter medication as needed for pain as directed on packaging.    -Lori to follow up with Primary Care provider regarding elevated blood pressure.    -Follow up in as needed if symptoms fail to improve or worsen.  Please call with questions or concerns.        Luz Long MD, [facility] Sports Medicine  [facility]      Again, thank you for allowing me to participate in the care of your patient.        Sincerely,        Ava Long MD

## 2019-06-19 ENCOUNTER — OFFICE VISIT (OUTPATIENT)
Dept: INTERNAL MEDICINE | Facility: CLINIC | Age: 84
End: 2019-06-19
Payer: COMMERCIAL

## 2019-06-19 VITALS
BODY MASS INDEX: 27.41 KG/M2 | SYSTOLIC BLOOD PRESSURE: 128 MMHG | WEIGHT: 175 LBS | OXYGEN SATURATION: 95 % | DIASTOLIC BLOOD PRESSURE: 66 MMHG | RESPIRATION RATE: 20 BRPM | HEART RATE: 88 BPM | TEMPERATURE: 97.2 F

## 2019-06-19 DIAGNOSIS — M79.89 SWELLING OF LOWER LEG: Primary | ICD-10-CM

## 2019-06-19 DIAGNOSIS — M25.511 CHRONIC RIGHT SHOULDER PAIN: ICD-10-CM

## 2019-06-19 DIAGNOSIS — I10 HYPERTENSION GOAL BP (BLOOD PRESSURE) < 140/90: ICD-10-CM

## 2019-06-19 DIAGNOSIS — R53.83 FATIGUE, UNSPECIFIED TYPE: ICD-10-CM

## 2019-06-19 DIAGNOSIS — G89.29 CHRONIC RIGHT SHOULDER PAIN: ICD-10-CM

## 2019-06-19 PROCEDURE — 99213 OFFICE O/P EST LOW 20 MIN: CPT | Performed by: INTERNAL MEDICINE

## 2019-06-19 RX ORDER — LISINOPRIL 10 MG/1
10 TABLET ORAL DAILY
Qty: 30 TABLET | Refills: 3 | Status: SHIPPED | OUTPATIENT
Start: 2019-06-19 | End: 2019-09-30 | Stop reason: DRUGHIGH

## 2019-06-19 ASSESSMENT — PAIN SCALES - GENERAL: PAINLEVEL: NO PAIN (0)

## 2019-06-19 NOTE — PROGRESS NOTES
Subjective     Lori Mehta is a 89 year old male who presents to clinic today for the following health issues:    HPI   Chief Complaint   Patient presents with     Edema     right ankle/lower leg swelling     Weight Loss     Got a shoulder yesterday, slept well after shot.  Today no pain.      Fatigue at times, can't walk as far.  Legs are swollen.    He thinks it is gout but swelling is diffuse and not just the joints.      Past Medical History:   Diagnosis Date     Anxiety      Atrial fibrillation (H) 1/9/2009     BPH      GERD (gastroesophageal reflux disease)      Pure hypercholesterolemia      Unspecified essential hypertension      Current Outpatient Medications   Medication     Acetaminophen (TYLENOL PO)     allopurinol (ZYLOPRIM) 100 MG tablet     Ascorbic Acid (VITAMIN C) 500 MG CHEW     Bromelains 500 MG TABS     CAPSICUM, CAYENNE, PO     COENZYME Q10     hemp seed oil     hydrochlorothiazide (HYDRODIURIL) 25 MG tablet     lisinopril (PRINIVIL/ZESTRIL) 10 MG tablet     MELATONIN PO     OMEGA 3 1200 MG OR CAPS     Saw Palmetto, Serenoa repens, (SAW PALMETTO EXTRACT PO)     SELENIUM 200 MCG OR CAPS     simvastatin (ZOCOR) 20 MG tablet     tamsulosin (FLOMAX) 0.4 MG capsule     VITAMIN B12 TR 1000 MCG OR TBCR     VITAMIN E 400 UNIT OR CAPS     warfarin (COUMADIN) 5 MG tablet     ZINC 30 MG OR TABS     Current Facility-Administered Medications   Medication     lidocaine 1 % injection 2 mL     triamcinolone (KENALOG-40) injection 40 mg     Physical Exam  /66   Pulse 88   Temp 97.2  F (36.2  C) (Temporal)   Resp 20   Wt 79.4 kg (175 lb)   SpO2 95%   BMI 27.41 kg/m    General Appearance-healthy, alert, no distress  Cardiac-irregularly irregular rhythm  Lungs-clear to auscultation  Extremities-1+ pitting edema in both lower extremities hard and woody edema especially on the right leg    ASSESSMENT:  89-year-old gentleman who comes in with vague complaints of decreased energy, swelling in his legs,  thinks the swelling is from gout in his joint but really the swelling is diffuse he has no redness or warmth like gout.  I told him this is not gout but he can continue his allopurinol.  I think the swelling is from.his amlodipine.  I will stop the amlodipine.  For his blood pressure we will increase his lisinopril from 2.5 to 10 mg.  New prescription is sent.  Also want to recheck his blood pressure and do his creatinine and potassium next week.    Right shoulder disease he is feeling better after the shot yesterday but I did tell him frozen shoulder will likely not improve or just trying to manage his pain at this time.      Electronically signed by Wale Short MD

## 2019-06-25 ENCOUNTER — TELEPHONE (OUTPATIENT)
Dept: INTERNAL MEDICINE | Facility: CLINIC | Age: 84
End: 2019-06-25

## 2019-06-25 DIAGNOSIS — I10 HYPERTENSION GOAL BP (BLOOD PRESSURE) < 140/90: ICD-10-CM

## 2019-06-25 LAB
ALBUMIN SERPL-MCNC: 3.1 G/DL (ref 3.4–5)
ALP SERPL-CCNC: 89 U/L (ref 40–150)
ALT SERPL W P-5'-P-CCNC: 32 U/L (ref 0–70)
ANION GAP SERPL CALCULATED.3IONS-SCNC: 5 MMOL/L (ref 3–14)
AST SERPL W P-5'-P-CCNC: 26 U/L (ref 0–45)
BILIRUB SERPL-MCNC: 0.5 MG/DL (ref 0.2–1.3)
BUN SERPL-MCNC: 33 MG/DL (ref 7–30)
CALCIUM SERPL-MCNC: 9.2 MG/DL (ref 8.5–10.1)
CHLORIDE SERPL-SCNC: 108 MMOL/L (ref 94–109)
CO2 SERPL-SCNC: 28 MMOL/L (ref 20–32)
CREAT SERPL-MCNC: 1.28 MG/DL (ref 0.66–1.25)
ERYTHROCYTE [DISTWIDTH] IN BLOOD BY AUTOMATED COUNT: 15.2 % (ref 10–15)
GFR SERPL CREATININE-BSD FRML MDRD: 49 ML/MIN/{1.73_M2}
GLUCOSE SERPL-MCNC: 116 MG/DL (ref 70–99)
HCT VFR BLD AUTO: 33.9 % (ref 40–53)
HGB BLD-MCNC: 10.7 G/DL (ref 13.3–17.7)
MCH RBC QN AUTO: 31.4 PG (ref 26.5–33)
MCHC RBC AUTO-ENTMCNC: 31.6 G/DL (ref 31.5–36.5)
MCV RBC AUTO: 99 FL (ref 78–100)
PLATELET # BLD AUTO: 217 10E9/L (ref 150–450)
POTASSIUM SERPL-SCNC: 4.2 MMOL/L (ref 3.4–5.3)
PROT SERPL-MCNC: 7.3 G/DL (ref 6.8–8.8)
RBC # BLD AUTO: 3.41 10E12/L (ref 4.4–5.9)
SODIUM SERPL-SCNC: 141 MMOL/L (ref 133–144)
WBC # BLD AUTO: 8.8 10E9/L (ref 4–11)

## 2019-06-25 PROCEDURE — 36415 COLL VENOUS BLD VENIPUNCTURE: CPT | Performed by: INTERNAL MEDICINE

## 2019-06-25 PROCEDURE — 80053 COMPREHEN METABOLIC PANEL: CPT | Performed by: INTERNAL MEDICINE

## 2019-06-25 PROCEDURE — 85027 COMPLETE CBC AUTOMATED: CPT | Performed by: INTERNAL MEDICINE

## 2019-06-25 NOTE — TELEPHONE ENCOUNTER
The patient called back and information has been given.   Thank you,  Laurel Robison   for [facility]

## 2019-06-25 NOTE — TELEPHONE ENCOUNTER
----- Message from Wale Short MD sent at 6/25/2019  1:07 PM CDT -----  His labs are good, hopefully his swelling is better  Continue the medication changes.

## 2019-06-25 NOTE — TELEPHONE ENCOUNTER
Tried to reach patient, unable to leave a message. Try again later.    Rodrick Cortez, [facility]

## 2019-07-02 ENCOUNTER — TELEPHONE (OUTPATIENT)
Dept: INTERNAL MEDICINE | Facility: CLINIC | Age: 84
End: 2019-07-02

## 2019-07-02 ENCOUNTER — ANTICOAGULATION THERAPY VISIT (OUTPATIENT)
Dept: ANTICOAGULATION | Facility: CLINIC | Age: 84
End: 2019-07-02
Payer: COMMERCIAL

## 2019-07-02 DIAGNOSIS — Z79.01 LONG TERM CURRENT USE OF ANTICOAGULANT THERAPY: ICD-10-CM

## 2019-07-02 DIAGNOSIS — I48.20 CHRONIC ATRIAL FIBRILLATION (H): ICD-10-CM

## 2019-07-02 LAB — INR POINT OF CARE: 1.8 (ref 0.86–1.14)

## 2019-07-02 PROCEDURE — 36416 COLLJ CAPILLARY BLOOD SPEC: CPT

## 2019-07-02 PROCEDURE — 99207 ZZC NO CHARGE NURSE ONLY: CPT

## 2019-07-02 PROCEDURE — 85610 PROTHROMBIN TIME: CPT | Mod: QW

## 2019-07-02 NOTE — TELEPHONE ENCOUNTER
Fatoumata is called.  She is informed that gunjan do have some Vitamin K, it is low and should not cause significant trouble with his INR readings.    Fatoumata understands this information.  Closing this encounter.  ALEJANDRA AyonN, RN

## 2019-07-02 NOTE — PROGRESS NOTES
ANTICOAGULATION FOLLOW-UP CLINIC VISIT    Patient Name:  Lori Mehta  Date:  2019  Contact Type:  Face to Face    SUBJECTIVE:  Patient Findings         Clinical Outcomes     Negatives:   Major bleeding event, Thromboembolic event, Anticoagulation-related hospital admission, Anticoagulation-related ED visit, Anticoagulation-related fatality           OBJECTIVE    INR Protime   Date Value Ref Range Status   2019 1.8 (A) 0.86 - 1.14 Final       ASSESSMENT / PLAN  INR assessment THER    Recheck INR In: 4 WEEKS    INR Location Clinic      Anticoagulation Summary  As of 2019    INR goal:   2.0-3.0   TTR:   75.3 % (3.2 y)   INR used for dosin.8! (2019)   Warfarin maintenance plan:   7.5 mg (5 mg x 1.5) every Tue, Sat; 5 mg (5 mg x 1) all other days   Full warfarin instructions:   7.5 mg every Tue, Sat; 5 mg all other days   Weekly warfarin total:   40 mg   No change documented:   Noehre, Taylor, RN   Plan last modified:   Lakeisha Rodriguez RN (2019)   Next INR check:   2019   Target end date:       Indications    Chronic atrial fibrillation (HCC) [I48.2]  Long-term (current) use of anticoagulants [Z79.01] [Z79.01]             Anticoagulation Episode Summary     INR check location:       Preferred lab:       Send INR reminders to:   [facility] [place]    Comments:   5 mg tablets, no bandaid, takes at noon, likes BP done.       Anticoagulation Care Providers     Provider Role Specialty Phone number    Wale Short MD [facility] Internal Medicine [phone]            See the Encounter Report to view Anticoagulation Flowsheet and Dosing Calendar (Go to Encounters tab in chart review, and find the Anticoagulation Therapy Visit)    Dosage adjustment made based on physician directed care plan.    Taylor Marte, CAN

## 2019-07-02 NOTE — TELEPHONE ENCOUNTER
Reason for Call:  Other     Detailed comments: Fatoumata calls back to let Coumadin nurse know that he has been eating a lot of cherries lately and is wondering if that could have an effect on his numbers.    Phone Number Patient can be reached at: Home number on file [phone] (home)    Best Time: any    Can we leave a detailed message on this number? YES    Call taken on 7/2/2019 at 3:11 PM by Lakeisha Buitrago

## 2019-07-30 ENCOUNTER — ANTICOAGULATION THERAPY VISIT (OUTPATIENT)
Dept: ANTICOAGULATION | Facility: CLINIC | Age: 84
End: 2019-07-30
Payer: COMMERCIAL

## 2019-07-30 VITALS — HEART RATE: 64 BPM | SYSTOLIC BLOOD PRESSURE: 132 MMHG | DIASTOLIC BLOOD PRESSURE: 62 MMHG

## 2019-07-30 DIAGNOSIS — I48.20 CHRONIC ATRIAL FIBRILLATION (H): ICD-10-CM

## 2019-07-30 DIAGNOSIS — Z79.01 LONG TERM CURRENT USE OF ANTICOAGULANT THERAPY: ICD-10-CM

## 2019-07-30 LAB — INR POINT OF CARE: 1.5 (ref 0.9–1.1)

## 2019-07-30 PROCEDURE — 85610 PROTHROMBIN TIME: CPT | Mod: QW

## 2019-07-30 PROCEDURE — 99207 ZZC NO CHARGE NURSE ONLY: CPT

## 2019-07-30 PROCEDURE — 36416 COLLJ CAPILLARY BLOOD SPEC: CPT

## 2019-07-30 RX ORDER — WARFARIN SODIUM 5 MG/1
TABLET ORAL
Qty: 120 TABLET | Refills: 1 | Status: SHIPPED | OUTPATIENT
Start: 2019-07-30 | End: 2019-08-23

## 2019-07-30 NOTE — PROGRESS NOTES
ANTICOAGULATION FOLLOW-UP CLINIC VISIT    Patient Name:  Lori Mehta  Date:  2019  Contact Type:  Face to Face    SUBJECTIVE:  Patient Findings     Positives:   Change in diet/appetite (more Marly K recently - has a garden)             OBJECTIVE    INR Protime   Date Value Ref Range Status   2019 1.5 (A) 0.9 - 1.1 Final       ASSESSMENT / PLAN  INR assessment SUB    Recheck INR In: 2 WEEKS    INR Location Clinic      Anticoagulation Summary  As of 2019    INR goal:   2.0-3.0   TTR:   73.6 % (3.3 y)   INR used for dosin.5! (2019)   Warfarin maintenance plan:   5 mg (5 mg x 1) every Mon, Wed, Thu; 7.5 mg (5 mg x 1.5) all other days   Full warfarin instructions:   : 10 mg; Otherwise 5 mg every Mon, Wed, Thu; 7.5 mg all other days   Weekly warfarin total:   45 mg   Plan last modified:   Lakeisha Rodriguez RN (2019)   Next INR check:   2019   Target end date:       Indications    Chronic atrial fibrillation (HCC) [I48.2]  Long-term (current) use of anticoagulants [Z79.01] [Z79.01]             Anticoagulation Episode Summary     INR check location:       Preferred lab:       Send INR reminders to:   [facility] [place]    Comments:   5 mg tablets, no bandaid, takes at noon, likes BP done.       Anticoagulation Care Providers     Provider Role Specialty Phone number    Wale Short MD [facility] Internal Medicine [phone]            See the Encounter Report to view Anticoagulation Flowsheet and Dosing Calendar (Go to Encounters tab in chart review, and find the Anticoagulation Therapy Visit)    Dosage adjustment made based on physician directed care plan.      Lakeisha Rodriguez, RN

## 2019-08-08 ENCOUNTER — TELEPHONE (OUTPATIENT)
Dept: INTERNAL MEDICINE | Facility: CLINIC | Age: 84
End: 2019-08-08

## 2019-08-08 NOTE — TELEPHONE ENCOUNTER
Reason for call:  Other   Patient called regarding (reason for call): call back  Additional comments: Right shoulder pain - frozen shoulder is hurting very bad, also worried about spots on his back, refused next available, states patient needs to be seen sooner.     Phone number to reach patient:  Home number on file [phone] (home)    Best Time:  ASAP     Can we leave a detailed message on this number?  YES

## 2019-08-09 NOTE — TELEPHONE ENCOUNTER
Patient made an appt for the Right shoulder 1:20 on 8/12 with Jayjay Reveles PA-C      Wife wants him to see  for the spots on his back. They have been a while and thinks they should be looked at. Can you work pt in for that?        Can someone call patient and triage the spots on his back.  MP/MA

## 2019-08-09 NOTE — TELEPHONE ENCOUNTER
"Phone call to patient and spoke with his wife Fatoumata. She states pt has had black spots on his back for \"a long time,\" and she noticed that one of them has changed recently. She states there are several smaller ones that are flat, and one that is raised and now seems to have some white/lightness to the top of it. 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 Wife states she is going to call there and make him an appointment. Writer encouraged this appointment as it does sound like something a provider should see as soon as possible. She assured she will make this appointment to coincide with her daughter's appointment there and they will \"go together.\"      Lima Payne RN on 8/9/2019 at 10:55 AM    "

## 2019-08-12 ENCOUNTER — OFFICE VISIT (OUTPATIENT)
Dept: ORTHOPEDICS | Facility: CLINIC | Age: 84
End: 2019-08-12
Payer: COMMERCIAL

## 2019-08-12 VITALS
BODY MASS INDEX: 27.15 KG/M2 | WEIGHT: 173 LBS | SYSTOLIC BLOOD PRESSURE: 133 MMHG | DIASTOLIC BLOOD PRESSURE: 74 MMHG | HEIGHT: 67 IN

## 2019-08-12 DIAGNOSIS — M12.811 ROTATOR CUFF ARTHROPATHY OF RIGHT SHOULDER: Primary | ICD-10-CM

## 2019-08-12 PROCEDURE — 99213 OFFICE O/P EST LOW 20 MIN: CPT | Mod: 25 | Performed by: PHYSICIAN ASSISTANT

## 2019-08-12 PROCEDURE — 20610 DRAIN/INJ JOINT/BURSA W/O US: CPT | Mod: RT | Performed by: PHYSICIAN ASSISTANT

## 2019-08-12 RX ORDER — TRIAMCINOLONE ACETONIDE 40 MG/ML
40 INJECTION, SUSPENSION INTRA-ARTICULAR; INTRAMUSCULAR ONCE
Status: COMPLETED | OUTPATIENT
Start: 2019-08-12 | End: 2019-08-12

## 2019-08-12 RX ADMIN — TRIAMCINOLONE ACETONIDE 40 MG: 40 INJECTION, SUSPENSION INTRA-ARTICULAR; INTRAMUSCULAR at 14:08

## 2019-08-12 ASSESSMENT — PAIN SCALES - GENERAL: PAINLEVEL: NO PAIN (0)

## 2019-08-12 ASSESSMENT — MIFFLIN-ST. JEOR: SCORE: 1398.35

## 2019-08-12 NOTE — PROGRESS NOTES
Prior to injection, verified patient identity using patient's name and date of birth.  Due to injection administration, patient instructed to remain in clinic for 15 minutes  afterwards, and to report any adverse reaction to me immediately.    Joint injection was performed.      Drug Amount Wasted:  None.  Vial/Syringe: Single dose vial  : [employer]  EXPIRATION DATE:  3/2021  NDC: [address]-5  Lot   [id number]  Jayjay Reveles PA-C

## 2019-08-12 NOTE — PROGRESS NOTES
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  He is here with his wife.    He is being seen for   Chief Complaints and History of Present Illnesses   Patient presents with     Consult     rt shoulder Dr. Long pt          History of Present Illness:   Mechanism of Injury: No specific injury patient is unsure when he started to not be able to lift over his shoulder level.  Location: Right shoulder  Duration of Pain: Years.  They are difficult historian so I was not able to get exactly how many.  Rating of Pain: Moderate to severe  Pain Quality: Achy  Pain is better with: Rest  Pain is worse with: Range of motion or use of the arm  Treatment so far consists of: Cortisone injections done on 3/19/2019 and another one on 6/18/2019.  First 1 was done in the emergency department by Xiomara MOHR and the second 1 was done by Dr. Long in the subacromial space.  Patient felt that the last injection only lasted a few days.  Patient is also tried several different rubs on his shoulder.  He has tried Tylenol without much luck.  Patient does not want to do formal physical therapy and does not want to have surgery.  Associated Features: Inability to use arm away from body or above shoulder level.  Decreased range of motion.  Denies numbness or tingling.  Prior history of related problems: Patient does not recall specific injury when he could not use his arm above his shoulder level however in November he did pull on a rope and then fell backwards but he did not feel he hurt his shoulder and he did not recall that his shoulder did not work after that.  Pain is Limiting: Activity with the right upper extremity.  Here to: Orthopedic consultation and possible injection  The Pain Has: Been about the same  Additional History: Patient has multiple other questions about a bump in his armpit and also a question about  his moles on his back and also question about his foot and hip.  Patient is with his wife today who helps out quite a bit.      Patient's past medical, surgical, social and family histories reviewed.     Past Medical History:   Diagnosis Date     Anxiety      Atrial fibrillation (H) 1/9/2009     BPH      GERD (gastroesophageal reflux disease)      Pure hypercholesterolemia      Unspecified essential hypertension         Past Surgical History:   Procedure Laterality Date     ARTHROPLASTY HIP ANTERIOR  11/4/2013    Procedure: ARTHROPLASTY HIP ANTERIOR;  Right Total Hip Arthroplasty - anterior approach;  Surgeon: Mathew Osorio MD;  Location: [facility] OR     CL AFF SURGICAL PATHOLOGY      nasal polyp removal     COLONOSCOPY  6/16/10     HC REMOVAL GALLBLADDER      Cholecystectomy     HC REMOVAL OF HYDROCELE,TUNICA,UNILAT       HC REPAIR ING HERNIA,5+Y/O,REDUCIBL       HC UGI ENDOSCOPY, SIMPLE EXAM  6/16/10     LAPAROSCOPIC HERNIORRHAPHY PREPERITONEAL  9/19/2012    Procedure: LAPAROSCOPIC HERNIORRHAPHY PREPERITONEAL;  Laparoscopic Preperitoneal Left Inguinal Hernia Repair;  Surgeon: Errol Navas MD;  Location: [facility] OR     LASER YAG CAPSULOTOMY Left 4/6/2017    Procedure: LASER YAG CAPSULOTOMY;  Surgeon: Mathew Croft MD;  Location: [facility] OR     OPEN REDUCTION INTERNAL FIXATION FOREARM Left 12/29/2014    Procedure: OPEN REDUCTION INTERNAL FIXATION FOREARM;  Surgeon: Mathew Osorio MD;  Location: [facility] OR     PHACOEMULSIFICATION WITH STANDARD INTRAOCULAR LENS IMPLANT  7/19/2012    Procedure: PHACOEMULSIFICATION WITH STANDARD INTRAOCULAR LENS IMPLANT;  PHACOEMULSIFICATION WITH STANDARD INTRAOCULAR LENS IMPLANT LEFT EYE;  Surgeon: Gabriel Ross MD;  Location: [facility] OR     PHACOEMULSIFICATION WITH STANDARD INTRAOCULAR LENS IMPLANT  8/16/2012    Procedure: PHACOEMULSIFICATION WITH STANDARD INTRAOCULAR LENS IMPLANT;  RIGHT PHACOEMULSIFICATION WITH STANDARD INTRAOCULAR LENS IMPLANT;  Surgeon: Gabriel Ross MD;   Location:  OR       Medications:    Current Outpatient Medications on File Prior to Visit:  Acetaminophen (TYLENOL PO) Take 1,000 mg by mouth   allopurinol (ZYLOPRIM) 100 MG tablet Take 1 tablet (100 mg) by mouth daily   Ascorbic Acid (VITAMIN C) 500 MG CHEW Take  by mouth. 1-3 daily   Bromelains 500 MG TABS Take 3 tablets by mouth. 3-4 daily   CAPSICUM, CAYENNE, PO Take 450 mg by mouth daily   COENZYME Q10 1 DAILY   hemp seed oil Take by mouth daily as needed   hydrochlorothiazide (HYDRODIURIL) 25 MG tablet Take 1 tablet (25 mg) by mouth daily   lisinopril (PRINIVIL/ZESTRIL) 10 MG tablet Take 1 tablet (10 mg) by mouth daily   MELATONIN PO Take 10 mg by mouth At Bedtime   OMEGA 3 1200 MG OR CAPS 2 CAP DAILY   Saw Palmetto, Serenoa repens, (SAW PALMETTO EXTRACT PO) Take 1,000 mg by mouth   SELENIUM 200 MCG OR CAPS 1 CAPSULE DAILY   simvastatin (ZOCOR) 20 MG tablet Take 3 tablets (60 mg) by mouth At Bedtime   tamsulosin (FLOMAX) 0.4 MG capsule Take 1 capsule (0.4 mg) by mouth daily   VITAMIN B12 TR 1000 MCG OR TBCR 1 TAB DAILY   VITAMIN E 400 UNIT OR CAPS 1 CAPSULE DAILY   warfarin (COUMADIN) 5 MG tablet Take 5 mg on Mon, Wed, Fri and 7.5 mg all other days, or as directed by the Coumadin clinic.   ZINC 30 MG OR TABS 1 TABLET DAILY     Current Facility-Administered Medications on File Prior to Visit:  lidocaine 1 % injection 2 mL   triamcinolone (KENALOG-40) injection 40 mg       Allergies   Allergen Reactions     Paroxetine      Other reaction(s): Intolerance     Terfenadine      Other reaction(s): Other  Unable to void       Social History     Occupational History     Not on file   Tobacco Use     Smoking status: Never Smoker     Smokeless tobacco: Never Used   Substance and Sexual Activity     Alcohol use: Yes     Alcohol/week: 0.6 oz     Types: 1 Standard drinks or equivalent per week     Comment: beer once a week     Drug use: No     Sexual activity: Never       Family History   Problem Relation Age of Onset  "    Diabetes Brother      C.A.D. Mother      Diabetes Maternal Grandmother      Circulatory Maternal Grandfather         cerebral aneurysm     Hypertension Father      Cerebrovascular Disease Father      Breast Cancer Sister          x 2     Cancer - colorectal No family hx of        REVIEW OF SYSTEMS  10 point review systems performed otherwise negative as noted as per history of present illness.    Physical Exam:  Vitals: /74   Ht 1.702 m (5' 7\")   Wt 78.5 kg (173 lb)   BMI 27.10 kg/m    BMI= Body mass index is 27.1 kg/m .    Constitutional: healthy, alert and no acute distress   Psychiatric: mentation appears normal and affect normal/bright  NEURO: no focal deficits, CMS intact right upper extremity  RESP: Normal with easy respirations and no use of accessory muscles to breathe, no audible wheezing or retractions  CV: +2 radial pulse and his hand is warm to palpation.   SKIN: No erythema, rashes, excoriation, or breakdown. No evidence of infection.   MUSCULOSKELETAL:    INSPECTION of right shoulder: No gross deformities, erythema, edema, ecchymosis, atrophy or fasciculations.     PALPATION: No tenderness to palpation of the AC joint, proximal biceps tendon, clavicle, lateral shoulder, posterior shoulder, trapezius area. No increased warmth noted.     ROM: Forward flexion to approximately actively only about 10 to 15 degrees and passively to about 90 degrees contralateral shoulder goes to about 170 degrees, abduction to approximately actively may be 10 degrees and passively to about 90 degrees and the contralateral shoulder will go to approximately 165 degrees, external rotation to approximately approximately 20 degrees and the contralateral at about 90 degrees, internal rotation to back pocket.  All range of motion is without catching or locking however the patient does have pain especially at maximal range of motion especially with coming down from forward flexion or abduction.     STRENGTH: 5 out of 5 " , internal and 0 out of 5 external rotation     SPECIAL TEST: Patient has a negative belly press and negative liftoff test, patient can even do an empty can or full can test, positive drop test  GAIT: non-antalgic  Lymph: no palpable lymph nodes    Diagnostic Modalities:  I did review the x-rays that were done on 6/18/2019 which are 3 views of the right shoulder showing severe degenerative joint disease.  We also see a high riding humerus.  No fracture no dislocation no tumor.    Independent visualization of the images was performed.    Impression: 1.  Right shoulder rotator cuff arthropathy    Plan:  All of the above pertinent physical exam and imaging modalities findings was reviewed with Lori and his wife.                                          INJECTION PROCEDURE:  The patient was counseled about an  injection, including discussion of risks (including infection), contents of the injection, rationale for performing the injection, and expected benefits of the injection. The skin was prepped with alcohol and betadine and then utilizing sterile technique an injection of the right shoulder glenohumeral  joint from the anterior approach was performed. I used preston chloride spray prior to doing the injection. The injection consisted 1ml of Kenalog (40mg per 1ml) with 8ml 1% lidocaine plain. The patient tolerated the injection well, and there were no complications. The injection site was covered with a Band-Aid. The injection was performed by Alfredo Reveles PA-C    Patient Instructions:  1.  X-ray: We reviewed x-rays that were done on 6/18/2019.  We can see that you have a high riding humerus and some severe arthritis in your shoulder joint.  When your humerus is up often times that means that your rotator cuff is not functioning.  2.  Exam: On exam I can definitely tell that your rotator cuff does not work.  At some point you must of torn this it might not have been an injury rather than just giving way.  3.   Cortisone injection: So far this year you have had 2 cortisone injections one by DIMA Solano on 3/19/2019 and another one that was done by Dr. Long on 18 2019.  Dr. Long's injection was subacromial and it sounds like it lasted only a few days.  We could do a subacromial injection because you do not have an intact rotator cuff however since it only lasted a few days we will try a glenohumeral injection which means into the joint in clinic today.  4. We can do 4 cortisone injections in a years time but no more than that. We can do the first 3 injections within 6 weeks of each other but the fourth injection we usually wait 2-3 months. This helps protect the cartilage in your joint.  5.  Therapy: I do believe Dr. Diaz wanted you to do therapy to strengthen the muscles around the rotator cuff.  You would like to avoid therapy and do not feel would do you much good.  6.  Medication: You are on Coumadin so we cannot give you NSAIDs like ibuprofen Mobic or Voltaren.  I recommended topical rubs like Aspercreme but you have tried many of those.  Unfortunately you are left with just Tylenol.  I would suggest arthritis Tylenol.  7.  Surgery: We know you do not want to have surgery but if you were to have surgery Dr. Godfrey who comes to [place] could do it and would do a reverse total shoulder.  This would be a shoulder replacement.  It is done in patients that are 89.  8.  Plan: If you want to do one more cortisone injection this year I would recommend trying to do it through radiology because they can watch the needle right into the joint.  Since her rotator cuff is probably not intact we should be able to just do a subacromial in clinic but since you have not had good luck in clinic then we might as will try something different.  9. Follow up with Jayjay Reveles PA-C on an as needed basis.   Re-x-ray on return: No    BP Readings from Last 1 Encounters:   08/12/19 133/74       BP noted to be well controlled today in  office.      Patient does not use Tobacco products.    This note was dictated with Advaction.    Jayjay Reveles PA-C

## 2019-08-12 NOTE — PROGRESS NOTES
Lori Mehta is a 89 year old male who is seen in consultation at the request of .  History of Present illness:  Lori presents for evaluation of:  1.) rt shoulder   Onset: January 2019  Symptoms brought on by: nothing.   Character:  sharp and stabbing.    Progression of symptoms:  worse.    Previous similar pain: no .   Pain Level:  1/10.   Previous treatments:  ice, acetaminophen and steroid.  Currently on Blood thinners? Yes  Diagnosis of Diabetes? No

## 2019-08-12 NOTE — LETTER
8/12/2019         RE: Lori Mehta  [address] [facility] [id number]        Dear Colleague,    Thank you for referring your patient, Lori Mehta, to the [facility]. Please see a copy of my visit note below.    Lori Mehta is a 89 year old male who is seen in consultation at the request of .  History of Present illness:  Lori presents for evaluation of:  1.) rt shoulder   Onset: January 2019  Symptoms brought on by: nothing.   Character:  sharp and stabbing.    Progression of symptoms:  worse.    Previous similar pain: no .   Pain Level:  1/10.   Previous treatments:  ice, acetaminophen and steroid.  Currently on Blood thinners? Yes  Diagnosis of Diabetes? No              Prior to injection, verified patient identity using patient's name and date of birth.  Due to injection administration, patient instructed to remain in clinic for 15 minutes  afterwards, and to report any adverse reaction to me immediately.    Joint injection was performed.      Drug Amount Wasted:  None.  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  He is here with his wife.    He is being seen for   Chief Complaints and History of Present Illnesses   Patient presents with     Consult     rt shoulder Dr. Long pt          History of Present Illness:   Mechanism of Injury: No specific injury patient is unsure when he started to not be able to lift over his shoulder level.  Location: Right shoulder  Duration of Pain: Years.  They are difficult historian so I was not able to get exactly how many.  Rating of Pain: Moderate to severe  Pain Quality: Achy  Pain is better with: Rest  Pain is worse with: Range of motion or use of the  arm  Treatment so far consists of: Cortisone injections done on 3/19/2019 and another one on 6/18/2019.  First 1 was done in the emergency department by Xiomara MOHR and the second 1 was done by Dr. Long in the subacromial space.  Patient felt that the last injection only lasted a few days.  Patient is also tried several different rubs on his shoulder.  He has tried Tylenol without much luck.  Patient does not want to do formal physical therapy and does not want to have surgery.  Associated Features: Inability to use arm away from body or above shoulder level.  Decreased range of motion.  Denies numbness or tingling.  Prior history of related problems: Patient does not recall specific injury when he could not use his arm above his shoulder level however in November he did pull on a rope and then fell backwards but he did not feel he hurt his shoulder and he did not recall that his shoulder did not work after that.  Pain is Limiting: Activity with the right upper extremity.  Here to: Orthopedic consultation and possible injection  The Pain Has: Been about the same  Additional History: Patient has multiple other questions about a bump in his armpit and also a question about his moles on his back and also question about his foot and hip.  Patient is with his wife today who helps out quite a bit.      Patient's past medical, surgical, social and family histories reviewed.     Past Medical History:   Diagnosis Date     Anxiety      Atrial fibrillation (H) 1/9/2009     BPH      GERD (gastroesophageal reflux disease)      Pure hypercholesterolemia      Unspecified essential hypertension         Past Surgical History:   Procedure Laterality Date     ARTHROPLASTY HIP ANTERIOR  11/4/2013    Procedure: ARTHROPLASTY HIP ANTERIOR;  Right Total Hip Arthroplasty - anterior approach;  Surgeon: Mathew Osorio MD;  Location: [facility] OR     CL AFF SURGICAL PATHOLOGY      nasal polyp removal     COLONOSCOPY  6/16/10     HC REMOVAL  GALLBLADDER      Cholecystectomy     HC REMOVAL OF HYDROCELE,TUNICA,UNILAT       HC REPAIR ING HERNIA,5+Y/O,REDUCIBL       HC UGI ENDOSCOPY, SIMPLE EXAM  6/16/10     LAPAROSCOPIC HERNIORRHAPHY PREPERITONEAL  9/19/2012    Procedure: LAPAROSCOPIC HERNIORRHAPHY PREPERITONEAL;  Laparoscopic Preperitoneal Left Inguinal Hernia Repair;  Surgeon: Errol Navas MD;  Location: [facility]     LASER YAG CAPSULOTOMY Left 4/6/2017    Procedure: LASER YAG CAPSULOTOMY;  Surgeon: Mathew Croft MD;  Location: [facility]     OPEN REDUCTION INTERNAL FIXATION FOREARM Left 12/29/2014    Procedure: OPEN REDUCTION INTERNAL FIXATION FOREARM;  Surgeon: Mathew Osorio MD;  [facility]     PHACOEMULSIFICATION WITH STANDARD INTRAOCULAR LENS IMPLANT  7/19/2012    Procedure: PHACOEMULSIFICATION WITH STANDARD INTRAOCULAR LENS IMPLANT;  PHACOEMULSIFICATION WITH STANDARD INTRAOCULAR LENS IMPLANT LEFT EYE;  Surgeon: Gabriel Ross MD;  Location: [facility]     PHACOEMULSIFICATION WITH STANDARD INTRAOCULAR LENS IMPLANT  8/16/2012    Procedure: PHACOEMULSIFICATION WITH STANDARD INTRAOCULAR LENS IMPLANT;  RIGHT PHACOEMULSIFICATION WITH STANDARD INTRAOCULAR LENS IMPLANT;  Surgeon: Gabriel Ross MD;  Location: [facility]       Medications:    Current Outpatient Medications on File Prior to Visit:  Acetaminophen (TYLENOL PO) Take 1,000 mg by mouth   allopurinol (ZYLOPRIM) 100 MG tablet Take 1 tablet (100 mg) by mouth daily   Ascorbic Acid (VITAMIN C) 500 MG CHEW Take  by mouth. 1-3 daily   Bromelains 500 MG TABS Take 3 tablets by mouth. 3-4 daily   CAPSICUM, CAYENNE, PO Take 450 mg by mouth daily   COENZYME Q10 1 DAILY   hemp seed oil Take by mouth daily as needed   hydrochlorothiazide (HYDRODIURIL) 25 MG tablet Take 1 tablet (25 mg) by mouth daily   lisinopril (PRINIVIL/ZESTRIL) 10 MG tablet Take 1 tablet (10 mg) by mouth daily   MELATONIN PO Take 10 mg by mouth At Bedtime   OMEGA 3 1200 MG OR CAPS 2 CAP DAILY   Saw Palmetto, Serenoa repens,  "(SAW PALMETTO EXTRACT PO) Take 1,000 mg by mouth   SELENIUM 200 MCG OR CAPS 1 CAPSULE DAILY   simvastatin (ZOCOR) 20 MG tablet Take 3 tablets (60 mg) by mouth At Bedtime   tamsulosin (FLOMAX) 0.4 MG capsule Take 1 capsule (0.4 mg) by mouth daily   VITAMIN B12 TR 1000 MCG OR TBCR 1 TAB DAILY   VITAMIN E 400 UNIT OR CAPS 1 CAPSULE DAILY   warfarin (COUMADIN) 5 MG tablet Take 5 mg on Mon, Wed, Fri and 7.5 mg all other days, or as directed by the Coumadin clinic.   ZINC 30 MG OR TABS 1 TABLET DAILY     Current Facility-Administered Medications on File Prior to Visit:  lidocaine 1 % injection 2 mL   triamcinolone (KENALOG-40) injection 40 mg       Allergies   Allergen Reactions     Paroxetine      Other reaction(s): Intolerance     Terfenadine      Other reaction(s): Other  Unable to void       Social History     Occupational History     Not on file   Tobacco Use     Smoking status: Never Smoker     Smokeless tobacco: Never Used   Substance and Sexual Activity     Alcohol use: Yes     Alcohol/week: 0.6 oz     Types: 1 Standard drinks or equivalent per week     Comment: beer once a week     Drug use: No     Sexual activity: Never       Family History   Problem Relation Age of Onset     Diabetes Brother      C.A.D. Mother      Diabetes Maternal Grandmother      Circulatory Maternal Grandfather         cerebral aneurysm     Hypertension Father      Cerebrovascular Disease Father      Breast Cancer Sister          x 2     Cancer - colorectal No family hx of        REVIEW OF SYSTEMS  10 point review systems performed otherwise negative as noted as per history of present illness.    Physical Exam:  Vitals: /74   Ht 1.702 m (5' 7\")   Wt 78.5 kg (173 lb)   BMI 27.10 kg/m     BMI= Body mass index is 27.1 kg/m .    Constitutional: healthy, alert and no acute distress   Psychiatric: mentation appears normal and affect normal/bright  NEURO: no focal deficits, CMS intact right upper extremity  RESP: Normal with easy " respirations and no use of accessory muscles to breathe, no audible wheezing or retractions  CV: +2 radial pulse and his hand is warm to palpation.   SKIN: No erythema, rashes, excoriation, or breakdown. No evidence of infection.   MUSCULOSKELETAL:    INSPECTION of right shoulder: No gross deformities, erythema, edema, ecchymosis, atrophy or fasciculations.     PALPATION: No tenderness to palpation of the AC joint, proximal biceps tendon, clavicle, lateral shoulder, posterior shoulder, trapezius area. No increased warmth noted.     ROM: Forward flexion to approximately actively only about 10 to 15 degrees and passively to about 90 degrees contralateral shoulder goes to about 170 degrees, abduction to approximately actively may be 10 degrees and passively to about 90 degrees and the contralateral shoulder will go to approximately 165 degrees, external rotation to approximately approximately 20 degrees and the contralateral at about 90 degrees, internal rotation to back pocket.  All range of motion is without catching or locking however the patient does have pain especially at maximal range of motion especially with coming down from forward flexion or abduction.     STRENGTH: 5 out of 5 , internal and 0 out of 5 external rotation     SPECIAL TEST: Patient has a negative belly press and negative liftoff test, patient can even do an empty can or full can test, positive drop test  GAIT: non-antalgic  Lymph: no palpable lymph nodes    Diagnostic Modalities:  I did review the x-rays that were done on 6/18/2019 which are 3 views of the right shoulder showing severe degenerative joint disease.  We also see a high riding humerus.  No fracture no dislocation no tumor.    Independent visualization of the images was performed.    Impression: 1.  Right shoulder rotator cuff arthropathy    Plan:  All of the above pertinent physical exam and imaging modalities findings was reviewed with Lori and his wife.                                           INJECTION PROCEDURE:  The patient was counseled about an  injection, including discussion of risks (including infection), contents of the injection, rationale for performing the injection, and expected benefits of the injection. The skin was prepped with alcohol and betadine and then utilizing sterile technique an injection of the right shoulder glenohumeral  joint from the anterior approach was performed. I used preston chloride spray prior to doing the injection. The injection consisted 1ml of Kenalog (40mg per 1ml) with 8ml 1% lidocaine plain. The patient tolerated the injection well, and there were no complications. The injection site was covered with a Band-Aid. The injection was performed by Alfredo Reveles PA-C    Patient Instructions:  1.  X-ray: We reviewed x-rays that were done on 6/18/2019.  We can see that you have a high riding humerus and some severe arthritis in your shoulder joint.  When your humerus is up often times that means that your rotator cuff is not functioning.  2.  Exam: On exam I can definitely tell that your rotator cuff does not work.  At some point you must of torn this it might not have been an injury rather than just giving way.  3.  Cortisone injection: So far this year you have had 2 cortisone injections one by DIMA Solano on 3/19/2019 and another one that was done by Dr. Long on 18 2019.  Dr. Long's injection was subacromial and it sounds like it lasted only a few days.  We could do a subacromial injection because you do not have an intact rotator cuff however since it only lasted a few days we will try a glenohumeral injection which means into the joint in clinic today.  4. We can do 4 cortisone injections in a years time but no more than that. We can do the first 3 injections within 6 weeks of each other but the fourth injection we usually wait 2-3 months. This helps protect the cartilage in your joint.  5.  Therapy: I do believe Dr. Diaz wanted you to do  therapy to strengthen the muscles around the rotator cuff.  You would like to avoid therapy and do not feel would do you much good.  6.  Medication: You are on Coumadin so we cannot give you NSAIDs like ibuprofen Mobic or Voltaren.  I recommended topical rubs like Aspercreme but you have tried many of those.  Unfortunately you are left with just Tylenol.  I would suggest arthritis Tylenol.  7.  Surgery: We know you do not want to have surgery but if you were to have surgery Dr. Godfrey who comes to [place] could do it and would do a reverse total shoulder.  This would be a shoulder replacement.  It is done in patients that are 91.  8.  Plan: If you want to do one more cortisone injection this year I would recommend trying to do it through radiology because they can watch the needle right into the joint.  Since her rotator cuff is probably not intact we should be able to just do a subacromial in clinic but since you have not had good luck in clinic then we might as will try something different.  9. Follow up with Jayjay Reveles PA-C on an as needed basis.   Re-x-ray on return: No    BP Readings from Last 1 Encounters:   08/12/19 133/74       BP noted to be well controlled today in office.      Patient does not use Tobacco products.    This note was dictated with [employer].    Jayjay Reveles PA-C          Again, thank you for allowing me to participate in the care of your patient.        Sincerely,        Jayjay Reveles PA-C

## 2019-08-12 NOTE — PATIENT INSTRUCTIONS
Encounter Diagnosis   Name Primary?     Rotator cuff arthropathy of right shoulder Yes     Rest, ice and elevate above heart level as needed for pain control  1.  X-ray: We reviewed x-rays that were done on 6/18/2019.  We can see that you have a high riding humerus and some severe arthritis in your shoulder joint.  When your humerus is up often times that means that your rotator cuff is not functioning.  2.  Exam: On exam I can definitely tell that your rotator cuff does not work.  At some point you must of torn this it might not have been an injury rather than just giving way.  3.  Cortisone injection: So far this year you have had 2 cortisone injections one by DIMA Solano on 3/19/2019 and another one that was done by Dr. Long on 18 2019.  Dr. Long's injection was subacromial and it sounds like it lasted only a few days.  We could do a subacromial injection because you do not have an intact rotator cuff however since it only lasted a few days we will try a glenohumeral injection which means into the joint in clinic today.  4. We can do 4 cortisone injections in a years time but no more than that. We can do the first 3 injections within 6 weeks of each other but the fourth injection we usually wait 2-3 months. This helps protect the cartilage in your joint.  5.  Therapy: I do believe Dr. Diaz wanted you to do therapy to strengthen the muscles around the rotator cuff.  You would like to avoid therapy and do not feel would do you much good.  6.  Medication: You are on Coumadin so we cannot give you NSAIDs like ibuprofen Mobic or Voltaren.  I recommended topical rubs like Aspercreme but you have tried many of those.  Unfortunately you are left with just Tylenol.  I would suggest arthritis Tylenol.  7.  Surgery: We know you do not want to have surgery but if you were to have surgery Dr. Godfrey who comes to [place] could do it and would do a reverse total shoulder.  This would be a shoulder replacement.  It is  done in patients that are 91.  8.  Plan: If you want to do one more cortisone injection this year I would recommend trying to do it through radiology because they can watch the needle right into the joint.  Since her rotator cuff is probably not intact we should be able to just do a subacromial in clinic but since you have not had good luck in clinic then we might as will try something different.  9. Follow up with Jayjay Reveles PA-C on an as needed basis.   Cortisone Instructions:     1. You received an injection of cortisone into your right shoulder glenohumeral joint today.  2. The joint(s) may be more painful for the first 1-2 days.  3. We ask you to continue to rest the joint(s) for a few more days before resuming regular activities.  4. Pain Medications you can take (as long as your primary care provider allows these meds and you do not have kidney or liver conditions):  Tylenol  Take 1000 mg by mouth every 6 hours as needed; maximum dose 4000 mg a day  Ibuprofen  600 mg every 6 hours as needed; maximum 2400 mg a day  (OK to take tylenol and ibuprofen at the same time)  5. Rest, ice and elevate as needed for pain control  6. Watch for these signs of infection: redness, swelling, drainage, warmth to touch, increased pain, or fever. Call the clinic or make an appointment to be seen if you think you have an infection.  7. If you are diabetic, make sure you keep a close eye on your blood sugars, they can get elevated with cortisone injections.   8. Sometimes it can take 1-2 weeks for it to reach its full effect.    Cortisone Injections  Cortisone is a type of steroid. It can greatly reduce swelling, redness, and irritation (inflammation) and pain. Being injected with cortisone is simple and doesn t take long. Your doctor may ask you questions about your health. Certain health conditions, such as diabetes, can be affected by cortisone.     Your pain may be relieved by a cortisone injection.   Why have a cortisone  injection?  Injecting cortisone can relieve pain for anything from a sports injury to arthritis. Your doctor may suggest an injection if rest, splints, or oral medicine doesn t relieve your pain. Injecting cortisone is simpler than having surgery. And cortisone may provide the lasting pain relief that can help you get out and enjoy life again.  Getting the injection  Your doctor will start by cleaning and occasionally numbing your skin at the injection site. Next you ll be injected with local anesthetics (for short-term pain relief) and cortisone. The injection may last a few moments. A small bandage will be put over the injection site. You ll then be ready to go home.  After your injection  After being injected, make sure you don t injure the treated region. But stay active. Enjoy a walk or some other mild activity. Just be careful not to strain the region that gave you trouble.  The next day  Some people feel more pain after being injected. This is normal, and it will go away soon. Applying ice for 20 minutes at a time to your injury may reduce the increased pain. Rest for the first day or two. You don t need to stay in bed. But avoid tasks that may strain the injured region.  If you have diabetes  Cortisone injections can cause blood sugar to be increased for several days after the injection. If you have diabetes, you should follow your blood  sugar closely during this time. Follow your regular plan for what to do when your blood sugar is elevated.     1166-6100 [employer]. [address] [place] [address], [place] [id number]. All rights reserved. This information is not intended as a substitute for professional medical care. Always follow your healthcare professional's instructions.     [employer] and [employer] may offer reliable information regarding your diagnosis and treatment plan.    THANK YOU for coming in today. If you receive a survey via [employer] or mail please let us know if there was anything you  especially appreciated today or if there is any way we can improve our clinic. We appreciate your input.    GENERAL INFORMATION:  Our hours are:  Monday :     Clinic 7:30 AM-430 PM ([facility])  Tuesday:      Operating Room All Day ([facility])  Wednesday: Clinic 7:30 AM - 11:15 AM ([facility])             Clinic 1:00 PM - 4:00PM ([facility])  Thursday:     Administrative Day  Friday:          Clinic 7:30 AM - 11:15 AM ([facility])            Clinic 1:00 PM - 4:00 PM ([facility])      [facility] for any issues or concerns: [phone]      We are not in the office Thursdays. Therefore non- urgent calls and medical messages received on Thursday will be addressed when we are back in the office on Wednesday. Urgent matters will be reviewed and addressed by one of our partners in the office as needed.    If lab work was done today as part of your evaluation you will generally be contacted via [employer], mail, or phone with the results within 1-5 days. If there is an alarming result we will contact you by phone. Lab results come back at varying times, I generally wait until all labs are resulted before making comments on results. Please note labs are automatically released to [employer] (if you have signed up for it) once available-at times you may see these prior to my having a chance to review them as well.    If you need refills please contact your pharmacist. They will send a refill request to me to review. Please allow 3 business days for us to process all refill requests. All narcotic refills should be handled in the clinic at the time of your visit.

## 2019-08-13 ENCOUNTER — ANTICOAGULATION THERAPY VISIT (OUTPATIENT)
Dept: ANTICOAGULATION | Facility: CLINIC | Age: 84
End: 2019-08-13
Payer: COMMERCIAL

## 2019-08-13 VITALS — SYSTOLIC BLOOD PRESSURE: 143 MMHG | HEART RATE: 83 BPM | DIASTOLIC BLOOD PRESSURE: 65 MMHG

## 2019-08-13 DIAGNOSIS — I48.20 CHRONIC ATRIAL FIBRILLATION (H): ICD-10-CM

## 2019-08-13 DIAGNOSIS — Z79.01 LONG TERM CURRENT USE OF ANTICOAGULANT THERAPY: ICD-10-CM

## 2019-08-13 LAB — INR POINT OF CARE: 1.7 (ref 0.9–1.1)

## 2019-08-13 PROCEDURE — 99207 ZZC NO CHARGE NURSE ONLY: CPT

## 2019-08-13 PROCEDURE — 36416 COLLJ CAPILLARY BLOOD SPEC: CPT

## 2019-08-13 PROCEDURE — 85610 PROTHROMBIN TIME: CPT | Mod: QW

## 2019-08-14 NOTE — PROGRESS NOTES
Sports Medicine Clinic Visit    PCP: Wale Short    CC: Patient presents with:  Right Ankle - Pain  Lower Back - Pain      HPI:  Lori Mehta is a 89 year old male who is seen as a self referral for new issues.   He notes right leg numbness when he is on one of his riding lawn mowers.  This occurs on his smaller riding  but not his bigger riding .  He started noticing this summer. Once he gets up the numbness goes away.  He denies back pain and leg pain. He rates the pain at a 0/10 currently.  Symptoms are relieved with standing.  He endorses numbness, tingling and weakness (temporary).   He denies swelling, bruising, popping, grinding, catching and locking.  Other treatment has included nothing.     He continues to have issues with the right shoulder. He has difficulty and pain with raising the shoulder above the head. He notes he had ~ 1 day of relief with the last steroid injection.       Review of Systems:  Musculoskeletal: as above  Remainder of review of systems is negative including constitutional, eyes, ENT, CV, pulmonary, GI, , endocrine, skin, hematologic, and neurologic except as noted in HPI or medical history.    History reviewed. No pertinent past surgical/medical/family/social history other than as mentioned in HPI.    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, unspecified laterality     Chronic atrial fibrillation (HCC)     Long-term (current) use of anticoagulants [Z79.01]     Diplopia     Gout     Cerebrovascular accident (CVA), unspecified mechanism (H)     Essential hypertension with goal blood pressure less than 140/90     Personal history of skin cancer      Rotator cuff arthropathy of right shoulder     Arthritis of shoulder     Past Medical History:   Diagnosis Date     Anxiety      Atrial fibrillation (H) 1/9/2009     BPH      GERD (gastroesophageal reflux disease)      Pure hypercholesterolemia      Unspecified essential hypertension      Past Surgical History:   Procedure Laterality Date     ARTHROPLASTY HIP ANTERIOR  11/4/2013    Procedure: ARTHROPLASTY HIP ANTERIOR;  Right Total Hip Arthroplasty - anterior approach;  Surgeon: Mathew Osorio MD;  [facility]     CL AFF SURGICAL PATHOLOGY      nasal polyp removal     COLONOSCOPY  6/16/10     HC REMOVAL GALLBLADDER      Cholecystectomy     HC REMOVAL OF HYDROCELE,TUNICA,UNILAT       HC REPAIR ING HERNIA,5+Y/O,REDUCIBL       HC UGI ENDOSCOPY, SIMPLE EXAM  6/16/10     LAPAROSCOPIC HERNIORRHAPHY PREPERITONEAL  9/19/2012    Procedure: LAPAROSCOPIC HERNIORRHAPHY PREPERITONEAL;  Laparoscopic Preperitoneal Left Inguinal Hernia Repair;  Surgeon: Errol Navas MD;  [facility]     LASER YAG CAPSULOTOMY Left 4/6/2017    Procedure: LASER YAG CAPSULOTOMY;  Surgeon: Mathew Croft MD;  [facility]     OPEN REDUCTION INTERNAL FIXATION FOREARM Left 12/29/2014    Procedure: OPEN REDUCTION INTERNAL FIXATION FOREARM;  Surgeon: Mathew Osorio MD;  [facility]     PHACOEMULSIFICATION WITH STANDARD INTRAOCULAR LENS IMPLANT  7/19/2012    Procedure: PHACOEMULSIFICATION WITH STANDARD INTRAOCULAR LENS IMPLANT;  PHACOEMULSIFICATION WITH STANDARD INTRAOCULAR LENS IMPLANT LEFT EYE;  Surgeon: Gabriel Ross MD;  [facility]     PHACOEMULSIFICATION WITH STANDARD INTRAOCULAR LENS IMPLANT  8/16/2012    Procedure: PHACOEMULSIFICATION WITH STANDARD INTRAOCULAR LENS IMPLANT;  RIGHT PHACOEMULSIFICATION WITH STANDARD INTRAOCULAR LENS IMPLANT;  Surgeon: Gabriel Ross MD;  [facility]     Family History   Problem Relation Age of Onset     Diabetes Brother      C.A.D. Mother      Diabetes Maternal  Grandmother      Circulatory Maternal Grandfather         cerebral aneurysm     Hypertension Father      Cerebrovascular Disease Father      Breast Cancer Sister          x 2     Cancer - colorectal No family hx of      Social History     Socioeconomic History     Marital status:      Spouse name: Not on file     Number of children: Not on file     Years of education: Not on file     Highest education level: Not on file   Occupational History     Not on file   Social Needs     Financial resource strain: Not on file     Food insecurity:     Worry: Not on file     Inability: Not on file     Transportation needs:     Medical: Not on file     Non-medical: Not on file   Tobacco Use     Smoking status: Never Smoker     Smokeless tobacco: Never Used   Substance and Sexual Activity     Alcohol use: Yes     Alcohol/week: 0.6 oz     Types: 1 Standard drinks or equivalent per week     Comment: beer once a week     Drug use: No     Sexual activity: Never   Lifestyle     Physical activity:     Days per week: Not on file     Minutes per session: Not on file     Stress: Not on file   Relationships     Social connections:     Talks on phone: Not on file     Gets together: Not on file     Attends Baptism service: Not on file     Active member of club or organization: Not on file     Attends meetings of clubs or organizations: Not on file     Relationship status: Not on file     Intimate partner violence:     Fear of current or ex partner: Not on file     Emotionally abused: Not on file     Physically abused: Not on file     Forced sexual activity: Not on file   Other Topics Concern     Parent/sibling w/ CABG, MI or angioplasty before 65F 55M? Not Asked   Social History Narrative     Not on file         Current Outpatient Medications   Medication     Acetaminophen (TYLENOL PO)     allopurinol (ZYLOPRIM) 100 MG tablet     Ascorbic Acid (VITAMIN C) 500 MG CHEW     Bromelains 500 MG TABS     CAPSICUM, CAYENNE, PO     COENZYME  "Q10     hemp seed oil     hydrochlorothiazide (HYDRODIURIL) 25 MG tablet     lisinopril (PRINIVIL/ZESTRIL) 10 MG tablet     MELATONIN PO     OMEGA 3 1200 MG OR CAPS     Saw Palmetto, Serenoa repens, (SAW PALMETTO EXTRACT PO)     SELENIUM 200 MCG OR CAPS     simvastatin (ZOCOR) 20 MG tablet     tamsulosin (FLOMAX) 0.4 MG capsule     VITAMIN B12 TR 1000 MCG OR TBCR     VITAMIN E 400 UNIT OR CAPS     warfarin (COUMADIN) 5 MG tablet     ZINC 30 MG OR TABS     Current Facility-Administered Medications   Medication     lidocaine 1 % injection 2 mL     triamcinolone (KENALOG-40) injection 40 mg     Allergies   Allergen Reactions     Paroxetine      Other reaction(s): Intolerance     Terfenadine      Other reaction(s): Other  Unable to void         Objective:  BP (!) 148/86   Pulse 76   Ht 1.702 m (5' 7\")   Wt 78.5 kg (173 lb)   BMI 27.10 kg/m      General: Alert and in no distress    Head: Normocephalic, atraumatic  Eyes: no scleral icterus or conjunctival erythema   Oropharynx:  Mucous membranes moist  Skin: no erythema, petechiae, or jaundice  CV: regular rhythm by palpation, 2+ distal pulses  Resp: normal respiratory effort without conversational dyspnea   Psych: normal mood and affect    Neuro: Motor strength and sensation as noted below    Musculoskeletal:    Bilateral Knee exam    Inspection:  No erythema, ecchymosis, warmth, or edema    Palpation: No tenderness to palpation of the bilateral legs    ROM: Full seated active knee extension and flexion bilaterally.  Mildly decreased right hip flexion.    Strength:    Left:  5/5 hip flexion/abduction/adduction, 5/5 knee extension/flexion, 5/5 ankle dorsiflexion/plantarflexion, 5/5 great toe extension/flexion  Right:  5/5 hip flexion/abduction/adduction, 5/5 knee extension/flexion, 5/5 ankle dorsiflexion/plantarflexion, 5/5 great toe extension/flexion    Sensation:  Intact to light touch in the bilateral lower extremities      Radiology:  No imaging obtained " today    Assessment:  1. 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  Did discuss EMG but given the symptoms are only temporary I am not sure it would be helpful - Lori is not interested in pursuing this route.  Had extensive conversation about the ergonomics of the .   Recommended he try various cushions/pillows and seating positions. Asked if he could take more frequent breaks, but sounds like this happens within 10-15 minutes of him sitting on the .  Also suggested he just use the other  but that one is too big for some areas.  Additionally asked if someone could do that part of the lawn that required a smaller .      -Lori to follow up with his primary care provider regarding elevated blood pressure.    -Follow up with me as needed.  Please call with questions or concerns.        Luz Long MD, CAQ Sports Medicine  [facility]

## 2019-08-23 ENCOUNTER — OFFICE VISIT (OUTPATIENT)
Dept: ORTHOPEDICS | Facility: CLINIC | Age: 84
End: 2019-08-23
Payer: COMMERCIAL

## 2019-08-23 ENCOUNTER — ANTICOAGULATION THERAPY VISIT (OUTPATIENT)
Dept: ANTICOAGULATION | Facility: CLINIC | Age: 84
End: 2019-08-23
Payer: COMMERCIAL

## 2019-08-23 VITALS
SYSTOLIC BLOOD PRESSURE: 148 MMHG | HEIGHT: 67 IN | DIASTOLIC BLOOD PRESSURE: 86 MMHG | HEART RATE: 76 BPM | BODY MASS INDEX: 27.15 KG/M2 | WEIGHT: 173 LBS

## 2019-08-23 DIAGNOSIS — I48.20 CHRONIC ATRIAL FIBRILLATION (H): ICD-10-CM

## 2019-08-23 DIAGNOSIS — R20.2 NUMBNESS AND TINGLING OF RIGHT LEG: Primary | ICD-10-CM

## 2019-08-23 DIAGNOSIS — R20.0 NUMBNESS AND TINGLING OF RIGHT LEG: Primary | ICD-10-CM

## 2019-08-23 DIAGNOSIS — Z79.01 LONG TERM CURRENT USE OF ANTICOAGULANT THERAPY: ICD-10-CM

## 2019-08-23 LAB — INR POINT OF CARE: 2 (ref 0.9–1.1)

## 2019-08-23 PROCEDURE — 99213 OFFICE O/P EST LOW 20 MIN: CPT | Performed by: PHYSICAL MEDICINE & REHABILITATION

## 2019-08-23 PROCEDURE — 36416 COLLJ CAPILLARY BLOOD SPEC: CPT

## 2019-08-23 PROCEDURE — 85610 PROTHROMBIN TIME: CPT | Mod: QW

## 2019-08-23 PROCEDURE — 99207 ZZC NO CHARGE NURSE ONLY: CPT

## 2019-08-23 RX ORDER — WARFARIN SODIUM 5 MG/1
TABLET ORAL
Qty: 120 TABLET | Refills: 1 | COMMUNITY
Start: 2019-08-23 | End: 2020-01-16

## 2019-08-23 ASSESSMENT — MIFFLIN-ST. JEOR: SCORE: 1398.35

## 2019-08-23 NOTE — LETTER
8/23/2019         RE: Lori Mehta  [address] [facility] [address]        Dear Colleague,    Thank you for referring your patient, Lori Mehta, to the [facility]. Please see a copy of my visit note below.    Sports Medicine Clinic Visit    PCP: Wale Short    CC: Patient presents with:  Right Ankle - Pain  Lower Back - Pain      HPI:  Lori Mehta is a 89 year old male who is seen as a self referral for new issues.   He notes right leg numbness when he is on one of his riding lawn mowers.  This occurs on his smaller riding  but not his bigger riding .  He started noticing this summer. Once he gets up the numbness goes away.  He denies back pain and leg pain. He rates the pain at a 0/10 currently.  Symptoms are relieved with standing.  He endorses numbness, tingling and weakness (temporary).   He denies swelling, bruising, popping, grinding, catching and locking.  Other treatment has included nothing.     He continues to have issues with the right shoulder. He has difficulty and pain with raising the shoulder above the head. He notes he had ~ 1 day of relief with the last steroid injection.       Review of Systems:  Musculoskeletal: as above  Remainder of review of systems is negative including constitutional, eyes, ENT, CV, pulmonary, GI, , endocrine, skin, hematologic, and neurologic except as noted in HPI or medical history.    History reviewed. No pertinent past surgical/medical/family/social history other than as mentioned in HPI.    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, unspecified laterality     Chronic atrial fibrillation (HCC)      Long-term (current) use of anticoagulants [Z79.01]     Diplopia     Gout     Cerebrovascular accident (CVA), unspecified mechanism (H)     Essential hypertension with goal blood pressure less than 140/90     Personal history of skin cancer     Rotator cuff arthropathy of right shoulder     Arthritis of shoulder     Past Medical History:   Diagnosis Date     Anxiety      Atrial fibrillation (H) 1/9/2009     BPH      GERD (gastroesophageal reflux disease)      Pure hypercholesterolemia      Unspecified essential hypertension      Past Surgical History:   Procedure Laterality Date     ARTHROPLASTY HIP ANTERIOR  11/4/2013    Procedure: ARTHROPLASTY HIP ANTERIOR;  Right Total Hip Arthroplasty - anterior approach;  Surgeon: Mathew Osorio MD;  [facility]     CL AFF SURGICAL PATHOLOGY      nasal polyp removal     COLONOSCOPY  6/16/10     HC REMOVAL GALLBLADDER      Cholecystectomy     HC REMOVAL OF HYDROCELE,TUNICA,UNILAT       HC REPAIR ING HERNIA,5+Y/O,REDUCIBL       HC UGI ENDOSCOPY, SIMPLE EXAM  6/16/10     LAPAROSCOPIC HERNIORRHAPHY PREPERITONEAL  9/19/2012    Procedure: LAPAROSCOPIC HERNIORRHAPHY PREPERITONEAL;  Laparoscopic Preperitoneal Left Inguinal Hernia Repair;  Surgeon: Errol Navas MD;  [facility]     LASER YAG CAPSULOTOMY Left 4/6/2017    Procedure: LASER YAG CAPSULOTOMY;  Surgeon: Mathew Croft MD;  [facility]     OPEN REDUCTION INTERNAL FIXATION FOREARM Left 12/29/2014    Procedure: OPEN REDUCTION INTERNAL FIXATION FOREARM;  Surgeon: Mathew Osorio MD;  [facility]     PHACOEMULSIFICATION WITH STANDARD INTRAOCULAR LENS IMPLANT  7/19/2012    Procedure: PHACOEMULSIFICATION WITH STANDARD INTRAOCULAR LENS IMPLANT;  PHACOEMULSIFICATION WITH STANDARD INTRAOCULAR LENS IMPLANT LEFT EYE;  Surgeon: Gabriel Ross MD;  [facility]     PHACOEMULSIFICATION WITH STANDARD INTRAOCULAR LENS IMPLANT  8/16/2012    Procedure: PHACOEMULSIFICATION WITH STANDARD INTRAOCULAR  LENS IMPLANT;  RIGHT PHACOEMULSIFICATION WITH STANDARD INTRAOCULAR LENS IMPLANT;  Surgeon: Gabriel Ross MD;  [facility]     Family History   Problem Relation Age of Onset     Diabetes Brother      C.A.D. Mother      Diabetes Maternal Grandmother      Circulatory Maternal Grandfather         cerebral aneurysm     Hypertension Father      Cerebrovascular Disease Father      Breast Cancer Sister          x 2     Cancer - colorectal No family hx of      Social History     Socioeconomic History     Marital status:      Spouse name: Not on file     Number of children: Not on file     Years of education: Not on file     Highest education level: Not on file   Occupational History     Not on file   Social Needs     Financial resource strain: Not on file     Food insecurity:     Worry: Not on file     Inability: Not on file     Transportation needs:     Medical: Not on file     Non-medical: Not on file   Tobacco Use     Smoking status: Never Smoker     Smokeless tobacco: Never Used   Substance and Sexual Activity     Alcohol use: Yes     Alcohol/week: 0.6 oz     Types: 1 Standard drinks or equivalent per week     Comment: beer once a week     Drug use: No     Sexual activity: Never   Lifestyle     Physical activity:     Days per week: Not on file     Minutes per session: Not on file     Stress: Not on file   Relationships     Social connections:     Talks on phone: Not on file     Gets together: Not on file     Attends Cheondoism service: Not on file     Active member of club or organization: Not on file     Attends meetings of clubs or organizations: Not on file     Relationship status: Not on file     Intimate partner violence:     Fear of current or ex partner: Not on file     Emotionally abused: Not on file     Physically abused: Not on file     Forced sexual activity: Not on file   Other Topics Concern     Parent/sibling w/ CABG, MI or angioplasty before 65F 55M? Not Asked   Social History Narrative      "Not on file         Current Outpatient Medications   Medication     Acetaminophen (TYLENOL PO)     allopurinol (ZYLOPRIM) 100 MG tablet     Ascorbic Acid (VITAMIN C) 500 MG CHEW     Bromelains 500 MG TABS     CAPSICUM, CAYENNE, PO     COENZYME Q10     hemp seed oil     hydrochlorothiazide (HYDRODIURIL) 25 MG tablet     lisinopril (PRINIVIL/ZESTRIL) 10 MG tablet     MELATONIN PO     OMEGA 3 1200 MG OR CAPS     Saw Palmetto, Serenoa repens, (SAW PALMETTO EXTRACT PO)     SELENIUM 200 MCG OR CAPS     simvastatin (ZOCOR) 20 MG tablet     tamsulosin (FLOMAX) 0.4 MG capsule     VITAMIN B12 TR 1000 MCG OR TBCR     VITAMIN E 400 UNIT OR CAPS     warfarin (COUMADIN) 5 MG tablet     ZINC 30 MG OR TABS     Current Facility-Administered Medications   Medication     lidocaine 1 % injection 2 mL     triamcinolone (KENALOG-40) injection 40 mg     Allergies   Allergen Reactions     Paroxetine      Other reaction(s): Intolerance     Terfenadine      Other reaction(s): Other  Unable to void         Objective:  BP (!) 148/86   Pulse 76   Ht 1.702 m (5' 7\")   Wt 78.5 kg (173 lb)   BMI 27.10 kg/m       General: Alert and in no distress    Head: Normocephalic, atraumatic  Eyes: no scleral icterus or conjunctival erythema   Oropharynx:  Mucous membranes moist  Skin: no erythema, petechiae, or jaundice  CV: regular rhythm by palpation, 2+ distal pulses  Resp: normal respiratory effort without conversational dyspnea   Psych: normal mood and affect    Neuro: Motor strength and sensation as noted below    Musculoskeletal:    Bilateral Knee exam    Inspection:  No erythema, ecchymosis, warmth, or edema    Palpation: No tenderness to palpation of the bilateral legs    ROM: Full seated active knee extension and flexion bilaterally.  Mildly decreased right hip flexion.    Strength:    Left:  5/5 hip flexion/abduction/adduction, 5/5 knee extension/flexion, 5/5 ankle dorsiflexion/plantarflexion, 5/5 great toe extension/flexion  Right:  5/5 hip " flexion/abduction/adduction, 5/5 knee extension/flexion, 5/5 ankle dorsiflexion/plantarflexion, 5/5 great toe extension/flexion    Sensation:  Intact to light touch in the bilateral lower extremities      Radiology:  No imaging obtained today    Assessment:  1. 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  Did discuss EMG but given the symptoms are only temporary I am not sure it would be helpful - Lori is not interested in pursuing this route.  Had extensive conversation about the ergonomics of the .   Recommended he try various cushions/pillows and seating positions. Asked if he could take more frequent breaks, but sounds like this happens within 10-15 minutes of him sitting on the .  Also suggested he just use the other  but that one is too big for some areas.  Additionally asked if someone could do that part of the lawn that required a smaller .      -Lori to follow up with his primary care provider regarding elevated blood pressure.    -Follow up with me as needed.  Please call with questions or concerns.        Luz Long MD, [facility] Sports Medicine  [facility]    Again, thank you for allowing me to participate in the care of your patient.        Sincerely,        Ava Long MD

## 2019-08-23 NOTE — PROGRESS NOTES
ANTICOAGULATION FOLLOW-UP CLINIC VISIT    Patient Name:  Lori Mehta  Date:  2019  Contact Type:  Face to Face    SUBJECTIVE:  Patient Findings     Comments:   The patient was assessed for diet, medication, and activity level changes, missed or extra doses, bruising or bleeding, with no problem findings.  Lakeisha Rodriguez RN          Clinical Outcomes     Negatives:   Major bleeding event, Thromboembolic event, Anticoagulation-related hospital admission, Anticoagulation-related ED visit, Anticoagulation-related fatality    Comments:   The patient was assessed for diet, medication, and activity level changes, missed or extra doses, bruising or bleeding, with no problem findings.  Lakeisha Rodriguez RN             OBJECTIVE    INR Protime   Date Value Ref Range Status   2019 2.0 (A) 0.9 - 1.1 Final       ASSESSMENT / PLAN  INR assessment THER    Recheck INR In: 4 WEEKS    INR Location Clinic      Anticoagulation Summary  As of 2019    INR goal:   2.0-3.0   TTR:   72.1 % (3.3 y)   INR used for dosin.0 (2019)   Warfarin maintenance plan:   5 mg (5 mg x 1) every Mon; 7.5 mg (5 mg x 1.5) all other days   Full warfarin instructions:   5 mg every Mon; 7.5 mg all other days   Weekly warfarin total:   50 mg   No change documented:   Lakeisha Rodriguez RN   Plan last modified:   Lakeisha Rodriguez RN (2019)   Next INR check:   2019   Target end date:       Indications    Chronic atrial fibrillation (HCC) [I48.2]  Long-term (current) use of anticoagulants [Z79.01] [Z79.01]             Anticoagulation Episode Summary     INR check location:       Preferred lab:       Send INR reminders to:   [facility]    Comments:   5 mg tablets, no bandaid, takes at noon, likes BP done.       Anticoagulation Care Providers     Provider Role Specialty Phone number    Wale Short MD [facility] Internal Medicine [phone]            See the Encounter Report to view Anticoagulation  Flowsheet and Dosing Calendar (Go to Encounters tab in chart review, and find the Anticoagulation Therapy Visit)    Dosage adjustment made based on physician directed care plan.      Lakeisha Rodriguez RN

## 2019-09-17 ENCOUNTER — ANTICOAGULATION THERAPY VISIT (OUTPATIENT)
Dept: ANTICOAGULATION | Facility: CLINIC | Age: 84
End: 2019-09-17
Payer: COMMERCIAL

## 2019-09-17 DIAGNOSIS — Z79.01 LONG TERM CURRENT USE OF ANTICOAGULANT THERAPY: ICD-10-CM

## 2019-09-17 DIAGNOSIS — I48.20 CHRONIC ATRIAL FIBRILLATION (H): ICD-10-CM

## 2019-09-17 LAB — INR POINT OF CARE: 2.2 (ref 0.9–1.1)

## 2019-09-17 PROCEDURE — 85610 PROTHROMBIN TIME: CPT | Mod: QW

## 2019-09-17 PROCEDURE — 36416 COLLJ CAPILLARY BLOOD SPEC: CPT

## 2019-09-17 PROCEDURE — 99207 ZZC NO CHARGE NURSE ONLY: CPT

## 2019-09-17 NOTE — PROGRESS NOTES
ANTICOAGULATION FOLLOW-UP CLINIC VISIT    Patient Name:  Lori Mehta  Date:  2019  Contact Type:  Face to Face    SUBJECTIVE:  Patient Findings     Comments:   The patient was assessed for diet, medication, and activity level changes, missed or extra doses, bruising or bleeding, with no problem findings.  Lakeisha Rodriguez RN          Clinical Outcomes     Negatives:   Major bleeding event, Thromboembolic event, Anticoagulation-related hospital admission, Anticoagulation-related ED visit, Anticoagulation-related fatality    Comments:   The patient was assessed for diet, medication, and activity level changes, missed or extra doses, bruising or bleeding, with no problem findings.  Lakeisha Rodriguez RN             OBJECTIVE    INR Protime   Date Value Ref Range Status   2019 2.2 (A) 0.9 - 1.1 Final       ASSESSMENT / PLAN  INR assessment THER    Recheck INR In: 4 WEEKS    INR Location Clinic      Anticoagulation Summary  As of 2019    INR goal:   2.0-3.0   TTR:   72.7 % (3.4 y)   INR used for dosin.2 (2019)   Warfarin maintenance plan:   5 mg (5 mg x 1) every Mon; 7.5 mg (5 mg x 1.5) all other days   Full warfarin instructions:   5 mg every Mon; 7.5 mg all other days   Weekly warfarin total:   50 mg   No change documented:   Lakeisha Rodriguez RN   Plan last modified:   Lakeisha Rodriguez RN (2019)   Next INR check:   10/15/2019   Target end date:       Indications    Chronic atrial fibrillation (HCC) [I48.2]  Long-term (current) use of anticoagulants [Z79.01] [Z79.01]             Anticoagulation Episode Summary     INR check location:       Preferred lab:       Send INR reminders to:   [facility]    Comments:   5 mg tablets, no bandaid, takes at noon, likes BP done.       Anticoagulation Care Providers     Provider Role Specialty Phone number    Wale Short MD [facility] Internal Medicine [phone]            See the Encounter Report to view Anticoagulation  Flowsheet and Dosing Calendar (Go to Encounters tab in chart review, and find the Anticoagulation Therapy Visit)    Dosage adjustment made based on physician directed care plan.      Lakeisha Rodriguez RN

## 2019-09-30 ENCOUNTER — NURSE TRIAGE (OUTPATIENT)
Dept: INTERNAL MEDICINE | Facility: CLINIC | Age: 84
End: 2019-09-30

## 2019-09-30 DIAGNOSIS — I10 ESSENTIAL HYPERTENSION WITH GOAL BLOOD PRESSURE LESS THAN 140/90: Primary | ICD-10-CM

## 2019-09-30 RX ORDER — LISINOPRIL 5 MG/1
5 TABLET ORAL DAILY
Qty: 90 TABLET | Refills: 1 | Status: SHIPPED | OUTPATIENT
Start: 2019-09-30 | End: 2020-03-11

## 2019-09-30 NOTE — TELEPHONE ENCOUNTER
"Nurse Triage SBAR    Situation: Lori Mehta has questions about care advice given per protocol. Patient states his diastolic pressure has been low at times and requesting/needing  medication review. He would like to suggest we stop the lisinopril or go to 5 mg a day.    Background: patient on lisinipril for hypertension. Pressured have ranged form 122//53. Pulse 88 today. He states he feels fine today but some days he is lightheaded like he is going to black out and legs are weaker. His leg swelling is better.    Assessment:  (See information below for more triage details.)    Recommendation: Routing to provider for recommendation on medication or if appt needed.    Protocol Recommended Disposition: Telephone advice    Nurse Triage Follow Up: Patient informed of recommendations and reasons to call back or seek care in the Clinic. Patient verbalized understanding and agrees with the plan..    Additional Information    Systolic BP < 90 and NOT dizzy, lightheaded or weak    Diastolic BP < 50 mm Hg    Answer Assessment - Initial Assessment Questions  1. BLOOD PRESSURE: \"What is the blood pressure?\" \"Did you take at least two measurements 5 minutes apart?\"      Today was 122/63, pulse 88  2. ONSET: \"When did you take your blood pressure?\"      This am  3. HOW: \"How did you obtain the blood pressure?\" (e.g., visiting nurse, automatic home BP monitor)      Home bp  4. HISTORY: \"Do you have a history of low blood pressure?\" \"What is your blood pressure normally?\"      No. Its been getting lower and lower,   5. MEDICATIONS: \"Are you taking any medications for blood pressure?\" If yes: \"Have they been changed recently?\"      Lisinopril 10mg started in June.  6. PULSE RATE: \"Do you know what your pulse rate is?\"       88.  7. OTHER SYMPTOMS: \"Have you been sick recently?\" \"Have you had a recent injury?\"      I feel good today. Some times my legs are tires and weak but I have a big yard. I also feel lightheaded some " "days.  8. PREGNANCY: \"Is there any chance you are pregnant?\" \"When was your last menstrual period?\"      No.    Protocols used: LOW BLOOD PRESSURE-A-OH    "

## 2019-09-30 NOTE — TELEPHONE ENCOUNTER
I called pt and informed him of the below msg. He states he doesn't have many left, so I informed him that we will send in the new dose.  Taylor Ty MA

## 2019-09-30 NOTE — TELEPHONE ENCOUNTER
Reason for call:  Patient reporting a symptom    Symptom or request: BP concerns. Bottom # has been low. Around 53-55.     Duration (how long have symptoms been present): 2 weeks     Have you been treated for this before? No    Additional comments: Pt's wife calling. He does get lightheaded once in a while. She would like a call to discuss.     Phone Number patient can be reached at:  Home number on file [phone] (home)    Best Time:  Any     Can we leave a detailed message on this number:  YES    Call taken on 9/30/2019 at 10:01 AM by Tatyana Mccarthy

## 2019-10-15 ENCOUNTER — ANTICOAGULATION THERAPY VISIT (OUTPATIENT)
Dept: ANTICOAGULATION | Facility: CLINIC | Age: 84
End: 2019-10-15
Payer: COMMERCIAL

## 2019-10-15 VITALS — DIASTOLIC BLOOD PRESSURE: 75 MMHG | HEART RATE: 75 BPM | SYSTOLIC BLOOD PRESSURE: 138 MMHG

## 2019-10-15 DIAGNOSIS — I48.20 CHRONIC ATRIAL FIBRILLATION (H): ICD-10-CM

## 2019-10-15 DIAGNOSIS — Z79.01 LONG TERM CURRENT USE OF ANTICOAGULANT THERAPY: ICD-10-CM

## 2019-10-15 LAB — INR POINT OF CARE: 2.5 (ref 0.9–1.1)

## 2019-10-15 PROCEDURE — 99207 ZZC NO CHARGE NURSE ONLY: CPT

## 2019-10-15 PROCEDURE — 36416 COLLJ CAPILLARY BLOOD SPEC: CPT

## 2019-10-15 PROCEDURE — 85610 PROTHROMBIN TIME: CPT | Mod: QW

## 2019-10-15 NOTE — PROGRESS NOTES
ANTICOAGULATION FOLLOW-UP CLINIC VISIT    Patient Name:  Lori Mehta  Date:  10/15/2019  Contact Type:  Face to Face    SUBJECTIVE:  Patient Findings     Comments:   The patient was assessed for diet, medication, and activity level changes, missed or extra doses, bruising or bleeding, with no problem findings.  Lakeisha Rodriguez RN          Clinical Outcomes     Negatives:   Major bleeding event, Thromboembolic event, Anticoagulation-related hospital admission, Anticoagulation-related ED visit, Anticoagulation-related fatality    Comments:   The patient was assessed for diet, medication, and activity level changes, missed or extra doses, bruising or bleeding, with no problem findings.  Lakeisha Rodriguez RN             OBJECTIVE    INR Protime   Date Value Ref Range Status   10/15/2019 2.5 (A) 0.9 - 1.1 Final       ASSESSMENT / PLAN  INR assessment THER    Recheck INR In: 6 WEEKS    INR Location Clinic      Anticoagulation Summary  As of 10/15/2019    INR goal:   2.0-3.0   TTR:   73.3 % (3.5 y)   INR used for dosin.5 (10/15/2019)   Warfarin maintenance plan:   5 mg (5 mg x 1) every Mon; 7.5 mg (5 mg x 1.5) all other days   Full warfarin instructions:   5 mg every Mon; 7.5 mg all other days   Weekly warfarin total:   50 mg   No change documented:   Lakeisha Rodriguez RN   Plan last modified:   Lakeisha Rodriguez RN (2019)   Next INR check:   2019   Target end date:       Indications    Chronic atrial fibrillation (HCC) [I48.20]  Long-term (current) use of anticoagulants [Z79.01] [Z79.01]             Anticoagulation Episode Summary     INR check location:       Preferred lab:       Send INR reminders to:   [facility] [facility]    Comments:   5 mg tablets, no bandaid, takes at noon, likes BP done.       Anticoagulation Care Providers     Provider Role Specialty Phone number    Wale Short MD [facility] Internal Medicine [phone]            See the Encounter Report to view Anticoagulation  Flowsheet and Dosing Calendar (Go to Encounters tab in chart review, and find the Anticoagulation Therapy Visit)    Dosage adjustment made based on physician directed care plan.      Lakeisha Rodriguez RN

## 2019-10-31 ENCOUNTER — TRANSFERRED RECORDS (OUTPATIENT)
Dept: HEALTH INFORMATION MANAGEMENT | Facility: CLINIC | Age: 84
End: 2019-10-31

## 2019-11-26 ENCOUNTER — OFFICE VISIT (OUTPATIENT)
Dept: INTERNAL MEDICINE | Facility: CLINIC | Age: 84
End: 2019-11-26
Payer: COMMERCIAL

## 2019-11-26 ENCOUNTER — ALLIED HEALTH/NURSE VISIT (OUTPATIENT)
Dept: FAMILY MEDICINE | Facility: CLINIC | Age: 84
End: 2019-11-26
Payer: COMMERCIAL

## 2019-11-26 ENCOUNTER — ANTICOAGULATION THERAPY VISIT (OUTPATIENT)
Dept: ANTICOAGULATION | Facility: CLINIC | Age: 84
End: 2019-11-26
Payer: COMMERCIAL

## 2019-11-26 VITALS
DIASTOLIC BLOOD PRESSURE: 76 MMHG | WEIGHT: 177 LBS | SYSTOLIC BLOOD PRESSURE: 174 MMHG | RESPIRATION RATE: 16 BRPM | BODY MASS INDEX: 27.72 KG/M2 | TEMPERATURE: 97.9 F | HEART RATE: 106 BPM | OXYGEN SATURATION: 98 %

## 2019-11-26 VITALS — HEART RATE: 73 BPM | SYSTOLIC BLOOD PRESSURE: 210 MMHG | DIASTOLIC BLOOD PRESSURE: 82 MMHG

## 2019-11-26 VITALS — DIASTOLIC BLOOD PRESSURE: 76 MMHG | SYSTOLIC BLOOD PRESSURE: 152 MMHG | HEART RATE: 68 BPM

## 2019-11-26 DIAGNOSIS — Z79.01 LONG TERM CURRENT USE OF ANTICOAGULANT THERAPY: ICD-10-CM

## 2019-11-26 DIAGNOSIS — I48.20 CHRONIC ATRIAL FIBRILLATION (H): ICD-10-CM

## 2019-11-26 DIAGNOSIS — I10 ESSENTIAL HYPERTENSION WITH GOAL BLOOD PRESSURE LESS THAN 140/90: Primary | ICD-10-CM

## 2019-11-26 DIAGNOSIS — F41.9 ANXIETY: ICD-10-CM

## 2019-11-26 LAB — INR POINT OF CARE: 2.5 (ref 0.9–1.1)

## 2019-11-26 PROCEDURE — 99207 ZZC NO CHARGE NURSE ONLY: CPT

## 2019-11-26 PROCEDURE — 36416 COLLJ CAPILLARY BLOOD SPEC: CPT

## 2019-11-26 PROCEDURE — 85610 PROTHROMBIN TIME: CPT | Mod: QW

## 2019-11-26 PROCEDURE — 99213 OFFICE O/P EST LOW 20 MIN: CPT | Performed by: INTERNAL MEDICINE

## 2019-11-26 RX ORDER — CITALOPRAM HYDROBROMIDE 10 MG/1
10 TABLET ORAL DAILY
Qty: 90 TABLET | Refills: 3 | Status: SHIPPED | OUTPATIENT
Start: 2019-11-26 | End: 2020-03-11

## 2019-11-26 ASSESSMENT — PAIN SCALES - GENERAL: PAINLEVEL: NO PAIN (0)

## 2019-11-26 NOTE — PROGRESS NOTES
Subjective     Lori Mehta is a 89 year old male who presents to clinic today for the following health issues:    HPI   Chief Complaint   Patient presents with     Hypertension     discuss some elevated blood pressure readings       BP he follows at home on his meter, as 114/58, measures at the elbow on the left arm.  Then at coumadin clinic was up to 200 systolic, Feels ok today.     Last week was dizzy sometimes.      Has some nerves and is very edgy.      Past Medical History:   Diagnosis Date     Anxiety      Atrial fibrillation (H) 1/9/2009     BPH      GERD (gastroesophageal reflux disease)      Pure hypercholesterolemia      Unspecified essential hypertension      Current Outpatient Medications   Medication     Acetaminophen (TYLENOL PO)     Ascorbic Acid (VITAMIN C) 500 MG CHEW     Bromelains 500 MG TABS     CAPSICUM, CAYENNE, PO     citalopram (CELEXA) 10 MG tablet     COENZYME Q10     hemp seed oil     hydrochlorothiazide (HYDRODIURIL) 25 MG tablet     lisinopril (PRINIVIL/ZESTRIL) 5 MG tablet     MELATONIN PO     OMEGA 3 1200 MG OR CAPS     Saw Palmetto, Serenoa repens, (SAW PALMETTO EXTRACT PO)     SELENIUM 200 MCG OR CAPS     simvastatin (ZOCOR) 20 MG tablet     tamsulosin (FLOMAX) 0.4 MG capsule     VITAMIN B12 TR 1000 MCG OR TBCR     VITAMIN E 400 UNIT OR CAPS     warfarin (COUMADIN) 5 MG tablet     ZINC 30 MG OR TABS     No current facility-administered medications for this visit.      Physical Exam  BP (!) 174/76   Pulse 106   Temp 97.9  F (36.6  C) (Temporal)   Resp 16   Wt 80.3 kg (177 lb)   SpO2 98%   BMI 27.72 kg/m    General Appearance-healthy, alert, no distress  Cardiac-regular rate and rhythm  with normal S1, S2 ; no murmur, rub or gallops  Lungs-clear to auscultation        ASSESSMENT:  Patient who has a long history of hypertension is on lisinopril and hydrochlorothiazide.  He is gone up and down with his lisinopril back down to 5 mg a day.  His home blood pressure readings are  down to 114/58 in the morning but I think as he gets anxious through the day that his blood pressure goes up.  We will keep his medications the same the day at this time is 174/76.  We did discuss his anxiety were going to try him on some Celexa for that.  I think he is reassured by his blood pressure would like to keep it under 150 and he will monitor this tomorrow at home.  He has no symptoms of chest pain, lightheadedness, dizziness at this time.

## 2019-11-26 NOTE — PROGRESS NOTES
ANTICOAGULATION FOLLOW-UP CLINIC VISIT    Patient Name:  Lori Mehta  Date:  2019  Contact Type:  Face to Face    SUBJECTIVE:  Patient Findings     Comments:   The patient was assessed for diet, medication, and activity level changes, missed or extra doses, bruising or bleeding, with no problem findings.          Clinical Outcomes     Negatives:   Major bleeding event, Thromboembolic event, Anticoagulation-related hospital admission, Anticoagulation-related ED visit, Anticoagulation-related fatality    Comments:   The patient was assessed for diet, medication, and activity level changes, missed or extra doses, bruising or bleeding, with no problem findings.             OBJECTIVE    INR Protime   Date Value Ref Range Status   2019 2.5 (A) 0.9 - 1.1 Final       ASSESSMENT / PLAN  INR assessment THER    Recheck INR In: 6 WEEKS    INR Location Clinic      Anticoagulation Summary  As of 2019    INR goal:   2.0-3.0   TTR:   66.4 % (1 y)   INR used for dosin.5 (2019)   Warfarin maintenance plan:   5 mg (5 mg x 1) every Mon; 7.5 mg (5 mg x 1.5) all other days   Full warfarin instructions:   5 mg every Mon; 7.5 mg all other days   Weekly warfarin total:   50 mg   No change documented:   Lakeisha Rodriguez, RN   Plan last modified:   Lakeisha Rodriguez RN (2019)   Next INR check:   2020   Priority:   Maintenance   Target end date:       Indications    Chronic atrial fibrillation (HCC) [I48.20]  Long-term (current) use of anticoagulants [Z79.01] [Z79.01]             Anticoagulation Episode Summary     INR check location:       Preferred lab:       Send INR reminders to:   [facility] [place]    Comments:   5 mg tablets, no bandaid, takes at noon, likes BP done.       Anticoagulation Care Providers     Provider Role Specialty Phone number    Wale Short MD [facility] Internal Medicine [phone]            See the Encounter Report to view Anticoagulation Flowsheet and Dosing  Calendar (Go to Encounters tab in chart review, and find the Anticoagulation Therapy Visit)    Dosage adjustment made based on physician directed care plan.    Lakeisha Rodriguez RN

## 2019-11-26 NOTE — PROGRESS NOTES
Lori Mehta is a 89 year old patient who comes in today for a Blood Pressure check.  Initial BP:  BP (!) 202/80 (BP Location: Left arm, Patient Position: Sitting, Cuff Size: Adult Regular)   Pulse 73      73  Disposition: provider notified while patient in the clinic.  Patient stated that he was dizzy last Wednesday and Thursday but he does not have symptoms today.    Patient declined being transferred to 's schedule for today. He stated he was leaving. Was informed that at the very least he should schedule an appointment with Dr. Short or another provider to follow up on his blood pressure. Patient's wife was with him and stated that she would try to schedule an appointment for him.    Rodrick Cortez, CMA

## 2020-01-01 ENCOUNTER — OFFICE VISIT (OUTPATIENT)
Dept: INTERNAL MEDICINE | Facility: CLINIC | Age: 85
End: 2020-01-01
Payer: COMMERCIAL

## 2020-01-01 ENCOUNTER — TELEPHONE (OUTPATIENT)
Dept: INTERNAL MEDICINE | Facility: CLINIC | Age: 85
End: 2020-01-01

## 2020-01-01 ENCOUNTER — VIRTUAL VISIT (OUTPATIENT)
Dept: INTERNAL MEDICINE | Facility: CLINIC | Age: 85
End: 2020-01-01
Payer: COMMERCIAL

## 2020-01-01 ENCOUNTER — HOSPITAL ENCOUNTER (OUTPATIENT)
Dept: ULTRASOUND IMAGING | Facility: CLINIC | Age: 85
Discharge: HOME | End: 2020-08-24
Attending: INTERNAL MEDICINE | Admitting: INTERNAL MEDICINE
Payer: MEDICARE

## 2020-01-01 ENCOUNTER — TELEPHONE (OUTPATIENT)
Dept: ANTICOAGULATION | Facility: CLINIC | Age: 85
End: 2020-01-01

## 2020-01-01 ENCOUNTER — ANTICOAGULATION THERAPY VISIT (OUTPATIENT)
Dept: ANTICOAGULATION | Facility: CLINIC | Age: 85
End: 2020-01-01

## 2020-01-01 ENCOUNTER — OFFICE VISIT (OUTPATIENT)
Dept: CARDIOLOGY | Facility: CLINIC | Age: 85
End: 2020-01-01
Payer: COMMERCIAL

## 2020-01-01 ENCOUNTER — ANTICOAGULATION THERAPY VISIT (OUTPATIENT)
Dept: ANTICOAGULATION | Facility: OTHER | Age: 85
End: 2020-01-01

## 2020-01-01 ENCOUNTER — HOSPITAL ENCOUNTER (OUTPATIENT)
Dept: CARDIOLOGY | Facility: CLINIC | Age: 85
Discharge: HOME | End: 2020-10-26
Attending: INTERNAL MEDICINE | Admitting: INTERNAL MEDICINE
Payer: MEDICARE

## 2020-01-01 ENCOUNTER — NURSE TRIAGE (OUTPATIENT)
Dept: NURSING | Facility: CLINIC | Age: 85
End: 2020-01-01

## 2020-01-01 ENCOUNTER — HOSPITAL ENCOUNTER (OUTPATIENT)
Dept: CARDIOLOGY | Facility: CLINIC | Age: 85
Discharge: HOME | End: 2020-11-18
Attending: INTERNAL MEDICINE | Admitting: INTERNAL MEDICINE
Payer: MEDICARE

## 2020-01-01 ENCOUNTER — NURSE TRIAGE (OUTPATIENT)
Dept: INTERNAL MEDICINE | Facility: CLINIC | Age: 85
End: 2020-01-01

## 2020-01-01 ENCOUNTER — HOSPITAL ENCOUNTER (EMERGENCY)
Facility: CLINIC | Age: 85
Discharge: HOME | End: 2020-08-25
Attending: EMERGENCY MEDICINE | Admitting: EMERGENCY MEDICINE
Payer: MEDICARE

## 2020-01-01 VITALS
OXYGEN SATURATION: 96 % | SYSTOLIC BLOOD PRESSURE: 158 MMHG | TEMPERATURE: 98.5 F | RESPIRATION RATE: 18 BRPM | HEART RATE: 81 BPM | DIASTOLIC BLOOD PRESSURE: 109 MMHG

## 2020-01-01 VITALS
TEMPERATURE: 98.3 F | DIASTOLIC BLOOD PRESSURE: 66 MMHG | WEIGHT: 173 LBS | HEART RATE: 94 BPM | BODY MASS INDEX: 27.1 KG/M2 | SYSTOLIC BLOOD PRESSURE: 138 MMHG | OXYGEN SATURATION: 96 % | RESPIRATION RATE: 16 BRPM

## 2020-01-01 VITALS
RESPIRATION RATE: 16 BRPM | WEIGHT: 186 LBS | BODY MASS INDEX: 29.13 KG/M2 | TEMPERATURE: 98.2 F | DIASTOLIC BLOOD PRESSURE: 62 MMHG | HEART RATE: 90 BPM | SYSTOLIC BLOOD PRESSURE: 102 MMHG | OXYGEN SATURATION: 97 %

## 2020-01-01 VITALS
DIASTOLIC BLOOD PRESSURE: 80 MMHG | RESPIRATION RATE: 20 BRPM | OXYGEN SATURATION: 93 % | BODY MASS INDEX: 29.44 KG/M2 | HEART RATE: 98 BPM | WEIGHT: 188 LBS | SYSTOLIC BLOOD PRESSURE: 146 MMHG | TEMPERATURE: 97.7 F

## 2020-01-01 VITALS
OXYGEN SATURATION: 96 % | HEIGHT: 67 IN | WEIGHT: 184.5 LBS | HEART RATE: 90 BPM | DIASTOLIC BLOOD PRESSURE: 60 MMHG | BODY MASS INDEX: 28.96 KG/M2 | SYSTOLIC BLOOD PRESSURE: 158 MMHG

## 2020-01-01 VITALS
RESPIRATION RATE: 16 BRPM | HEART RATE: 84 BPM | TEMPERATURE: 98 F | BODY MASS INDEX: 27.88 KG/M2 | DIASTOLIC BLOOD PRESSURE: 74 MMHG | OXYGEN SATURATION: 97 % | WEIGHT: 178 LBS | SYSTOLIC BLOOD PRESSURE: 128 MMHG

## 2020-01-01 VITALS
WEIGHT: 175.44 LBS | BODY MASS INDEX: 27.48 KG/M2 | DIASTOLIC BLOOD PRESSURE: 56 MMHG | SYSTOLIC BLOOD PRESSURE: 136 MMHG | RESPIRATION RATE: 24 BRPM | HEART RATE: 84 BPM | OXYGEN SATURATION: 96 % | TEMPERATURE: 97.7 F

## 2020-01-01 DIAGNOSIS — I48.20 CHRONIC ATRIAL FIBRILLATION (H): ICD-10-CM

## 2020-01-01 DIAGNOSIS — Z79.01 LONG TERM CURRENT USE OF ANTICOAGULANT THERAPY: ICD-10-CM

## 2020-01-01 DIAGNOSIS — I50.9 ACUTE CONGESTIVE HEART FAILURE, UNSPECIFIED HEART FAILURE TYPE (H): ICD-10-CM

## 2020-01-01 DIAGNOSIS — E78.5 HYPERLIPIDEMIA LDL GOAL <100: ICD-10-CM

## 2020-01-01 DIAGNOSIS — I50.9 ACUTE CONGESTIVE HEART FAILURE, UNSPECIFIED HEART FAILURE TYPE (H): Primary | ICD-10-CM

## 2020-01-01 DIAGNOSIS — R06.02 SOB (SHORTNESS OF BREATH): ICD-10-CM

## 2020-01-01 DIAGNOSIS — F99 INSOMNIA DUE TO OTHER MENTAL DISORDER: ICD-10-CM

## 2020-01-01 DIAGNOSIS — M79.89 LEG SWELLING: ICD-10-CM

## 2020-01-01 DIAGNOSIS — F51.05 INSOMNIA DUE TO OTHER MENTAL DISORDER: Primary | ICD-10-CM

## 2020-01-01 DIAGNOSIS — R06.02 SOB (SHORTNESS OF BREATH): Primary | ICD-10-CM

## 2020-01-01 DIAGNOSIS — F99 INSOMNIA DUE TO OTHER MENTAL DISORDER: Primary | ICD-10-CM

## 2020-01-01 DIAGNOSIS — N40.1 BENIGN NON-NODULAR PROSTATIC HYPERPLASIA WITH LOWER URINARY TRACT SYMPTOMS: ICD-10-CM

## 2020-01-01 DIAGNOSIS — R05.9 COUGH: ICD-10-CM

## 2020-01-01 DIAGNOSIS — I10 ESSENTIAL HYPERTENSION WITH GOAL BLOOD PRESSURE LESS THAN 140/90: ICD-10-CM

## 2020-01-01 DIAGNOSIS — L98.9 SKIN LESION: ICD-10-CM

## 2020-01-01 DIAGNOSIS — Z79.01 LONG TERM CURRENT USE OF ANTICOAGULANT THERAPY: Primary | ICD-10-CM

## 2020-01-01 DIAGNOSIS — I35.0 AORTIC VALVE STENOSIS, ETIOLOGY OF CARDIAC VALVE DISEASE UNSPECIFIED: Primary | ICD-10-CM

## 2020-01-01 DIAGNOSIS — F41.9 ANXIETY: ICD-10-CM

## 2020-01-01 DIAGNOSIS — R53.83 FATIGUE, UNSPECIFIED TYPE: ICD-10-CM

## 2020-01-01 DIAGNOSIS — N18.30 CKD (CHRONIC KIDNEY DISEASE) STAGE 3, GFR 30-59 ML/MIN (H): ICD-10-CM

## 2020-01-01 DIAGNOSIS — N30.00 ACUTE CYSTITIS WITHOUT HEMATURIA: Primary | ICD-10-CM

## 2020-01-01 DIAGNOSIS — H04.202 WATERING OF LEFT EYE: ICD-10-CM

## 2020-01-01 DIAGNOSIS — R04.0 EPISTAXIS: ICD-10-CM

## 2020-01-01 DIAGNOSIS — N18.30 CKD (CHRONIC KIDNEY DISEASE) STAGE 3, GFR 30-59 ML/MIN (H): Primary | ICD-10-CM

## 2020-01-01 DIAGNOSIS — D64.9 ANEMIA, UNSPECIFIED TYPE: ICD-10-CM

## 2020-01-01 DIAGNOSIS — F51.05 INSOMNIA DUE TO OTHER MENTAL DISORDER: ICD-10-CM

## 2020-01-01 DIAGNOSIS — Z12.5 SCREENING FOR PROSTATE CANCER: ICD-10-CM

## 2020-01-01 DIAGNOSIS — I35.0 AORTIC VALVE STENOSIS, ETIOLOGY OF CARDIAC VALVE DISEASE UNSPECIFIED: ICD-10-CM

## 2020-01-01 DIAGNOSIS — R04.0 EPISTAXIS: Primary | ICD-10-CM

## 2020-01-01 DIAGNOSIS — N18.32 STAGE 3B CHRONIC KIDNEY DISEASE (H): ICD-10-CM

## 2020-01-01 LAB
ALBUMIN SERPL-MCNC: 3.3 G/DL (ref 3.4–5)
ALBUMIN SERPL-MCNC: 3.5 G/DL (ref 3.4–5)
ALBUMIN SERPL-MCNC: 3.7 G/DL (ref 3.4–5)
ALBUMIN UR-MCNC: 100 MG/DL
ALP SERPL-CCNC: 60 U/L (ref 40–150)
ALP SERPL-CCNC: 64 U/L (ref 40–150)
ALP SERPL-CCNC: 75 U/L (ref 40–150)
ALT SERPL W P-5'-P-CCNC: 23 U/L (ref 0–70)
ALT SERPL W P-5'-P-CCNC: 24 U/L (ref 0–70)
ALT SERPL W P-5'-P-CCNC: 27 U/L (ref 0–70)
ANION GAP SERPL CALCULATED.3IONS-SCNC: 4 MMOL/L (ref 3–14)
ANION GAP SERPL CALCULATED.3IONS-SCNC: 5 MMOL/L (ref 3–14)
ANION GAP SERPL CALCULATED.3IONS-SCNC: 7 MMOL/L (ref 3–14)
APPEARANCE UR: ABNORMAL
AST SERPL W P-5'-P-CCNC: 23 U/L (ref 0–45)
AST SERPL W P-5'-P-CCNC: 28 U/L (ref 0–45)
AST SERPL W P-5'-P-CCNC: 29 U/L (ref 0–45)
BACTERIA SPEC CULT: NORMAL
BASOPHILS # BLD AUTO: 0.1 10E9/L (ref 0–0.2)
BASOPHILS NFR BLD AUTO: 0.7 %
BILIRUB SERPL-MCNC: 0.5 MG/DL (ref 0.2–1.3)
BILIRUB SERPL-MCNC: 0.6 MG/DL (ref 0.2–1.3)
BILIRUB SERPL-MCNC: 0.7 MG/DL (ref 0.2–1.3)
BILIRUB UR QL STRIP: NEGATIVE
BUN SERPL-MCNC: 32 MG/DL (ref 7–30)
BUN SERPL-MCNC: 36 MG/DL (ref 7–30)
BUN SERPL-MCNC: 41 MG/DL (ref 7–30)
BUN SERPL-MCNC: 42 MG/DL (ref 7–30)
BUN SERPL-MCNC: 48 MG/DL (ref 7–30)
CALCIUM SERPL-MCNC: 10.2 MG/DL (ref 8.5–10.1)
CALCIUM SERPL-MCNC: 10.4 MG/DL (ref 8.5–10.1)
CALCIUM SERPL-MCNC: 9.5 MG/DL (ref 8.5–10.1)
CALCIUM SERPL-MCNC: 9.7 MG/DL (ref 8.5–10.1)
CALCIUM SERPL-MCNC: 9.9 MG/DL (ref 8.5–10.1)
CAPILLARY BLOOD COLLECTION: NORMAL
CHLORIDE SERPL-SCNC: 106 MMOL/L (ref 94–109)
CHLORIDE SERPL-SCNC: 107 MMOL/L (ref 94–109)
CHLORIDE SERPL-SCNC: 107 MMOL/L (ref 94–109)
CHOLEST SERPL-MCNC: 172 MG/DL
CO2 SERPL-SCNC: 26 MMOL/L (ref 20–32)
CO2 SERPL-SCNC: 27 MMOL/L (ref 20–32)
CO2 SERPL-SCNC: 28 MMOL/L (ref 20–32)
CO2 SERPL-SCNC: 30 MMOL/L (ref 20–32)
CO2 SERPL-SCNC: 30 MMOL/L (ref 20–32)
COLOR UR AUTO: YELLOW
CREAT SERPL-MCNC: 1.98 MG/DL (ref 0.66–1.25)
CREAT SERPL-MCNC: 2.03 MG/DL (ref 0.66–1.25)
CREAT SERPL-MCNC: 2.09 MG/DL (ref 0.66–1.25)
CREAT SERPL-MCNC: 2.24 MG/DL (ref 0.66–1.25)
CREAT SERPL-MCNC: 2.44 MG/DL (ref 0.66–1.25)
DIFFERENTIAL METHOD BLD: ABNORMAL
EOSINOPHIL NFR BLD AUTO: 2.4 %
ERYTHROCYTE [DISTWIDTH] IN BLOOD BY AUTOMATED COUNT: 13.2 % (ref 10–15)
ERYTHROCYTE [DISTWIDTH] IN BLOOD BY AUTOMATED COUNT: 14.6 % (ref 10–15)
ERYTHROCYTE [DISTWIDTH] IN BLOOD BY AUTOMATED COUNT: 15.2 % (ref 10–15)
GFR SERPL CREATININE-BSD FRML MDRD: 22 ML/MIN/{1.73_M2}
GFR SERPL CREATININE-BSD FRML MDRD: 24 ML/MIN/{1.73_M2}
GFR SERPL CREATININE-BSD FRML MDRD: 27 ML/MIN/{1.73_M2}
GFR SERPL CREATININE-BSD FRML MDRD: 28 ML/MIN/{1.73_M2}
GFR SERPL CREATININE-BSD FRML MDRD: 28 ML/MIN/{1.73_M2}
GLUCOSE SERPL-MCNC: 104 MG/DL (ref 70–99)
GLUCOSE SERPL-MCNC: 109 MG/DL (ref 70–99)
GLUCOSE SERPL-MCNC: 116 MG/DL (ref 70–99)
GLUCOSE SERPL-MCNC: 122 MG/DL (ref 70–99)
GLUCOSE SERPL-MCNC: 98 MG/DL (ref 70–99)
GLUCOSE UR STRIP-MCNC: NEGATIVE MG/DL
HCT VFR BLD AUTO: 32.3 % (ref 40–53)
HCT VFR BLD AUTO: 32.5 % (ref 40–53)
HCT VFR BLD AUTO: 36.6 % (ref 40–53)
HDLC SERPL-MCNC: 74 MG/DL
HGB BLD-MCNC: 10.7 G/DL (ref 13.3–17.7)
HGB BLD-MCNC: 11.3 G/DL (ref 13.3–17.7)
HGB BLD-MCNC: 9.8 G/DL (ref 13.3–17.7)
HGB UR QL STRIP: NEGATIVE
IMM GRANULOCYTES # BLD: 0 10E9/L (ref 0–0.4)
IMM GRANULOCYTES NFR BLD: 0.3 %
INR BLD: 1.4 (ref 0.86–1.14)
INR BLD: 1.7 (ref 0.86–1.14)
INR BLD: 2 (ref 0.86–1.14)
INR BLD: 2.1 (ref 0.86–1.14)
INR BLD: 2.2 (ref 0.86–1.14)
INR BLD: 2.3 (ref 0.86–1.14)
INR BLD: 2.4 (ref 0.86–1.14)
INR BLD: 2.5 (ref 0.86–1.14)
INR BLD: 2.5 (ref 0.86–1.14)
INR BLD: 2.6 (ref 0.86–1.14)
INR BLD: 2.7 (ref 0.86–1.14)
INR BLD: 3.9 (ref 0.86–1.14)
INR BLD: 5 (ref 0.86–1.14)
INR PPP: 1.84 (ref 0.86–1.14)
INR PPP: 2.62 (ref 0.86–1.14)
INR PPP: 3.01 (ref 0.86–1.14)
IRON SATN MFR SERPL: 20 % (ref 15–46)
IRON SERPL-MCNC: 76 UG/DL (ref 35–180)
KETONES UR STRIP-MCNC: NEGATIVE MG/DL
LDLC SERPL CALC-MCNC: 85 MG/DL
LEUKOCYTE ESTERASE UR QL STRIP: ABNORMAL
LYMPHOCYTES # BLD AUTO: 1.1 10E9/L (ref 0.8–5.3)
LYMPHOCYTES NFR BLD AUTO: 12.4 %
Lab: NORMAL
MCH RBC QN AUTO: 31.7 PG (ref 26.5–33)
MCH RBC QN AUTO: 31.7 PG (ref 26.5–33)
MCH RBC QN AUTO: 31.9 PG (ref 26.5–33)
MCHC RBC AUTO-ENTMCNC: 30.3 G/DL (ref 31.5–36.5)
MCHC RBC AUTO-ENTMCNC: 30.9 G/DL (ref 31.5–36.5)
MCHC RBC AUTO-ENTMCNC: 32.9 G/DL (ref 31.5–36.5)
MCV RBC AUTO: 103 FL (ref 78–100)
MCV RBC AUTO: 105 FL (ref 78–100)
MCV RBC AUTO: 97 FL (ref 78–100)
MONOCYTES # BLD AUTO: 0.6 10E9/L (ref 0–1.3)
MONOCYTES NFR BLD AUTO: 7.4 %
MUCOUS THREADS #/AREA URNS LPF: PRESENT /LPF
NEUTROPHILS # BLD AUTO: 6.6 10E9/L (ref 1.6–8.3)
NEUTROPHILS NFR BLD AUTO: 76.8 %
NITRATE UR QL: NEGATIVE
NONHDLC SERPL-MCNC: 98 MG/DL
NRBC # BLD AUTO: 0 10*3/UL
NRBC BLD AUTO-RTO: 0 /100
NT-PROBNP SERPL-MCNC: ABNORMAL PG/ML (ref 0–450)
PH UR STRIP: 7 PH (ref 5–7)
PLATELET # BLD AUTO: 158 10E9/L (ref 150–450)
PLATELET # BLD AUTO: 182 10E9/L (ref 150–450)
PLATELET # BLD AUTO: 198 10E9/L (ref 150–450)
POTASSIUM SERPL-SCNC: 4.1 MMOL/L (ref 3.4–5.3)
POTASSIUM SERPL-SCNC: 4.1 MMOL/L (ref 3.4–5.3)
POTASSIUM SERPL-SCNC: 4.2 MMOL/L (ref 3.4–5.3)
POTASSIUM SERPL-SCNC: 4.3 MMOL/L (ref 3.4–5.3)
POTASSIUM SERPL-SCNC: 4.4 MMOL/L (ref 3.4–5.3)
PROT SERPL-MCNC: 7.3 G/DL (ref 6.8–8.8)
PROT SERPL-MCNC: 7.6 G/DL (ref 6.8–8.8)
PROT SERPL-MCNC: 8 G/DL (ref 6.8–8.8)
PSA SERPL-ACNC: 4.39 UG/L (ref 0–4)
RBC # BLD AUTO: 3.09 10E12/L (ref 4.4–5.9)
RBC # BLD AUTO: 3.35 10E12/L (ref 4.4–5.9)
RBC # BLD AUTO: 3.56 10E12/L (ref 4.4–5.9)
RBC #/AREA URNS AUTO: 4 /HPF (ref 0–2)
RETICS # AUTO: 54.1 10E9/L (ref 25–95)
RETICS/RBC NFR AUTO: 1.5 % (ref 0.5–2)
SODIUM SERPL-SCNC: 140 MMOL/L (ref 133–144)
SODIUM SERPL-SCNC: 140 MMOL/L (ref 133–144)
SODIUM SERPL-SCNC: 141 MMOL/L (ref 133–144)
SOURCE: ABNORMAL
SP GR UR STRIP: 1.01 (ref 1–1.03)
SPECIMEN SOURCE: NORMAL
T4 FREE SERPL-MCNC: 1.1 NG/DL (ref 0.76–1.46)
TIBC SERPL-MCNC: 371 UG/DL (ref 240–430)
TRIGL SERPL-MCNC: 63 MG/DL
TSH SERPL DL<=0.005 MIU/L-ACNC: 4.76 MU/L (ref 0.4–4)
UROBILINOGEN UR STRIP-MCNC: 0 MG/DL (ref 0–2)
WBC # BLD AUTO: 6.4 10E9/L (ref 4–11)
WBC # BLD AUTO: 7.2 10E9/L (ref 4–11)
WBC # BLD AUTO: 8.6 10E9/L (ref 4–11)
WBC #/AREA URNS AUTO: 31 /HPF (ref 0–5)

## 2020-01-01 PROCEDURE — 85610 PROTHROMBIN TIME: CPT | Performed by: INTERNAL MEDICINE

## 2020-01-01 PROCEDURE — 99207 PR NO CHARGE NURSE ONLY: CPT | Performed by: INTERNAL MEDICINE

## 2020-01-01 PROCEDURE — 36416 COLLJ CAPILLARY BLOOD SPEC: CPT | Performed by: INTERNAL MEDICINE

## 2020-01-01 PROCEDURE — 76770 US EXAM ABDO BACK WALL COMP: CPT

## 2020-01-01 PROCEDURE — 85610 PROTHROMBIN TIME: CPT | Mod: QW | Performed by: INTERNAL MEDICINE

## 2020-01-01 PROCEDURE — 99207 ZZC NO CHARGE NURSE ONLY: CPT | Performed by: INTERNAL MEDICINE

## 2020-01-01 PROCEDURE — 83540 ASSAY OF IRON: CPT | Performed by: INTERNAL MEDICINE

## 2020-01-01 PROCEDURE — 36415 COLL VENOUS BLD VENIPUNCTURE: CPT | Performed by: INTERNAL MEDICINE

## 2020-01-01 PROCEDURE — 85045 AUTOMATED RETICULOCYTE COUNT: CPT | Performed by: INTERNAL MEDICINE

## 2020-01-01 PROCEDURE — 30903 CONTROL OF NOSEBLEED: CPT | Mod: 50 | Performed by: EMERGENCY MEDICINE

## 2020-01-01 PROCEDURE — 81001 URINALYSIS AUTO W/SCOPE: CPT | Performed by: INTERNAL MEDICINE

## 2020-01-01 PROCEDURE — 99214 OFFICE O/P EST MOD 30 MIN: CPT | Performed by: INTERNAL MEDICINE

## 2020-01-01 PROCEDURE — 99213 OFFICE O/P EST LOW 20 MIN: CPT | Performed by: INTERNAL MEDICINE

## 2020-01-01 PROCEDURE — 99284 EMERGENCY DEPT VISIT MOD MDM: CPT | Mod: 25 | Performed by: EMERGENCY MEDICINE

## 2020-01-01 PROCEDURE — 93306 TTE W/DOPPLER COMPLETE: CPT

## 2020-01-01 PROCEDURE — 93306 TTE W/DOPPLER COMPLETE: CPT | Mod: 26 | Performed by: INTERNAL MEDICINE

## 2020-01-01 PROCEDURE — 17110 DESTRUCTION B9 LES UP TO 14: CPT | Performed by: INTERNAL MEDICINE

## 2020-01-01 PROCEDURE — 85610 PROTHROMBIN TIME: CPT | Performed by: EMERGENCY MEDICINE

## 2020-01-01 PROCEDURE — 87086 URINE CULTURE/COLONY COUNT: CPT | Performed by: INTERNAL MEDICINE

## 2020-01-01 PROCEDURE — 80061 LIPID PANEL: CPT | Performed by: INTERNAL MEDICINE

## 2020-01-01 PROCEDURE — 93005 ELECTROCARDIOGRAM TRACING: CPT | Performed by: REHABILITATION PRACTITIONER

## 2020-01-01 PROCEDURE — G0103 PSA SCREENING: HCPCS | Performed by: INTERNAL MEDICINE

## 2020-01-01 PROCEDURE — 85025 COMPLETE CBC W/AUTO DIFF WBC: CPT | Performed by: EMERGENCY MEDICINE

## 2020-01-01 PROCEDURE — 99204 OFFICE O/P NEW MOD 45 MIN: CPT | Performed by: INTERNAL MEDICINE

## 2020-01-01 PROCEDURE — 85610 PROTHROMBIN TIME: CPT | Mod: QW | Performed by: EMERGENCY MEDICINE

## 2020-01-01 PROCEDURE — 93010 ELECTROCARDIOGRAM REPORT: CPT | Mod: 76 | Performed by: INTERNAL MEDICINE

## 2020-01-01 PROCEDURE — 80053 COMPREHEN METABOLIC PANEL: CPT | Performed by: INTERNAL MEDICINE

## 2020-01-01 PROCEDURE — 99214 OFFICE O/P EST MOD 30 MIN: CPT | Mod: 25 | Performed by: INTERNAL MEDICINE

## 2020-01-01 PROCEDURE — 36416 COLLJ CAPILLARY BLOOD SPEC: CPT | Performed by: EMERGENCY MEDICINE

## 2020-01-01 PROCEDURE — 99213 OFFICE O/P EST LOW 20 MIN: CPT | Mod: 95 | Performed by: INTERNAL MEDICINE

## 2020-01-01 PROCEDURE — 84439 ASSAY OF FREE THYROXINE: CPT | Performed by: INTERNAL MEDICINE

## 2020-01-01 PROCEDURE — 83550 IRON BINDING TEST: CPT | Performed by: INTERNAL MEDICINE

## 2020-01-01 PROCEDURE — 83880 ASSAY OF NATRIURETIC PEPTIDE: CPT | Performed by: INTERNAL MEDICINE

## 2020-01-01 PROCEDURE — 80048 BASIC METABOLIC PNL TOTAL CA: CPT | Performed by: INTERNAL MEDICINE

## 2020-01-01 PROCEDURE — 84443 ASSAY THYROID STIM HORMONE: CPT | Performed by: INTERNAL MEDICINE

## 2020-01-01 PROCEDURE — 85027 COMPLETE CBC AUTOMATED: CPT | Performed by: INTERNAL MEDICINE

## 2020-01-01 RX ORDER — WARFARIN SODIUM 5 MG/1
TABLET ORAL
Qty: 120 TABLET | Refills: 1 | Status: SHIPPED | OUTPATIENT
Start: 2020-01-01 | End: 2021-01-01

## 2020-01-01 RX ORDER — FUROSEMIDE 20 MG
20 TABLET ORAL DAILY
Qty: 30 TABLET | Refills: 0 | Status: SHIPPED | OUTPATIENT
Start: 2020-01-01 | End: 2020-01-01

## 2020-01-01 RX ORDER — MIRTAZAPINE 15 MG/1
15 TABLET, FILM COATED ORAL AT BEDTIME
Qty: 90 TABLET | Refills: 3 | Status: ON HOLD | OUTPATIENT
Start: 2020-01-01 | End: 2021-01-01

## 2020-01-01 RX ORDER — CODEINE PHOSPHATE AND GUAIFENESIN 10; 100 MG/5ML; MG/5ML
1 SOLUTION ORAL EVERY 4 HOURS PRN
Qty: 180 OZ | Refills: 0 | Status: ON HOLD | OUTPATIENT
Start: 2020-01-01 | End: 2021-01-01

## 2020-01-01 RX ORDER — SIMVASTATIN 40 MG
TABLET ORAL
Qty: 90 TABLET | Refills: 3 | Status: SHIPPED | OUTPATIENT
Start: 2020-01-01 | End: 2021-01-01

## 2020-01-01 RX ORDER — AMLODIPINE BESYLATE 5 MG/1
5 TABLET ORAL DAILY
Qty: 90 TABLET | Refills: 0 | Status: SHIPPED | OUTPATIENT
Start: 2020-01-01 | End: 2020-01-01

## 2020-01-01 RX ORDER — CALCIUM CARBONATE/VITAMIN D3 500-10/5ML
LIQUID (ML) ORAL DAILY
Status: ON HOLD | COMMUNITY
End: 2021-01-01

## 2020-01-01 RX ORDER — MIRTAZAPINE 15 MG/1
15 TABLET, FILM COATED ORAL AT BEDTIME
Qty: 30 TABLET | Refills: 3 | Status: SHIPPED | OUTPATIENT
Start: 2020-01-01 | End: 2020-01-01

## 2020-01-01 RX ORDER — FUROSEMIDE 20 MG
20 TABLET ORAL DAILY
Qty: 30 TABLET | Refills: 11 | Status: ON HOLD | OUTPATIENT
Start: 2020-01-01 | End: 2021-01-01

## 2020-01-01 RX ORDER — AMLODIPINE BESYLATE 5 MG/1
5 TABLET ORAL DAILY
Qty: 90 TABLET | Refills: 3 | Status: SHIPPED | OUTPATIENT
Start: 2020-01-01 | End: 2020-01-01

## 2020-01-01 RX ORDER — TAMSULOSIN HYDROCHLORIDE 0.4 MG/1
0.4 CAPSULE ORAL DAILY
Qty: 90 CAPSULE | Refills: 3 | Status: SHIPPED | OUTPATIENT
Start: 2020-01-01 | End: 2021-01-01

## 2020-01-01 RX ORDER — CYANOCOBALAMIN (VITAMIN B-12) 500 MCG
400 LOZENGE ORAL DAILY
COMMUNITY
End: 2021-01-01

## 2020-01-01 RX ORDER — WARFARIN SODIUM 5 MG/1
TABLET ORAL
Qty: 120 TABLET | Refills: 1 | Status: SHIPPED | OUTPATIENT
Start: 2020-01-01 | End: 2020-01-01

## 2020-01-01 RX ORDER — GLUCOSAMINE/CHONDR SU A SOD 750-600 MG
TABLET ORAL DAILY
COMMUNITY
End: 2021-01-01

## 2020-01-01 ASSESSMENT — PAIN SCALES - GENERAL
PAINLEVEL: NO PAIN (0)

## 2020-01-01 ASSESSMENT — MIFFLIN-ST. JEOR: SCORE: 1445.52

## 2020-01-06 ENCOUNTER — TELEPHONE (OUTPATIENT)
Dept: INTERNAL MEDICINE | Facility: CLINIC | Age: 85
End: 2020-01-06

## 2020-01-06 NOTE — TELEPHONE ENCOUNTER
"Reason for Call:  Same Day Appointment, Requested Provider:  Wale Short MD    PCP: Wale Short    Reason for visit: Lori took a fall yesterday and hit his face on the floor.  He seems to be feeling fine per his wife, but they are concerned about why he fell and felt like he had \"no control\".    Duration of symptoms: 1 day    Have you been treated for this in the past? No    Additional comments: Fatoumata would like him to be seen tomorrow with Dr Short. Lori has an appointment with [facility] tomorrow at 10:30am if he could be seen around that.    Can we leave a detailed message on this number? YES    Phone number patient can be reached at: Home number on file [phone] (home)    Best Time: any    Call taken on 1/6/2020 at 10:49 AM by Lakeisha Buitrago      "

## 2020-01-06 NOTE — TELEPHONE ENCOUNTER
Patient is scheduled at 10:45 am on 1/7/2020 with Dr. Short for a quick visit. Patient's wife (marilu) said that would be fine.    Rodrick Cortez, [facility]

## 2020-01-07 ENCOUNTER — ANTICOAGULATION THERAPY VISIT (OUTPATIENT)
Dept: ANTICOAGULATION | Facility: CLINIC | Age: 85
End: 2020-01-07
Payer: COMMERCIAL

## 2020-01-07 ENCOUNTER — OFFICE VISIT (OUTPATIENT)
Dept: INTERNAL MEDICINE | Facility: CLINIC | Age: 85
End: 2020-01-07
Payer: COMMERCIAL

## 2020-01-07 VITALS
DIASTOLIC BLOOD PRESSURE: 84 MMHG | HEART RATE: 90 BPM | TEMPERATURE: 97.6 F | SYSTOLIC BLOOD PRESSURE: 179 MMHG | OXYGEN SATURATION: 96 % | WEIGHT: 179 LBS | BODY MASS INDEX: 28.09 KG/M2 | HEIGHT: 67 IN | RESPIRATION RATE: 16 BRPM

## 2020-01-07 DIAGNOSIS — M79.89 SWELLING OF LIMB: Primary | ICD-10-CM

## 2020-01-07 DIAGNOSIS — I48.20 CHRONIC ATRIAL FIBRILLATION (H): ICD-10-CM

## 2020-01-07 DIAGNOSIS — Z79.01 LONG TERM CURRENT USE OF ANTICOAGULANT THERAPY: ICD-10-CM

## 2020-01-07 DIAGNOSIS — R05.9 COUGH: ICD-10-CM

## 2020-01-07 DIAGNOSIS — I10 ESSENTIAL HYPERTENSION WITH GOAL BLOOD PRESSURE LESS THAN 140/90: ICD-10-CM

## 2020-01-07 LAB — INR POINT OF CARE: 2.2 (ref 0.9–1.1)

## 2020-01-07 PROCEDURE — 99214 OFFICE O/P EST MOD 30 MIN: CPT | Performed by: INTERNAL MEDICINE

## 2020-01-07 PROCEDURE — 85610 PROTHROMBIN TIME: CPT | Mod: QW

## 2020-01-07 PROCEDURE — 36416 COLLJ CAPILLARY BLOOD SPEC: CPT

## 2020-01-07 PROCEDURE — 99207 ZZC NO CHARGE NURSE ONLY: CPT

## 2020-01-07 RX ORDER — CODEINE PHOSPHATE AND GUAIFENESIN 10; 100 MG/5ML; MG/5ML
1-2 SOLUTION ORAL EVERY 4 HOURS PRN
Qty: 180 ML | Refills: 0 | Status: SHIPPED | OUTPATIENT
Start: 2020-01-07 | End: 2020-03-11

## 2020-01-07 RX ORDER — FUROSEMIDE 20 MG
20 TABLET ORAL WEEKLY
Qty: 30 TABLET | Refills: 0 | Status: SHIPPED | OUTPATIENT
Start: 2020-01-07 | End: 2020-03-11

## 2020-01-07 ASSESSMENT — MIFFLIN-ST. JEOR: SCORE: 1425.57

## 2020-01-07 ASSESSMENT — PAIN SCALES - GENERAL: PAINLEVEL: NO PAIN (0)

## 2020-01-07 NOTE — PROGRESS NOTES
Subjective     Lori Mehta is a 89 year old male who presents to clinic today for the following health issues:    HPI   Chief Complaint   Patient presents with     Fall     discuss a fall that he had two days ago     Knee Pain     right knee swelling       He has had right knee swelling for many months. No pain during the day, just pain at night, hard to sleep.      2 days ago, was leaning forward down to his knees.  He fell forward on to his face. No syncope. Just no strength or use of his arms to catch himself.      BP has been high here. Home readings are good, 116 to 136.       He couldn't take the Celexa, had to stop it.     Past Medical History:   Diagnosis Date     Anxiety      Atrial fibrillation (H) 1/9/2009     BPH      GERD (gastroesophageal reflux disease)      Pure hypercholesterolemia      Unspecified essential hypertension      Current Outpatient Medications   Medication     Acetaminophen (TYLENOL PO)     Ascorbic Acid (VITAMIN C) 500 MG CHEW     Bromelains 500 MG TABS     CAPSICUM, CAYENNE, PO     COENZYME Q10     hemp seed oil     hydrochlorothiazide (HYDRODIURIL) 25 MG tablet     lisinopril (PRINIVIL/ZESTRIL) 5 MG tablet     MELATONIN PO     OMEGA 3 1200 MG OR CAPS     Saw Palmetto, Serenoa repens, (SAW PALMETTO EXTRACT PO)     SELENIUM 200 MCG OR CAPS     simvastatin (ZOCOR) 20 MG tablet     tamsulosin (FLOMAX) 0.4 MG capsule     VITAMIN B12 TR 1000 MCG OR TBCR     VITAMIN E 400 UNIT OR CAPS     warfarin (COUMADIN) 5 MG tablet     ZINC 30 MG OR TABS     citalopram (CELEXA) 10 MG tablet     No current facility-administered medications for this visit.      Social History     Tobacco Use     Smoking status: Never Smoker     Smokeless tobacco: Never Used   Substance Use Topics     Alcohol use: Yes     Alcohol/week: 1.0 standard drinks     Types: 1 Standard drinks or equivalent per week     Comment: beer once a week     Drug use: No     Review of Systems  Constitutional-No fevers, chills,  +  "weight gain  Cardiac-No chest pain or palpitations.  Respiratory-No cough, sob, or hemoptysis.  GI-No nausea, vomitting, diarrhea, constipation, or blood in the stool.  Musculoskeletal-right knee swelling, side swelling .    Physical Exam  BP (!) 179/84   Pulse 90   Temp 97.6  F (36.4  C) (Temporal)   Resp 16   Ht 1.702 m (5' 7\")   Wt 81.2 kg (179 lb)   SpO2 96%   BMI 28.04 kg/m    General Appearance-healthy, alert, no distress  ENT-ENT exam normal, no neck nodes or sinus tenderness  Cardiac-irregularly irregular rhythm  Lungs-clear to auscultation  Extremities-no peripheral edema, peripheral pulses normal  Musculoskeletal-right knee is swollen, suprapatellar swelling.     ASSESSMENT:  Patient who has some swelling on his right knee.  He feels his whole right side is swollen.  He does have increase of his weight by 4 to 5 pounds.  He also had a couple episodes where he sort of fell forward did not pass out no loss of consciousness.  His exam is benign other than atrial fibrillation with an irregular rhythm, his neck has no bruits, his ears are clear.  Due to the fluid retention we will put him on Lasix 20 mg just once a week to try to pull the fluid off.  He can have an extra banana or high potassium foods.  His last potassium check was 4.2.  We will see him back in March for his physical.  He will let us know if he has any more episodes of falling or any syncope.    Electronically signed by Wale Short MD    "

## 2020-01-07 NOTE — PROGRESS NOTES
ANTICOAGULATION FOLLOW-UP CLINIC VISIT    Patient Name:  Lori Mehta  Date:  2020  Contact Type:  Face to Face    SUBJECTIVE:  Patient Findings     Comments:   The patient was assessed for diet, medication, and activity level changes, missed or extra doses, bruising or bleeding, with no problem findings.  Lakeisha Rodriguez RN          Clinical Outcomes     Negatives:   Major bleeding event, Thromboembolic event, Anticoagulation-related hospital admission, Anticoagulation-related ED visit, Anticoagulation-related fatality    Comments:   The patient was assessed for diet, medication, and activity level changes, missed or extra doses, bruising or bleeding, with no problem findings.  Lakeisha Rodriguez RN             OBJECTIVE    INR Protime   Date Value Ref Range Status   2020 2.2 (A) 0.9 - 1.1 Final       ASSESSMENT / PLAN  INR assessment THER    Recheck INR In: 6 WEEKS    INR Location Clinic      Anticoagulation Summary  As of 2020    INR goal:   2.0-3.0   TTR:   66.4 % (1 y)   INR used for dosin.2 (2020)   Warfarin maintenance plan:   5 mg (5 mg x 1) every Mon; 7.5 mg (5 mg x 1.5) all other days   Full warfarin instructions:   5 mg every Mon; 7.5 mg all other days   Weekly warfarin total:   50 mg   No change documented:   Lakeisha Rodriguez RN   Plan last modified:   Lakeisha Rodriguez RN (2019)   Next INR check:   2020   Priority:   Maintenance   Target end date:       Indications    Chronic atrial fibrillation (HCC) [I48.20]  Long-term (current) use of anticoagulants [Z79.01] [Z79.01]             Anticoagulation Episode Summary     INR check location:       Preferred lab:       Send INR reminders to:   [facility] [place]    Comments:   5 mg tablets, no bandaid, takes at noon, likes BP done.       Anticoagulation Care Providers     Provider Role Specialty Phone number    Wale Short MD [facility] Internal Medicine [phone]            See the Encounter Report to view  Anticoagulation Flowsheet and Dosing Calendar (Go to Encounters tab in chart review, and find the Anticoagulation Therapy Visit)    Dosage adjustment made based on physician directed care plan.    Lakeisha Rodriguez RN

## 2020-01-16 DIAGNOSIS — I48.20 CHRONIC ATRIAL FIBRILLATION (H): ICD-10-CM

## 2020-01-16 RX ORDER — WARFARIN SODIUM 5 MG/1
TABLET ORAL
Qty: 120 TABLET | Refills: 1 | Status: SHIPPED | OUTPATIENT
Start: 2020-01-16 | End: 2020-01-01

## 2020-01-28 ENCOUNTER — OFFICE VISIT (OUTPATIENT)
Dept: FAMILY MEDICINE | Facility: OTHER | Age: 85
End: 2020-01-28
Payer: COMMERCIAL

## 2020-01-28 VITALS
DIASTOLIC BLOOD PRESSURE: 72 MMHG | TEMPERATURE: 97.7 F | SYSTOLIC BLOOD PRESSURE: 136 MMHG | RESPIRATION RATE: 16 BRPM | HEART RATE: 88 BPM | OXYGEN SATURATION: 95 % | WEIGHT: 179.9 LBS | BODY MASS INDEX: 28.18 KG/M2

## 2020-01-28 DIAGNOSIS — M17.10 ARTHRITIS OF KNEE: Primary | ICD-10-CM

## 2020-01-28 PROCEDURE — 99213 OFFICE O/P EST LOW 20 MIN: CPT | Performed by: INTERNAL MEDICINE

## 2020-01-28 ASSESSMENT — PAIN SCALES - GENERAL: PAINLEVEL: NO PAIN (0)

## 2020-01-28 NOTE — PROGRESS NOTES
"Subjective     Lori Mehta is a 89 year old male who presents to clinic today for the following health issues:    HPI   Joint Pain    Onset: \"months\"    Description:   Location: right knee and right shoulder  Character: Sharp    Intensity: severe    Progression of Symptoms: intermittent    Accompanying Signs & Symptoms:  Other symptoms: swelling    History:   Previous similar pain: YES      Precipitating factors:   Trauma or overuse: no     Alleviating factors:  Improved by: standing up    Therapies Tried and outcome: tylenol, injections, rest                      Chief Complaint         The patient is a 89-year-old homosapien male who presents today on the referral of another provider.  He has persistent knee pain as result of arthritis.  He was told that I was the \"joint doctor\".  He has had previous injections, he has an internist, he has been seen recently by orthopedics, he refuses physical therapy, and he is on Coumadin.  I have offered steroid arthrocentesis and he refuses stating it does not hurt that bad at this moment.  When I suggested to the patient that there really was no medication for his ailment and that he should treat it topically, he decided that \"this visit is a waste of time\".  I have explained to the patient that positioning his knee with a slight flexion at nighttime might help.  He disagrees.  I explained to the patient that topical capsaicin or lidocaine may have be of some benefit.  He already has some over-the-counter herbal cream and notes that that is good enough.  After about 15 minutes of bantering, it became evident that I truly had nothing to offer him and that this was truly \"a waste of time\".  I offered him my best wishes and suggested that he follows up with his internist, orthopedists, etc.  He voiced some concern that he drove all the way to [place] and I apologized that they put [place] so far from his home and in the future, I would try to move it closer.                   "                             Decision Making    1. Arthritis of knee                             FOLLOW UP   I have asked the patient to make an appointment for followup with his regular cadre of physicians as needed          DO EDITH Wellington    Portions of this note were produced using [employer]  Although every attempt at real-time proof reading has been made, occasional grammar/syntax errors may have been missed.

## 2020-02-12 DIAGNOSIS — E78.5 HYPERLIPIDEMIA LDL GOAL <100: ICD-10-CM

## 2020-02-12 RX ORDER — SIMVASTATIN 20 MG
TABLET ORAL
Qty: 90 TABLET | Refills: 0 | Status: SHIPPED | OUTPATIENT
Start: 2020-02-12 | End: 2020-03-13

## 2020-02-12 NOTE — TELEPHONE ENCOUNTER
"Requested Prescriptions   Pending Prescriptions Disp Refills     simvastatin (ZOCOR) 20 MG tablet [Pharmacy Med Name: SIMVASTATIN 20MG TABS] 270 tablet 3     Sig: TAKE THREE TABLETS BY MOUTH AT BEDTIME   Last Written Prescription Date:  2/12/19  Last Fill Quantity: 270,  # refills: 3   Last office visit: 11/26/19 [facility]  Future Office Visit:   Next 5 appointments (look out 90 days)    Mar 11, 2020  9:30 AM CDT  PHYSICAL with Wale Short MD  [facility] ([address] [id number] [phone]             Statins Protocol Failed - 2/12/2020  8:37 AM        Failed - LDL on file in past 12 months     Recent Labs   Lab Test 02/13/18  0822   LDL 70           Passed - No abnormal creatine kinase in past 12 months     No lab results found.             Passed - Recent (12 mo) or future (30 days) visit within the authorizing provider's specialty     Patient has had an office visit with the authorizing provider or a provider within the authorizing providers department within the previous 12 mos or has a future within next 30 days. See \"Patient Info\" tab in inbasket, or \"Choose Columns\" in Meds & Orders section of the refill encounter.              Passed - Medication is active on med list        Passed - Patient is age 18 or older          "

## 2020-02-12 NOTE — TELEPHONE ENCOUNTER
Medication is being filled for 1 time refill only due to:  Patient needs to be seen because it has been more than one year since last visit.     Patient has an appointment made.    Yecenia Crum RN on 2/12/2020 at 1:10 PM

## 2020-02-12 NOTE — TELEPHONE ENCOUNTER
Patient stopped at the clinic to find out about his prescription and why it has not been filled.  He was quite upset it has not been filled and said he has never had to wait for a prescription it is always filled the same day.    It was explained to him in detail about the 72 hours we are allowed to fill prescriptions.    Carolee SARGENT

## 2020-02-18 ENCOUNTER — ANTICOAGULATION THERAPY VISIT (OUTPATIENT)
Dept: ANTICOAGULATION | Facility: CLINIC | Age: 85
End: 2020-02-18
Payer: COMMERCIAL

## 2020-02-18 ENCOUNTER — TELEPHONE (OUTPATIENT)
Dept: ANTICOAGULATION | Facility: CLINIC | Age: 85
End: 2020-02-18

## 2020-02-18 DIAGNOSIS — I48.20 CHRONIC ATRIAL FIBRILLATION (H): ICD-10-CM

## 2020-02-18 DIAGNOSIS — Z79.01 LONG TERM CURRENT USE OF ANTICOAGULANT THERAPY: ICD-10-CM

## 2020-02-18 LAB — INR POINT OF CARE: 3.3 (ref 0.9–1.1)

## 2020-02-18 PROCEDURE — 36416 COLLJ CAPILLARY BLOOD SPEC: CPT

## 2020-02-18 PROCEDURE — 99207 ZZC NO CHARGE NURSE ONLY: CPT

## 2020-02-18 PROCEDURE — 85610 PROTHROMBIN TIME: CPT | Mod: QW

## 2020-02-18 NOTE — TELEPHONE ENCOUNTER
Pt fell on the ice two weeks ago. He hit the top of his head head and reports bleeding a lot. He refused to be seen in the ER, per wife. He denies any LOC, headache, N/V, blacking out, etc. He states the biggest concern was the bleeding. Currently he has a hard bump about the size of a dime on the top of his head. Last week he start to experience a day-long episode of severe dizziness. He took his vertigo medication, which didn't work right away, though he did experience relief later on in the day.   I advised pt to be seen in the ER if he hits his head again because of the risk of a brain bleed.     His INR today was 3.3.     His wife is wondering if there is anything that can be done at this point? Imagining?     Please advise    Lakeisha Rodriguez RN

## 2020-02-18 NOTE — TELEPHONE ENCOUNTER
If he is feeling ok, then nothing else to do. Otherwise could be seen and do a ct scan.  Do you want to call him or our team?

## 2020-02-18 NOTE — PROGRESS NOTES
ANTICOAGULATION FOLLOW-UP CLINIC VISIT    Patient Name:  Lori Mehta  Date:  2/18/2020  Contact Type:  Face to Face    SUBJECTIVE:  Patient Findings     Positives:   Change in diet/appetite (decreased appetite )    Comments:             Clinical Outcomes     Comments:                OBJECTIVE    INR Protime   Date Value Ref Range Status   02/18/2020 3.3 (A) 0.9 - 1.1 Final       ASSESSMENT / PLAN  INR assessment SUPRA    Recheck INR In: 3 WEEKS    INR Location Clinic      Anticoagulation Summary  As of 2/18/2020    INR goal:   2.0-3.0   TTR:   67.9 % (1 y)   INR used for dosing:   3.3! (2/18/2020)   Warfarin maintenance plan:   5 mg (5 mg x 1) every Mon; 7.5 mg (5 mg x 1.5) all other days   Full warfarin instructions:   2/18: 2.5 mg; Otherwise 5 mg every Mon; 7.5 mg all other days   Weekly warfarin total:   50 mg   Plan last modified:   Lakeisha Rodriguez RN (8/13/2019)   Next INR check:   3/11/2020   Priority:   Maintenance   Target end date:       Indications    Chronic atrial fibrillation (HCC) [I48.20]  Long-term (current) use of anticoagulants [Z79.01] [Z79.01]             Anticoagulation Episode Summary     INR check location:       Preferred lab:       Send INR reminders to:   [facility] [place]    Comments:   5 mg tablets, no bandaid, takes at noon, likes BP done.       Anticoagulation Care Providers     Provider Role Specialty Phone number    Wale Short MD [facility] Internal Medicine [phone]            See the Encounter Report to view Anticoagulation Flowsheet and Dosing Calendar (Go to Encounters tab in chart review, and find the Anticoagulation Therapy Visit)    Dosage adjustment made based on physician directed care plan.      Lakeisha Rodriguez, CAN

## 2020-02-18 NOTE — TELEPHONE ENCOUNTER
I spoke with wife, Fatoumata, who said that for now they will hold off on an appt/imaging, but if she feels concerned about it before his original appt on 3/11 she will call back  Lakeisha Rodriguez RN

## 2020-02-24 ENCOUNTER — TRANSFERRED RECORDS (OUTPATIENT)
Dept: HEALTH INFORMATION MANAGEMENT | Facility: CLINIC | Age: 85
End: 2020-02-24

## 2020-03-10 ENCOUNTER — TRANSFERRED RECORDS (OUTPATIENT)
Dept: HEALTH INFORMATION MANAGEMENT | Facility: CLINIC | Age: 85
End: 2020-03-10

## 2020-03-10 LAB
ALT SERPL-CCNC: 20 U/L (ref 0–55)
AST SERPL-CCNC: 27 U/L (ref 5–40)
CHOLEST SERPL-MCNC: 162 MG/DL
CREAT SERPL-MCNC: 2.1 MG/DL (ref 0.5–1.5)
GFR SERPL CREATININE-BSD FRML MDRD: 30 ML/MIN/1.73M2
GLUCOSE SERPL-MCNC: 105 MG/DL (ref 70–100)
HDLC SERPL-MCNC: 61 MG/DL
LDLC SERPL CALC-MCNC: 85 MG/DL
POTASSIUM SERPL-SCNC: 4.6 MMOL/L (ref 3.5–5)
TRIGL SERPL-MCNC: 79 MG/DL

## 2020-03-11 ENCOUNTER — ANTICOAGULATION THERAPY VISIT (OUTPATIENT)
Dept: ANTICOAGULATION | Facility: CLINIC | Age: 85
End: 2020-03-11
Payer: COMMERCIAL

## 2020-03-11 ENCOUNTER — OFFICE VISIT (OUTPATIENT)
Dept: INTERNAL MEDICINE | Facility: CLINIC | Age: 85
End: 2020-03-11
Payer: COMMERCIAL

## 2020-03-11 VITALS
SYSTOLIC BLOOD PRESSURE: 152 MMHG | BODY MASS INDEX: 27.72 KG/M2 | HEART RATE: 54 BPM | RESPIRATION RATE: 16 BRPM | TEMPERATURE: 96.8 F | DIASTOLIC BLOOD PRESSURE: 74 MMHG | WEIGHT: 177 LBS | OXYGEN SATURATION: 95 %

## 2020-03-11 DIAGNOSIS — I48.20 CHRONIC ATRIAL FIBRILLATION (H): ICD-10-CM

## 2020-03-11 DIAGNOSIS — E78.5 HYPERLIPIDEMIA LDL GOAL <100: ICD-10-CM

## 2020-03-11 DIAGNOSIS — R53.83 FATIGUE, UNSPECIFIED TYPE: ICD-10-CM

## 2020-03-11 DIAGNOSIS — Z00.00 MEDICARE ANNUAL WELLNESS VISIT, SUBSEQUENT: ICD-10-CM

## 2020-03-11 DIAGNOSIS — N18.30 CKD (CHRONIC KIDNEY DISEASE) STAGE 3, GFR 30-59 ML/MIN (H): ICD-10-CM

## 2020-03-11 DIAGNOSIS — I10 ESSENTIAL HYPERTENSION WITH GOAL BLOOD PRESSURE LESS THAN 140/90: ICD-10-CM

## 2020-03-11 DIAGNOSIS — M25.561 CHRONIC PAIN OF RIGHT KNEE: Primary | ICD-10-CM

## 2020-03-11 DIAGNOSIS — Z79.01 LONG TERM CURRENT USE OF ANTICOAGULANT THERAPY: ICD-10-CM

## 2020-03-11 DIAGNOSIS — G89.29 CHRONIC PAIN OF RIGHT KNEE: Primary | ICD-10-CM

## 2020-03-11 LAB — INR POINT OF CARE: 2.2 (ref 0.9–1.1)

## 2020-03-11 PROCEDURE — 99213 OFFICE O/P EST LOW 20 MIN: CPT | Mod: 25 | Performed by: INTERNAL MEDICINE

## 2020-03-11 PROCEDURE — 36416 COLLJ CAPILLARY BLOOD SPEC: CPT

## 2020-03-11 PROCEDURE — 99207 ZZC NO CHARGE NURSE ONLY: CPT

## 2020-03-11 PROCEDURE — 20610 DRAIN/INJ JOINT/BURSA W/O US: CPT | Mod: RT | Performed by: INTERNAL MEDICINE

## 2020-03-11 PROCEDURE — 85610 PROTHROMBIN TIME: CPT | Mod: QW

## 2020-03-11 PROCEDURE — 99397 PER PM REEVAL EST PAT 65+ YR: CPT | Mod: 25 | Performed by: INTERNAL MEDICINE

## 2020-03-11 RX ORDER — AMLODIPINE BESYLATE 5 MG/1
5 TABLET ORAL DAILY
Qty: 30 TABLET | Refills: 3 | Status: SHIPPED | OUTPATIENT
Start: 2020-03-11 | End: 2020-01-01

## 2020-03-11 RX ORDER — MECLIZINE HYDROCHLORIDE 25 MG/1
25 TABLET ORAL PRN
Status: ON HOLD | COMMUNITY
End: 2021-01-01

## 2020-03-11 RX ORDER — HYDROCHLOROTHIAZIDE 25 MG/1
25 TABLET ORAL DAILY
Qty: 90 TABLET | Refills: 3 | Status: ON HOLD | OUTPATIENT
Start: 2020-03-11 | End: 2021-01-01

## 2020-03-11 ASSESSMENT — PAIN SCALES - GENERAL: PAINLEVEL: NO PAIN (1)

## 2020-03-11 ASSESSMENT — ACTIVITIES OF DAILY LIVING (ADL): CURRENT_FUNCTION: NO ASSISTANCE NEEDED

## 2020-03-11 NOTE — PROGRESS NOTES
"SUBJECTIVE:   Lori Mehta is a 89 year old male who presents for Preventive Visit.  Are you in the first 12 months of your Medicare coverage?  No    Healthy Habits:     In general, how would you rate your overall health?  Fair    Frequency of exercise:  None    Duration of exercise:  Less than 15 minutes    Do you usually eat at least 4 servings of fruit and vegetables a day, include whole grains    & fiber and avoid regularly eating high fat or \"junk\" foods?  Yes    Taking medications regularly:  No    Barriers to taking medications:  None    Medication side effects:  None    Ability to successfully perform activities of daily living:  No assistance needed    Home Safety:  No safety concerns identified    Hearing impairment symptoms: hearing aids have been ordered.    In the past 6 months, have you been bothered by leaking of urine? Yes    In general, how would you rate your overall mental or emotional health?  Fair      PHQ-2 Total Score: 2    Additional concerns today:  Yes    [facility], previously was 1.28.    Right upper leg has been swollen, wonders about an injection.     Coumadin has been up and down, fell and had some bleeding on his scalp.     [facility] and [facility].    Do you feel safe in your environment? Yes    Have you ever done Advance Care Planning? (For example, a Health Directive, POLST, or a discussion with a medical provider or your loved ones about your wishes): Yes, advance care planning is on file.    Fall risk  Fallen 2 or more times in the past year?: No  Any fall with injury in the past year?: No    Cognitive Screening   1) Repeat 3 items (Leader, Season, Table)    2) Clock draw: NORMAL  3) 3 item recall: Recalls 1 object   Results: NORMAL clock, 1-2 items recalled: COGNITIVE IMPAIRMENT LESS LIKELY    Mini-CogTM Copyright CARINE Hoyt. Licensed by the author for use in [facility]; reprinted with permission " (rajni@[facility]). All rights reserved.      Do you have sleep apnea, excessive snoring or daytime drowsiness?: no    Reviewed and updated as needed this visit by clinical staff  Tobacco  Allergies  Meds         Reviewed and updated as needed this visit by Provider        Social History     Tobacco Use     Smoking status: Never Smoker     Smokeless tobacco: Never Used   Substance Use Topics     Alcohol use: Not Currently     Alcohol/week: 1.0 standard drinks     Types: 1 Standard drinks or equivalent per week     Comment: beer once a week     If you drink alcohol do you typically have >3 drinks per day or >7 drinks per week? No    Alcohol Use 3/7/2017   Prescreen: >3 drinks/day or >7 drinks/week? The patient does not drink >3 drinks per day nor >7 drinks per week.       Current providers sharing in care for this patient include:   Patient Care Team:  Wale Short MD as PCP - General (Internal Medicine)  Wale Short MD as Assigned PCP    The following health maintenance items are reviewed in Epic and correct as of today:  Health Maintenance   Topic Date Due     URINE DRUG SCREEN  [birth year]28     DTAP/TDAP/TD IMMUNIZATION (1 - Tdap) 06/27/1939     ZOSTER IMMUNIZATION (1 of 2) 06/27/1978     ADVANCE CARE PLANNING  09/20/2016     MICROALBUMIN  10/30/2016     OP ANNUAL INR REFERRAL  05/24/2017     LIPID  02/13/2019     INFLUENZA VACCINE (1) 09/01/2019     PHQ-2  01/01/2020     FALL RISK ASSESSMENT  02/12/2020     MEDICARE ANNUAL WELLNESS VISIT  02/12/2020     BMP  06/25/2020     CREATININE  06/25/2020     PNEUMOCOCCAL IMMUNIZATION 65+ LOW/MEDIUM RISK  Completed     IPV IMMUNIZATION  Aged Out     MENINGITIS IMMUNIZATION  Aged Out     [facility]  Labs reviewed in EPIC      Review of Systems  Weight is stable  Denies eye ears nose problems  Cardiac no chest pain  Pulmonary no shortness of breath  GI negative  Extremities right leg is swelling, chronic  Skin is negative  Mental health denies anxiety or  "depression    OBJECTIVE:   BP (!) 152/74   Pulse 54   Temp 96.8  F (36  C) (Temporal)   Resp 16   Wt 80.3 kg (177 lb)   SpO2 95%   BMI 27.72 kg/m   Estimated body mass index is 27.72 kg/m  as calculated from the following:    Height as of 1/7/20: 1.702 m (5' 7\").    Weight as of this encounter: 80.3 kg (177 lb).  Physical Exam  GENERAL: healthy, alert and no distress  EYES: Eyes grossly normal to inspection, PERRL and conjunctivae and sclerae normal  HENT: ear canals and TM's normal, nose and mouth without ulcers or lesions  NECK: no adenopathy, no asymmetry, masses, or scars and thyroid normal to palpation  RESP: lungs clear to auscultation - no rales, rhonchi or wheezes  CV: regular rate and rhythm, normal S1 S2, no S3 or S4, no murmur, click or rub, no peripheral edema and peripheral pulses strong  ABDOMEN: soft, nontender, no hepatosplenomegaly, no masses and bowel sounds normal  MS: right leg/knee is swollen above the knee, mild pain   SKIN: no suspicious lesions or rashes  NEURO: Normal strength and tone, mentation intact and speech normal  PSYCH: mentation appears normal, affect normal/bright        ASSESSMENT / PLAN:       ICD-10-CM    1. Chronic pain of right knee  M25.561     G89.29    2. Essential hypertension with goal blood pressure less than 140/90  I10 amLODIPine (NORVASC) 5 MG tablet     hydrochlorothiazide (HYDRODIURIL) 25 MG tablet     **Basic metabolic panel FUTURE anytime   3. Hyperlipidemia LDL goal <100  E78.5    4. Fatigue, unspecified type  R53.83 **TSH with free T4 reflex FUTURE anytime   5. Medicare annual wellness visit, subsequent  Z00.00      Patient's blood pressure is running high.  [facility] is [facility] to 2.1 it had been 1.4 so.  Working to stop his lisinopril and replace his blood pressure medication with amlodipine 5 mg.  I went over his medications multiple times with him today.  I will recheck his potassium, creatinine, blood pressure in 1 week.  Right " "knee pain he would like a injection of this I would like to do this next week as we had multiple issues today for his Medicare wellness exam.  Fatigue we will be checking his thyroid and kidney function next week again.  Patient is overall upset [facility] how things change why we cannot do labs at certain times.  I tried to reassure him that we are looking out for his best interest.  He does have overly complex demands but will continue to provide the best care we can give him.        COUNSELING:  Reviewed preventive health counseling, as reflected in patient instructions       Regular exercise       Healthy diet/nutrition    Estimated body mass index is 27.72 kg/m  as calculated from the following:    Height as of 1/7/20: 1.702 m (5' 7\").    Weight as of this encounter: 80.3 kg (177 lb).         reports that he has never smoked. He has never used smokeless tobacco.      Appropriate preventive services were discussed with this patient, including applicable screening as appropriate for cardiovascular disease, diabetes, osteopenia/osteoporosis, and glaucoma.  As appropriate for age/gender, discussed screening for colorectal cancer, prostate cancer, breast cancer, and cervical cancer. Checklist reviewing preventive services available has been given to the patient.    Reviewed patients plan of care and provided an AVS. The Basic Care Plan (routine screening as documented in Health Maintenance) for Lori meets the Care Plan requirement. This Care Plan has been established and reviewed with the Patient and spouse.    Counseling Resources:  ATP IV Guidelines  Pooled Cohorts Equation Calculator  Breast Cancer Risk Calculator  FRAX Risk Assessment  ICSI Preventive Guidelines  Dietary Guidelines for Americans, 2010  USDA's MyPlate  ASA Prophylaxis  Lung CA Screening    Wale Short MD  [facility]    Identified Health Risks:  "

## 2020-03-11 NOTE — PROGRESS NOTES
ANTICOAGULATION FOLLOW-UP CLINIC VISIT    Patient Name:  Lori Mehta  Date:  3/11/2020  Contact Type:  Face to Face    SUBJECTIVE:  Patient Findings     Comments:   The patient was assessed for diet, medication, and activity level changes, missed or extra doses, bruising or bleeding, with no problem findings..  Lakeisha Rodriguez RN          Clinical Outcomes     Negatives:   Major bleeding event, Thromboembolic event, Anticoagulation-related hospital admission, Anticoagulation-related ED visit, Anticoagulation-related fatality    Comments:   The patient was assessed for diet, medication, and activity level changes, missed or extra doses, bruising or bleeding, with no problem findings..  Lakeisha Rodriguez RN             OBJECTIVE    INR Protime   Date Value Ref Range Status   2020 2.2 (A) 0.9 - 1.1 Final       ASSESSMENT / PLAN  INR assessment THER    Recheck INR In: 6 WEEKS    INR Location Clinic      Anticoagulation Summary  As of 3/11/2020    INR goal:   2.0-3.0   TTR:   66.2 % (1 y)   INR used for dosin.2 (3/11/2020)   Warfarin maintenance plan:   5 mg (5 mg x 1) every Mon; 7.5 mg (5 mg x 1.5) all other days   Full warfarin instructions:   5 mg every Mon; 7.5 mg all other days   Weekly warfarin total:   50 mg   No change documented:   Lakeisha Rodriguez RN   Plan last modified:   Lakeisha Rodriguez RN (2019)   Next INR check:   2020   Priority:   Maintenance   Target end date:       Indications    Chronic atrial fibrillation (HCC) [I48.20]  Long-term (current) use of anticoagulants [Z79.01] [Z79.01]             Anticoagulation Episode Summary     INR check location:       Preferred lab:       Send INR reminders to:   [facility] [place]    Comments:   5 mg tablets, no bandaid, takes at noon, likes BP done.       Anticoagulation Care Providers     Provider Role Specialty Phone number    Wale Short MD [facility] Internal Medicine [phone]            See the Encounter Report to  view Anticoagulation Flowsheet and Dosing Calendar (Go to Encounters tab in chart review, and find the Anticoagulation Therapy Visit)    Dosage adjustment made based on physician directed care plan.      Lakeisha Rodriguez RN

## 2020-03-13 ENCOUNTER — TELEPHONE (OUTPATIENT)
Dept: INTERNAL MEDICINE | Facility: CLINIC | Age: 85
End: 2020-03-13

## 2020-03-13 DIAGNOSIS — E78.5 HYPERLIPIDEMIA LDL GOAL <100: ICD-10-CM

## 2020-03-13 RX ORDER — SIMVASTATIN 20 MG
TABLET ORAL
Qty: 270 TABLET | Refills: 1 | Status: SHIPPED | OUTPATIENT
Start: 2020-03-13 | End: 2020-01-01 | Stop reason: DRUGHIGH

## 2020-03-13 RX ORDER — SIMVASTATIN 20 MG
TABLET ORAL
Qty: 90 TABLET | Refills: 0 | Status: SHIPPED | OUTPATIENT
Start: 2020-03-13 | End: 2020-03-13

## 2020-03-13 NOTE — TELEPHONE ENCOUNTER
Patients wife calling, there is a consent to communicate on file, would like this today if possible, please call to advise

## 2020-03-13 NOTE — TELEPHONE ENCOUNTER
Fatoumata calling back. [facility] states he can  Only take 1 per day, doctor needs to clarify with them that Loir needs 3 per day.

## 2020-03-16 ENCOUNTER — TELEPHONE (OUTPATIENT)
Dept: INTERNAL MEDICINE | Facility: CLINIC | Age: 85
End: 2020-03-16

## 2020-03-16 DIAGNOSIS — E78.5 HYPERLIPIDEMIA LDL GOAL <100: ICD-10-CM

## 2020-03-16 RX ORDER — SIMVASTATIN 20 MG
TABLET ORAL
Qty: 90 TABLET | Refills: 0 | OUTPATIENT
Start: 2020-03-16

## 2020-03-16 NOTE — TELEPHONE ENCOUNTER
[employer] changed over to a new plan called [employer], Patient takes simvastatin (ZOCOR) 3 20 mg before bed this company will only allow 1. They want Han to call [facility] and tell them that he needs the 3 a day and you allow it.. and order it for a year they said they haven't heard from you. [employer] [phone].  Please advise

## 2020-03-16 NOTE — TELEPHONE ENCOUNTER
Spoke with Wife (Fatoumata) and they need to use [employer] [facility]. She was confused and thought that [facility] was [facility] now. Patient needs a PA for Simvastatin for 3 tablets a day.    Rodrick Cortez, CMA

## 2020-03-16 NOTE — TELEPHONE ENCOUNTER
This medication was sent to the wrong pharmacy. He uses [facility] in [place]. Please resend to the correct pharmacy.   Thank you,  Laurel Robison   for [facility]

## 2020-03-17 ENCOUNTER — TELEPHONE (OUTPATIENT)
Dept: INTERNAL MEDICINE | Facility: CLINIC | Age: 85
End: 2020-03-17

## 2020-03-17 ENCOUNTER — OFFICE VISIT (OUTPATIENT)
Dept: INTERNAL MEDICINE | Facility: CLINIC | Age: 85
End: 2020-03-17
Payer: COMMERCIAL

## 2020-03-17 VITALS
DIASTOLIC BLOOD PRESSURE: 78 MMHG | BODY MASS INDEX: 27.88 KG/M2 | TEMPERATURE: 97.6 F | OXYGEN SATURATION: 91 % | WEIGHT: 178 LBS | RESPIRATION RATE: 16 BRPM | SYSTOLIC BLOOD PRESSURE: 138 MMHG | HEART RATE: 56 BPM

## 2020-03-17 DIAGNOSIS — G89.29 CHRONIC PAIN OF RIGHT KNEE: Primary | ICD-10-CM

## 2020-03-17 DIAGNOSIS — I10 ESSENTIAL HYPERTENSION WITH GOAL BLOOD PRESSURE LESS THAN 140/90: ICD-10-CM

## 2020-03-17 DIAGNOSIS — I10 ESSENTIAL HYPERTENSION WITH GOAL BLOOD PRESSURE LESS THAN 140/90: Primary | ICD-10-CM

## 2020-03-17 DIAGNOSIS — R53.83 FATIGUE, UNSPECIFIED TYPE: ICD-10-CM

## 2020-03-17 DIAGNOSIS — E78.5 HYPERLIPIDEMIA LDL GOAL <100: ICD-10-CM

## 2020-03-17 DIAGNOSIS — M25.561 CHRONIC PAIN OF RIGHT KNEE: Primary | ICD-10-CM

## 2020-03-17 LAB
ANION GAP SERPL CALCULATED.3IONS-SCNC: 6 MMOL/L (ref 3–14)
BUN SERPL-MCNC: 41 MG/DL (ref 7–30)
CALCIUM SERPL-MCNC: 9.8 MG/DL (ref 8.5–10.1)
CHLORIDE SERPL-SCNC: 108 MMOL/L (ref 94–109)
CO2 SERPL-SCNC: 27 MMOL/L (ref 20–32)
CREAT SERPL-MCNC: 1.72 MG/DL (ref 0.66–1.25)
GFR SERPL CREATININE-BSD FRML MDRD: 34 ML/MIN/{1.73_M2}
GLUCOSE SERPL-MCNC: 111 MG/DL (ref 70–99)
POTASSIUM SERPL-SCNC: 4.5 MMOL/L (ref 3.4–5.3)
SODIUM SERPL-SCNC: 141 MMOL/L (ref 133–144)
T4 FREE SERPL-MCNC: 0.89 NG/DL (ref 0.76–1.46)
TSH SERPL DL<=0.005 MIU/L-ACNC: 5.47 MU/L (ref 0.4–4)

## 2020-03-17 PROCEDURE — 20610 DRAIN/INJ JOINT/BURSA W/O US: CPT | Mod: RT | Performed by: INTERNAL MEDICINE

## 2020-03-17 PROCEDURE — 84439 ASSAY OF FREE THYROXINE: CPT | Performed by: INTERNAL MEDICINE

## 2020-03-17 PROCEDURE — 80048 BASIC METABOLIC PNL TOTAL CA: CPT | Performed by: INTERNAL MEDICINE

## 2020-03-17 PROCEDURE — 99213 OFFICE O/P EST LOW 20 MIN: CPT | Mod: 25 | Performed by: INTERNAL MEDICINE

## 2020-03-17 PROCEDURE — 84443 ASSAY THYROID STIM HORMONE: CPT | Performed by: INTERNAL MEDICINE

## 2020-03-17 PROCEDURE — 36415 COLL VENOUS BLD VENIPUNCTURE: CPT | Performed by: INTERNAL MEDICINE

## 2020-03-17 RX ORDER — TRIAMCINOLONE ACETONIDE 40 MG/ML
40 INJECTION, SUSPENSION INTRA-ARTICULAR; INTRAMUSCULAR ONCE
Status: COMPLETED | OUTPATIENT
Start: 2020-03-17 | End: 2020-03-17

## 2020-03-17 RX ORDER — SIMVASTATIN 40 MG
40 TABLET ORAL AT BEDTIME
Qty: 90 TABLET | Refills: 1 | Status: SHIPPED | OUTPATIENT
Start: 2020-03-17 | End: 2020-01-01

## 2020-03-17 RX ADMIN — TRIAMCINOLONE ACETONIDE 40 MG: 40 INJECTION, SUSPENSION INTRA-ARTICULAR; INTRAMUSCULAR at 12:23

## 2020-03-17 ASSESSMENT — PAIN SCALES - GENERAL: PAINLEVEL: NO PAIN (0)

## 2020-03-17 NOTE — TELEPHONE ENCOUNTER
Spoke with wife. Would like labs done before his Rx runs and to check his levels with the dose change of the statin.  Orders pending.

## 2020-03-17 NOTE — TELEPHONE ENCOUNTER
Prior Authorization Retail Medication Request - KEY [id number]    Medication/Dose: simvastatin (ZOCOR) 20 MG tablet  ICD code (if different than what is on RX):    Previously Tried and Failed:    Rationale:      Insurance Name:  [employer]  Insurance ID:  [id number]      Pharmacy Information (if different than what is on RX)  Name:    Phone:

## 2020-03-17 NOTE — PROGRESS NOTES
Subjective     Lori Mehta is a 89 year old male who presents to clinic today for the following health issues:    HPI   Chief Complaint   Patient presents with     Knee Pain     would like injection in knee       [facility], today is 1.7 so improved off the lisinopril.    More issues with his simvastatin taking 60 mg they will fill 320 mg pills.    Physical Exam  /78   Pulse 56   Temp 97.6  F (36.4  C) (Temporal)   Resp 16   Wt 80.7 kg (178 lb)   SpO2 91%   BMI 27.88 kg/m    General Appearance-healthy, alert, no distress  Right knee is swollen    ASSESSMENT:  Chronic right knee pain-patient will have an injection today.    Kidney disease creatinine was 2 is down to 1.7 off lisinopril amlodipine is working for his blood pressure  Hyperlipidemia we will try him on 40 mg of simvastatin instead of the 60 since is difficult to get 3 pills of the 20 mg.    Procedure note right knee injection-  Consent was obtained  Area was prepped with ChloraPrep  Using a 25-gauge 1-1/2 inch needle I injected 40 mg of Kenalog and 2 cc of lidocaine both from new vials.  No complications aftercare instructions given to the patient.    Electronically signed by Wale Short MD

## 2020-03-17 NOTE — TELEPHONE ENCOUNTER
Central Prior Authorization Team   Phone: [phone]      Prior Authorization Approval    Authorization Effective Date: 2/16/2020  Authorization Expiration Date: 3/17/2023  Medication: simvastatin (ZOCOR) 20 MG tablet - APPROVED  Approved Dose/Quantity: 270 FOR 90  Reference #:     Insurance Company: [employer] - Phone [phone] Fax [phone]  Expected CoPay:       CoPay Card Available:      Foundation Assistance Needed:    Which Pharmacy is filling the prescription (Not needed for infusion/clinic administered): [facility] - [place], [place] - [address]  Pharmacy Notified: Yes  Patient Notified: Yes (**Instructed pharmacy to notify patient when script is ready to /ship.**)

## 2020-04-02 NOTE — TELEPHONE ENCOUNTER
Reason for Call:  Medication or medication refill:    Do you use a [facility]?  Name of the pharmacy and phone number for the current request:  [facility] - [phone]    Name of the medication requested: warfarin ANTICOAGULANT (COUMADIN) 5 MG tablet     Other request: patient calling states that he is running low on this Rx. Patient states that he only has 3 days left. Patient is aware that Dr. Short is out of the office today, but would like another provider to ok this. Please call to advise     Can we leave a detailed message on this number? YES    Phone number patient can be reached at: Home number on file [phone] (home)    Best Time: any    Call taken on 4/2/2020 at 8:37 AM by Ophelia Acosta

## 2020-04-02 NOTE — TELEPHONE ENCOUNTER
Reason for Call:  Other call back    Detailed comments: says mediation qty is wrong, please call ..says sb 1 year.amlodipine    Phone Number Patient can be reached at: Home number on file [phone] (home)    Best Time: any    Can we leave a detailed message on this number? YES    Call taken on 4/2/2020 at 1:16 PM by Indira Chiang

## 2020-04-02 NOTE — TELEPHONE ENCOUNTER
Attempted to reach patient to clarify on medication. I see in his chart that Amlodipine 5mg tablets was sent on 3/11/2020 advised to take 1 tablet by mouth daily.     Shikha Islas MA

## 2020-04-02 NOTE — TELEPHONE ENCOUNTER
"Requested Prescriptions   Pending Prescriptions Disp Refills     amLODIPine (NORVASC) 5 MG tablet 90 tablet 1     Sig: Take 1 tablet (5 mg) by mouth daily   Last Written Prescription Date:  3/11/2020  Last Fill Quantity: 30,  # refills: 3   Last office visit: 3/17/2020 with prescribing provider:     Future Office Visit:        Calcium Channel Blockers Protocol  Failed - 4/2/2020  1:39 PM        Failed - Normal serum creatinine on file in past 12 months     Recent Labs   Lab Test 03/17/20  1124   CR 1.72*     Ok to refill medication if creatinine is low          Passed - Blood pressure under 140/90 in past 12 months     BP Readings from Last 3 Encounters:   03/17/20 138/78   03/11/20 (!) 152/74   01/28/20 136/72           Passed - Recent (12 mo) or future (30 days) visit within the authorizing provider's specialty     Patient has had an office visit with the authorizing provider or a provider within the authorizing providers department within the previous 12 mos or has a future within next 30 days. See \"Patient Info\" tab in inbasket, or \"Choose Columns\" in Meds & Orders section of the refill encounter.            Passed - Medication is active on med list        Passed - Patient is age 18 or older         Resent prescription refill per request  Dina Hester RN    "

## 2020-04-21 NOTE — PROGRESS NOTES
ANTICOAGULATION FOLLOW-UP CLINIC VISIT    Patient Name:  Lori Mehta  Date:  2020  Contact Type:  Telephone    SUBJECTIVE:  Patient Findings     Comments:   I left a detailed voicemail with the orders reflected in flowsheet. I have also requested a call back if there have been any missed doses, concerns, illness, fever, or if there have been any changes in medications, activity level, or diet  Lakeisha Rodriguez RN          Clinical Outcomes     Comments:   I left a detailed voicemail with the orders reflected in flowsheet. I have also requested a call back if there have been any missed doses, concerns, illness, fever, or if there have been any changes in medications, activity level, or diet  Lakeisha Rodriguez RN             OBJECTIVE    INR Point of Care   Date Value Ref Range Status   2020 2.5 (H) 0.86 - 1.14 Final     Comment:     This test is intended for monitoring Coumadin therapy.  Results are not   accurate in patients with prolonged INR due to factor deficiency.         ASSESSMENT / PLAN  INR assessment THER    Recheck INR In: 9 WEEKS    INR Location Outside lab      Anticoagulation Summary  As of 2020    INR goal:   2.0-3.0   TTR:   66.3 % (1 y)   INR used for dosin.5 (2020)   Warfarin maintenance plan:   5 mg (5 mg x 1) every Mon; 7.5 mg (5 mg x 1.5) all other days   Full warfarin instructions:   5 mg every Mon; 7.5 mg all other days   Weekly warfarin total:   50 mg   No change documented:   Lakeisha Rodriguez RN   Plan last modified:   Lakeisha Rodriguez RN (2019)   Next INR check:   2020   Priority:   Maintenance   Target end date:       Indications    Chronic atrial fibrillation (HCC) [I48.20]  Long-term (current) use of anticoagulants [Z79.01] [Z79.01]             Anticoagulation Episode Summary     INR check location:       Preferred lab:       Send INR reminders to:   [facility] [place]    Comments:   5 mg tablets, no bandaid, takes at noon, likes BP done.        Anticoagulation Care Providers     Provider Role Specialty Phone number    Wale Short MD [facility] Internal Medicine [phone]            See the Encounter Report to view Anticoagulation Flowsheet and Dosing Calendar (Go to Encounters tab in chart review, and find the Anticoagulation Therapy Visit)    Dosage adjustment made based on physician directed care plan.        Lakeisha Rodriguez RN

## 2020-07-01 NOTE — PROGRESS NOTES
ANTICOAGULATION FOLLOW-UP CLINIC VISIT    Patient Name:  Lori Mehta  Date:  2020  Contact Type:  Telephone    SUBJECTIVE:  Patient Findings     Comments:   I left a detailed voicemail with the orders reflected in flowsheet. I have also requested a call back if there have been any missed doses, concerns, illness, fever, or if there have been any changes in medications, activity level, or diet  Lakeisha Rodriguez RN          Clinical Outcomes     Comments:   I left a detailed voicemail with the orders reflected in flowsheet. I have also requested a call back if there have been any missed doses, concerns, illness, fever, or if there have been any changes in medications, activity level, or diet  Lakeisha Rodriguez RN             OBJECTIVE    Recent labs: (last 7 days)     20  0948   INR 2.2*       ASSESSMENT / PLAN  INR assessment THER    Recheck INR In: 8 WEEKS    INR Location Outside lab      Anticoagulation Summary  As of 2020    INR goal:   2.0-3.0   TTR:   81.0 % (1 y)   INR used for dosin.2 (2020)   Warfarin maintenance plan:   5 mg (5 mg x 1) every Mon; 7.5 mg (5 mg x 1.5) all other days   Full warfarin instructions:   5 mg every Mon; 7.5 mg all other days   Weekly warfarin total:   50 mg   No change documented:   Lakeisha Rodriguez, RN   Plan last modified:   Lakeisha Rodriguez, RN (2019)   Next INR check:   2020   Priority:   Maintenance   Target end date:       Indications    Chronic atrial fibrillation (HCC) [I48.20]  Long-term (current) use of anticoagulants [Z79.01] [Z79.01]             Anticoagulation Episode Summary     INR check location:       Preferred lab:       Send INR reminders to:   [facility] [place]    Comments:   5 mg tablets, no bandaid, takes at noon, likes BP done.       Anticoagulation Care Providers     Provider Role Specialty Phone number    Wale Short MD [facility] Internal Medicine [phone]            See the Encounter Report to view  Anticoagulation Flowsheet and Dosing Calendar (Go to Encounters tab in chart review, and find the Anticoagulation Therapy Visit)    Dosage adjustment made based on physician directed care plan.      Lakeisha Rodriguez RN

## 2020-08-03 NOTE — TELEPHONE ENCOUNTER
Mediation approved but he should have some type of visit with Dr Short soon to discuss kidney function which is reduced since last check.  This should be in the week or two after Dr Short returns.  Riley Deng MD

## 2020-08-03 NOTE — PROGRESS NOTES
ANTICOAGULATION FOLLOW-UP CLINIC VISIT    Patient Name:  Lori Mehta  Date:  8/3/2020  Contact Type:  Telephone    SUBJECTIVE:  Patient Findings     Comments:   I left a detailed voicemail with the orders reflected in flowsheet. I have also requested a call back if there have been any missed doses, concerns, illness, fever, or if there have been any changes in medications, activity level, or diet  Lakeisha Rodriguez RN          Clinical Outcomes     Comments:   I left a detailed voicemail with the orders reflected in flowsheet. I have also requested a call back if there have been any missed doses, concerns, illness, fever, or if there have been any changes in medications, activity level, or diet  Lakeisha Rodriguez RN             OBJECTIVE    Recent labs: (last 7 days)     20  0901   INR 2.62*       ASSESSMENT / PLAN  INR assessment THER    Recheck INR In: 8 WEEKS    INR Location Outside lab      Anticoagulation Summary  As of 8/3/2020    INR goal:   2.0-3.0   TTR:   90.0 % (1 y)   Prior goal:   2.0-3.0   INR used for dosin.62 (8/3/2020)   Warfarin maintenance plan:   5 mg (5 mg x 1) every Mon; 7.5 mg (5 mg x 1.5) all other days   Full warfarin instructions:   5 mg every Mon; 7.5 mg all other days   Weekly warfarin total:   50 mg   No change documented:   Lakeisha Rodriguez, RN   Plan last modified:   Lakeisha Rodriguez RN (2019)   Next INR check:   2020   Priority:   Maintenance   Target end date:   Indefinite    Indications    Chronic atrial fibrillation (HCC) [I48.20]  Long-term (current) use of anticoagulants [Z79.01] [Z79.01]             Anticoagulation Episode Summary     INR check location:       Preferred lab:       Send INR reminders to:   [facility] [place]    Comments:   5 mg tablets, no bandaid, takes at noon, likes BP done.       Anticoagulation Care Providers     Provider Role Specialty Phone number    Wale Short MD Referring Internal Medicine [phone]            See  the Encounter Report to view Anticoagulation Flowsheet and Dosing Calendar (Go to Encounters tab in chart review, and find the Anticoagulation Therapy Visit)    Dosage adjustment made based on physician directed care plan.      Lakeisha Rodriguez RN

## 2020-08-03 NOTE — TELEPHONE ENCOUNTER
Routing refill request to provider for review/approval because:  Labs out of range:  Creatinine    ALEJANDRA MachadoN, RN  [facility]

## 2020-08-03 NOTE — RESULT ENCOUNTER NOTE
Mildly diminished GFR from before with slight increase of creatinine but not of BUN.  I suspect this is normal for patient and age but have suggested a follow-up with Dr. Short in August.

## 2020-08-24 NOTE — TELEPHONE ENCOUNTER
Patient's wife was informed that his kidneys are about the same. His urine shows a possible infection which may be causing his urinary symptoms. Dr. Short would like him to go on Keflex 250 mg twice a day for 7 days. They would like this sent to [facility].    Please review and approve prescription if appropriate.    Thank you, Rodrick Cortez, CMA

## 2020-08-24 NOTE — PROGRESS NOTES
Subjective     Lori Mehta is a 89 year old male who presents to clinic today for the following health issues:    HPI     Hyperlipidemia Follow-Up      Are you regularly taking any medication or supplement to lower your cholesterol?   Yes- simvastatin and fish oil     Are you having muscle aches or other side effects that you think could be caused by your cholesterol lowering medication?  Yes- muscle aches in legs at night     Hypertension Follow-up      Do you check your blood pressure regularly outside of the clinic? Yes     Are you following a low salt diet? No    Are your blood pressures ever more than 140 on the top number (systolic) OR more   than 90 on the bottom number (diastolic), for example 140/90? No      How many servings of fruits and vegetables do you eat daily?  2-3    On average, how many sweetened beverages do you drink each day (Examples: soda, juice, sweet tea, etc.  Do NOT count diet or artificially sweetened beverages)?   0    How many days per week do you exercise enough to make your heart beat faster? walking    How many minutes a day do you exercise enough to make your heart beat faster? walking    How many days per week do you miss taking your medication? 0      Concern: Skin on arm, prostate,constipation and if should take ginko biloba.     He had a blood test and his creatinine was up some.      Urination is frequency.      Review of Systems   CONSTITUTIONAL: NEGATIVE for fever, chills, change in weight  INTEGUMENTARY/SKIN: NEGATIVE for worrisome rashes, moles or lesions  EYES: NEGATIVE for vision changes or irritation  ENT/MOUTH: NEGATIVE for ear, mouth and throat problems  RESP: NEGATIVE for significant cough or SOB  CV: NEGATIVE for chest pain, palpitations or peripheral edema  GI: NEGATIVE for nausea, abdominal pain, heartburn, or change in bowel habits   male :frequency  MUSCULOSKELETAL: NEGATIVE for significant arthralgias or myalgia  NEURO: NEGATIVE for weakness, dizziness  or paresthesias  ENDOCRINE: NEGATIVE for temperature intolerance, skin/hair changes  HEME: NEGATIVE for bleeding problems  PSYCHIATRIC: NEGATIVE for changes in mood or affect      Objective    /56 (BP Location: Right arm, Patient Position: Chair, Cuff Size: Adult Regular)   Pulse 84   Temp 97.7  F (36.5  C) (Temporal)   Resp 24   Wt 79.6 kg (175 lb 7 oz)   SpO2 96%   BMI 27.48 kg/m    Body mass index is 27.48 kg/m .  Physical Exam   GENERAL: healthy, alert and no distress  NECK: no adenopathy, no asymmetry, masses, or scars and thyroid normal to palpation  RESP: lungs clear to auscultation - no rales, rhonchi or wheezes  CV: regular rate and rhythm, normal S1 S2, no S3 or S4, no murmur, click or rub, no peripheral edema and peripheral pulses strong  ABDOMEN: soft, nontender, no hepatosplenomegaly, no masses and bowel sounds normal  MS: no gross musculoskeletal defects noted, no edema  NEURO: Normal strength and tone, mentation intact and speech normal  PSYCH: mentation appears normal, affect normal/bright            Assessment & Plan     Lori was seen today for recheck.    Diagnoses and all orders for this visit:    CKD (chronic kidney disease) stage 3, GFR 30-59 ml/min (H)  -     Comprehensive metabolic panel  -     *UA reflex to Microscopic and Culture ([facility]; [facility]; [facility]; [facility]; [place]; [place])  -     US Renal Complete; Future    Hyperlipidemia LDL goal <100  -     simvastatin (ZOCOR) 40 MG tablet; TAKE ONE TABLET BY MOUTH AT BEDTIME    Essential hypertension with goal blood pressure less than 140/90  -     amLODIPine (NORVASC) 5 MG tablet; Take 1 tablet (5 mg) by mouth daily    Benign non-nodular prostatic hyperplasia with lower urinary tract symptoms  -     PSA, screen  -     *UA reflex to Microscopic and Culture ([facility]; [facility]; [facility]; [facility]; [facility]; [place]; [place])    Screening  for prostate cancer  -     PSA, screen      89-year-old gentleman who had some blood test done in August.  He has high blood pressure and needs medication refills.  The meds are refilled for the next year as his labs were pretty good.  Unfortunately his creatinine is gone up to 2.  He does have some chronic kidney disease last creatinine about 1.6.  He is on hydrochlorothiazide.  I would like to recheck his creatinine and BUN today.  Also do a ultrasound of his kidneys to make sure there is no blockage since he is having more BPH symptoms despite being on Flomax.    We discussed his supplements and vitamins and I recommended he stop those so they do not affect his kidneys they will stop the ginkgo and possibly magnesium but continue most of them.        Return in about 3 months (around 11/24/2020) for Routine Visit.    Wale Short MD  [facility]

## 2020-08-24 NOTE — TELEPHONE ENCOUNTER
----- Message from Wale Short MD sent at 8/24/2020 11:17 AM CDT -----  Please let him know his kidneys are about the same.  His urine shows a possible infection which may be causing his urinary symptoms.  I like him to go on Keflex 250 mg twice a day for 7 days.  Please find out what pharmacy he wants and set up the prescription.

## 2020-08-25 NOTE — ED AVS SNAPSHOT
[facility]  [address] [place] [address]  Phone:  [phone]  Fax:  [phone]                                    Lori Mehta   MRN: [id number]    Department:  [facility]   Date of Visit:  8/25/2020           After Visit Summary Signature Page    I have received my discharge instructions, and my questions have been answered. I have discussed any challenges I see with this plan with the nurse or doctor.    ..........................................................................................................................................  Patient/Patient Representative Signature      ..........................................................................................................................................  Patient Representative Print Name and Relationship to Patient    ..................................................               ................................................  Date                                   Time    ..........................................................................................................................................  Reviewed by Signature/Title    ...................................................              ..............................................  Date                                               Time          22EPIC Rev 08/18

## 2020-08-25 NOTE — ED TRIAGE NOTES
Patient's airway, breathing, circulation, and disability/mental status (ABCDs) intact/WDL during triage.

## 2020-08-25 NOTE — TELEPHONE ENCOUNTER
Patient wife reports patient is having a bloody nose.     Put ice on neck (back).  Still bleeding.  Started about an hour ago.  Stuffed cotton in nose.      Advised proper technique to stop nose bleed.  Advised to call back if does not stop after holding for 15 min x 2 and 10 min with nasal decongestant packing.     Advised to see PCP within 24 hours.     Mer Hernandez RN/[facility]    COVID 19 Nurse Triage Plan/Patient Instructions    Please be aware that novel coronavirus (COVID-19) may be circulating in the community. If you develop symptoms such as fever, cough, or SOB or if you have concerns about the presence of another infection including coronavirus (COVID-19), please contact your health care provider or visit [email or web address].     Disposition/Instructions    In-Person Visit with provider recommended. Reference Visit Selection Guide.    Thank you for taking steps to prevent the spread of this virus.  o Limit your contact with others.  o Wear a simple mask to cover your cough.  o Wash your hands well and often.    Resources    [facility]: About COVID-19: [email or web address]    CDC: What to Do If You're Sick: www.cdc.gov/coronavirus/2019-ncov/about/steps-when-sick.html    CDC: Ending Home Isolation: www.cdc.gov/coronavirus/2019-ncov/hcp/disposition-in-home-patients.html     CDC: Caring for Someone: www.cdc.gov/coronavirus/2019-ncov/if-you-are-sick/care-for-someone.html     [facility]: Interim Guidance for Hospital Discharge to Home: www.health.[facility].[place]/diseases/coronavirus/hcp/hospdischarge.pdf    [facility] clinical trials (COVID-19 research studies): [email or web address]     Below are the COVID-19 hotlines at the [place] [facility] ([facility]). Interpreters are available.   o For health questions: Call 880-725-3265 or 1-557.624.7067 (7 a.m. to 7 p.m.)  o For questions about schools and childcare: Call 740-026-6716 or  9-794-118-1641 (7 a.m. to 7 p.m.)     Additional Information    Negative: Fainted or too weak to stand following large blood loss    Negative: Sounds like a life-threatening emergency to the triager    Negative: Nosebleed followed a nose injury    [1] Nosebleed AND [2] bleeding now BUT [3] not using correct technique    Negative: [1] Bleeding present > 30 minutes AND [2] using correct method of direct pressure    Negative: [1] Bleeding now AND [2] second call after being instructed in correct technique of direct pressure    Negative: Dizziness or lightheadedness    Negative: Pale skin (pallor) of new onset or worsening    Negative: [1] Has nasal packing (inserted by health care provider to control bleeding) AND [2] new rash    Negative: [1] Has nasal packing AND [2] now bleeding around the packing (Exception: few drops or ooze)    Negative: Patient sounds very sick or weak to the triager    Taking Coumadin (warfarin) or other strong blood thinner, or known bleeding disorder (e.g., thrombocytopenia)    Negative: [1] Bleeding recurs 3 or more times in 24 hours AND [2] direct pressure applied correctly    Negative: [1] Skin bruises or bleeding gums AND [2] not caused by an injury    Negative: [1] Large amount of blood has been lost (e.g., 1 cup or 240 ml) AND [2] bleeding now controlled (stopped)    Negative: [1] Has nasal packing AND [2] fever > 100.5 F (38.1 C)    Protocols used: NOSEBLEED-A-

## 2020-08-25 NOTE — DISCHARGE INSTRUCTIONS
Nasal packing until follow-up on Friday in the clinic.  Do not take your Coumadin today but she can restart that tomorrow.  A order has been placed for your INR next Tuesday.  If you have recurrent bleeding that she cannot control at home you should return to the emergency room.

## 2020-08-25 NOTE — ED PROVIDER NOTES
History     Chief Complaint   Patient presents with     Epistaxis     HPI  Lori Mehta is a 89 year old male who presents with bilateral nosebleeds.  Patient states a couple days ago he picked a scab on the left nares and then has had bleeding since then.  Now he is noted blood coming from the right side also.  No injury to the right side.  He is not been sick.  No sinus drainage prior to removing the scab.  Denies other unusual bleeding or bruising.  He is on Coumadin.  His INR 3 weeks ago was 2.62.  No change in his dosing but did eat a lot of beets in the last few days.  He has not had cough or chest pain.  He has chronic A. fib and previous CVA.  He is on long-term anticoagulants.  Not noticed any dark or bloody stools.  He said no fever or chills.  Denies headache.  Denies lightheadedness.  No focal motor weakness.  Denies tobacco use.    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     Priority: Medium     Rotator cuff arthropathy of right shoulder 04/17/2019     Priority: Medium     Cerebrovascular accident (CVA), unspecified mechanism (H) 07/19/2016     Priority: Medium     Essential hypertension with goal blood pressure less than 140/90 07/19/2016     Priority: Medium     Diplopia 07/11/2016     Priority: Medium     Gout 07/11/2016     Priority: Medium     Long-term (current) use of anticoagulants [Z79.01] 04/08/2016     Priority: Medium     Chronic atrial fibrillation (HCC) 12/17/2015     Priority: Medium     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 Previously signed by patient and notary on 3/1/1995.  scanned and placed behind media tab on 9/14/11.  Please see advance directive for specifics...Terra Tobin RN         HYPERLIPIDEMIA LDL GOAL <100 10/31/2010     Priority: Medium     Anxiety 12/14/2009     Priority: Medium     Hypertrophy of prostate without urinary obstruction 05/07/2004     Priority: Medium     Problem list name updated by automated process. Provider to review       Esophageal reflux 05/07/2004     Priority: Medium        Past Medical History:    Past Medical History:   Diagnosis Date     Anxiety      Atrial fibrillation (H) 1/9/2009     BPH      GERD (gastroesophageal reflux disease)      Pure hypercholesterolemia      Unspecified essential hypertension        Past Surgical History:    Past Surgical History:   Procedure Laterality Date     ARTHROPLASTY HIP ANTERIOR  11/4/2013    Procedure: ARTHROPLASTY HIP ANTERIOR;  Right Total Hip Arthroplasty - anterior approach;  Surgeon: Mathew Osorio MD;  Location: [facility]     CL AFF SURGICAL PATHOLOGY      nasal polyp removal     COLONOSCOPY  6/16/10     HC REMOVAL GALLBLADDER      Cholecystectomy     HC REMOVAL OF HYDROCELE,TUNICA,UNILAT       HC REPAIR ING HERNIA,5+Y/O,REDUCIBL       HC UGI ENDOSCOPY, SIMPLE EXAM  6/16/10     LAPAROSCOPIC HERNIORRHAPHY PREPERITONEAL  9/19/2012    Procedure: LAPAROSCOPIC HERNIORRHAPHY PREPERITONEAL;  Laparoscopic Preperitoneal Left Inguinal Hernia Repair;  Surgeon: Errol Navas MD;  Location: [facility]     LASER YAG CAPSULOTOMY Left 4/6/2017    Procedure: LASER YAG CAPSULOTOMY;  Surgeon: Lang  Mathew Presley MD;  Location: [facility]     OPEN REDUCTION INTERNAL FIXATION FOREARM Left 12/29/2014    Procedure: OPEN REDUCTION INTERNAL FIXATION FOREARM;  Surgeon: Mathew Osorio MD;  [facility]     PHACOEMULSIFICATION WITH STANDARD INTRAOCULAR LENS IMPLANT  7/19/2012    Procedure: PHACOEMULSIFICATION WITH STANDARD INTRAOCULAR LENS IMPLANT;  PHACOEMULSIFICATION WITH STANDARD INTRAOCULAR LENS IMPLANT LEFT EYE;  Surgeon: Gabriel Ross MD;  [facility]     PHACOEMULSIFICATION WITH STANDARD INTRAOCULAR LENS IMPLANT  8/16/2012    Procedure: PHACOEMULSIFICATION WITH STANDARD INTRAOCULAR LENS IMPLANT;  RIGHT PHACOEMULSIFICATION WITH STANDARD INTRAOCULAR LENS IMPLANT;  Surgeon: Gabriel Ross MD;  Location: [facility]       Family History:    Family History   Problem Relation Age of Onset     Diabetes Brother      C.A.D. Mother      Diabetes Maternal Grandmother      Circulatory Maternal Grandfather         cerebral aneurysm     Hypertension Father      Cerebrovascular Disease Father      Breast Cancer Sister          x 2     Cancer - colorectal No family hx of        Social History:  Marital Status:   [2]  Social History     Tobacco Use     Smoking status: Never Smoker     Smokeless tobacco: Never Used   Substance Use Topics     Alcohol use: Not Currently     Alcohol/week: 1.0 standard drinks     Types: 1 Standard drinks or equivalent per week     Comment: beer once a week     Drug use: No        Medications:    Acetaminophen (TYLENOL PO)  amLODIPine (NORVASC) 5 MG tablet  Ascorbic Acid (VITAMIN C) 500 MG CHEW  Bromelains 500 MG TABS  CAPSICUM, CAYENNE, PO  cephALEXin (KEFLEX) 250 MG capsule  COENZYME Q10  hydrochlorothiazide (HYDRODIURIL) 25 MG tablet  meclizine (ANTIVERT) 25 MG tablet  MELATONIN PO  Multiple Vitamins-Minerals (PRESERVISION AREDS 2 PO)  OMEGA 3 1200 MG OR CAPS  Saw Palmetto, Serenoa repens, (SAW PALMETTO EXTRACT PO)  SELENIUM 200 MCG OR CAPS  simvastatin (ZOCOR) 20 MG  tablet  simvastatin (ZOCOR) 40 MG tablet  tamsulosin (FLOMAX) 0.4 MG capsule  VITAMIN B12 TR 1000 MCG OR TBCR  warfarin ANTICOAGULANT (COUMADIN) 5 MG tablet  ZINC 30 MG OR TABS          Review of Systems all other systems are reviewed and are negative.    Physical Exam   BP: (!) 160/93  Pulse: 92  Temp: 98.5  F (36.9  C)  Resp: 18  SpO2: 95 %      Physical Exam alert cooperative male in mild to moderate stress.  His main complaint that he cannot breathe through his nose.  HEENT shows no facial swelling.  He has no tenderness of the frontal or maxillary sinuses.  He has packing in both nares.  The right side is soaked with blood .  Orally there is no postnasal bleeding currently.  No significant skin bruising or lesions.  Patient is able speak in complete sentences.  He has no neck stridor.    ED Course        Procedures        The patient's nasal packing was removed.  He had a large clot adherent to the dressing.  The septum was diffusely raw and oozing blood.  A 4.5 Rhino Rocket was placed without difficulty.  On the right side the packing was removed and showed some blood in the inferior aspect of the anterior canal.  No active bleeding site was noted.  There was a small clot noted on the cotton swab that was removed.       Critical Care time:  none               Results for orders placed or performed during the hospital encounter of 08/25/20 (from the past 24 hour(s))   CBC with platelets differential   Result Value Ref Range    WBC 8.6 4.0 - 11.0 10e9/L    RBC Count 3.35 (L) 4.4 - 5.9 10e12/L    Hemoglobin 10.7 (L) 13.3 - 17.7 g/dL    Hematocrit 32.5 (L) 40.0 - 53.0 %    MCV 97 78 - 100 fl    MCH 31.9 26.5 - 33.0 pg    MCHC 32.9 31.5 - 36.5 g/dL    RDW 13.2 10.0 - 15.0 %    Platelet Count 198 150 - 450 10e9/L    Diff Method Automated Method     % Neutrophils 76.8 %    % Lymphocytes 12.4 %    % Monocytes 7.4 %    % Eosinophils 2.4 %    % Basophils 0.7 %    % Immature Granulocytes 0.3 %    Nucleated RBCs 0 0 /100     Absolute Neutrophil 6.6 1.6 - 8.3 10e9/L    Absolute Lymphocytes 1.1 0.8 - 5.3 10e9/L    Absolute Monocytes 0.6 0.0 - 1.3 10e9/L    Absolute Basophils 0.1 0.0 - 0.2 10e9/L    Abs Immature Granulocytes 0.0 0 - 0.4 10e9/L    Absolute Nucleated RBC 0.0    INR   Result Value Ref Range    INR 3.01 (H) 0.86 - 1.14       Medications - No data to display  CBC and INR obtained.  Review of his records show that his hemoglobin a year ago was 10.7 with normal platelets.  At 9:10 AM on recheck.  No active bleeding from the right side.  No active bleeding from the left side with the nasal packing.  At 9:30 AM and at 9:45 AM on recheck no active bleeding from either side.  Patient ambulated to the restroom without assistance.  No active bleeding thereafter.  Assessments & Plan (with Medical Decision Making)   Lori Mehta is a 89 year old male who presents with bilateral nosebleeds.  Patient states a couple days ago he picked a scab on the left nares and then has had bleeding since then.  Now he is noted blood coming from the right side also.  No injury to the right side.  He is not been sick.  No sinus drainage prior to removing the scab.  Denies other unusual bleeding or bruising.  He is on Coumadin.  His INR 3 weeks ago was 2.62.  No change in his dosing but did eat a lot of beets in the last few days.  He has not had cough or chest pain.  He has chronic A. fib and previous CVA.  He is on long-term anticoagulants.  Not noticed any dark or bloody stools.  He said no fever or chills.  Denies headache.  Denies lightheadedness.  No focal motor weakness.  Denies tobacco use.  Patient had active bleeding in the left nares.  No localized lesion to cauterize.  Improved with 4.5 Rhino Rocket.  The right side had some blood in it but this was removed and no active bleeding was noted.  On repeated checks patient had no recurrence.  INR is slightly elevated at 3.0.  He is advised to not significantly change his diet.  He held his  Coumadin today.  He can reinstitute that tomorrow.  We will get an INR on Tuesday.  Information on nosebleed is provided.  Follow-up in the clinic on Friday for packing removal.  Reasons to return to emergency room were discussed.  I have reviewed the nursing notes.    I have reviewed the findings, diagnosis, plan and need for follow up with the patient.       New Prescriptions    No medications on file       Final diagnoses:   Long-term (current) use of anticoagulants [Z79.01]   Epistaxis       8/25/2020   [facility] EMERGENCY DEPARTMENT     Riley Vuong MD  08/25/20 1003

## 2020-08-25 NOTE — TELEPHONE ENCOUNTER
Reason for call:  Other   Patient called regarding (reason for call): appointment  Additional comments: please call and schedule appt for Friday - ED follow up/ remove nasal packing     Phone number to reach patient:  Home number on file [phone] (home)    Best Time:  any    Can we leave a detailed message on this number?  YES    Travel screening: Not Applicable

## 2020-08-25 NOTE — TELEPHONE ENCOUNTER
Called pt and LM that PL will see him on Friday 8-28 at 8:15 for his ED f/u and remove his packing.  KH

## 2020-08-28 NOTE — PROGRESS NOTES
Subjective     Lori Mehta is a 89 year old male who presents to clinic today for the following health issues:    HPI       ED/UC Followup:    Facility:  [facility]  Date of visit: 8/25/20  Reason for visit: epistaxis  Current Status: follow up     Picked his nose on Monday and then had some bleeding and went to the ER.      He has been on keflex for UTI and should have covered his sinus as well.      Review of Systems         Objective    /66   Pulse 94   Temp 98.3  F (36.8  C) (Temporal)   Resp 16   Wt 78.5 kg (173 lb)   SpO2 96%   BMI 27.10 kg/m    Body mass index is 27.1 kg/m .  Physical Exam   NAD,   Left nostril was with a rhinorocket.    Dried blood around the nares but otherwise no sign of active bleeding after the Rhino Rocket is removed.          Assessment & Plan     Lori was seen today for er f/u.    Diagnoses and all orders for this visit:    Chronic atrial fibrillation (H)    Long term current use of anticoagulant therapy  -     INR point of care    Epistaxis  -     INR point of care    Epistaxis is treated, Rhino Rocket is removed.  The patient is been on antibiotics for a UTI which would have covered any sinus infection.  He will check his INR today as it was over 3 in the emergency room.           No follow-ups on file.    Wale Short MD  [facility]

## 2020-08-28 NOTE — PROGRESS NOTES
ANTICOAGULATION FOLLOW-UP CLINIC VISIT    Patient Name:  Lori Mehta  Date:  2020  Contact Type:  Telephone    SUBJECTIVE:  Patient Findings     Positives:   Missed doses (held on  due nose bleed ), Change in medications (keflex )    Comments:   I left a detailed voicemail with the orders reflected in flowsheet. I have also requested a call back if there have been any missed doses, concerns, illness, fever, or if there have been any changes in medications, activity level, or diet  Lakeisha Rodriguez RN          Clinical Outcomes     Comments:   I left a detailed voicemail with the orders reflected in flowsheet. I have also requested a call back if there have been any missed doses, concerns, illness, fever, or if there have been any changes in medications, activity level, or diet  Lakeisha Rodriguez RN             OBJECTIVE    Recent labs: (last 7 days)     20  0904   INR 2.0*       ASSESSMENT / PLAN  INR assessment THER    Recheck INR In: 4 DAYS    INR Location Outside lab      Anticoagulation Summary  As of 2020    INR goal:   2.0-3.0   TTR:   95.1 % (1 y)   INR used for dosin.0 (2020)   Warfarin maintenance plan:   5 mg (5 mg x 1) every Mon; 7.5 mg (5 mg x 1.5) all other days   Full warfarin instructions:   5 mg every Mon; 7.5 mg all other days   Weekly warfarin total:   50 mg   No change documented:   Lakeisha Rodriguez RN   Plan last modified:   Lakeisha Rodriguez RN (2019)   Next INR check:   2020   Priority:   Maintenance   Target end date:   Indefinite    Indications    Chronic atrial fibrillation (HCC) [I48.20]  Long-term (current) use of anticoagulants [Z79.01] [Z79.01]             Anticoagulation Episode Summary     INR check location:       Preferred lab:       Send INR reminders to:   [facility]    Comments:   5 mg tablets, no bandaid, takes at noon, likes BP done.       Anticoagulation Care Providers     Provider Role Specialty Phone number     Wale Short MD Referring Internal Medicine [phone]            See the Encounter Report to view Anticoagulation Flowsheet and Dosing Calendar (Go to Encounters tab in chart review, and find the Anticoagulation Therapy Visit)    Dosage adjustment made based on physician directed care plan.      Lakeisha Rodriguez RN

## 2020-09-01 NOTE — PROGRESS NOTES
ANTICOAGULATION FOLLOW-UP CLINIC VISIT    Patient Name:  Lori Mehta  Date:  2020  Contact Type:  Telephone    SUBJECTIVE:         OBJECTIVE    Recent labs: (last 7 days)     20  0858   INR 2.6*       ASSESSMENT / PLAN  INR assessment THER    Recheck INR In: 4 WEEKS    INR Location Outside lab      Anticoagulation Summary  As of 2020    INR goal:   2.0-3.0   TTR:   95.1 % (1 y)   INR used for dosin.6 (2020)   Warfarin maintenance plan:   5 mg (5 mg x 1) every Mon; 7.5 mg (5 mg x 1.5) all other days   Full warfarin instructions:   5 mg every Mon; 7.5 mg all other days   Weekly warfarin total:   50 mg   No change documented:   Lakeisha Rodriguez RN   Plan last modified:   Lakeisha Rodriguez RN (2019)   Next INR check:   2020   Priority:   Maintenance   Target end date:   Indefinite    Indications    Chronic atrial fibrillation (HCC) [I48.20]  Long-term (current) use of anticoagulants [Z79.01] [Z79.01]             Anticoagulation Episode Summary     INR check location:       Preferred lab:       Send INR reminders to:   [facility]    Comments:   5 mg tablets, no bandaid, takes at noon, likes BP done.       Anticoagulation Care Providers     Provider Role Specialty Phone number    Wale Short MD Referring Internal Medicine [phone]            See the Encounter Report to view Anticoagulation Flowsheet and Dosing Calendar (Go to Encounters tab in chart review, and find the Anticoagulation Therapy Visit)    Dosage adjustment made based on physician directed care plan.    Lakeisha Rodriguez RN

## 2020-09-01 NOTE — TELEPHONE ENCOUNTER
Reason for Call:  Other INR Follow up    Detailed comments: patients wife calling, patient was in at 9am would like a call back to go over the results and instructions,please call    Phone Number Patient can be reached at: Home number on file [phone] (home)    Best Time: any    Can we leave a detailed message on this number? YES    Call taken on 9/1/2020 at 3:04 PM by Ophelia Acosta  .

## 2020-09-03 NOTE — TELEPHONE ENCOUNTER
Reason for Call:  Other     Detailed comments: Requesting copies of his last few lab work results.      Phone Number Patient can be reached at: Home number on file [phone] (home)    Best Time: any    Can we leave a detailed message on this number? YES    Call taken on 9/3/2020 at 9:39 AM by Lakeisha Buitrago

## 2020-09-03 NOTE — LETTER
[address] [id number] [phone]        September 3, 2020    Lori Mehta  [address]  [facility] [address]          Dear Lori,    The lab results you requested.    Sincerely,        Wale Short MD

## 2020-09-15 NOTE — TELEPHONE ENCOUNTER
Patient called today.    States that he hasn't been taking his BP medication for 3 days now.    This AM BP reading was 110/52.    Patient did speak with a nurse non [place].    Suggested to call patient today.    Thank you.    Central Scheduling  Fanta DEE

## 2020-09-15 NOTE — TELEPHONE ENCOUNTER
That is okay to hold his blood pressure medication as long as his blood pressure is less than 140/90.

## 2020-09-29 NOTE — TELEPHONE ENCOUNTER
Attempted to contact pt regarding INR of 3.9 from 9/29. VM left to call [facility] back to discuss    Lakeisha Rodriguez RN

## 2020-09-29 NOTE — PROGRESS NOTES
ANTICOAGULATION FOLLOW-UP CLINIC VISIT    Patient Name:  Lori Mehta  Date:  9/29/2020  Contact Type:  Telephone    SUBJECTIVE:  Patient Findings     Positives:   Change in medications (has been taking cough syrup, guaiatussin- per [employer] this should not affect INR,but possibly the cough itself might be causing an increase )    Comments:   Hold tomorrow, since he already took dose this AM  Lakeisha Rodriguez RN          Clinical Outcomes     Comments:   Hold tomorrow, since he already took dose this AM  Lakeisha Rodriguez RN             OBJECTIVE    Recent labs: (last 7 days)     09/29/20  0919   INR 3.9*       ASSESSMENT / PLAN  INR assessment SUPRA    Recheck INR In: 1 WEEK    INR Location Outside lab      Anticoagulation Summary  As of 9/29/2020    INR goal:   2.0-3.0   TTR:   89.8 % (1 y)   INR used for dosing:   3.9! (9/29/2020)   Warfarin maintenance plan:   5 mg (5 mg x 1) every Mon; 7.5 mg (5 mg x 1.5) all other days   Full warfarin instructions:   9/30: Hold; Otherwise 5 mg every Mon; 7.5 mg all other days   Weekly warfarin total:   50 mg   Plan last modified:   Lakeisha Rodriguez RN (8/13/2019)   Next INR check:   10/6/2020   Priority:   Maintenance   Target end date:   Indefinite    Indications    Chronic atrial fibrillation (HCC) [I48.20]  Long-term (current) use of anticoagulants [Z79.01] [Z79.01]             Anticoagulation Episode Summary     INR check location:       Preferred lab:       Send INR reminders to:   [facility]    Comments:   5 mg tablets, no bandaid, takes at noon, likes BP done.       Anticoagulation Care Providers     Provider Role Specialty Phone number    Wale Short MD Referring Internal Medicine [phone]            See the Encounter Report to view Anticoagulation Flowsheet and Dosing Calendar (Go to Encounters tab in chart review, and find the Anticoagulation Therapy Visit)    Dosage adjustment made based on physician directed care plan.      Lakeisha DEE  CAN Rodriguez

## 2020-10-05 NOTE — PROGRESS NOTES
ANTICOAGULATION FOLLOW-UP CLINIC VISIT    Patient Name:  Lori Mehta  Date:  10/5/2020  Contact Type:  Telephone    SUBJECTIVE:  Patient Findings     Comments:  The patient was assessed for diet, medication, and activity level changes, missed or extra doses, bruising or bleeding, with no problem findings.          Clinical Outcomes     Negatives:  Major bleeding event, Thromboembolic event, Anticoagulation-related hospital admission, Anticoagulation-related ED visit, Anticoagulation-related fatality    Comments:  The patient was assessed for diet, medication, and activity level changes, missed or extra doses, bruising or bleeding, with no problem findings.             OBJECTIVE    Recent labs: (last 7 days)     10/05/20  1008   INR 2.7*       ASSESSMENT / PLAN  INR assessment THER    Recheck INR In: 2 WEEKS    INR Location Outside lab      Anticoagulation Summary  As of 10/5/2020    INR goal:  2.0-3.0   TTR:  88.5 % (1 y)   INR used for dosin.7 (10/5/2020)   Warfarin maintenance plan:  5 mg (5 mg x 1) every Mon, Fri; 7.5 mg (5 mg x 1.5) all other days   Full warfarin instructions:  5 mg every Mon, Fri; 7.5 mg all other days   Weekly warfarin total:  47.5 mg   Plan last modified:  Azalia Stallings RN (10/5/2020)   Next INR check:  10/20/2020   Priority:  Maintenance   Target end date:  Indefinite    Indications    Chronic atrial fibrillation (HCC) [I48.20]  Long-term (current) use of anticoagulants [Z79.01] [Z79.01]             Anticoagulation Episode Summary     INR check location:      Preferred lab:      Send INR reminders to:  [facility]    Comments:  5 mg tablets, no bandaid, takes at noon, likes BP done.       Anticoagulation Care Providers     Provider Role Specialty Phone number    Wale Short MD Referring Internal Medicine [phone]            See the Encounter Report to view Anticoagulation Flowsheet and Dosing Calendar (Go to Encounters tab in chart review, and find the  Anticoagulation Therapy Visit)    Dosage adjustment made based on physician directed care plan.    Azalia Stallings RN

## 2020-10-05 NOTE — TELEPHONE ENCOUNTER
Attempted to contact pt regarding INR of 2.7 from 10/5. VM left to call [facility] back to discuss    Lakeisha Rodriguez RN

## 2020-10-12 NOTE — TELEPHONE ENCOUNTER
"Patient's wife states that patient is having some wheezing with breathing and some fatigue for the past 2 weeks with some SOB and a cough.  She states that he is feeling a little better today, and is up and around doing outside chores.  She stated that she thought she better call because he is not getting a\" whole lot\" better and thinks that maybe he may have bronchitis or pneumonia, or a sinus infection, she stated they don't have the COVID 19.  Fatoumata states she thinks he may need an antibiotic and Dr. Han MD knows Lori history and would like to have him review symptoms.  Fatoumata denies patient having chest pain or fever.  Advised patient's wife, Fatoumata, to call 911 or seek emergency care for worsening symptoms.  Fatoumata stated understanding.    Will forward to PCP for review.    Additional Information    Negative: Breathing stopped and hasn't returned    Negative: Choking on something    Negative: SEVERE difficulty breathing (e.g., struggling for each breath, speaks in single words, pulse > 120)    Negative: Bluish (or gray) lips or face    Negative: Difficult to awaken or acting confused (e.g., disoriented, slurred speech)    Negative: Passed out (i.e., fainted, collapsed and was not responding)    Negative: Wheezing started suddenly after medicine, an allergic food, or bee sting    Negative: Stridor    Negative: Slow, shallow and weak breathing    Negative: Sounds like a life-threatening emergency to the triager    MILD difficulty breathing (e.g., minimal/no SOB at rest, SOB with walking, pulse < 100) of new onset or worse than normal    Patient wants to be seen    Answer Assessment - Initial Assessment Questions  1. RESPIRATORY STATUS: \"Describe your breathing?\" (e.g., wheezing, shortness of breath, unable to speak, severe coughing)       Wheezing, short of breath, coughing    2. ONSET: \"When did this breathing problem begin?\"       Approximately 2 weeks ago    3. PATTERN \"Does the difficult breathing come and " "go, or has it been constant since it started?\"       Constant    4. SEVERITY: \"How bad is your breathing?\" (e.g., mild, moderate, severe)     - MILD: No SOB at rest, mild SOB with walking, speaks normally in sentences, can lay down, no retractions, pulse < 100.     - MODERATE: SOB at rest, SOB with minimal exertion and prefers to sit, cannot lie down flat, speaks in phrases, mild retractions, audible wheezing, pulse 100-120.     - SEVERE: Very SOB at rest, speaks in single words, struggling to breathe, sitting hunched forward, retractions, pulse > 120       Moderate    5. RECURRENT SYMPTOM: \"Have you had difficulty breathing before?\" If so, ask: \"When was the last time?\" and \"What happened that time?\"       Yes, bronchitis    6. CARDIAC HISTORY: \"Do you have any history of heart disease?\" (e.g., heart attack, angina, bypass surgery, angioplasty)   Hypertension    7. LUNG HISTORY: \"Do you have any history of lung disease?\"  (e.g., pulmonary embolus, asthma, emphysema)      No    8. CAUSE: \"What do you think is causing the breathing problem?\"       Bronchitis, pneumonia    9. OTHER SYMPTOMS: \"Do you have any other symptoms? (e.g., dizziness, runny nose, cough, chest pain, fever)      No    10. PREGNANCY: \"Is there any chance you are pregnant?\" \"When was your last menstrual period?\"        NA  11. TRAVEL: \"Have you traveled out of the country in the last month?\" (e.g., travel history, exposures)        No    Protocols used: BREATHING DIFFICULTY-A-OH  Dina Hester RN      "

## 2020-10-13 NOTE — PROGRESS NOTES
"Lori Mehta is a 89 year old male who is being evaluated via a billable telephone visit.      The patient has been notified of following:     \"This telephone visit will be conducted via a call between you and your physician/provider. We have found that certain health care needs can be provided without the need for a physical exam.  This service lets us provide the care you need with a short phone conversation.  If a prescription is necessary we can send it directly to your pharmacy.  If lab work is needed we can place an order for that and you can then stop by our lab to have the test done at a later time.    Telephone visits are billed at different rates depending on your insurance coverage. During this emergency period, for some insurers they may be billed the same as an in-person visit.  Please reach out to your insurance provider with any questions.    If during the course of the call the physician/provider feels a telephone visit is not appropriate, you will not be charged for this service.\"    Patient has given verbal consent for Telephone visit?  Yes    What phone number would you like to be contacted at? [phone]    How would you like to obtain your AVS? Mail a copy    Subjective     Lori Mehta is a 89 year old male who presents via phone visit today for the following health issues:    HPI     Chief Complaint   Patient presents with     Fatigue     Feeling tired and weak ongoing for about 2 weeks now or more. No energy       Sleep Problem     Can't sleep at night also. [facility] But we was out of it the next day.      He is very tired, no energy. Been a couple weeks, getting worse.  No fevers, no sob.  Chronic urination and up at night a lot.      Trouble sleeping, goes to be at 10:30, still awake at 1:30.  He has tried some alprazolam but felt worse the next day.             Review of Systems   Constitutional, HEENT, cardiovascular, pulmonary, gi and " gu systems are negative, except as otherwise noted.       Objective          Vitals:  No vitals were obtained today due to virtual visit.    healthy, alert and no distress  PSYCH: Alert and oriented times 3; coherent speech, normal   rate and volume, able to articulate logical thoughts, able   to abstract reason, no tangential thoughts, no hallucinations   or delusions  His affect is normal  RESP: No cough, no audible wheezing, able to talk in full sentences  Remainder of exam unable to be completed due to telephone visits            Assessment/Plan:    Assessment & Plan     Lori was seen today for fatigue and sleep problem.    Diagnoses and all orders for this visit:    Insomnia due to other mental disorder  -     mirtazapine (REMERON) 15 MG tablet; Take 1 tablet (15 mg) by mouth At Bedtime    Anxiety    Patient calling in with insomnia.  [facility] is [facility] but gets overly sedated and sleepy the next day.  He is having a lot of racing thoughts and anxiety at night.  We will put him on mirtazapine 15 mg at night I think this will help him.  He is instructed to take this on a daily basis not as needed she also helps him with his anxiety and his mood issues.    Also recommended they get a flu shot when they go into the pharmacy today.            No follow-ups on file.    Wale Short MD  [facility]    Phone call duration:  9 minutes

## 2020-10-15 NOTE — TELEPHONE ENCOUNTER
Reason for Call:  Other call back    Detailed comments: Picked up script from [facility] - mirtazapine 15 mg tabs - reading instructions does not sound like sleeping pill and questioning dosage. Please call to clarify. Pts spouse would like someone else to address in 's absence    Phone Number Patient can be reached at: Home number on file [phone] (home)    Best Time: anytime    Can we leave a detailed message on this number? YES    Call taken on 10/15/2020 at 1:32 PM by Julia Collins

## 2020-10-15 NOTE — TELEPHONE ENCOUNTER
Spoke to patient wife and she was just concerned that the dose was high. I read her Larsmicha note and she is okay with stated the medication and will call us if she has any more questions.  Carmen Roca MA 10/15/2020

## 2020-10-20 NOTE — TELEPHONE ENCOUNTER
Attempted to contact pt regarding INR of 5.0 from 10/20. VM left to call [facility] back to discuss    Pt was started on mirtazapine on 10/13 which can increase INR.     Lakeisha Rodriguez RN

## 2020-10-20 NOTE — PROGRESS NOTES
ANTICOAGULATION FOLLOW-UP CLINIC VISIT    Patient Name:  Lori Mehta  Date:  10/20/2020  Contact Type:  Telephone    SUBJECTIVE:  Patient Findings     Positives:  Change in medications (mirtazapine started 10/13 which can increase INR, per [facility] )             OBJECTIVE    Recent labs: (last 7 days)     10/20/20  0937   INR 5.0*       ASSESSMENT / PLAN  INR assessment SUPRA    Recheck INR In: 3 DAYS    INR Location Outside lab      Anticoagulation Summary  As of 10/20/2020    INR goal:  2.0-3.0   TTR:  84.9 % (1 y)   INR used for dosin.0 (10/20/2020)   Warfarin maintenance plan:  5 mg (5 mg x 1) every Mon, Fri; 7.5 mg (5 mg x 1.5) all other days   Full warfarin instructions:  10/20: Hold; 10/21: Hold; Otherwise 5 mg every Mon, Fri; 7.5 mg all other days   Weekly warfarin total:  47.5 mg   Plan last modified:  Azalia Stallings RN (10/5/2020)   Next INR check:  10/23/2020   Priority:  Maintenance   Target end date:  Indefinite    Indications    Chronic atrial fibrillation (HCC) [I48.20]  Long-term (current) use of anticoagulants [Z79.01] [Z79.01]             Anticoagulation Episode Summary     INR check location:      Preferred lab:      Send INR reminders to:  [facility]    Comments:  5 mg tablets, no bandaid, takes at noon, likes BP done.       Anticoagulation Care Providers     Provider Role Specialty Phone number    Wale Short MD Referring Internal Medicine [phone]            See the Encounter Report to view Anticoagulation Flowsheet and Dosing Calendar (Go to Encounters tab in chart review, and find the Anticoagulation Therapy Visit)    Dosage adjustment made based on physician directed care plan.      Lakeisha Rodriguez, RN

## 2020-10-20 NOTE — PROGRESS NOTES
Subjective     Lori Mehta is a 89 year old male who presents to clinic today for the following health issues:    HPI         Chief Complaint   Patient presents with     Ear Problem     possible fluid in left ear     Eye Problem     left eye has been watery     Cough     Left eye irritated with some watering. Overnight cheek swells up, he can tell, all on the left side.  Nose feel plugged.   Left ear with water.  Been a week or so.      Mirtazapine is working for sleeping now.      INR was up to 5.0    Past Medical History:   Diagnosis Date     Anxiety      Atrial fibrillation (H) 1/9/2009     BPH      GERD (gastroesophageal reflux disease)      Pure hypercholesterolemia      Unspecified essential hypertension      Current Outpatient Medications   Medication     Acetaminophen (TYLENOL PO)     Ascorbic Acid (VITAMIN C) 500 MG CHEW     Bromelains 500 MG TABS     CAPSICUM, CAYENNE, PO     COENZYME Q10     furosemide (LASIX) 20 MG tablet     hydrochlorothiazide (HYDRODIURIL) 25 MG tablet     mirtazapine (REMERON) 15 MG tablet     Multiple Vitamins-Minerals (PRESERVISION AREDS 2 PO)     OMEGA 3 1200 MG OR CAPS     Saw Palmetto, Serenoa repens, (SAW PALMETTO EXTRACT PO)     SELENIUM 200 MCG OR CAPS     simvastatin (ZOCOR) 40 MG tablet     tamsulosin (FLOMAX) 0.4 MG capsule     VITAMIN B12 TR 1000 MCG OR TBCR     warfarin ANTICOAGULANT (COUMADIN) 5 MG tablet     ZINC 30 MG OR TABS     meclizine (ANTIVERT) 25 MG tablet     MELATONIN PO     No current facility-administered medications for this visit.      Social History     Tobacco Use     Smoking status: Never Smoker     Smokeless tobacco: Never Used   Substance Use Topics     Alcohol use: Not Currently     Alcohol/week: 1.0 standard drinks     Types: 1 Standard drinks or equivalent per week     Comment: beer once a week     Drug use: No     Review of Systems   CONSTITUTIONAL:weight gain   EYES: left eye watering  ENT/MOUTH: left ear feels full   RESP:mild sob   CV:  NEGATIVE for chest pain, palpitations or peripheral edema  MUSCULOSKELETAL: NEGATIVE for significant arthralgias or myalgia  NEURO: NEGATIVE for weakness, dizziness or paresthesias  ENDOCRINE: NEGATIVE for temperature intolerance, skin/hair changes  HEME: NEGATIVE for bleeding problems  PSYCHIATRIC: NEGATIVE for changes in mood or affect      Objective    BP (!) 146/80   Pulse 98   Temp 97.7  F (36.5  C) (Temporal)   Resp 20   Wt 85.3 kg (188 lb)   SpO2 93%   BMI 29.44 kg/m    Body mass index is 29.44 kg/m .  Physical Exam   NAD  Lungs are pretty clear,   Heart regular  Left eye without erythema, some swelling below the eye,   Left ear is clear  Skin  No rash.   Both legs with 2+ pitting edema          Assessment & Plan     Lori was seen today for ear problem, eye problem and cough.    Diagnoses and all orders for this visit:    SOB (shortness of breath)  -     Comprehensive metabolic panel  -     BNP-N terminal pro  -     CBC with platelets  -     furosemide (LASIX) 20 MG tablet; Take 1 tablet (20 mg) by mouth daily    Leg swelling  -     Comprehensive metabolic panel  -     BNP-N terminal pro  -     CBC with platelets  -     furosemide (LASIX) 20 MG tablet; Take 1 tablet (20 mg) by mouth daily  -     **Basic metabolic panel FUTURE anytime; Future    Long term current use of anticoagulant therapy  -     INR; Future    Watering of left eye    Chronic atrial fibrillation (H)    Stage 3b chronic kidney disease      Patient who is atrial fibrillation on Coumadin, he comes in with some left eye watering, left face congestion swelling under the left eye.  He is really not tender over the sinuses, his left ear is clear his eye is clear.  He has no rash like shingles.  He has no cellulitis seen.    He does have some shortness of breath and on reviewing his vital signs his weight is up quite a few pounds.  He has 2-3+ pitting edema in his lower extremities.  I worry about congestive heart failure and fluid  retention.  I am going to check labs today as he has chronic kidney disease with creatinine of 2.  Also check his BNP.  We will add Lasix 20 mg a day until he drops 5 pounds of water weight.  Follow-up his labs for kidney and potassium on Friday again.    He is very happy with his insomnia treatment of mirtazapine he is sleeping well.  Seems to have had some interaction with his INR which is up to 5.  Coumadin clinic adjusted this.  We will recheck his INR on Friday as well.                No follow-ups on file.    Wale Short MD  [facility]

## 2020-10-20 NOTE — TELEPHONE ENCOUNTER
"Reason for Call:  Same Day Appointment, Requested Provider:  Wale Short MD    PCP: Wale Short    Reason for visit: Fluid in left ear, left eye and cheek also swollen    Duration of symptoms: about 3 days    Have you been treated for this in the past?     Additional comments: Lori has a 9:45am appointment at the lab today and was hoping Dr Short would take a look at him also today while he is here.  Wife Fatoumata states they would prefer to see Dr Short.    Asking for a call back soon as they are \"leaving for town soon\".    Can we leave a detailed message on this number? YES    Phone number patient can be reached at: Home number on file [phone] (home)    Best Time: any    Call taken on 10/20/2020 at 8:45 AM by Lakeisha Buitrago    "

## 2020-10-20 NOTE — TELEPHONE ENCOUNTER
----- Message from Wale Short MD sent at 10/20/2020  2:14 PM CDT -----  Please let him know his labs are okay it does show some heart failure therefore try the diuretic and recheck his labs as already ordered for Friday morning at 930.

## 2020-10-23 NOTE — TELEPHONE ENCOUNTER
Attempted to contact pt regarding INR of 1.84 from 10/23. VM left to call [facility] back to discuss    Advised 5 mg daily if pt is unable to reach [facility] by the end of the day      Lakeisha Rodriguez RN

## 2020-10-23 NOTE — TELEPHONE ENCOUNTER
Patient's spouse is calling back. Please call back when able.  Thank you,  Laurel Robison   for [facility]

## 2020-10-23 NOTE — PROGRESS NOTES
Subjective     Lori Mehta is a 89 year old male who presents to clinic today for the following health issues:    HPI         Chief Complaint   Patient presents with     Breathing Problem     f/u, not improving, noticing some wheezing     No fevers but having issues with breathing, coughs when lay down. Some sob. Dry cough.      Gave him lasix 20mg and weight is down 2 pounds.     inr is 1.83.     Wife is not sick.  Creatinine is elevated to so repeat his labs today.    Past Medical History:   Diagnosis Date     Anxiety      Atrial fibrillation (H) 1/9/2009     BPH      GERD (gastroesophageal reflux disease)      Pure hypercholesterolemia      Unspecified essential hypertension      Current Outpatient Medications   Medication     Acetaminophen (TYLENOL PO)     Ascorbic Acid (VITAMIN C) 500 MG CHEW     Bromelains 500 MG TABS     CAPSICUM, CAYENNE, PO     COENZYME Q10     furosemide (LASIX) 20 MG tablet     hydrochlorothiazide (HYDRODIURIL) 25 MG tablet     Lutein-Zeaxanthin 25-5 MG CAPS     magnesium oxide 400 MG CAPS     meclizine (ANTIVERT) 25 MG tablet     MELATONIN PO     mirtazapine (REMERON) 15 MG tablet     Multiple Vitamins-Minerals (PRESERVISION AREDS 2 PO)     OMEGA 3 1200 MG OR CAPS     Saw Palmetto, Serenoa repens, (SAW PALMETTO EXTRACT PO)     SELENIUM 200 MCG OR CAPS     simvastatin (ZOCOR) 40 MG tablet     tamsulosin (FLOMAX) 0.4 MG capsule     TURMERIC PO     vitamin B complex with vitamin C (VITAMIN  B COMPLEX) tablet     VITAMIN B12 TR 1000 MCG OR TBCR     vitamin E 400 units TABS     warfarin ANTICOAGULANT (COUMADIN) 5 MG tablet     ZINC 30 MG OR TABS     No current facility-administered medications for this visit.      Social History     Tobacco Use     Smoking status: Never Smoker     Smokeless tobacco: Never Used   Substance Use Topics     Alcohol use: Not Currently     Alcohol/week: 1.0 standard drinks     Types: 1 Standard drinks or equivalent per week     Comment: beer once a week      Drug use: No         Review of Systems   CONSTITUTIONAL: NEGATIVE for fever, chills, change in weight  RESP:POSITIVE for orthopnea  CV: NEGATIVE for chest pain, palpitations or peripheral edema  GI: NEGATIVE for nausea, abdominal pain, heartburn, or change in bowel habits  MUSCULOSKELETAL: NEGATIVE for significant arthralgias or myalgia  NEURO: NEGATIVE for weakness, dizziness or paresthesias  ENDOCRINE: NEGATIVE for temperature intolerance, skin/hair changes  HEME: NEGATIVE for bleeding problems  PSYCHIATRIC: NEGATIVE for changes in mood or affect      Objective    /62   Pulse 90   Temp 98.2  F (36.8  C) (Temporal)   Resp 16   Wt 84.4 kg (186 lb)   SpO2 97%   BMI 29.13 kg/m    Body mass index is 29.13 kg/m .  Physical Exam   No acute distress, kind of grumpy  Heart is regular rate and rhythm  Lungs are clear without wheezes or crackles  Extremities right leg is trace edema, left leg is 1+ edema both appear improved from 5 days ago.            Assessment & Plan     Lori was seen today for breathing problem.    Diagnoses and all orders for this visit:    Acute congestive heart failure, unspecified heart failure type (H)  -     Echocardiogram Complete; Future    SOB (shortness of breath)    Leg swelling    Shortness of breath which does not seem infectious.  He has no fever.  Appears more to be congestive heart failure.  His echo in 2016 showed a normal EF.  His BNP was elevated 5 days ago, he has got chronic kidney disease.  We have tried him on Lasix 20 mg a day and his weight is down 2 pounds, leg swelling is better.  If he continues this he will improve.  I want to get an echocardiogram to see if he should then be on a beta-blocker and ACE inhibitor as well.  They will try to monitor his pulses he said he had 1 real low reading at 42 earlier this week.         No follow-ups on file.    Wale Short MD  [facility]

## 2020-10-23 NOTE — PROGRESS NOTES
ANTICOAGULATION FOLLOW-UP CLINIC VISIT    Patient Name:  Lori Mehta  Date:  10/23/2020  Contact Type:  Telephone    SUBJECTIVE:  Patient Findings     Positives:  Missed doses (Held due to high INR earlier this week)             OBJECTIVE    Recent labs: (last 7 days)     10/23/20  1313   INR 1.84*       ASSESSMENT / PLAN  INR assessment SUB    Recheck INR In: 3 DAYS    INR Location Outside lab      Anticoagulation Summary  As of 10/23/2020    INR goal:  2.0-3.0   TTR:  84.4 % (1 y)   INR used for dosin.84 (10/23/2020)   Warfarin maintenance plan:  5 mg (5 mg x 1) every Mon, Fri; 7.5 mg (5 mg x 1.5) all other days   Full warfarin instructions:  10/24: 5 mg; 10/25: 5 mg; Otherwise 5 mg every Mon, Fri; 7.5 mg all other days   Weekly warfarin total:  47.5 mg   Plan last modified:  Azalia Stallings RN (10/5/2020)   Next INR check:  10/26/2020   Priority:  Maintenance   Target end date:  Indefinite    Indications    Chronic atrial fibrillation (HCC) [I48.20]  Long-term (current) use of anticoagulants [Z79.01] [Z79.01]             Anticoagulation Episode Summary     INR check location:      Preferred lab:      Send INR reminders to:  [facility]    Comments:  5 mg tablets, no bandaid, takes at noon, likes BP done.       Anticoagulation Care Providers     Provider Role Specialty Phone number    Wale Short MD Referring Internal Medicine [phone]            See the Encounter Report to view Anticoagulation Flowsheet and Dosing Calendar (Go to Encounters tab in chart review, and find the Anticoagulation Therapy Visit)    Dosage adjustment made based on physician directed care plan.    Azalia Stallings RN

## 2020-10-23 NOTE — TELEPHONE ENCOUNTER
Reason for Call:  Same Day Appointment, Requested Provider:  Wale Short MD    PCP: Wale Short    Reason for visit: Fatoumata calls this morning to ask if Dr Short would see Lori this morning around the time he comes in for his lab work at 9:30am.      Fatoumata states Lori is not doing very good, has gotten worse since his last appointment with Dr Short.  Water pill seems to not be helping very much.    Duration of symptoms: Worse the last couple days    Have you been treated for this in the past? Yes    Additional comments: Fatoumata states they will be leaving the house at 9am to come to clinic for Lori lab appt    Can we leave a detailed message on this number? YES    Phone number patient can be reached at: Home number on file [phone] (home)    Best Time: Soon    Call taken on 10/23/2020 at 7:52 AM by Lakeisha Buitrago

## 2020-10-26 NOTE — PROGRESS NOTES
ANTICOAGULATION FOLLOW-UP CLINIC VISIT    Patient Name:  Lori Mehta  Date:  10/26/2020  Contact Type:  Telephone    SUBJECTIVE:  Patient Findings     Positives:  Missed doses (held two doses last week due to elevated INR )             OBJECTIVE    Recent labs: (last 7 days)     10/26/20  1052   INR 1.4*       ASSESSMENT / PLAN  INR assessment SUB    Recheck INR In: 1 WEEK    INR Location Outside lab      Anticoagulation Summary  As of 10/26/2020    INR goal:  2.0-3.0   TTR:  83.6 % (1 y)   INR used for dosin.4 (10/26/2020)   Warfarin maintenance plan:  5 mg (5 mg x 1) every Mon, Fri; 7.5 mg (5 mg x 1.5) all other days   Full warfarin instructions:  10/26: 7.5 mg; 10/27: 5 mg; 10/28: 5 mg; 10/29: 5 mg; 10/31: 5 mg; : 5 mg; Otherwise 5 mg every Mon, Fri; 7.5 mg all other days   Weekly warfarin total:  47.5 mg   Plan last modified:  Azalia Stallings RN (10/5/2020)   Next INR check:  11/3/2020   Priority:  Maintenance   Target end date:  Indefinite    Indications    Chronic atrial fibrillation (HCC) [I48.20]  Long-term (current) use of anticoagulants [Z79.01] [Z79.01]             Anticoagulation Episode Summary     INR check location:      Preferred lab:      Send INR reminders to:  [facility]    Comments:  5 mg tablets, no bandaid, takes at noon, likes BP done.       Anticoagulation Care Providers     Provider Role Specialty Phone number    Wale Short MD Referring Internal Medicine [phone]            See the Encounter Report to view Anticoagulation Flowsheet and Dosing Calendar (Go to Encounters tab in chart review, and find the Anticoagulation Therapy Visit)    Dosage adjustment made based on physician directed care plan.    Lakeisha Rodriguez RN

## 2020-10-26 NOTE — TELEPHONE ENCOUNTER
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  Continue his medications but I had also like him to see cardiology as soon as possible for a consultation.  Continue to monitor his weight while using the diuretics.

## 2020-10-27 NOTE — TELEPHONE ENCOUNTER
Reason for Call:  Other     Detailed comments: wife has some questions on his INR and asks that you give them a call.    Phone Number Patient can be reached at: Home number on file [phone] (home)    Best Time: any    Can we leave a detailed message on this number? YES    Call taken on 10/27/2020 at 10:09 AM by Nasim Davison

## 2020-10-30 NOTE — TELEPHONE ENCOUNTER
Reason for Call:  Other prescription question.    Detailed comments: Pt's wife calling (C2C on file) and states they have some questions about furosemide (LASIX) 20 MG tablet. Please advise.     Phone Number Patient can be reached at: Home number on file [phone] (home)    Best Time:     Can we leave a detailed message on this number? YES    Call taken on 10/30/2020 at 2:55 PM by Carmella Ahuja

## 2020-10-30 NOTE — TELEPHONE ENCOUNTER
How much weight should he lose, and how long should he be taking it?      1st weight on 20th : 187#  Today: 177#  Has lost 10#; would like to know how they should proceed. Patient is feeling good and working outside a little.    Larisa Payne XRO/

## 2020-11-02 NOTE — PROGRESS NOTES
ANTICOAGULATION FOLLOW-UP CLINIC VISIT    Patient Name:  Lori Mehta  Date:  2020  Contact Type:  Telephone    SUBJECTIVE:  Patient Findings     Comments:  The patient was assessed for diet, medication, and activity level changes, missed or extra doses, bruising or bleeding, with no problem findings.  Lakeisha Rodriguez RN          Clinical Outcomes     Comments:  The patient was assessed for diet, medication, and activity level changes, missed or extra doses, bruising or bleeding, with no problem findings.  Lakeisha Rodriguez RN             OBJECTIVE    Recent labs: (last 7 days)     20  1009   INR 1.7*       ASSESSMENT / PLAN  INR assessment SUB    Recheck INR In: 1 WEEK    INR Location Outside lab      Anticoagulation Summary  As of 2020    INR goal:  2.0-3.0   TTR:  81.7 % (1 y)   INR used for dosin.7 (2020)   Warfarin maintenance plan:  7.5 mg (5 mg x 1.5) every Mon, Fri; 5 mg (5 mg x 1) all other days   Full warfarin instructions:  7.5 mg every Mon, Fri; 5 mg all other days   Weekly warfarin total:  40 mg   Plan last modified:  Lakeisha Rodriguez RN (2020)   Next INR check:  2020   Priority:  Maintenance   Target end date:  Indefinite    Indications    Chronic atrial fibrillation (HCC) [I48.20]  Long-term (current) use of anticoagulants [Z79.01] [Z79.01]             Anticoagulation Episode Summary     INR check location:      Preferred lab:      Send INR reminders to:  [facility]    Comments:  5 mg tablets, no bandaid, takes at noon, likes BP done.       Anticoagulation Care Providers     Provider Role Specialty Phone number    Wale Short MD Referring Internal Medicine [phone]            See the Encounter Report to view Anticoagulation Flowsheet and Dosing Calendar (Go to Encounters tab in chart review, and find the Anticoagulation Therapy Visit)    Dosage adjustment made based on physician directed care plan.      Lakeisha Rodriguez RN

## 2020-11-11 NOTE — PROGRESS NOTES
ANTICOAGULATION FOLLOW-UP CLINIC VISIT    Patient Name:  Lori Mehta  Date:  2020  Contact Type:  Telephone    SUBJECTIVE:  Patient Findings     Comments:  I left a detailed voicemail with the orders reflected in flowsheet. I have also requested a call back if there have been any missed doses, concerns, illness, fever, or if there have been any changes in medications, activity level, or diet  Lakeisha Rodriguez RN          Clinical Outcomes     Comments:  I left a detailed voicemail with the orders reflected in flowsheet. I have also requested a call back if there have been any missed doses, concerns, illness, fever, or if there have been any changes in medications, activity level, or diet  Lakeisha Rodriguez RN             OBJECTIVE    Recent labs: (last 7 days)     20  0953   INR 2.3*       ASSESSMENT / PLAN  INR assessment THER    Recheck INR In: 1 WEEK    INR Location Outside lab      Anticoagulation Summary  As of 2020    INR goal:  2.0-3.0   TTR:  80.4 % (1 y)   INR used for dosin.3 (2020)   Warfarin maintenance plan:  7.5 mg (5 mg x 1.5) every Mon, Fri; 5 mg (5 mg x 1) all other days   Full warfarin instructions:  7.5 mg every Mon, Fri; 5 mg all other days   Weekly warfarin total:  40 mg   No change documented:  Lakeisha Rodriguez RN   Plan last modified:  Lakeisha Rodriguez RN (2020)   Next INR check:  2020   Priority:  Maintenance   Target end date:  Indefinite    Indications    Chronic atrial fibrillation (HCC) [I48.20]  Long-term (current) use of anticoagulants [Z79.01] [Z79.01]             Anticoagulation Episode Summary     INR check location:      Preferred lab:      Send INR reminders to:  [facility]    Comments:  5 mg tablets, no bandaid, takes at noon, likes BP done.       Anticoagulation Care Providers     Provider Role Specialty Phone number    Wale Short MD Referring Internal Medicine [phone]            See the Encounter Report to  view Anticoagulation Flowsheet and Dosing Calendar (Go to Encounters tab in chart review, and find the Anticoagulation Therapy Visit)    Dosage adjustment made based on physician directed care plan.      Lakeisha Rodriguez RN

## 2020-11-16 NOTE — TELEPHONE ENCOUNTER
Requested Prescriptions   Pending Prescriptions Disp Refills     guaiFENesin-codeine (ROBITUSSIN AC) 100-10 MG/5ML solution       Sig: Take by mouth every 4 hours as needed for cough       There is no refill protocol information for this order        Last Written Prescription Date:  1/7/2020  Last Fill Quantity: 180ml,  # refills: 0   Last office visit: 10/23/2020 with prescribing provider:     Future Office Visit:

## 2020-11-16 NOTE — TELEPHONE ENCOUNTER
Reason for Call:  Medication or medication refill:    Do you use a [facility]?  Name of the pharmacy and phone number for the current request:  [facility] - [phone]    Name of the medication requested: Guaiatussin  10 mg-5ml syrup    Other request: please refill    Can we leave a detailed message on this number? YES    Phone number patient can be reached at: Home number on file [phone] (home)    Best Time: any    Call taken on 11/16/2020 at 10:11 AM by Nasim Davison

## 2020-11-18 NOTE — PROGRESS NOTES
"CARDIOLOGY CONSULT    REASON FOR CONSULT: severe aortic stenosis, cardiomyopathy    PRIMARY CARE PHYSICIAN:  Wale Short    HISTORY OF PRESENT ILLNESS:  Mr. Mehta is a 89 y/o gentleman with PMH significant for chronic atrial fibrillation, hypertension, hyperlipidemia who present for the evaluation of severe aortic stenosis and cardiomyopathy noted on recent echocardiogram.  Mr. Mehta was recently seen by his primary care physician for \"chest congestion\".  His BNP was elevated.  He was started on Lasix 20 mg daily.  An echocardiogram demonstrated moderately reduced LV systolic function with an EF of 35 to 40%.  He was noted to have a moderate RV dysfunction.  His aortic valve was consistent with low flow low gradient severe aortic stenosis.  He was referred to cardiology for further evaluation.  Tami today reports feeling \"fine\".  He states that Lasix did not help and so he stopped taking it.  He is not interested in taking any further Lasix as he does not feel it does anything good.  He does not want any cardiac procedures or treatment.  He denies any chest pain, chest discomfort or shortness of breath.  He denies any orthopnea, PND or lower extremity edema.  He denies any lightheadedness or dizziness.  He is without cardiovascular complaints per his report.    PAST MEDICAL HISTORY:  1.  Chronic atrial fibrillation  2.  BPH  3.  Hypertension  4.  Hyperlipidemia      MEDICATIONS:  Current Outpatient Medications   Medication     Acetaminophen (TYLENOL PO)     Ascorbic Acid (VITAMIN C) 500 MG CHEW     Bromelains 500 MG TABS     CAPSICUM, CAYENNE, PO     COENZYME Q10     guaiFENesin-codeine (ROBITUSSIN AC) 100-10 MG/5ML solution     hydrochlorothiazide (HYDRODIURIL) 25 MG tablet     Lutein-Zeaxanthin 25-5 MG CAPS     magnesium oxide 400 MG CAPS     meclizine (ANTIVERT) 25 MG tablet     MELATONIN PO     mirtazapine (REMERON) 15 MG tablet     Multiple Vitamins-Minerals (PRESERVISION AREDS 2 PO)     OMEGA 3 1200 " MG OR CAPS     Saw Palmetto, Serenoa repens, (SAW PALMETTO EXTRACT PO)     SELENIUM 200 MCG OR CAPS     simvastatin (ZOCOR) 40 MG tablet     tamsulosin (FLOMAX) 0.4 MG capsule     TURMERIC PO     vitamin B complex with vitamin C (VITAMIN  B COMPLEX) tablet     VITAMIN B12 TR 1000 MCG OR TBCR     vitamin E 400 units TABS     warfarin ANTICOAGULANT (COUMADIN) 5 MG tablet     ZINC 30 MG OR TABS     furosemide (LASIX) 20 MG tablet     No current facility-administered medications for this visit.        ALLERGIES:  Allergies   Allergen Reactions     Paroxetine      Other reaction(s): Intolerance     Terfenadine      Other reaction(s): Other  Unable to void       SOCIAL HISTORY:  I have reviewed this patient's social history and updated it with pertinent information if needed. Lori Mehta  reports that he has never smoked. He has never used smokeless tobacco. He reports previous alcohol use of about 1.0 standard drinks of alcohol per week. He reports that he does not use drugs.    FAMILY HISTORY:  I have reviewed this patient's family history and updated it with pertinent information if needed.   Family History   Problem Relation Age of Onset     Diabetes Brother      C.A.D. Mother      Diabetes Maternal Grandmother      Circulatory Maternal Grandfather         cerebral aneurysm     Hypertension Father      Cerebrovascular Disease Father      Breast Cancer Sister          x 2     Cancer - colorectal No family hx of        REVIEW OF SYSTEMS:  A complete ROS was obtained and the pertinent positives are outlined in the history of present illness above.  The remainder of systems is negative.      PHYSICAL EXAM:      BP: (!) 158/60 Pulse: 74     SpO2: 96 %      Vital Signs with Ranges  Pulse:  [74] 74  BP: (158)/(60-64) 158/60  SpO2:  [96 %] 96 %  184 lbs 8 oz    Constitutional: awake, alert, no distress  Eyes: PERRL, sclera nonicteric  ENT: trachea midline  Respiratory: CTAB  Cardiovascular:  Irregularly irregular, II/VI  mid systolic murmur  GI: nondistended, nontender, bowel sounds present  Lymph/Hematologic: no lymphadenopathy  Skin: dry, no rash  Musculoskeletal: good muscle tone, no edema bilaterally  Neurologic: no focal deficits  Neuropsychiatric: appropriate affact    DATA:  Labs:  Reviewed in Epic      EKG: Dated 11/18/2020 reviewed personally.  Afib with bifascicular block    Echocardiogram:  Left ventricle is mildly dilated. There is severe concentric left ventricular  hypertrophy.  Left ventricular systolic function is moderately reduced. The visual ejection  fraction is estimated at 35-40%.  Flattened septum consistent with right ventricular volume overload  The right ventricle is moderately dilated. The right ventricular systolic  function is moderately reduced.  Sevre biatrial enlargement.  A patent foramen ovale is suspected.  Severe low flow, low gradient aortic stenosis. Vmax 2.6 m/s, mean gradient 15  mmHg, SAM 0.95cm2, SVi 25 cc/m2, DI 0.25. There is mild (1+) aortic  regurgitation.  Moderate (2+) mitral regurgitation.  Mild (1+) pulmonic valvular regurgitation.  Pulmonary hypertension present. Right ventricular systolic pressure is  approximated at 53mmHg plus the right atrial pressure. Moderate (2+) tricuspid  regurgitation.  Dilation of the inferior vena cava is present with abnormal respiratory  variation in diameter.        ASSESSMENT:  1.  Low flow low gradient severe aortic stenosis: Likely symptomatic, however patient minimizes symptoms today.  2.  Cardiomyopathy: Moderate reduced ejection fraction at 35 to 40%.  Possibly valvular in origin versus ischemic.  3.  Chronic atrial fibrillation: On Coumadin, no AV dolly blockers.  He has bifascicular block.  There was some question for need of pacemaker a number of years ago and the patient declined.  4.  Hypertension  5.  Hyperlipidemia    RECOMMENDATIONS:  1.  We reviewed the pathophysiology of aortic stenosis and cardiomyopathy, natural progression and  indications for surgery/TAVR.  We reviewed that this is a progressive condition and will be life limiting.  We reviewed treatment including medications such as Lasix and medical treatment to improve LV function.  We also discussed briefly the option of TAVR.  Given his advanced age even TAVR may be challenging and it is very clear that Mr. Moctezuma is not interested in any further treatment for his heart.  He is adamant that Lasix is not doing him any good and declines pharmacologic treatment.  He may follow-up with cardiology on a as needed basis.      Amanda Gardner MD  Cardiology - [facility]  November 18, 2020

## 2020-11-18 NOTE — LETTER
"11/18/2020    Wale Short MD  [address] [facility] [address]    RE: Lori Wrendeneen       Dear Colleague,    I had the pleasure of seeing Lori Mehta in the [facility].    CARDIOLOGY CONSULT    REASON FOR CONSULT: severe aortic stenosis, cardiomyopathy    PRIMARY CARE PHYSICIAN:  Wale Short    HISTORY OF PRESENT ILLNESS:  Mr. Mehta is a 89 y/o gentleman with PMH significant for chronic atrial fibrillation, hypertension, hyperlipidemia who present for the evaluation of severe aortic stenosis and cardiomyopathy noted on recent echocardiogram.  Mr. Mehta was recently seen by his primary care physician for \"chest congestion\".  His BNP was elevated.  He was started on Lasix 20 mg daily.  An echocardiogram demonstrated moderately reduced LV systolic function with an EF of 35 to 40%.  He was noted to have a moderate RV dysfunction.  His aortic valve was consistent with low flow low gradient severe aortic stenosis.  He was referred to cardiology for further evaluation.  Tami today reports feeling \"fine\".  He states that Lasix did not help and so he stopped taking it.  He is not interested in taking any further Lasix as he does not feel it does anything good.  He does not want any cardiac procedures or treatment.  He denies any chest pain, chest discomfort or shortness of breath.  He denies any orthopnea, PND or lower extremity edema.  He denies any lightheadedness or dizziness.  He is without cardiovascular complaints per his report.    PAST MEDICAL HISTORY:  1.  Chronic atrial fibrillation  2.  BPH  3.  Hypertension  4.  Hyperlipidemia      MEDICATIONS:  Current Outpatient Medications   Medication     Acetaminophen (TYLENOL PO)     Ascorbic Acid (VITAMIN C) 500 MG CHEW     Bromelains 500 MG TABS     CAPSICUM, CAYENNE, PO     COENZYME Q10     guaiFENesin-codeine (ROBITUSSIN AC) 100-10 MG/5ML solution     hydrochlorothiazide (HYDRODIURIL) 25 MG tablet     " Lutein-Zeaxanthin 25-5 MG CAPS     magnesium oxide 400 MG CAPS     meclizine (ANTIVERT) 25 MG tablet     MELATONIN PO     mirtazapine (REMERON) 15 MG tablet     Multiple Vitamins-Minerals (PRESERVISION AREDS 2 PO)     OMEGA 3 1200 MG OR CAPS     Saw Palmetto, Serenoa repens, (SAW PALMETTO EXTRACT PO)     SELENIUM 200 MCG OR CAPS     simvastatin (ZOCOR) 40 MG tablet     tamsulosin (FLOMAX) 0.4 MG capsule     TURMERIC PO     vitamin B complex with vitamin C (VITAMIN  B COMPLEX) tablet     VITAMIN B12 TR 1000 MCG OR TBCR     vitamin E 400 units TABS     warfarin ANTICOAGULANT (COUMADIN) 5 MG tablet     ZINC 30 MG OR TABS     furosemide (LASIX) 20 MG tablet     No current facility-administered medications for this visit.        ALLERGIES:  Allergies   Allergen Reactions     Paroxetine      Other reaction(s): Intolerance     Terfenadine      Other reaction(s): Other  Unable to void       SOCIAL HISTORY:  I have reviewed this patient's social history and updated it with pertinent information if needed. Lori Mehta  reports that he has never smoked. He has never used smokeless tobacco. He reports previous alcohol use of about 1.0 standard drinks of alcohol per week. He reports that he does not use drugs.    FAMILY HISTORY:  I have reviewed this patient's family history and updated it with pertinent information if needed.   Family History   Problem Relation Age of Onset     Diabetes Brother      C.A.D. Mother      Diabetes Maternal Grandmother      Circulatory Maternal Grandfather         cerebral aneurysm     Hypertension Father      Cerebrovascular Disease Father      Breast Cancer Sister          x 2     Cancer - colorectal No family hx of        REVIEW OF SYSTEMS:  A complete ROS was obtained and the pertinent positives are outlined in the history of present illness above.  The remainder of systems is negative.      PHYSICAL EXAM:      BP: (!) 158/60 Pulse: 74     SpO2: 96 %      Vital Signs with Ranges  Pulse:   [74] 74  BP: (158)/(60-64) 158/60  SpO2:  [96 %] 96 %  184 lbs 8 oz    Constitutional: awake, alert, no distress  Eyes: PERRL, sclera nonicteric  ENT: trachea midline  Respiratory: CTAB  Cardiovascular:  Irregularly irregular, II/VI mid systolic murmur  GI: nondistended, nontender, bowel sounds present  Lymph/Hematologic: no lymphadenopathy  Skin: dry, no rash  Musculoskeletal: good muscle tone, no edema bilaterally  Neurologic: no focal deficits  Neuropsychiatric: appropriate affact    DATA:  Labs:  Reviewed in Epic      EKG: Dated 11/18/2020 reviewed personally.  Afib with bifascicular block    Echocardiogram:  Left ventricle is mildly dilated. There is severe concentric left ventricular  hypertrophy.  Left ventricular systolic function is moderately reduced. The visual ejection  fraction is estimated at 35-40%.  Flattened septum consistent with right ventricular volume overload  The right ventricle is moderately dilated. The right ventricular systolic  function is moderately reduced.  Sevre biatrial enlargement.  A patent foramen ovale is suspected.  Severe low flow, low gradient aortic stenosis. Vmax 2.6 m/s, mean gradient 15  mmHg, SAM 0.95cm2, SVi 25 cc/m2, DI 0.25. There is mild (1+) aortic  regurgitation.  Moderate (2+) mitral regurgitation.  Mild (1+) pulmonic valvular regurgitation.  Pulmonary hypertension present. Right ventricular systolic pressure is  approximated at 53mmHg plus the right atrial pressure. Moderate (2+) tricuspid  regurgitation.  Dilation of the inferior vena cava is present with abnormal respiratory  variation in diameter.      ASSESSMENT:  1.  Low flow low gradient severe aortic stenosis: Likely symptomatic, however patient minimizes symptoms today.  2.  Cardiomyopathy: Moderate reduced ejection fraction at 35 to 40%.  Possibly valvular in origin versus ischemic.  3.  Chronic atrial fibrillation: On Coumadin, no AV dolly blockers.  He has bifascicular block.  There was some question  for need of pacemaker a number of years ago and the patient declined.  4.  Hypertension  5.  Hyperlipidemia    RECOMMENDATIONS:  1.  We reviewed the pathophysiology of aortic stenosis and cardiomyopathy, natural progression and indications for surgery/TAVR.  We reviewed that this is a progressive condition and will be life limiting.  We reviewed treatment including medications such as Lasix and medical treatment to improve LV function.  We also discussed briefly the option of TAVR.  Given his advanced age even TAVR may be challenging and it is very clear that Mr. Moctezuma is not interested in any further treatment for his heart.  He is adamant that Lasix is not doing him any good and declines pharmacologic treatment.  He may follow-up with cardiology on a as needed basis.      Amanda Gardner MD  Cardiology - [facility]  November 18, 2020      Thank you for allowing me to participate in the care of your patient.    Sincerely,     Amanda Gardner MD     [facility]

## 2020-11-18 NOTE — LETTER
"11/18/2020    Wale Short MD  [address] [facility] [address]    RE: Lori Wrendeneen       Dear Colleague,    I had the pleasure of seeing Lori Mehta in the [facility].    CARDIOLOGY CONSULT    REASON FOR CONSULT: severe aortic stenosis, cardiomyopathy    PRIMARY CARE PHYSICIAN:  Wale Short    HISTORY OF PRESENT ILLNESS:  Mr. Mehta is a 89 y/o gentleman with PMH significant for chronic atrial fibrillation, hypertension, hyperlipidemia who present for the evaluation of severe aortic stenosis and cardiomyopathy noted on recent echocardiogram.  Mr. Mehta was recently seen by his primary care physician for \"chest congestion\".  His BNP was elevated.  He was started on Lasix 20 mg daily.  An echocardiogram demonstrated moderately reduced LV systolic function with an EF of 35 to 40%.  He was noted to have a moderate RV dysfunction.  His aortic valve was consistent with low flow low gradient severe aortic stenosis.  He was referred to cardiology for further evaluation.  Tami today reports feeling \"fine\".  He states that Lasix did not help and so he stopped taking it.  He is not interested in taking any further Lasix as he does not feel it does anything good.  He does not want any cardiac procedures or treatment.  He denies any chest pain, chest discomfort or shortness of breath.  He denies any orthopnea, PND or lower extremity edema.  He denies any lightheadedness or dizziness.  He is without cardiovascular complaints per his report.    PAST MEDICAL HISTORY:  1.  Chronic atrial fibrillation  2.  BPH  3.  Hypertension  4.  Hyperlipidemia      MEDICATIONS:  Current Outpatient Medications   Medication     Acetaminophen (TYLENOL PO)     Ascorbic Acid (VITAMIN C) 500 MG CHEW     Bromelains 500 MG TABS     CAPSICUM, CAYENNE, PO     COENZYME Q10     guaiFENesin-codeine (ROBITUSSIN AC) 100-10 MG/5ML solution     hydrochlorothiazide (HYDRODIURIL) 25 MG tablet     " Lutein-Zeaxanthin 25-5 MG CAPS     magnesium oxide 400 MG CAPS     meclizine (ANTIVERT) 25 MG tablet     MELATONIN PO     mirtazapine (REMERON) 15 MG tablet     Multiple Vitamins-Minerals (PRESERVISION AREDS 2 PO)     OMEGA 3 1200 MG OR CAPS     Saw Palmetto, Serenoa repens, (SAW PALMETTO EXTRACT PO)     SELENIUM 200 MCG OR CAPS     simvastatin (ZOCOR) 40 MG tablet     tamsulosin (FLOMAX) 0.4 MG capsule     TURMERIC PO     vitamin B complex with vitamin C (VITAMIN  B COMPLEX) tablet     VITAMIN B12 TR 1000 MCG OR TBCR     vitamin E 400 units TABS     warfarin ANTICOAGULANT (COUMADIN) 5 MG tablet     ZINC 30 MG OR TABS     furosemide (LASIX) 20 MG tablet     No current facility-administered medications for this visit.        ALLERGIES:  Allergies   Allergen Reactions     Paroxetine      Other reaction(s): Intolerance     Terfenadine      Other reaction(s): Other  Unable to void       SOCIAL HISTORY:  I have reviewed this patient's social history and updated it with pertinent information if needed. Lori Mehta  reports that he has never smoked. He has never used smokeless tobacco. He reports previous alcohol use of about 1.0 standard drinks of alcohol per week. He reports that he does not use drugs.    FAMILY HISTORY:  I have reviewed this patient's family history and updated it with pertinent information if needed.   Family History   Problem Relation Age of Onset     Diabetes Brother      C.A.D. Mother      Diabetes Maternal Grandmother      Circulatory Maternal Grandfather         cerebral aneurysm     Hypertension Father      Cerebrovascular Disease Father      Breast Cancer Sister          x 2     Cancer - colorectal No family hx of        REVIEW OF SYSTEMS:  A complete ROS was obtained and the pertinent positives are outlined in the history of present illness above.  The remainder of systems is negative.      PHYSICAL EXAM:      BP: (!) 158/60 Pulse: 74     SpO2: 96 %      Vital Signs with Ranges  Pulse:   [74] 74  BP: (158)/(60-64) 158/60  SpO2:  [96 %] 96 %  184 lbs 8 oz    Constitutional: awake, alert, no distress  Eyes: PERRL, sclera nonicteric  ENT: trachea midline  Respiratory: CTAB  Cardiovascular:  Irregularly irregular, II/VI mid systolic murmur  GI: nondistended, nontender, bowel sounds present  Lymph/Hematologic: no lymphadenopathy  Skin: dry, no rash  Musculoskeletal: good muscle tone, no edema bilaterally  Neurologic: no focal deficits  Neuropsychiatric: appropriate affact    DATA:  Labs:  Reviewed in Epic      EKG: Dated 11/18/2020 reviewed personally.  Afib with bifascicular block    Echocardiogram:  Left ventricle is mildly dilated. There is severe concentric left ventricular  hypertrophy.  Left ventricular systolic function is moderately reduced. The visual ejection  fraction is estimated at 35-40%.  Flattened septum consistent with right ventricular volume overload  The right ventricle is moderately dilated. The right ventricular systolic  function is moderately reduced.  Sevre biatrial enlargement.  A patent foramen ovale is suspected.  Severe low flow, low gradient aortic stenosis. Vmax 2.6 m/s, mean gradient 15  mmHg, SAM 0.95cm2, SVi 25 cc/m2, DI 0.25. There is mild (1+) aortic  regurgitation.  Moderate (2+) mitral regurgitation.  Mild (1+) pulmonic valvular regurgitation.  Pulmonary hypertension present. Right ventricular systolic pressure is  approximated at 53mmHg plus the right atrial pressure. Moderate (2+) tricuspid  regurgitation.  Dilation of the inferior vena cava is present with abnormal respiratory  variation in diameter.        ASSESSMENT:  1.  Low flow low gradient severe aortic stenosis: Likely symptomatic, however patient minimizes symptoms today.  2.  Cardiomyopathy: Moderate reduced ejection fraction at 35 to 40%.  Possibly valvular in origin versus ischemic.  3.  Chronic atrial fibrillation: On Coumadin, no AV dolly blockers.  He has bifascicular block.  There was some question  for need of pacemaker a number of years ago and the patient declined.  4.  Hypertension  5.  Hyperlipidemia    RECOMMENDATIONS:  1.  We reviewed the pathophysiology of aortic stenosis and cardiomyopathy, natural progression and indications for surgery/TAVR.  We reviewed that this is a progressive condition and will be life limiting.  We reviewed treatment including medications such as Lasix and medical treatment to improve LV function.  We also discussed briefly the option of TAVR.  Given his advanced age even TAVR may be challenging and it is very clear that Mr. Moctezuma is not interested in any further treatment for his heart.  He is adamant that Lasix is not doing him any good and declines pharmacologic treatment.  He may follow-up with cardiology on a as needed basis.      Amanda Gardner MD  Cardiology - [facility]  November 18, 2020      Thank you for allowing me to participate in the care of your patient.      Sincerely,     Amanda Gardner MD     [facility]    cc:   Wale Short MD  [address] [place] [id number]

## 2020-11-18 NOTE — PROGRESS NOTES
ANTICOAGULATION FOLLOW-UP CLINIC VISIT    Patient Name:  Lori Mehta  Date:  2020  Contact Type:  Telephone    SUBJECTIVE:  Patient Findings     Comments:  I left a detailed voicemail with the orders reflected in flowsheet. I have also requested a call back if there have been any missed doses, concerns, illness, fever, or if there have been any changes in medications, activity level, or diet  Lakeisha Rodriguez RN          Clinical Outcomes     Comments:  I left a detailed voicemail with the orders reflected in flowsheet. I have also requested a call back if there have been any missed doses, concerns, illness, fever, or if there have been any changes in medications, activity level, or diet  Lakeisha Rodriguez RN             OBJECTIVE    Recent labs: (last 7 days)     20  0924   INR 2.4*       ASSESSMENT / PLAN  INR assessment THER    Recheck INR In: 2 WEEKS    INR Location Outside lab      Anticoagulation Summary  As of 2020    INR goal:  2.0-3.0   TTR:  80.4 % (1 y)   INR used for dosin.4 (2020)   Warfarin maintenance plan:  7.5 mg (5 mg x 1.5) every Mon, Fri; 5 mg (5 mg x 1) all other days   Full warfarin instructions:  7.5 mg every Mon, Fri; 5 mg all other days   Weekly warfarin total:  40 mg   No change documented:  Lakeisha Rodriguez RN   Plan last modified:  Lakeisha Rodriguez RN (2020)   Next INR check:  2020   Priority:  Maintenance   Target end date:  Indefinite    Indications    Chronic atrial fibrillation (HCC) [I48.20]  Long-term (current) use of anticoagulants [Z79.01] [Z79.01]             Anticoagulation Episode Summary     INR check location:      Preferred lab:      Send INR reminders to:  [facility]    Comments:  5 mg tablets, no bandaid, takes at noon, likes BP done.       Anticoagulation Care Providers     Provider Role Specialty Phone number    Wale Short MD Referring Internal Medicine [phone]            See the Encounter Report to  view Anticoagulation Flowsheet and Dosing Calendar (Go to Encounters tab in chart review, and find the Anticoagulation Therapy Visit)    Dosage adjustment made based on physician directed care plan.      Lakeisha Rodriguez RN

## 2020-11-19 NOTE — TELEPHONE ENCOUNTER
Reason for Call:  Other prescription    Detailed comments: At Heart Specialist appointment yesterday Kareem was advised to start taking a water pill daily now.  Fatoumata has questions about this and other medications he may need.      Fatoumata is aware Dr Short is not in today and is asking if another provider or nurse would call her back today to discuss.    Phone Number Patient can be reached at: Home number on file [phone] (home)    Best Time: any    Can we leave a detailed message on this number? YES    Call taken on 11/19/2020 at 9:19 AM by Lakeisha Buitrago

## 2020-11-20 NOTE — TELEPHONE ENCOUNTER
The heart doctor said to take his water pill every day in the morning.  She is concerned about the kidney being effected so she is wondering if he should take another pill to compensate for that.

## 2020-11-24 NOTE — TELEPHONE ENCOUNTER
Potassium is within normal limits.  Renal function is markedly decreased but stable over the last several months.  No changes are recommended at this time.    Tano

## 2020-11-25 NOTE — TELEPHONE ENCOUNTER
Spoke with patient wife Fatoumata MCKEON on file and informed of results below. Patient wife  understood. Appointment made for follow up on 12/18/2020.   Shikha Islas MA

## 2020-11-25 NOTE — TELEPHONE ENCOUNTER
----- Message from Wale Short MD sent at 11/24/2020  8:15 PM CST -----  Please let him know his kidneys are similar to a month ago.  I will be good for him to follow-up with me in the next 2-4 weeks to discuss his cardiology visit.

## 2020-12-01 NOTE — PROGRESS NOTES
ANTICOAGULATION FOLLOW-UP CLINIC VISIT    Patient Name:  Lori Mehta  Date:  2020  Contact Type:  Telephone    SUBJECTIVE:  Patient Findings     Comments:  I left a detailed voicemail with the orders reflected in flowsheet. I have also requested a call back if there have been any missed doses, concerns, illness, fever, or if there have been any changes in medications, activity level, or diet  Lakeisha Rodriguez RN          Clinical Outcomes     Comments:  I left a detailed voicemail with the orders reflected in flowsheet. I have also requested a call back if there have been any missed doses, concerns, illness, fever, or if there have been any changes in medications, activity level, or diet  Lakeisha Rodriguez RN             OBJECTIVE    Recent labs: (last 7 days)     20  1007   INR 2.1*       ASSESSMENT / PLAN  INR assessment THER    Recheck INR In: 2 WEEKS    INR Location Outside lab      Anticoagulation Summary  As of 2020    INR goal:  2.0-3.0   TTR:  80.4 % (1 y)   INR used for dosin.1 (2020)   Warfarin maintenance plan:  7.5 mg (5 mg x 1.5) every Mon, Fri; 5 mg (5 mg x 1) all other days   Full warfarin instructions:  7.5 mg every Mon, Fri; 5 mg all other days   Weekly warfarin total:  40 mg   No change documented:  Lakeisha Rodriguez RN   Plan last modified:  Lakeisha Rodriguez RN (2020)   Next INR check:  2020   Priority:  Maintenance   Target end date:  Indefinite    Indications    Chronic atrial fibrillation (HCC) [I48.20]  Long-term (current) use of anticoagulants [Z79.01] [Z79.01]             Anticoagulation Episode Summary     INR check location:      Preferred lab:      Send INR reminders to:  [facility]    Comments:  5 mg tablets, no bandaid, takes at noon, likes BP done.       Anticoagulation Care Providers     Provider Role Specialty Phone number    Wale Short MD Referring Internal Medicine [phone]            See the Encounter Report to view  Anticoagulation Flowsheet and Dosing Calendar (Go to Encounters tab in chart review, and find the Anticoagulation Therapy Visit)    Dosage adjustment made based on physician directed care plan.      Laekisha Rodriguez RN

## 2020-12-18 NOTE — PROGRESS NOTES
Subjective     Lori Mehta is a 89 year old male who presents to clinic today for the following health issues:    HPI         Chief Complaint   Patient presents with     Heart Failure     f/u from seeing cardiology     He saw cardiology and discussed his aortic valve issues.  He gets sob, fatigue. Wife and family want aortic valve repaired, he isn't sure. Would rather go to [facility].    Needs to have an INR check today.      Wonders about tired and fatigue.      Skin lesion on left arm, seeing derm in February.     Past Medical History:   Diagnosis Date     Anxiety      Atrial fibrillation (H) 1/9/2009     BPH      GERD (gastroesophageal reflux disease)      Pure hypercholesterolemia      Unspecified essential hypertension      Current Outpatient Medications   Medication     Acetaminophen (TYLENOL PO)     Ascorbic Acid (VITAMIN C) 500 MG CHEW     Bromelains 500 MG TABS     CAPSICUM, CAYENNE, PO     COENZYME Q10     hydrochlorothiazide (HYDRODIURIL) 25 MG tablet     Lutein-Zeaxanthin 25-5 MG CAPS     magnesium oxide 400 MG CAPS     MELATONIN PO     mirtazapine (REMERON) 15 MG tablet     Multiple Vitamins-Minerals (PRESERVISION AREDS 2 PO)     OMEGA 3 1200 MG OR CAPS     Saw Palmetto, Serenoa repens, (SAW PALMETTO EXTRACT PO)     SELENIUM 200 MCG OR CAPS     simvastatin (ZOCOR) 40 MG tablet     tamsulosin (FLOMAX) 0.4 MG capsule     TURMERIC PO     vitamin B complex with vitamin C (VITAMIN  B COMPLEX) tablet     VITAMIN B12 TR 1000 MCG OR TBCR     vitamin E 400 units TABS     warfarin ANTICOAGULANT (COUMADIN) 5 MG tablet     ZINC 30 MG OR TABS     furosemide (LASIX) 20 MG tablet     guaiFENesin-codeine (ROBITUSSIN AC) 100-10 MG/5ML solution     meclizine (ANTIVERT) 25 MG tablet     No current facility-administered medications for this visit.      Social History     Tobacco Use     Smoking status: Never Smoker     Smokeless tobacco: Never Used   Substance Use Topics     Alcohol use: Not Currently     Alcohol/week:  1.0 standard drinks     Types: 1 Standard drinks or equivalent per week     Comment: beer once a week     Drug use: No        Review of Systems   CONSTITUTIONAL: NEGATIVE for fever, chills, change in weight  INTEGUMENTARY/SKIN: lesion left arm   EYES: NEGATIVE for vision changes or irritation  ENT/MOUTH: NEGATIVE for ear, mouth and throat problems  RESP:SOB/dyspnea  CV: fatigue   GI: NEGATIVE for nausea, abdominal pain, heartburn, or change in bowel habits  : NEGATIVE for frequency, dysuria, or hematuria  MUSCULOSKELETAL: NEGATIVE for significant arthralgias or myalgia  NEURO: NEGATIVE for weakness, dizziness or paresthesias  ENDOCRINE: NEGATIVE for temperature intolerance, skin/hair changes  HEME: NEGATIVE for bleeding problems  PSYCHIATRIC: NEGATIVE for changes in mood or affect      Objective    /74   Pulse 84   Temp 98  F (36.7  C) (Temporal)   Resp 16   Wt 80.7 kg (178 lb)   SpO2 97%   BMI 27.88 kg/m    Body mass index is 27.88 kg/m .  Physical Exam   No acute distress  A little bit hard of hearing  Heart is regular but has a murmur at the base systolic  Lungs are clear  Extremities no edema  Left arm has a skin lesion probable basal cell carcinoma            Assessment & Plan     Insomnia due to other mental disorder  Patient doing well with Remeron will refill for 90 days at a time.  - mirtazapine (REMERON) 15 MG tablet; Take 1 tablet (15 mg) by mouth At Bedtime    Benign non-nodular prostatic hyperplasia with lower urinary tract symptoms    Patient needs a refill of his Flomax we will refill this today.  - tamsulosin (FLOMAX) 0.4 MG capsule; Take 1 capsule (0.4 mg) by mouth daily    Aortic valve stenosis, etiology of cardiac valve disease unspecified  Aortic stenosis, patient saw cardiology care but [place] not interested in a procedure there.  He would like to feel better therefore we will get him over evaluation at [facility] for possible TAVR procedure.  - CBC  "with platelets  - Reticulocyte count  - CARDIOLOGY EVAL ADULT REFERRAL; Future    Fatigue, unspecified type  Fatigue we will check his hemoglobin is been anemic in the past.  Also check his TSH and reticulocyte count.  - CBC with platelets  - TSH with free T4 reflex  - Iron and iron binding capacity    Anemia, unspecified type  Anemia we will check iron and reticulocyte count  - Iron and iron binding capacity    Skin lesion  Skin lesion was treated with cryotherapy today. Will be seeing dermatology in the future.   - DESTRUCT BENIGN LESION, UP TO 14     BMI:   Estimated body mass index is 27.88 kg/m  as calculated from the following:    Height as of 11/18/20: 1.702 m (5' 7\").    Weight as of this encounter: 80.7 kg (178 lb).                No follow-ups on file.    Wale Short MD  [facility]    "

## 2020-12-18 NOTE — PROGRESS NOTES
ANTICOAGULATION FOLLOW-UP CLINIC VISIT    Patient Name:  Lori Mehta  Date:  2020  Contact Type:  Telephone    SUBJECTIVE:  Patient Findings     Comments:  The patient was assessed for diet, medication, and activity level changes, missed or extra doses, bruising or bleeding, with no problem findings.  Lakeisha Rodriguez RN          Clinical Outcomes     Negatives:  Major bleeding event, Thromboembolic event, Anticoagulation-related hospital admission, Anticoagulation-related ED visit, Anticoagulation-related fatality    Comments:  The patient was assessed for diet, medication, and activity level changes, missed or extra doses, bruising or bleeding, with no problem findings.  Lakeisha Rodriguez RN             OBJECTIVE    Recent labs: (last 7 days)     20  1126   INR 2.5*       ASSESSMENT / PLAN  INR assessment THER    Recheck INR In: 4 WEEKS    INR Location Outside lab      Anticoagulation Summary  As of 2020    INR goal:  2.0-3.0   TTR:  80.4 % (1 y)   INR used for dosin.5 (2020)   Warfarin maintenance plan:  7.5 mg (5 mg x 1.5) every Mon, Fri; 5 mg (5 mg x 1) all other days   Full warfarin instructions:  7.5 mg every Mon, Fri; 5 mg all other days   Weekly warfarin total:  40 mg   No change documented:  Lakeisha Rodriguez RN   Plan last modified:  Lakeisha Rodriguez RN (2020)   Next INR check:  2021   Priority:  Maintenance   Target end date:  Indefinite    Indications    Chronic atrial fibrillation (HCC) [I48.20]  Long-term (current) use of anticoagulants [Z79.01] [Z79.01]             Anticoagulation Episode Summary     INR check location:      Preferred lab:      Send INR reminders to:  [facility]    Comments:  5 mg tablets, no bandaid, takes at noon, likes BP done.       Anticoagulation Care Providers     Provider Role Specialty Phone number    Wale Short MD Referring Internal Medicine [phone]            See the Encounter Report to view  Anticoagulation Flowsheet and Dosing Calendar (Go to Encounters tab in chart review, and find the Anticoagulation Therapy Visit)    Dosage adjustment made based on physician directed care plan.      Lakeisha Rodriguez RN

## 2020-12-30 NOTE — TELEPHONE ENCOUNTER
Patient went to pharmacy and they grabbed his coumadin and remeron,  And they stated that Dr. Short refilled the Tamsulosin[facility]. Short should not be refilling this medication they would like to have Dr. Short put that historically in his chart and not reorder it.   Julia COLON

## 2021-01-01 ENCOUNTER — HOSPITAL LABORATORY (OUTPATIENT)
Dept: NURSING HOME | Facility: OTHER | Age: 86
End: 2021-01-01

## 2021-01-01 ENCOUNTER — OFFICE VISIT (OUTPATIENT)
Dept: INTERNAL MEDICINE | Facility: CLINIC | Age: 86
End: 2021-01-01
Payer: COMMERCIAL

## 2021-01-01 ENCOUNTER — TRANSFERRED RECORDS (OUTPATIENT)
Dept: HEALTH INFORMATION MANAGEMENT | Facility: CLINIC | Age: 86
End: 2021-01-01

## 2021-01-01 ENCOUNTER — APPOINTMENT (OUTPATIENT)
Dept: CT IMAGING | Facility: CLINIC | Age: 86
End: 2021-01-01
Attending: NURSE PRACTITIONER
Payer: MEDICARE

## 2021-01-01 ENCOUNTER — MEDICAL CORRESPONDENCE (OUTPATIENT)
Dept: HEALTH INFORMATION MANAGEMENT | Facility: CLINIC | Age: 86
End: 2021-01-01

## 2021-01-01 ENCOUNTER — ANTICOAGULATION THERAPY VISIT (OUTPATIENT)
Dept: ANTICOAGULATION | Facility: CLINIC | Age: 86
End: 2021-01-01

## 2021-01-01 ENCOUNTER — APPOINTMENT (OUTPATIENT)
Dept: GENERAL RADIOLOGY | Facility: CLINIC | Age: 86
End: 2021-01-01
Attending: INTERNAL MEDICINE
Payer: MEDICARE

## 2021-01-01 ENCOUNTER — TELEPHONE (OUTPATIENT)
Dept: INTERNAL MEDICINE | Facility: CLINIC | Age: 86
End: 2021-01-01

## 2021-01-01 ENCOUNTER — DOCUMENTATION ONLY (OUTPATIENT)
Dept: OTHER | Facility: CLINIC | Age: 86
End: 2021-01-01

## 2021-01-01 ENCOUNTER — HOSPITAL ENCOUNTER (EMERGENCY)
Facility: CLINIC | Age: 86
Discharge: HOME | End: 2021-01-17
Attending: FAMILY MEDICINE | Admitting: FAMILY MEDICINE
Payer: MEDICARE

## 2021-01-01 ENCOUNTER — PATIENT OUTREACH (OUTPATIENT)
Dept: CARE COORDINATION | Facility: CLINIC | Age: 86
End: 2021-01-01

## 2021-01-01 ENCOUNTER — APPOINTMENT (OUTPATIENT)
Dept: CT IMAGING | Facility: CLINIC | Age: 86
End: 2021-01-01
Attending: EMERGENCY MEDICINE
Payer: MEDICARE

## 2021-01-01 ENCOUNTER — HOSPITAL ENCOUNTER (OUTPATIENT)
Facility: CLINIC | Age: 86
Setting detail: OBSERVATION
Discharge: HOME HEALTH | End: 2021-01-24
Attending: EMERGENCY MEDICINE | Admitting: PEDIATRICS
Payer: MEDICARE

## 2021-01-01 ENCOUNTER — NURSING HOME VISIT (OUTPATIENT)
Dept: GERIATRICS | Facility: CLINIC | Age: 86
End: 2021-01-01
Payer: COMMERCIAL

## 2021-01-01 ENCOUNTER — ALLIED HEALTH/NURSE VISIT (OUTPATIENT)
Dept: FAMILY MEDICINE | Facility: CLINIC | Age: 86
End: 2021-01-01
Payer: COMMERCIAL

## 2021-01-01 ENCOUNTER — APPOINTMENT (OUTPATIENT)
Dept: PHYSICAL THERAPY | Facility: CLINIC | Age: 86
End: 2021-01-01
Attending: NURSE PRACTITIONER
Payer: MEDICARE

## 2021-01-01 ENCOUNTER — PATIENT OUTREACH (OUTPATIENT)
Dept: FAMILY MEDICINE | Facility: CLINIC | Age: 86
End: 2021-01-01
Payer: COMMERCIAL

## 2021-01-01 ENCOUNTER — TELEPHONE (OUTPATIENT)
Dept: ANTICOAGULATION | Facility: CLINIC | Age: 86
End: 2021-01-01

## 2021-01-01 ENCOUNTER — NURSE TRIAGE (OUTPATIENT)
Dept: NURSING | Facility: CLINIC | Age: 86
End: 2021-01-01

## 2021-01-01 ENCOUNTER — VIRTUAL VISIT (OUTPATIENT)
Dept: GERIATRICS | Facility: CLINIC | Age: 86
End: 2021-01-01
Payer: COMMERCIAL

## 2021-01-01 VITALS
OXYGEN SATURATION: 97 % | BODY MASS INDEX: 24.51 KG/M2 | WEIGHT: 156.5 LBS | HEART RATE: 58 BPM | SYSTOLIC BLOOD PRESSURE: 129 MMHG | TEMPERATURE: 98.3 F | RESPIRATION RATE: 18 BRPM | DIASTOLIC BLOOD PRESSURE: 58 MMHG

## 2021-01-01 VITALS
DIASTOLIC BLOOD PRESSURE: 70 MMHG | BODY MASS INDEX: 28.04 KG/M2 | WEIGHT: 179 LBS | TEMPERATURE: 98.4 F | HEART RATE: 84 BPM | SYSTOLIC BLOOD PRESSURE: 138 MMHG | OXYGEN SATURATION: 97 % | RESPIRATION RATE: 16 BRPM

## 2021-01-01 VITALS
RESPIRATION RATE: 16 BRPM | DIASTOLIC BLOOD PRESSURE: 58 MMHG | OXYGEN SATURATION: 97 % | SYSTOLIC BLOOD PRESSURE: 126 MMHG | TEMPERATURE: 98.6 F | WEIGHT: 177 LBS | HEART RATE: 78 BPM | BODY MASS INDEX: 27.72 KG/M2

## 2021-01-01 VITALS
TEMPERATURE: 96.6 F | WEIGHT: 188.49 LBS | RESPIRATION RATE: 20 BRPM | HEART RATE: 84 BPM | HEIGHT: 67 IN | SYSTOLIC BLOOD PRESSURE: 156 MMHG | DIASTOLIC BLOOD PRESSURE: 92 MMHG | BODY MASS INDEX: 29.58 KG/M2 | OXYGEN SATURATION: 96 %

## 2021-01-01 VITALS
BODY MASS INDEX: 27.41 KG/M2 | SYSTOLIC BLOOD PRESSURE: 141 MMHG | DIASTOLIC BLOOD PRESSURE: 87 MMHG | OXYGEN SATURATION: 92 % | HEART RATE: 88 BPM | TEMPERATURE: 97.8 F | RESPIRATION RATE: 18 BRPM | WEIGHT: 175 LBS

## 2021-01-01 VITALS
RESPIRATION RATE: 16 BRPM | BODY MASS INDEX: 28.51 KG/M2 | WEIGHT: 182 LBS | SYSTOLIC BLOOD PRESSURE: 132 MMHG | DIASTOLIC BLOOD PRESSURE: 60 MMHG | OXYGEN SATURATION: 97 % | HEART RATE: 96 BPM | TEMPERATURE: 98.5 F

## 2021-01-01 VITALS
BODY MASS INDEX: 28.66 KG/M2 | SYSTOLIC BLOOD PRESSURE: 126 MMHG | TEMPERATURE: 98 F | WEIGHT: 183 LBS | HEART RATE: 88 BPM | OXYGEN SATURATION: 99 % | DIASTOLIC BLOOD PRESSURE: 76 MMHG | RESPIRATION RATE: 16 BRPM

## 2021-01-01 VITALS
DIASTOLIC BLOOD PRESSURE: 80 MMHG | SYSTOLIC BLOOD PRESSURE: 145 MMHG | TEMPERATURE: 97.3 F | OXYGEN SATURATION: 96 % | HEART RATE: 51 BPM

## 2021-01-01 VITALS
RESPIRATION RATE: 16 BRPM | BODY MASS INDEX: 23.93 KG/M2 | OXYGEN SATURATION: 97 % | SYSTOLIC BLOOD PRESSURE: 98 MMHG | HEIGHT: 67 IN | WEIGHT: 152.5 LBS | DIASTOLIC BLOOD PRESSURE: 54 MMHG | HEART RATE: 50 BPM | TEMPERATURE: 98.2 F

## 2021-01-01 VITALS
DIASTOLIC BLOOD PRESSURE: 64 MMHG | BODY MASS INDEX: 29.6 KG/M2 | HEART RATE: 94 BPM | SYSTOLIC BLOOD PRESSURE: 136 MMHG | TEMPERATURE: 98.2 F | OXYGEN SATURATION: 99 % | RESPIRATION RATE: 20 BRPM | WEIGHT: 189 LBS

## 2021-01-01 VITALS
BODY MASS INDEX: 24.56 KG/M2 | SYSTOLIC BLOOD PRESSURE: 112 MMHG | WEIGHT: 156.5 LBS | RESPIRATION RATE: 18 BRPM | HEIGHT: 67 IN | OXYGEN SATURATION: 98 % | HEART RATE: 67 BPM | TEMPERATURE: 98.2 F | DIASTOLIC BLOOD PRESSURE: 49 MMHG

## 2021-01-01 VITALS
RESPIRATION RATE: 18 BRPM | SYSTOLIC BLOOD PRESSURE: 112 MMHG | HEIGHT: 67 IN | TEMPERATURE: 98.2 F | DIASTOLIC BLOOD PRESSURE: 49 MMHG | HEART RATE: 67 BPM | BODY MASS INDEX: 24.56 KG/M2 | WEIGHT: 156.5 LBS | OXYGEN SATURATION: 98 %

## 2021-01-01 DIAGNOSIS — I50.22 CHRONIC SYSTOLIC HEART FAILURE (H): ICD-10-CM

## 2021-01-01 DIAGNOSIS — N18.4 ANEMIA DUE TO STAGE 4 CHRONIC KIDNEY DISEASE (H): ICD-10-CM

## 2021-01-01 DIAGNOSIS — Z79.01 LONG TERM CURRENT USE OF ANTICOAGULANT THERAPY: ICD-10-CM

## 2021-01-01 DIAGNOSIS — I48.20 CHRONIC ATRIAL FIBRILLATION (H): ICD-10-CM

## 2021-01-01 DIAGNOSIS — R35.0 URINARY FREQUENCY: ICD-10-CM

## 2021-01-01 DIAGNOSIS — Z95.2 S/P TAVR (TRANSCATHETER AORTIC VALVE REPLACEMENT): Primary | ICD-10-CM

## 2021-01-01 DIAGNOSIS — Z79.01 LONG TERM (CURRENT) USE OF ANTICOAGULANTS: ICD-10-CM

## 2021-01-01 DIAGNOSIS — N40.1 BENIGN NON-NODULAR PROSTATIC HYPERPLASIA WITH LOWER URINARY TRACT SYMPTOMS: ICD-10-CM

## 2021-01-01 DIAGNOSIS — G45.9 TIA (TRANSIENT ISCHEMIC ATTACK): Primary | ICD-10-CM

## 2021-01-01 DIAGNOSIS — I35.0 NODULAR CALCIFIC AORTIC VALVE STENOSIS: Primary | ICD-10-CM

## 2021-01-01 DIAGNOSIS — Z51.81 ANTICOAGULATION GOAL OF INR 2 TO 3: ICD-10-CM

## 2021-01-01 DIAGNOSIS — G47.00 INSOMNIA, UNSPECIFIED TYPE: ICD-10-CM

## 2021-01-01 DIAGNOSIS — N39.0 URINARY TRACT INFECTION WITHOUT HEMATURIA, SITE UNSPECIFIED: ICD-10-CM

## 2021-01-01 DIAGNOSIS — I35.0 SEVERE AORTIC STENOSIS: ICD-10-CM

## 2021-01-01 DIAGNOSIS — G45.9 TIA (TRANSIENT ISCHEMIC ATTACK): ICD-10-CM

## 2021-01-01 DIAGNOSIS — G47.9 SLEEP DISTURBANCES: ICD-10-CM

## 2021-01-01 DIAGNOSIS — I50.21 CHF (CONGESTIVE HEART FAILURE), NYHA CLASS IV, ACUTE, SYSTOLIC (H): Primary | ICD-10-CM

## 2021-01-01 DIAGNOSIS — I27.20 PULMONARY HYPERTENSION (H): ICD-10-CM

## 2021-01-01 DIAGNOSIS — N13.8 BENIGN PROSTATIC HYPERPLASIA WITH URINARY OBSTRUCTION: ICD-10-CM
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
## 2021-01-01 DIAGNOSIS — Z79.01 ANTICOAGULATION GOAL OF INR 2 TO 3: ICD-10-CM

## 2021-01-01 DIAGNOSIS — N39.0 URINARY TRACT INFECTION DUE TO PROTEUS: ICD-10-CM

## 2021-01-01 DIAGNOSIS — N40.1 BENIGN PROSTATIC HYPERPLASIA WITH URINARY OBSTRUCTION: ICD-10-CM

## 2021-01-01 DIAGNOSIS — Z97.8 FOLEY CATHETER IN PLACE: ICD-10-CM

## 2021-01-01 DIAGNOSIS — F51.05 INSOMNIA DUE TO OTHER MENTAL DISORDER: ICD-10-CM

## 2021-01-01 DIAGNOSIS — G47.00 INSOMNIA, UNSPECIFIED TYPE: Primary | ICD-10-CM

## 2021-01-01 DIAGNOSIS — S00.83XA CONTUSION OF FACE, INITIAL ENCOUNTER: ICD-10-CM

## 2021-01-01 DIAGNOSIS — R53.1 ASTHENIA: ICD-10-CM

## 2021-01-01 DIAGNOSIS — N13.9 UROPATHY, OBSTRUCTIVE: ICD-10-CM
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
## 2021-01-01 DIAGNOSIS — I48.91 CONTROLLED ATRIAL FIBRILLATION (H): ICD-10-CM

## 2021-01-01 DIAGNOSIS — I50.21 CHF (CONGESTIVE HEART FAILURE), NYHA CLASS IV, ACUTE, SYSTOLIC (H): ICD-10-CM

## 2021-01-01 DIAGNOSIS — Z11.52 ENCOUNTER FOR SCREENING LABORATORY TESTING FOR SEVERE ACUTE RESPIRATORY SYNDROME CORONAVIRUS 2 (SARS-COV-2): ICD-10-CM

## 2021-01-01 DIAGNOSIS — I50.9 ACUTE CONGESTIVE HEART FAILURE, UNSPECIFIED HEART FAILURE TYPE (H): ICD-10-CM

## 2021-01-01 DIAGNOSIS — M62.81 GENERALIZED MUSCLE WEAKNESS: ICD-10-CM

## 2021-01-01 DIAGNOSIS — Z95.2 S/P TAVR (TRANSCATHETER AORTIC VALVE REPLACEMENT): ICD-10-CM

## 2021-01-01 DIAGNOSIS — N18.32 STAGE 3B CHRONIC KIDNEY DISEASE (H): ICD-10-CM

## 2021-01-01 DIAGNOSIS — S10.93XA CONTUSION OF FACE, SCALP AND NECK, INITIAL ENCOUNTER: ICD-10-CM

## 2021-01-01 DIAGNOSIS — I50.9 ACUTE CONGESTIVE HEART FAILURE, UNSPECIFIED HEART FAILURE TYPE (H): Primary | ICD-10-CM

## 2021-01-01 DIAGNOSIS — Z53.9 DIAGNOSIS NOT YET DEFINED: Primary | ICD-10-CM

## 2021-01-01 DIAGNOSIS — Z51.81 ENCOUNTER FOR THERAPEUTIC DRUG MONITORING: ICD-10-CM

## 2021-01-01 DIAGNOSIS — Z79.899 POLYPHARMACY: ICD-10-CM

## 2021-01-01 DIAGNOSIS — R04.0 EPISTAXIS: ICD-10-CM
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
## 2021-01-01 DIAGNOSIS — Z71.89 OTHER SPECIFIED COUNSELING: Primary | Chronic | ICD-10-CM

## 2021-01-01 DIAGNOSIS — F41.9 ANXIETY: ICD-10-CM

## 2021-01-01 DIAGNOSIS — F41.9 ANXIETY: Primary | ICD-10-CM

## 2021-01-01 DIAGNOSIS — F99 INSOMNIA DUE TO OTHER MENTAL DISORDER: ICD-10-CM

## 2021-01-01 DIAGNOSIS — S00.03XA CONTUSION OF FACE, SCALP AND NECK, INITIAL ENCOUNTER: ICD-10-CM

## 2021-01-01 DIAGNOSIS — N18.4 CKD (CHRONIC KIDNEY DISEASE) STAGE 4, GFR 15-29 ML/MIN (H): ICD-10-CM

## 2021-01-01 DIAGNOSIS — N18.4 CKD (CHRONIC KIDNEY DISEASE) STAGE 4, GFR 15-29 ML/MIN (H): Primary | ICD-10-CM

## 2021-01-01 DIAGNOSIS — M62.81 GENERALIZED MUSCLE WEAKNESS: Primary | ICD-10-CM

## 2021-01-01 DIAGNOSIS — I35.0 AORTIC VALVE STENOSIS, ETIOLOGY OF CARDIAC VALVE DISEASE UNSPECIFIED: ICD-10-CM

## 2021-01-01 DIAGNOSIS — I48.20 CHRONIC ATRIAL FIBRILLATION (H): Primary | ICD-10-CM

## 2021-01-01 DIAGNOSIS — R62.7 FAILURE TO THRIVE IN ADULT: ICD-10-CM

## 2021-01-01 DIAGNOSIS — S00.83XA CONTUSION OF FACE, SCALP AND NECK, INITIAL ENCOUNTER: ICD-10-CM
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
## 2021-01-01 DIAGNOSIS — D50.9 IRON DEFICIENCY ANEMIA, UNSPECIFIED IRON DEFICIENCY ANEMIA TYPE: ICD-10-CM

## 2021-01-01 DIAGNOSIS — N18.9 CHRONIC KIDNEY DISEASE, UNSPECIFIED CKD STAGE: ICD-10-CM

## 2021-01-01 DIAGNOSIS — Z97.8 INDWELLING FOLEY CATHETER PRESENT: ICD-10-CM

## 2021-01-01 DIAGNOSIS — I48.91 CONTROLLED ATRIAL FIBRILLATION (H): Primary | ICD-10-CM

## 2021-01-01 DIAGNOSIS — D63.1 ANEMIA DUE TO STAGE 4 CHRONIC KIDNEY DISEASE (H): ICD-10-CM

## 2021-01-01 DIAGNOSIS — I48.91 RATE CONTROLLED ATRIAL FIBRILLATION (H): ICD-10-CM

## 2021-01-01 DIAGNOSIS — I35.0 NODULAR CALCIFIC AORTIC VALVE STENOSIS: ICD-10-CM

## 2021-01-01 DIAGNOSIS — B96.4 URINARY TRACT INFECTION DUE TO PROTEUS: ICD-10-CM

## 2021-01-01 LAB
ALBUMIN SERPL-MCNC: 3.3 G/DL (ref 3.4–5)
ALBUMIN SERPL-MCNC: 3.5 G/DL (ref 3.4–5)
ALBUMIN UR-MCNC: 100 MG/DL
ALBUMIN UR-MCNC: 100 MG/DL
ALBUMIN UR-MCNC: NEGATIVE MG/DL
ALP SERPL-CCNC: 83 U/L (ref 40–150)
ALP SERPL-CCNC: 93 U/L (ref 40–150)
ALT SERPL W P-5'-P-CCNC: 30 U/L (ref 0–70)
ALT SERPL W P-5'-P-CCNC: 33 U/L (ref 0–70)
ALT SERPL-CCNC: 22 U/L (ref 8–45)
ANION GAP SERPL CALCULATED.3IONS-SCNC: 4 MMOL/L (ref 3–14)
ANION GAP SERPL CALCULATED.3IONS-SCNC: 5 MMOL/L (ref 3–14)
ANION GAP SERPL CALCULATED.3IONS-SCNC: 6 MMOL/L (ref 3–14)
ANION GAP SERPL CALCULATED.3IONS-SCNC: 7 MMOL/L (ref 3–14)
ANION GAP SERPL CALCULATED.3IONS-SCNC: 7 MMOL/L (ref 3–14)
ANION GAP SERPL CALCULATED.3IONS-SCNC: 8 MMOL/L (ref 3–14)
APPEARANCE UR: ABNORMAL
APPEARANCE UR: CLEAR
APPEARANCE UR: CLEAR
AST SERPL W P-5'-P-CCNC: 31 U/L (ref 0–45)
AST SERPL W P-5'-P-CCNC: 35 U/L (ref 0–45)
AST SERPL-CCNC: 27 U/L (ref 5–41)
BACTERIA #/AREA URNS HPF: ABNORMAL /HPF
BACTERIA SPEC CULT: ABNORMAL
BASOPHILS # BLD AUTO: 0 10E9/L (ref 0–0.2)
BASOPHILS # BLD AUTO: 0 10E9/L (ref 0–0.2)
BASOPHILS NFR BLD AUTO: 0.4 %
BASOPHILS NFR BLD AUTO: 0.5 %
BILIRUB SERPL-MCNC: 0.8 MG/DL (ref 0.2–1.3)
BILIRUB SERPL-MCNC: 0.8 MG/DL (ref 0.2–1.3)
BILIRUB UR QL STRIP: NEGATIVE
BUN SERPL-MCNC: 56 MG/DL (ref 7–30)
BUN SERPL-MCNC: 59 MG/DL (ref 7–30)
BUN SERPL-MCNC: 62 MG/DL (ref 7–30)
BUN SERPL-MCNC: 70 MG/DL (ref 7–30)
BUN SERPL-MCNC: 76 MG/DL (ref 7–30)
BUN SERPL-MCNC: 92 MG/DL (ref 7–30)
BUN SERPL-MCNC: 94 MG/DL (ref 7–30)
BUN SERPL-MCNC: 95 MG/DL (ref 7–30)
CALCIUM SERPL-MCNC: 9 MG/DL (ref 8.5–10.1)
CALCIUM SERPL-MCNC: 9.1 MG/DL (ref 8.5–10.1)
CALCIUM SERPL-MCNC: 9.2 MG/DL (ref 8.5–10.1)
CALCIUM SERPL-MCNC: 9.3 MG/DL (ref 8.5–10.1)
CALCIUM SERPL-MCNC: 9.5 MG/DL (ref 8.5–10.1)
CALCIUM SERPL-MCNC: 9.5 MG/DL (ref 8.5–10.1)
CALCIUM SERPL-MCNC: 9.7 MG/DL (ref 8.5–10.1)
CALCIUM SERPL-MCNC: 9.7 MG/DL (ref 8.5–10.1)
CAPILLARY BLOOD COLLECTION: NORMAL
CHLORIDE SERPL-SCNC: 101 MMOL/L (ref 94–109)
CHLORIDE SERPL-SCNC: 103 MMOL/L (ref 94–109)
CHLORIDE SERPL-SCNC: 103 MMOL/L (ref 94–109)
CHLORIDE SERPL-SCNC: 104 MMOL/L (ref 94–109)
CHLORIDE SERPL-SCNC: 104 MMOL/L (ref 94–109)
CHLORIDE SERPL-SCNC: 106 MMOL/L (ref 94–109)
CHLORIDE SERPL-SCNC: 106 MMOL/L (ref 94–109)
CHLORIDE SERPL-SCNC: 108 MMOL/L (ref 94–109)
CO2 SERPL-SCNC: 26 MMOL/L (ref 20–32)
CO2 SERPL-SCNC: 27 MMOL/L (ref 20–32)
CO2 SERPL-SCNC: 28 MMOL/L (ref 20–32)
CO2 SERPL-SCNC: 28 MMOL/L (ref 20–32)
CO2 SERPL-SCNC: 31 MMOL/L (ref 20–32)
CO2 SERPL-SCNC: 33 MMOL/L (ref 20–32)
COLOR UR AUTO: YELLOW
CREAT SERPL-MCNC: 1.86 MG/DL (ref 0.66–1.25)
CREAT SERPL-MCNC: 1.93 MG/DL (ref 0.66–1.25)
CREAT SERPL-MCNC: 1.95 MG/DL (ref 0.72–1.25)
CREAT SERPL-MCNC: 2.02 MG/DL (ref 0.66–1.25)
CREAT SERPL-MCNC: 2.1 MG/DL (ref 0.66–1.25)
CREAT SERPL-MCNC: 2.14 MG/DL (ref 0.66–1.25)
CREAT SERPL-MCNC: 2.19 MG/DL (ref 0.66–1.25)
CREAT SERPL-MCNC: 2.21 MG/DL (ref 0.66–1.25)
CREAT SERPL-MCNC: 2.3 MG/DL (ref 0.72–1.25)
CREAT SERPL-MCNC: 2.54 MG/DL (ref 0.66–1.25)
CREAT SERPL-MCNC: 2.61 MG/DL (ref 0.66–1.25)
CREAT SERPL-MCNC: 2.61 MG/DL (ref 0.72–1.25)
CREAT SERPL-MCNC: 2.69 MG/DL (ref 0.66–1.25)
DIFFERENTIAL METHOD BLD: ABNORMAL
DIFFERENTIAL METHOD BLD: ABNORMAL
EJECTION FRACTION: NORMAL %
EJECTION FRACTION: NORMAL %
EOSINOPHIL NFR BLD AUTO: 0.1 %
EOSINOPHIL NFR BLD AUTO: 0.5 %
ERYTHROCYTE [DISTWIDTH] IN BLOOD BY AUTOMATED COUNT: 15.3 % (ref 10–15)
ERYTHROCYTE [DISTWIDTH] IN BLOOD BY AUTOMATED COUNT: 15.3 % (ref 10–15)
ERYTHROCYTE [DISTWIDTH] IN BLOOD BY AUTOMATED COUNT: 15.4 % (ref 10–15)
ERYTHROCYTE [DISTWIDTH] IN BLOOD BY AUTOMATED COUNT: 17.1 % (ref 10–15)
ERYTHROCYTE [DISTWIDTH] IN BLOOD BY AUTOMATED COUNT: 18.6 % (ref 10–15)
GAMMA INTERFERON BACKGROUND BLD IA-ACNC: 0.21 IU/ML
GFR SERPL CREATININE-BSD FRML MDRD: 20 ML/MIN/1.73M2
GFR SERPL CREATININE-BSD FRML MDRD: 20 ML/MIN/{1.73_M2}
GFR SERPL CREATININE-BSD FRML MDRD: 20 ML/MIN/{1.73_M2}
GFR SERPL CREATININE-BSD FRML MDRD: 21 ML/MIN/{1.73_M2}
GFR SERPL CREATININE-BSD FRML MDRD: 25 ML/MIN/{1.73_M2}
GFR SERPL CREATININE-BSD FRML MDRD: 25 ML/MIN/{1.73_M2}
GFR SERPL CREATININE-BSD FRML MDRD: 26 ML/MIN/{1.73_M2}
GFR SERPL CREATININE-BSD FRML MDRD: 26 ML/MIN/{1.73_M2}
GFR SERPL CREATININE-BSD FRML MDRD: 27 ML/MIN/1.73M2
GFR SERPL CREATININE-BSD FRML MDRD: 28 ML/MIN/{1.73_M2}
GFR SERPL CREATININE-BSD FRML MDRD: 29 ML/MIN/1.73M2
GFR SERPL CREATININE-BSD FRML MDRD: 29 ML/MIN/{1.73_M2}
GFR SERPL CREATININE-BSD FRML MDRD: 31 ML/MIN/{1.73_M2}
GLUCOSE SERPL-MCNC: 105 MG/DL (ref 70–100)
GLUCOSE SERPL-MCNC: 108 MG/DL (ref 70–99)
GLUCOSE SERPL-MCNC: 109 MG/DL (ref 70–100)
GLUCOSE SERPL-MCNC: 109 MG/DL (ref 70–99)
GLUCOSE SERPL-MCNC: 114 MG/DL (ref 70–99)
GLUCOSE SERPL-MCNC: 115 MG/DL (ref 70–99)
GLUCOSE SERPL-MCNC: 121 MG/DL (ref 70–99)
GLUCOSE SERPL-MCNC: 121 MG/DL (ref 70–99)
GLUCOSE SERPL-MCNC: 132 MG/DL (ref 70–99)
GLUCOSE SERPL-MCNC: 161 MG/DL (ref 70–99)
GLUCOSE UR STRIP-MCNC: NEGATIVE MG/DL
HCT VFR BLD AUTO: 26.7 % (ref 40–53)
HCT VFR BLD AUTO: 33.3 % (ref 40–53)
HCT VFR BLD AUTO: 33.8 % (ref 40–53)
HCT VFR BLD AUTO: 33.9 % (ref 40–53)
HCT VFR BLD AUTO: 34.5 % (ref 40–53)
HGB BLD-MCNC: 10.5 G/DL (ref 13.3–17.7)
HGB BLD-MCNC: 10.7 G/DL (ref 13.3–17.7)
HGB BLD-MCNC: 10.8 G/DL (ref 13.3–17.7)
HGB BLD-MCNC: 10.9 G/DL (ref 13.3–17.7)
HGB BLD-MCNC: 8.1 G/DL (ref 13.3–17.7)
HGB UR QL STRIP: NEGATIVE
HYALINE CASTS #/AREA URNS LPF: 2 /LPF (ref 0–2)
IMM GRANULOCYTES # BLD: 0 10E9/L (ref 0–0.4)
IMM GRANULOCYTES # BLD: 0 10E9/L (ref 0–0.4)
IMM GRANULOCYTES NFR BLD: 0.4 %
IMM GRANULOCYTES NFR BLD: 0.4 %
INR BLD: 1.6 (ref 0.86–1.14)
INR BLD: 1.8 (ref 0.86–1.14)
INR BLD: 3.1 (ref 0.86–1.14)
INR BLD: 3.9 (ref 0.86–1.14)
INR PPP: 1.2 (ref 0.9–1.1)
INR PPP: 1.97 (ref 0.86–1.14)
INR PPP: 2.11 (ref 0.86–1.14)
INR PPP: 2.44 (ref 0.86–1.14)
INR PPP: 2.5 (ref 0.9–1.1)
INR PPP: 2.5 (ref 0.9–1.1)
INR PPP: 3.06 (ref 0.86–1.14)
INR PPP: 3.19 (ref 0.86–1.14)
INR PPP: 4.7 (ref 0.9–1.1)
KETONES UR STRIP-MCNC: NEGATIVE MG/DL
LABORATORY COMMENT REPORT: NORMAL
LEUKOCYTE ESTERASE UR QL STRIP: ABNORMAL
LEUKOCYTE ESTERASE UR QL STRIP: NEGATIVE
LEUKOCYTE ESTERASE UR QL STRIP: NEGATIVE
LYMPHOCYTES # BLD AUTO: 0.4 10E9/L (ref 0.8–5.3)
LYMPHOCYTES # BLD AUTO: 0.4 10E9/L (ref 0.8–5.3)
LYMPHOCYTES NFR BLD AUTO: 5.4 %
LYMPHOCYTES NFR BLD AUTO: 5.6 %
Lab: ABNORMAL
M TB IFN-G CD4+ BCKGRND COR BLD-ACNC: 0.36 IU/ML
M TB TUBERC IFN-G BLD QL: ABNORMAL
MAGNESIUM SERPL-MCNC: 2.6 MG/DL (ref 1.6–2.3)
MCH RBC QN AUTO: 32 PG (ref 26.5–33)
MCH RBC QN AUTO: 32.3 PG (ref 26.5–33)
MCH RBC QN AUTO: 32.4 PG (ref 26.5–33)
MCH RBC QN AUTO: 32.5 PG (ref 26.5–33)
MCH RBC QN AUTO: 32.6 PG (ref 26.5–33)
MCHC RBC AUTO-ENTMCNC: 30.3 G/DL (ref 31.5–36.5)
MCHC RBC AUTO-ENTMCNC: 31 G/DL (ref 31.5–36.5)
MCHC RBC AUTO-ENTMCNC: 31.3 G/DL (ref 31.5–36.5)
MCHC RBC AUTO-ENTMCNC: 32.1 G/DL (ref 31.5–36.5)
MCHC RBC AUTO-ENTMCNC: 32.2 G/DL (ref 31.5–36.5)
MCV RBC AUTO: 100 FL (ref 78–100)
MCV RBC AUTO: 102 FL (ref 78–100)
MCV RBC AUTO: 102 FL (ref 78–100)
MCV RBC AUTO: 105 FL (ref 78–100)
MCV RBC AUTO: 107 FL (ref 78–100)
MITOGEN IGNF BCKGRD COR BLD-ACNC: 0.01 IU/ML
MITOGEN IGNF BCKGRD COR BLD-ACNC: 0.01 IU/ML
MONOCYTES # BLD AUTO: 0.5 10E9/L (ref 0–1.3)
MONOCYTES # BLD AUTO: 0.8 10E9/L (ref 0–1.3)
MONOCYTES NFR BLD AUTO: 10.2 %
MONOCYTES NFR BLD AUTO: 6.7 %
MUCOUS THREADS #/AREA URNS LPF: PRESENT /LPF
MUCOUS THREADS #/AREA URNS LPF: PRESENT /LPF
NEUTROPHILS # BLD AUTO: 6.1 10E9/L (ref 1.6–8.3)
NEUTROPHILS # BLD AUTO: 6.3 10E9/L (ref 1.6–8.3)
NEUTROPHILS NFR BLD AUTO: 82.9 %
NEUTROPHILS NFR BLD AUTO: 86.9 %
NITRATE UR QL: NEGATIVE
NRBC # BLD AUTO: 0 10*3/UL
NRBC # BLD AUTO: 0 10*3/UL
NRBC BLD AUTO-RTO: 0 /100
NRBC BLD AUTO-RTO: 0 /100
NT-PROBNP SERPL-MCNC: ABNORMAL PG/ML (ref 0–450)
NT-PROBNP SERPL-MCNC: ABNORMAL PG/ML (ref 0–450)
PH UR STRIP: 5 PH (ref 5–7)
PH UR STRIP: 6 PH (ref 5–7)
PH UR STRIP: 6 PH (ref 5–7)
PLATELET # BLD AUTO: 116 10E9/L (ref 150–450)
PLATELET # BLD AUTO: 132 10E9/L (ref 150–450)
PLATELET # BLD AUTO: 150 10E9/L (ref 150–450)
PLATELET # BLD AUTO: 160 10E9/L (ref 150–450)
PLATELET # BLD AUTO: 161 10E9/L (ref 150–450)
POTASSIUM SERPL-SCNC: 3.8 MMOL/L (ref 3.5–5.1)
POTASSIUM SERPL-SCNC: 3.9 MMOL/L (ref 3.5–5.1)
POTASSIUM SERPL-SCNC: 4 MMOL/L (ref 3.4–5.3)
POTASSIUM SERPL-SCNC: 4.1 MMOL/L (ref 3.4–5.3)
POTASSIUM SERPL-SCNC: 4.1 MMOL/L (ref 3.4–5.3)
POTASSIUM SERPL-SCNC: 4.2 MMOL/L (ref 3.4–5.3)
POTASSIUM SERPL-SCNC: 4.3 MMOL/L (ref 3.4–5.3)
POTASSIUM SERPL-SCNC: 4.4 MMOL/L (ref 3.4–5.3)
POTASSIUM SERPL-SCNC: 4.4 MMOL/L (ref 3.4–5.3)
POTASSIUM SERPL-SCNC: 4.5 MMOL/L (ref 3.4–5.3)
POTASSIUM SERPL-SCNC: 4.6 MMOL/L (ref 3.4–5.3)
POTASSIUM SERPL-SCNC: 5.7 MMOL/L (ref 3.4–5.3)
PROT SERPL-MCNC: 7.2 G/DL (ref 6.8–8.8)
PROT SERPL-MCNC: 7.4 G/DL (ref 6.8–8.8)
RBC # BLD AUTO: 2.49 10E12/L (ref 4.4–5.9)
RBC # BLD AUTO: 3.24 10E12/L (ref 4.4–5.9)
RBC # BLD AUTO: 3.28 10E12/L (ref 4.4–5.9)
RBC # BLD AUTO: 3.37 10E12/L (ref 4.4–5.9)
RBC # BLD AUTO: 3.37 10E12/L (ref 4.4–5.9)
RBC #/AREA URNS AUTO: 1 /HPF (ref 0–2)
RBC #/AREA URNS AUTO: 2 /HPF (ref 0–2)
SARS-COV-2 RNA RESP QL NAA+PROBE: NEGATIVE
SODIUM SERPL-SCNC: 136 MMOL/L (ref 133–144)
SODIUM SERPL-SCNC: 136 MMOL/L (ref 133–144)
SODIUM SERPL-SCNC: 137 MMOL/L (ref 133–144)
SODIUM SERPL-SCNC: 137 MMOL/L (ref 133–144)
SODIUM SERPL-SCNC: 140 MMOL/L (ref 133–144)
SODIUM SERPL-SCNC: 140 MMOL/L (ref 133–144)
SODIUM SERPL-SCNC: 142 MMOL/L (ref 133–144)
SODIUM SERPL-SCNC: 143 MMOL/L (ref 133–144)
SOURCE: ABNORMAL
SOURCE: ABNORMAL
SOURCE: NORMAL
SP GR UR STRIP: 1.01 (ref 1–1.03)
SP GR UR STRIP: 1.02 (ref 1–1.03)
SP GR UR STRIP: 1.02 (ref 1–1.03)
SPECIMEN SOURCE: ABNORMAL
SPECIMEN SOURCE: NORMAL
SQUAMOUS #/AREA URNS AUTO: 20 /HPF (ref 0–1)
TSH SERPL-ACNC: 4.29 UIU/ML (ref 0.47–5)
UROBILINOGEN UR STRIP-MCNC: 0 MG/DL (ref 0–2)
WBC # BLD AUTO: 7.3 10E9/L (ref 4–11)
WBC # BLD AUTO: 7.3 10E9/L (ref 4–11)
WBC # BLD AUTO: 7.4 10E9/L (ref 4–11)
WBC # BLD AUTO: 7.6 10E9/L (ref 4–11)
WBC # BLD AUTO: 8.6 10E9/L (ref 4–11)
WBC #/AREA URNS AUTO: 33 /HPF (ref 0–5)
WBC #/AREA URNS AUTO: 5 /HPF (ref 0–5)

## 2021-01-01 PROCEDURE — 81001 URINALYSIS AUTO W/SCOPE: CPT | Performed by: FAMILY MEDICINE

## 2021-01-01 PROCEDURE — 87086 URINE CULTURE/COLONY COUNT: CPT | Performed by: FAMILY MEDICINE

## 2021-01-01 PROCEDURE — 82565 ASSAY OF CREATININE: CPT | Performed by: INTERNAL MEDICINE

## 2021-01-01 PROCEDURE — G0378 HOSPITAL OBSERVATION PER HR: HCPCS

## 2021-01-01 PROCEDURE — 99207 PR MD CERTIFICATION HHA PATIENT: CPT | Performed by: INTERNAL MEDICINE

## 2021-01-01 PROCEDURE — 99207 PR CDG-MDM COMPONENT: MEETS MODERATE - DOWN CODED: CPT | Performed by: PEDIATRICS

## 2021-01-01 PROCEDURE — 85027 COMPLETE CBC AUTOMATED: CPT | Performed by: INTERNAL MEDICINE

## 2021-01-01 PROCEDURE — 85025 COMPLETE CBC W/AUTO DIFF WBC: CPT | Performed by: EMERGENCY MEDICINE

## 2021-01-01 PROCEDURE — 99310 SBSQ NF CARE HIGH MDM 45: CPT | Performed by: NURSE PRACTITIONER

## 2021-01-01 PROCEDURE — 99308 SBSQ NF CARE LOW MDM 20: CPT | Performed by: NURSE PRACTITIONER

## 2021-01-01 PROCEDURE — 87186 SC STD MICRODIL/AGAR DIL: CPT | Performed by: FAMILY MEDICINE

## 2021-01-01 PROCEDURE — 99219 PR INITIAL OBSERVATION CARE,LEVEL II: CPT | Performed by: PEDIATRICS

## 2021-01-01 PROCEDURE — G0180 MD CERTIFICATION HHA PATIENT: HCPCS | Performed by: INTERNAL MEDICINE

## 2021-01-01 PROCEDURE — 99285 EMERGENCY DEPT VISIT HI MDM: CPT | Mod: 25 | Performed by: EMERGENCY MEDICINE

## 2021-01-01 PROCEDURE — 36415 COLL VENOUS BLD VENIPUNCTURE: CPT | Performed by: NURSE PRACTITIONER

## 2021-01-01 PROCEDURE — 36415 COLL VENOUS BLD VENIPUNCTURE: CPT | Performed by: INTERNAL MEDICINE

## 2021-01-01 PROCEDURE — 93005 ELECTROCARDIOGRAM TRACING: CPT | Performed by: EMERGENCY MEDICINE

## 2021-01-01 PROCEDURE — 36416 COLLJ CAPILLARY BLOOD SPEC: CPT | Performed by: INTERNAL MEDICINE

## 2021-01-01 PROCEDURE — 80048 BASIC METABOLIC PNL TOTAL CA: CPT | Performed by: INTERNAL MEDICINE

## 2021-01-01 PROCEDURE — 83880 ASSAY OF NATRIURETIC PEPTIDE: CPT | Performed by: INTERNAL MEDICINE

## 2021-01-01 PROCEDURE — 99214 OFFICE O/P EST MOD 30 MIN: CPT | Performed by: INTERNAL MEDICINE

## 2021-01-01 PROCEDURE — 97161 PT EVAL LOW COMPLEX 20 MIN: CPT | Mod: GP | Performed by: PHYSICAL THERAPIST

## 2021-01-01 PROCEDURE — 99283 EMERGENCY DEPT VISIT LOW MDM: CPT | Performed by: FAMILY MEDICINE

## 2021-01-01 PROCEDURE — 70450 CT HEAD/BRAIN W/O DYE: CPT

## 2021-01-01 PROCEDURE — 250N000011 HC RX IP 250 OP 636: Performed by: FAMILY MEDICINE

## 2021-01-01 PROCEDURE — 85025 COMPLETE CBC W/AUTO DIFF WBC: CPT | Performed by: FAMILY MEDICINE

## 2021-01-01 PROCEDURE — 250N000013 HC RX MED GY IP 250 OP 250 PS 637: Mod: GY | Performed by: INTERNAL MEDICINE

## 2021-01-01 PROCEDURE — 85610 PROTHROMBIN TIME: CPT | Performed by: INTERNAL MEDICINE

## 2021-01-01 PROCEDURE — 99284 EMERGENCY DEPT VISIT MOD MDM: CPT | Mod: 25 | Performed by: FAMILY MEDICINE

## 2021-01-01 PROCEDURE — 87088 URINE BACTERIA CULTURE: CPT | Performed by: FAMILY MEDICINE

## 2021-01-01 PROCEDURE — 99207 PR CDG-CODE CATEGORY CHANGED: CPT | Performed by: NURSE PRACTITIONER

## 2021-01-01 PROCEDURE — 93010 ELECTROCARDIOGRAM REPORT: CPT | Performed by: EMERGENCY MEDICINE

## 2021-01-01 PROCEDURE — 99217 PR OBSERVATION CARE DISCHARGE: CPT | Performed by: NURSE PRACTITIONER

## 2021-01-01 PROCEDURE — 81001 URINALYSIS AUTO W/SCOPE: CPT | Performed by: EMERGENCY MEDICINE

## 2021-01-01 PROCEDURE — 99215 OFFICE O/P EST HI 40 MIN: CPT | Performed by: INTERNAL MEDICINE

## 2021-01-01 PROCEDURE — 80048 BASIC METABOLIC PNL TOTAL CA: CPT | Performed by: NURSE PRACTITIONER

## 2021-01-01 PROCEDURE — 80053 COMPREHEN METABOLIC PANEL: CPT | Performed by: INTERNAL MEDICINE

## 2021-01-01 PROCEDURE — 83735 ASSAY OF MAGNESIUM: CPT | Performed by: PEDIATRICS

## 2021-01-01 PROCEDURE — 99305 1ST NF CARE MODERATE MDM 35: CPT | Mod: AI | Performed by: FAMILY MEDICINE

## 2021-01-01 PROCEDURE — 85027 COMPLETE CBC AUTOMATED: CPT | Performed by: NURSE PRACTITIONER

## 2021-01-01 PROCEDURE — 250N000013 HC RX MED GY IP 250 OP 250 PS 637: Mod: GY | Performed by: NURSE PRACTITIONER

## 2021-01-01 PROCEDURE — 81003 URINALYSIS AUTO W/O SCOPE: CPT | Performed by: INTERNAL MEDICINE

## 2021-01-01 PROCEDURE — 80053 COMPREHEN METABOLIC PANEL: CPT | Performed by: EMERGENCY MEDICINE

## 2021-01-01 PROCEDURE — 84132 ASSAY OF SERUM POTASSIUM: CPT | Performed by: INTERNAL MEDICINE

## 2021-01-01 PROCEDURE — 80048 BASIC METABOLIC PNL TOTAL CA: CPT | Performed by: FAMILY MEDICINE

## 2021-01-01 PROCEDURE — 250N000009 HC RX 250: Performed by: INTERNAL MEDICINE

## 2021-01-01 PROCEDURE — 51798 US URINE CAPACITY MEASURE: CPT | Performed by: FAMILY MEDICINE

## 2021-01-01 PROCEDURE — C9803 HOPD COVID-19 SPEC COLLECT: HCPCS | Performed by: EMERGENCY MEDICINE

## 2021-01-01 PROCEDURE — 71045 X-RAY EXAM CHEST 1 VIEW: CPT

## 2021-01-01 PROCEDURE — 99207 PR APP CREDIT; MD BILLING SHARED VISIT: CPT | Performed by: NURSE PRACTITIONER

## 2021-01-01 PROCEDURE — 94640 AIRWAY INHALATION TREATMENT: CPT

## 2021-01-01 PROCEDURE — 87635 SARS-COV-2 COVID-19 AMP PRB: CPT | Performed by: EMERGENCY MEDICINE

## 2021-01-01 PROCEDURE — 85610 PROTHROMBIN TIME: CPT | Performed by: EMERGENCY MEDICINE

## 2021-01-01 PROCEDURE — 99207 PR NO CHARGE NURSE ONLY: CPT

## 2021-01-01 PROCEDURE — 85610 PROTHROMBIN TIME: CPT | Performed by: FAMILY MEDICINE

## 2021-01-01 PROCEDURE — 96374 THER/PROPH/DIAG INJ IV PUSH: CPT | Performed by: FAMILY MEDICINE

## 2021-01-01 RX ORDER — METOPROLOL SUCCINATE 25 MG/1
12.5 TABLET, EXTENDED RELEASE ORAL DAILY
COMMUNITY
End: 2021-01-01

## 2021-01-01 RX ORDER — MORPHINE SULFATE 100 MG/5ML
5 SOLUTION ORAL
Qty: 30 ML | Refills: 0 | Status: SHIPPED | OUTPATIENT
Start: 2021-01-01

## 2021-01-01 RX ORDER — ALPRAZOLAM 0.25 MG
0.25 TABLET ORAL
Qty: 30 TABLET | Refills: 0 | Status: SHIPPED | OUTPATIENT
Start: 2021-01-01 | End: 2021-01-01

## 2021-01-01 RX ORDER — ALBUTEROL SULFATE 0.83 MG/ML
2.5 SOLUTION RESPIRATORY (INHALATION) EVERY 6 HOURS PRN
Status: DISCONTINUED | OUTPATIENT
Start: 2021-01-01 | End: 2021-01-01 | Stop reason: HOSPADM

## 2021-01-01 RX ORDER — ONDANSETRON 4 MG/1
4 TABLET, ORALLY DISINTEGRATING ORAL EVERY 6 HOURS PRN
Status: DISCONTINUED | OUTPATIENT
Start: 2021-01-01 | End: 2021-01-01 | Stop reason: HOSPADM

## 2021-01-01 RX ORDER — TAMSULOSIN HYDROCHLORIDE 0.4 MG/1
0.8 CAPSULE ORAL DAILY
Qty: 180 CAPSULE | Refills: 3 | Status: SHIPPED | OUTPATIENT
Start: 2021-01-01 | End: 2021-01-01

## 2021-01-01 RX ORDER — LORAZEPAM 0.5 MG/1
0.5 TABLET ORAL EVERY 8 HOURS PRN
Qty: 6 TABLET | Refills: 0 | Status: SHIPPED | OUTPATIENT
Start: 2021-01-01 | End: 2021-01-01

## 2021-01-01 RX ORDER — ISOSORBIDE DINITRATE 10 MG/1
10 TABLET ORAL 3 TIMES DAILY
COMMUNITY
End: 2021-01-01

## 2021-01-01 RX ORDER — LYCOPENE 10 MG
CAPSULE ORAL DAILY
COMMUNITY
End: 2021-01-01

## 2021-01-01 RX ORDER — ONDANSETRON 2 MG/ML
4 INJECTION INTRAMUSCULAR; INTRAVENOUS EVERY 6 HOURS PRN
Status: DISCONTINUED | OUTPATIENT
Start: 2021-01-01 | End: 2021-01-01 | Stop reason: HOSPADM

## 2021-01-01 RX ORDER — MULTIVIT WITH MINERALS/LUTEIN
1000 TABLET ORAL 2 TIMES DAILY
COMMUNITY
End: 2021-01-01

## 2021-01-01 RX ORDER — ACETAMINOPHEN 650 MG/1
650 SUPPOSITORY RECTAL EVERY 4 HOURS PRN
Status: DISCONTINUED | OUTPATIENT
Start: 2021-01-01 | End: 2021-01-01 | Stop reason: HOSPADM

## 2021-01-01 RX ORDER — CEPHALEXIN 500 MG/1
500 CAPSULE ORAL 2 TIMES DAILY
Qty: 14 CAPSULE | Refills: 0 | Status: SHIPPED | OUTPATIENT
Start: 2021-01-01 | End: 2021-01-01

## 2021-01-01 RX ORDER — LANOLIN ALCOHOL/MO/W.PET/CERES
1000 CREAM (GRAM) TOPICAL DAILY
COMMUNITY
End: 2021-01-01

## 2021-01-01 RX ORDER — FERROUS SULFATE 324(65)MG
324 TABLET, DELAYED RELEASE (ENTERIC COATED) ORAL 2 TIMES DAILY
COMMUNITY
End: 2021-01-01

## 2021-01-01 RX ORDER — HYDRALAZINE HYDROCHLORIDE 25 MG/1
25 TABLET, FILM COATED ORAL 3 TIMES DAILY
COMMUNITY
End: 2021-01-01

## 2021-01-01 RX ORDER — METOPROLOL SUCCINATE 25 MG/1
12.5 TABLET, EXTENDED RELEASE ORAL DAILY
Qty: 30 TABLET | Refills: 1 | Status: SHIPPED | OUTPATIENT
Start: 2021-01-01 | End: 2021-01-01

## 2021-01-01 RX ORDER — HYDRALAZINE HYDROCHLORIDE 10 MG/1
10 TABLET, FILM COATED ORAL 3 TIMES DAILY
COMMUNITY
End: 2021-01-01

## 2021-01-01 RX ORDER — ACETAMINOPHEN 325 MG/1
650 TABLET ORAL EVERY 4 HOURS PRN
Status: DISCONTINUED | OUTPATIENT
Start: 2021-01-01 | End: 2021-01-01 | Stop reason: HOSPADM

## 2021-01-01 RX ORDER — WARFARIN SODIUM 1 MG/1
TABLET ORAL DAILY
COMMUNITY
End: 2021-01-01

## 2021-01-01 RX ORDER — FUROSEMIDE 20 MG
20 TABLET ORAL DAILY
COMMUNITY
End: 2021-01-01

## 2021-01-01 RX ORDER — FUROSEMIDE 10 MG/ML
40 INJECTION INTRAMUSCULAR; INTRAVENOUS ONCE
Status: COMPLETED | OUTPATIENT
Start: 2021-01-01 | End: 2021-01-01

## 2021-01-01 RX ORDER — ZINC GLUCONATE 50 MG
50 TABLET ORAL DAILY
COMMUNITY
End: 2021-01-01

## 2021-01-01 RX ORDER — TORSEMIDE 10 MG/1
10 TABLET ORAL 2 TIMES DAILY
Qty: 60 TABLET | Refills: 1 | Status: SHIPPED | OUTPATIENT
Start: 2021-01-01 | End: 2021-01-01

## 2021-01-01 RX ORDER — TORSEMIDE 10 MG/1
TABLET ORAL
Qty: 60 TABLET | Refills: 1 | Status: SHIPPED | OUTPATIENT
Start: 2021-01-01 | End: 2021-01-01

## 2021-01-01 RX ORDER — TORSEMIDE 10 MG/1
40 TABLET ORAL DAILY
COMMUNITY
End: 2021-01-01

## 2021-01-01 RX ORDER — LORAZEPAM 2 MG/ML
0.5 CONCENTRATE ORAL EVERY 4 HOURS PRN
Qty: 30 ML | Refills: 0 | Status: SHIPPED | OUTPATIENT
Start: 2021-01-01

## 2021-01-01 RX ORDER — HYDRALAZINE HYDROCHLORIDE 20 MG/ML
10 INJECTION INTRAMUSCULAR; INTRAVENOUS EVERY 6 HOURS PRN
Status: CANCELLED | OUTPATIENT
Start: 2021-01-01

## 2021-01-01 RX ORDER — BENZONATATE 100 MG/1
100 CAPSULE ORAL 3 TIMES DAILY PRN
Status: DISCONTINUED | OUTPATIENT
Start: 2021-01-01 | End: 2021-01-01 | Stop reason: HOSPADM

## 2021-01-01 RX ORDER — TAMSULOSIN HYDROCHLORIDE 0.4 MG/1
0.4 CAPSULE ORAL AT BEDTIME
Status: DISCONTINUED | OUTPATIENT
Start: 2021-01-01 | End: 2021-01-01 | Stop reason: HOSPADM

## 2021-01-01 RX ORDER — LORAZEPAM 0.5 MG/1
0.5 TABLET ORAL EVERY 8 HOURS PRN
Qty: 20 TABLET | Refills: 0 | Status: ON HOLD | OUTPATIENT
Start: 2021-01-01 | End: 2021-01-01

## 2021-01-01 RX ORDER — ACETAMINOPHEN 325 MG/1
1000 TABLET ORAL EVERY 8 HOURS PRN
COMMUNITY
Start: 2021-01-01 | End: 2021-01-01

## 2021-01-01 RX ORDER — ACETAMINOPHEN 500 MG
1000 TABLET ORAL EVERY 6 HOURS PRN
COMMUNITY

## 2021-01-01 RX ADMIN — CEPHALEXIN 500 MG: 250 CAPSULE ORAL at 20:33

## 2021-01-01 RX ADMIN — CEPHALEXIN 500 MG: 250 CAPSULE ORAL at 08:47

## 2021-01-01 RX ADMIN — ACETAMINOPHEN 650 MG: 325 TABLET, FILM COATED ORAL at 20:33

## 2021-01-01 RX ADMIN — TAMSULOSIN HYDROCHLORIDE 0.4 MG: 0.4 CAPSULE ORAL at 20:33

## 2021-01-01 RX ADMIN — FUROSEMIDE 40 MG: 10 INJECTION, SOLUTION INTRAVENOUS at 07:54

## 2021-01-01 RX ADMIN — Medication 1 MG: at 20:33

## 2021-01-01 RX ADMIN — ALBUTEROL SULFATE 2.5 MG: 2.5 SOLUTION RESPIRATORY (INHALATION) at 22:29

## 2021-01-01 RX ADMIN — BENZONATATE 100 MG: 100 CAPSULE ORAL at 22:28

## 2021-01-01 ASSESSMENT — ACTIVITIES OF DAILY LIVING (ADL)
WALKING_OR_CLIMBING_STAIRS: STAIR CLIMBING DIFFICULTY, REQUIRES EQUIPMENT
WEAR_GLASSES_OR_BLIND: YES
CONCENTRATING,_REMEMBERING_OR_MAKING_DECISIONS_DIFFICULTY: NO
WHICH_OF_THE_ABOVE_FUNCTIONAL_RISKS_HAD_A_RECENT_ONSET_OR_CHANGE?: TOILETING
DIFFICULTY_EATING/SWALLOWING: NO
DOING_ERRANDS_INDEPENDENTLY_DIFFICULTY: NO
DIFFICULTY_COMMUNICATING: NO
DRESSING/BATHING_DIFFICULTY: NO
EQUIPMENT_CURRENTLY_USED_AT_HOME: CANE, STRAIGHT;WALKER, STANDARD
FALL_HISTORY_WITHIN_LAST_SIX_MONTHS: YES
NUMBER_OF_TIMES_PATIENT_HAS_FALLEN_WITHIN_LAST_SIX_MONTHS: 1
HEARING_DIFFICULTY_OR_DEAF: NO
WALKING_OR_CLIMBING_STAIRS_DIFFICULTY: YES

## 2021-01-01 ASSESSMENT — PAIN SCALES - GENERAL
PAINLEVEL: NO PAIN (0)

## 2021-01-01 ASSESSMENT — MIFFLIN-ST. JEOR
SCORE: 1300.37
SCORE: 1318.51
SCORE: 1318.51
SCORE: 1463.63

## 2021-01-12 NOTE — PROGRESS NOTES
Anticoagulation Management    Unable to reach pt today.    Today's INR result of 3.9 is supratherapeutic (goal INR of 2.0-3.0).  Result received from: Clinic Lab    Follow up required to confirm warfarin dose taken and assess for changes    VM left to call [facility] back to discuss INR. Will need to tray again later .       Anticoagulation clinic to follow up    Lakeisha Rodriguez RN

## 2021-01-12 NOTE — PROGRESS NOTES
ANTICOAGULATION MANAGEMENT     Patient Name:  Lori Mehta  Date:  1/12/2021    ASSESSMENT /SUBJECTIVE:    Today's INR result of 3.9 is supratherapeutic. Goal INR of 2.0-3.0      Warfarin dose taken: Warfarin taken as instructed    Diet: No new diet changes affecting INR    Medication changes/ interactions: Interaction between pumpkin seed oil started on about 3 weeks ago and warfarin may be affecting INR    Previous INR: Therapeutic     S/S of bleeding or thromboembolism: No    New injury or illness: No    Upcoming surgery, procedure or cardioversion: No    Additional findings: None      PLAN:    Telephone call with  wifeFatoumata regarding INR result and instructed:     Warfarin Dosing Instructions: 2.5 mg then change your warfarin dose to 7.5 mg Mon and 5 mg ROW  . (6.3 % change)    Instructed patient to follow up no later than: 9 days  Lab visit scheduled    Education provided: Potential interaction between warfarin and pumpkin seed oil (per [employer])      Fatoumata verbalizes understanding and agrees to warfarin dosing plan.    Instructed to call the Anticoagulation Clinic for any changes, questions or concerns. (#[phone])        Lakeisha Rodriguez RN      OBJECTIVE:  Recent labs: (last 7 days)     01/12/21  0926   INR 3.9*         INR assessment SUPRA    Recheck INR In: 9 DAYS    INR Location Outside lab      Anticoagulation Summary  As of 1/12/2021    INR goal:  2.0-3.0   TTR:  76.0 % (1 y)   INR used for dosing:  3.9 (1/12/2021)   Warfarin maintenance plan:  7.5 mg (5 mg x 1.5) every Mon; 5 mg (5 mg x 1) all other days   Full warfarin instructions:  1/13: 2.5 mg; Otherwise 7.5 mg every Mon; 5 mg all other days   Weekly warfarin total:  37.5 mg   Plan last modified:  Lakeisha Rodriguez RN (1/12/2021)   Next INR check:  1/21/2021   Priority:  Maintenance   Target end date:  Indefinite    Indications    Chronic atrial fibrillation (HCC) [I48.20]  Long-term (current) use of anticoagulants [Z79.01]  [Z79.01]             Anticoagulation Episode Summary     INR check location:      Preferred lab:      Send INR reminders to:  [facility]    Comments:  5 mg tablets, no bandaid, takes at noon, likes BP done.       Anticoagulation Care Providers     Provider Role Specialty Phone number    Wale Short MD Referring Internal Medicine [phone]

## 2021-01-17 NOTE — ED PROVIDER NOTES
History     Chief Complaint   Patient presents with     Urinary Retention     HPI  Lori Mehta is a 89 year old male who presents via EMS with concerns of being unable to urinate since bedtime last night.  Patient feels like he should urinate, he has no urgency or pressure like he has to go.  He is try to go a couple of times but was unable to go.  Patient has a known history of chronic kidney disease and is actually supposed to be seeing a nephrologist tomorrow.  He does feel like he could not wait so he decided to come in by ambulance to get faster care.  Patient denies any new shortness of breath but he does have chronic shortness of breath.  Patient denies any nausea or vomiting.  Patient has been eating and drinking normally.  Patient states he has not slept in the last 2 days, he used to be taking Remeron but he stopped this because he felt like it made his nerves worse.  He had been tried on Xanax in the past but he felt like that made him way too sleepy.    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     Priority: Medium     Rotator cuff arthropathy of right shoulder 04/17/2019     Priority: Medium     Cerebrovascular accident (CVA), unspecified mechanism (H) 07/19/2016     Priority: Medium     Essential hypertension with goal blood pressure less than 140/90 07/19/2016     Priority: Medium     Diplopia 07/11/2016     Priority: Medium     Gout 07/11/2016     Priority: Medium     Long-term (current) use of anticoagulants [Z79.01] 04/08/2016     Priority: Medium     Chronic atrial fibrillation (HCC) 12/17/2015     Priority: Medium     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 Previously signed by patient and notary on 3/1/1995.  scanned and placed behind media tab on 9/14/11.  Please see advance directive for specifics...Terra Tobin RN         HYPERLIPIDEMIA LDL GOAL <100 10/31/2010     Priority: Medium     Anxiety 12/14/2009     Priority: Medium     Hypertrophy of prostate without urinary obstruction 05/07/2004     Priority: Medium     Problem list name updated by automated process. Provider to review       Esophageal reflux 05/07/2004     Priority: Medium        Past Medical History:    Past Medical History:   Diagnosis Date     Anxiety      Atrial fibrillation (H) 1/9/2009     BPH      GERD (gastroesophageal reflux disease)      Pure hypercholesterolemia      Unspecified essential hypertension        Past Surgical History:    Past Surgical History:   Procedure Laterality Date     ARTHROPLASTY HIP ANTERIOR  11/4/2013    Procedure: ARTHROPLASTY HIP ANTERIOR;  Right Total Hip Arthroplasty - anterior approach;  Surgeon: Mathew Osorio MD;  Location: [facility]     CL AFF SURGICAL PATHOLOGY      nasal polyp removal     COLONOSCOPY  6/16/10     HC REMOVAL GALLBLADDER      Cholecystectomy     HC REMOVAL OF HYDROCELE,TUNICA,UNILAT       HC REPAIR ING HERNIA,5+Y/O,REDUCIBL       HC UGI ENDOSCOPY, SIMPLE EXAM  6/16/10     LAPAROSCOPIC HERNIORRHAPHY PREPERITONEAL  9/19/2012    Procedure: LAPAROSCOPIC HERNIORRHAPHY PREPERITONEAL;  Laparoscopic Preperitoneal Left Inguinal Hernia Repair;  Surgeon: Errol Navas,  MD;  Location: [facility]     LASER YAG CAPSULOTOMY Left 4/6/2017    Procedure: LASER YAG CAPSULOTOMY;  Surgeon: Mathew Croft MD;  Location: [facility]     OPEN REDUCTION INTERNAL FIXATION FOREARM Left 12/29/2014    Procedure: OPEN REDUCTION INTERNAL FIXATION FOREARM;  Surgeon: Mathew Osorio MD;  Location[facility]     PHACOEMULSIFICATION WITH STANDARD INTRAOCULAR LENS IMPLANT  7/19/2012    Procedure: PHACOEMULSIFICATION WITH STANDARD INTRAOCULAR LENS IMPLANT;  PHACOEMULSIFICATION WITH STANDARD INTRAOCULAR LENS IMPLANT LEFT EYE;  Surgeon: Gabriel Ross MD;  [facility]     PHACOEMULSIFICATION WITH STANDARD INTRAOCULAR LENS IMPLANT  8/16/2012    Procedure: PHACOEMULSIFICATION WITH STANDARD INTRAOCULAR LENS IMPLANT;  RIGHT PHACOEMULSIFICATION WITH STANDARD INTRAOCULAR LENS IMPLANT;  Surgeon: Gabriel Ross MD;  Location: [facility]       Family History:    Family History   Problem Relation Age of Onset     Diabetes Brother      C.A.D. Mother      Diabetes Maternal Grandmother      Circulatory Maternal Grandfather         cerebral aneurysm     Hypertension Father      Cerebrovascular Disease Father      Breast Cancer Sister          x 2     Cancer - colorectal No family hx of        Social History:  Marital Status:   [2]  Social History     Tobacco Use     Smoking status: Never Smoker     Smokeless tobacco: Never Used   Substance Use Topics     Alcohol use: Not Currently     Alcohol/week: 1.0 standard drinks     Types: 1 Standard drinks or equivalent per week     Comment: beer once a week     Drug use: No        Medications:         Acetaminophen (TYLENOL PO)       Ascorbic Acid (VITAMIN C) 500 MG CHEW       Bromelains 500 MG TABS       CAPSICUM, CAYENNE, PO       COENZYME Q10       furosemide (LASIX) 20 MG tablet       guaiFENesin-codeine (ROBITUSSIN AC) 100-10 MG/5ML solution       hydrochlorothiazide (HYDRODIURIL) 25 MG tablet       Lutein-Zeaxanthin 25-5 MG CAPS       magnesium oxide 400 MG CAPS        meclizine (ANTIVERT) 25 MG tablet       MELATONIN PO       mirtazapine (REMERON) 15 MG tablet       Multiple Vitamins-Minerals (PRESERVISION AREDS 2 PO)       OMEGA 3 1200 MG OR CAPS       Saw Palmetto, Serenoa repens, (SAW PALMETTO EXTRACT PO)       SELENIUM 200 MCG OR CAPS       simvastatin (ZOCOR) 40 MG tablet       tamsulosin (FLOMAX) 0.4 MG capsule       TURMERIC PO       vitamin B complex with vitamin C (VITAMIN  B COMPLEX) tablet       VITAMIN B12 TR 1000 MCG OR TBCR       vitamin E 400 units TABS       warfarin ANTICOAGULANT (COUMADIN) 5 MG tablet       ZINC 30 MG OR TABS          Review of Systems   All other systems reviewed and are negative.      Physical Exam   BP: (!) 164/132  Pulse: 90  Temp: 97.8  F (36.6  C)  Resp: 18  Weight: 79.4 kg (175 lb)  SpO2: 92 %      Physical Exam  Vitals signs and nursing note reviewed.   Constitutional:       General: He is not in acute distress.     Appearance: He is well-developed. He is not diaphoretic.   HENT:      Head: Normocephalic and atraumatic.   Eyes:      Conjunctiva/sclera: Conjunctivae normal.   Neck:      Musculoskeletal: Normal range of motion.   Cardiovascular:      Rate and Rhythm: Normal rate and regular rhythm.      Heart sounds: Normal heart sounds. No murmur.   Pulmonary:      Effort: Pulmonary effort is normal. No respiratory distress.      Breath sounds: Normal breath sounds. No stridor. No wheezing.   Abdominal:      General: Bowel sounds are normal. There is no distension.      Palpations: Abdomen is soft.      Tenderness: There is no abdominal tenderness. There is no guarding.   Musculoskeletal: Normal range of motion.   Skin:     General: Skin is warm and dry.      Findings: No rash.   Neurological:      Mental Status: He is alert and oriented to person, place, and time.   Psychiatric:         Mood and Affect: Mood is anxious.         Judgment: Judgment normal.      Comments: Patient seems somewhat confused at times, he repeats some of the  same questions again.         ED Course        Procedures      Results for orders placed or performed during the hospital encounter of 01/17/21 (from the past 24 hour(s))   CBC with platelets differential   Result Value Ref Range    WBC 7.3 4.0 - 11.0 10e9/L    RBC Count 3.37 (L) 4.4 - 5.9 10e12/L    Hemoglobin 10.8 (L) 13.3 - 17.7 g/dL    Hematocrit 34.5 (L) 40.0 - 53.0 %     (H) 78 - 100 fl    MCH 32.0 26.5 - 33.0 pg    MCHC 31.3 (L) 31.5 - 36.5 g/dL    RDW 15.3 (H) 10.0 - 15.0 %    Platelet Count 161 150 - 450 10e9/L    Diff Method Automated Method     % Neutrophils 86.9 %    % Lymphocytes 5.4 %    % Monocytes 6.7 %    % Eosinophils 0.1 %    % Basophils 0.5 %    % Immature Granulocytes 0.4 %    Nucleated RBCs 0 0 /100    Absolute Neutrophil 6.3 1.6 - 8.3 10e9/L    Absolute Lymphocytes 0.4 (L) 0.8 - 5.3 10e9/L    Absolute Monocytes 0.5 0.0 - 1.3 10e9/L    Absolute Basophils 0.0 0.0 - 0.2 10e9/L    Abs Immature Granulocytes 0.0 0 - 0.4 10e9/L    Absolute Nucleated RBC 0.0    Basic metabolic panel   Result Value Ref Range    Sodium 142 133 - 144 mmol/L    Potassium 4.4 3.4 - 5.3 mmol/L    Chloride 108 94 - 109 mmol/L    Carbon Dioxide 27 20 - 32 mmol/L    Anion Gap 7 3 - 14 mmol/L    Glucose 132 (H) 70 - 99 mg/dL    Urea Nitrogen 56 (H) 7 - 30 mg/dL    Creatinine 2.02 (H) 0.66 - 1.25 mg/dL    GFR Estimate 28 (L) >60 mL/min/[1.73_m2]    GFR Estimate If Black 32 (L) >60 mL/min/[1.73_m2]    Calcium 9.7 8.5 - 10.1 mg/dL   INR   Result Value Ref Range    INR 3.06 (H) 0.86 - 1.14   UA reflex to Microscopic and Culture    Specimen: Midstream Urine   Result Value Ref Range    Color Urine Yellow     Appearance Urine Cloudy     Glucose Urine Negative NEG^Negative mg/dL    Bilirubin Urine Negative NEG^Negative    Ketones Urine Negative NEG^Negative mg/dL    Specific Gravity Urine 1.021 1.003 - 1.035    Blood Urine Negative NEG^Negative    pH Urine 6.0 5.0 - 7.0 pH    Protein Albumin Urine 100 (A) NEG^Negative mg/dL     Urobilinogen mg/dL 0.0 0.0 - 2.0 mg/dL    Nitrite Urine Negative NEG^Negative    Leukocyte Esterase Urine Large (A) NEG^Negative    Source Midstream Urine     RBC Urine 1 0 - 2 /HPF    WBC Urine 33 (H) 0 - 5 /HPF    Bacteria Urine Many (A) NEG^Negative /HPF    Squamous Epithelial /HPF Urine 20 (H) 0 - 1 /HPF    Mucous Urine Present (A) NEG^Negative /LPF       Medications   furosemide (LASIX) injection 40 mg (has no administration in time range)     Patient was able to go the bathroom here without any difficulty.  We did do a bladder scan when the patient first arrived and there was no signs of urinary retention.  His urine does look a little suspicious for possible infection.  This could explain maybe his change in behavior here recently and trouble sleeping.  We will go ahead and treat with a course of Keflex.  Patient feels that the Remeron that he was put on was actually making his symptoms worse so I will try him on a half a milligram of Ativan to use at night to help him sleep to see if this works better.  He has an appointment with nephrology and cardiology tomorrow which she will keep.  Recommend that he give his primary care doctor a call for follow-up also.    Assessments & Plan (with Medical Decision Making)  Chronic kidney disease, insomnia, anxiety, urinary tract infection     I have reviewed the nursing notes.    I have reviewed the findings, diagnosis, plan and need for follow up with the patient.              1/17/2021   [facility] EMERGENCY DEPT     Fred Mendoza MD  01/17/21 0966

## 2021-01-17 NOTE — DISCHARGE INSTRUCTIONS
1.  Your work-up today was essentially normal.  You are urinating the appropriate amount.  You have kidney disease which will make you urinate a little bit less often.  As long as you are urinating every 8 hours at the minimum, you should be fine.  Do not over drink water.  Make sure you take all of your medications as prescribed, this will help your kidneys work better and continue to be able to urinate.  If you are having trouble with urinary retention, you should have pain, or pressure, or urgency, if you do not have the symptoms, there probably is not a problem.    2.  For your insomnia since you feel like the Remeron is not helping and maybe making things worse, I have prescribed a few pills of lorazepam that you can try.  See how this works and I want you to talk to Dr. Short about a better long-term option to help with your anxiety and insomnia.    3.  It looks like you also may have a urinary tract infection, this could be contributing to your mood changes and your change in urination.  I have started you on an antibiotic called Keflex that you will take twice a day for the next 7 days.    4.  It looks like your Coumadin level as a little bit too high, I would not take today's dose and restart your normal dosing on Monday.

## 2021-01-18 NOTE — TELEPHONE ENCOUNTER
ANTICOAGULATION  MANAGEMENT: Discharge Review    Lori Mehta chart reviewed for anticoagulation continuity of care    Emergency room visit on 1/17 for CKD.    Discharge disposition: Home    Results:    Recent labs: (last 7 days)     01/12/21  0926 01/17/21  0750   INR 3.9* 3.06*     Anticoagulation inpatient management:     held warfarin due to elevated. Per wife Fatoumata pt was advised to hold Sunday 1/17     Anticoagulation discharge instructions:     Warfarin dosing: hold 1/17   Bridging: No   INR goal change: No      Medication changes affecting anticoagulation: Yes: Keflex 1/17     Additional factors affecting anticoagulation: No    Plan     Agree with dosing adjustment on discharge - hold Sun 1/17 and 5 mg all other days    Spoke with kelsey Ham    Anticoagulation Calendar updated    Lakeisha Rodriguez RN

## 2021-01-20 NOTE — RESULT ENCOUNTER NOTE
Final Urine Culture Report on 1/20/21  Emergency Dept/Urgent Care discharge antibiotic prescribed: Cephalexin (Keflex) 500 mg capsule, 1 capsule (500 mg) by mouth 2 times daily for 7 days.  #1. Bacteria, 50,000 - 100,000 colonies/mL Staphylococcus aureus, is SUSCEPTIBLE to Antibiotic.    As per [facility] ED Lab Result protocol, no change in antibiotic therapy.

## 2021-01-21 NOTE — PROGRESS NOTES
ANTICOAGULATION MANAGEMENT     Patient Name:  Lori Mehta  Date:  2021    ASSESSMENT /SUBJECTIVE:    Today's INR result of 1.6 is subtherapeutic. Goal INR of 2.0-3.0      Warfarin dose taken: Less warfarin taken than planned which may be affecting INR    Diet: No new diet changes affecting INR    Medication changes/ interactions: Still on Keflex so will continue to keep a close eye on his INR    Previous INR: Therapeutic     S/S of bleeding or thromboembolism: No    New injury or illness: No    Upcoming surgery, procedure or cardioversion: No    Additional findings: None      PLAN:    Telephone call with  Spouse - Fatoumata regarding INR result and instructed:     Warfarin Dosing Instructions: Continue your current warfarin dose 7.5 mg Mon and 5 mg all other days    Instructed patient to follow up no later than: 6 days  Lab visit scheduled    Education provided: Importance of therapeutic range, Importance of following up for INR monitoring at instructed interval and Potential interaction between warfarin and Keflex      Fatoumata verbalizes understanding and agrees to warfarin dosing plan.    Instructed to call the Anticoagulation Clinic for any changes, questions or concerns. (#[phone])        Azalia Stallings RN      OBJECTIVE:  Recent labs: (last 7 days)     21  0750 21  0928   INR 3.06* 1.6*         INR assessment SUB    Recheck INR In: 6 DAYS    INR Location Outside lab      Anticoagulation Summary  As of 2021    INR goal:  2.0-3.0   TTR:  74.3 % (1 y)   INR used for dosin.6 (2021)   Warfarin maintenance plan:  7.5 mg (5 mg x 1.5) every Mon; 5 mg (5 mg x 1) all other days   Full warfarin instructions:  7.5 mg every Mon; 5 mg all other days   Weekly warfarin total:  37.5 mg   No change documented:  Azalia Stallings RN   Plan last modified:  Lakeisha Rodriguez RN (2021)   Next INR check:  2021   Priority:  Maintenance   Target end date:  Indefinite    Indications     Chronic atrial fibrillation (HCC) [I48.20]  Long-term (current) use of anticoagulants [Z79.01] [Z79.01]             Anticoagulation Episode Summary     INR check location:      Preferred lab:      Send INR reminders to:  [facility] [place]    Comments:  5 mg tablets, no bandaid, takes at noon, likes BP done.       Anticoagulation Care Providers     Provider Role Specialty Phone number    Wale Short MD Referring Internal Medicine [phone]

## 2021-01-21 NOTE — PROGRESS NOTES
Patient is here with his wife for lab appointment and had some questions they wanted to discuss.    Wife had questions about getting Ativan as this seems to be the only thing that has helped him sleep the last few days.  They are informed this is at their pharmacy.  She wanted to know when he will start to feel better.  She states he has taken 3 days of his Keflex so far and vomited today after taking his morning pills.  She is not sure if he vomited up the Keflex this morning.  She is informed to just give him the afternoon pill not take another one this morning.  She is informed that it may take another day or two for him to start feeling better.      Wife would really like for patient to see PCP to figure out next steps in his care.  She is informed he will need to see the labs from today and a message will be sent to PCP to see when patient can be see for further evaluation.  Closing this encounter.  Taylor Marte, ALEJANDRAN, RN

## 2021-01-21 NOTE — TELEPHONE ENCOUNTER
Consent to communicate on file with wife. Attempted to contact patient and wife but no VM will try again later.  Carmen Roca MA 1/21/2021

## 2021-01-21 NOTE — TELEPHONE ENCOUNTER
Spouse calling and requesting to change patients lab appointment on Wednesday 1/27 to Monday, 1/25 after his appointment with Dr. Short. Appointment for lab has been changed to Monday 1/25 at 11:45 am after he see's Dr. Short at 11:00 am. Spouse also asked that we notify the Coumadin clinic of the change in the appointment. Lissa Little LPN

## 2021-01-21 NOTE — PROGRESS NOTES
Anticoagulation Management    Unable to reach Lori today.    Today's INR result of 1.6 is subtherapeutic (goal INR of 2.0-3.0).  Result received from: Clinic Lab    Follow up required to confirm warfarin dose taken and assess for changes    Left message to call [facility] back to discuss.       Anticoagulation clinic to follow up    Azalia Stallings RN

## 2021-01-21 NOTE — TELEPHONE ENCOUNTER
Patients wife called and she said he had an INR done today and wants to know what the next steps are.

## 2021-01-21 NOTE — TELEPHONE ENCOUNTER
Wife would really like for patient to see PCP to figure out next steps in his care.  She is informed he will need to see the labs from today and a message will be sent to PCP to see when patient can be see for further evaluation.    ALEJANDRA AyonN, RN

## 2021-01-23 PROBLEM — S00.03XA CONTUSION OF FACE, SCALP AND NECK, INITIAL ENCOUNTER: Status: ACTIVE | Noted: 2021-01-01

## 2021-01-23 PROBLEM — S00.83XA CONTUSION OF FACE, SCALP AND NECK, INITIAL ENCOUNTER: Status: ACTIVE | Noted: 2021-01-01

## 2021-01-23 PROBLEM — Z86.73 HISTORY OF CVA (CEREBROVASCULAR ACCIDENT): Status: ACTIVE | Noted: 2021-01-01

## 2021-01-23 PROBLEM — M62.81 GENERALIZED MUSCLE WEAKNESS: Status: ACTIVE | Noted: 2021-01-01

## 2021-01-23 PROBLEM — I42.0 DILATED CARDIOMYOPATHY (H): Status: ACTIVE | Noted: 2021-01-01
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
## 2021-01-23 PROBLEM — S10.93XA CONTUSION OF FACE, SCALP AND NECK, INITIAL ENCOUNTER: Status: ACTIVE | Noted: 2021-01-01

## 2021-01-23 PROBLEM — I45.2 BIFASCICULAR BLOCK: Status: ACTIVE | Noted: 2021-01-01

## 2021-01-23 PROBLEM — I35.0 NONRHEUMATIC AORTIC VALVE STENOSIS: Status: ACTIVE | Noted: 2021-01-01

## 2021-01-23 NOTE — ED NOTES
Called wife to give update and reviewed patients medications with wife. Asked wife multiple times if he takes any other medication and she said no.

## 2021-01-23 NOTE — ED TRIAGE NOTES
Brought in by EMS for increased weakness after falling yesterday. Comes in with ecchymosis around his left eye and a lump to the left side of his forehead.

## 2021-01-23 NOTE — H&P
Formerly [facility]    History and Physical - Hospitalist Service       Date of Admission:  1/23/2021    Assessment & Plan   Lori Mehta is a 89 year old male with a history of CHF with known EF of 35-40% and severe AST, stage III CKD, prior CVA, atrial fibrillation on chronic warfarin, bifascicular block, GERD, and hyperlipidemia who presented to the ED on 1/23/2021 after a fall at home. Patient notes the day prior to admission, he was sitting on his bed bending at the waist to put on his socks when he fell forward off of the bed inuring the left side of his face. He reportedly hit the left side of his face on a dresser drawer knob. He did sustain a head injury and did not lose consciousness. No bleeding/lacerations. Bruising is present surrounding the left eye. Patient denies any dizziness or lightheadedness prior to incident. He typically uses a walker to get around, but says for the past few days he has not been up and moving around because he has been getting increasingly more weak. He denies shortness of breath, neck pain, chest pain, abdominal pain, hip pain, back pain or a headache. No recent fevers. Of note, patient was seen in the ED 1/17 as he was having difficulty urinating and was started on Keflex for UTI which he is still completing. He was also started on low dose lorazepam to help with sleep at night. Patient also currently following with cardiology for heart failure and severe aortic stenosis with consideration of possible TAVR in the near future. In the ED, patient is afebrile and vitally stable although blood pressure is elevated. Lab work showed chronic kidney disease with creatinine of 1.93 which is lower than he has been recently. UA appeared improved compared to 1/17. Head CT negative for acute pathology. EKG showed chronic atrial fibrillation.  Patient will be admitted to observation status for serial neurologic checks, repeat head CT tomorrow, and PT evaluation.  Patient currently denies pain. Denies shortness of breath and patient is maintaining oxygen saturations above 90% on room air.  Denies nausea and states he has been eating and drinking well. Denies difficulty with urination or bowel movements. No focal neurologic deficits present on exam.    Principal Problem:    Fall, initial encounter  Active Problems:    Hyperlipidemia LDL goal <100    CKD (chronic kidney disease) stage 3, GFR 30-59 ml/min    Chronic atrial fibrillation (HCC)    Long-term (current) use of anticoagulants [Z79.01]    Essential hypertension with goal blood pressure less than 140/90    Generalized muscle weakness    Nonrheumatic aortic valve stenosis    History of CVA (cerebrovascular accident)    Bifascicular block    Plan:   - Will admit patient to observation status  - Will hold warfarin in the setting of head trauma and concern for intracranial bleed  - Will continue prior to admission dose of Keflex with apparent stop date of 1/24  - Will check neurologic function every four hours  - Repeat head CT w/o contrast tomorrow morning to ensure stability  - Requested PT evaluation to assess mobility and ability to return home to independent living  - Not currently on antihypertensive medication-following closely with cardiology as outpatient  - Given severe aortic stenosis, will not add antihypertensive at this time  - Will not reorder home dose of lorazepam as benzodiazepines may predispose elderly patients to falls  - Patient has follow up appointment with PCP scheduled Monday 1/25  - Tylenol as needed for pain  - Patient's wife, Fatoumata, updated regarding plan of care       Diet:   2 gm sodium restricted  DVT Prophylaxis: observation patient/warfarin  Bowens Catheter: not present  Code Status:   FULL          Disposition Plan   Expected discharge: Tomorrow, recommended to prior living arrangement once patient's neurologic status stable, medically stable for hospital discharge.  Entered: Asaf Garcia NP  01/23/2021, 1:26 PM     The patient's care was discussed with the Attending Physician, Dr. Evans, Patient and Patient's wife, Fatoumata    Asaf Garcia NP  [facility]  Contact information available via [facility] Paging/Directory      ______________________________________________________________________    Chief Complaint   Fall    History is obtained from the patient    History of Present Illness   Lori Mehta is a 89 year old male with a history of CHF with known EF of 35-40% and severe AST, stage III CKD, prior CVA, atrial fibrillation on chronic warfarin, bifascicular block, GERD, and hyperlipidemia who presented to the ED on 1/23/2021 after a fall at home. Patient notes the day prior to admission, he was sitting on his bed bending at the waist to put on his socks when he fell forward off of the bed inuring the left side of his face. He reportedly hit the left side of his face on a dresser drawer knob. He did sustain a head injury and did not lose consciousness. No bleeding/lacerations. Bruising is present surrounding the left eye. Patient denies any dizziness or lightheadedness prior to incident. He typically uses a walker to get around, but says for the past few days he has not been up and moving around because he has been getting increasingly more weak. He denies shortness of breath, neck pain, chest pain, abdominal pain, hip pain, back pain or a headache. No recent fevers. Of note, patient was seen in the ED 1/17 as he was having difficulty urinating and was started on Keflex for UTI which he is still completing. He was also started on low dose lorazepam to help with sleep at night. Patient also currently following with cardiology for heart failure and severe aortic stenosis with consideration of possible TAVR in the near future. In the ED, patient is afebrile and vitally stable although blood pressure is elevated. Lab work showed chronic kidney disease with creatinine of 1.93  which is lower than he has been recently. UA appeared improved compared to 1/17. Head CT negative for acute pathology. EKG showed chronic atrial fibrillation.  Patient will be admitted to observation status for serial neurologic checks, repeat head CT tomorrow, and PT evaluation.    Review of Systems    The 10 point Review of Systems is negative other than noted in the HPI or here.     Past Medical History    I have reviewed this patient's medical history and updated it with pertinent information if needed.   Past Medical History:   Diagnosis Date     Anxiety      Atrial fibrillation (H) 1/9/2009     BPH      GERD (gastroesophageal reflux disease)      Pure hypercholesterolemia      Unspecified essential hypertension        Past Surgical History   I have reviewed this patient's surgical history and updated it with pertinent information if needed.  Past Surgical History:   Procedure Laterality Date     ARTHROPLASTY HIP ANTERIOR  11/4/2013    Procedure: ARTHROPLASTY HIP ANTERIOR;  Right Total Hip Arthroplasty - anterior approach;  Surgeon: Mathew Osorio MD;  Location: [facility] OR     CL AFF SURGICAL PATHOLOGY      nasal polyp removal     COLONOSCOPY  6/16/10     HC REMOVAL GALLBLADDER      Cholecystectomy     HC REMOVAL OF HYDROCELE,TUNICA,UNILAT       HC REPAIR ING HERNIA,5+Y/O,REDUCIBL       LAPAROSCOPIC HERNIORRHAPHY PREPERITONEAL  9/19/2012    Procedure: LAPAROSCOPIC HERNIORRHAPHY PREPERITONEAL;  Laparoscopic Preperitoneal Left Inguinal Hernia Repair;  Surgeon: Errol Navas MD;  Location: [facility] OR     LASER YAG CAPSULOTOMY Left 4/6/2017    Procedure: LASER YAG CAPSULOTOMY;  Surgeon: Mathew Croft MD;  Location: [facility] OR     OPEN REDUCTION INTERNAL FIXATION FOREARM Left 12/29/2014    Procedure: OPEN REDUCTION INTERNAL FIXATION FOREARM;  Surgeon: Mathew Osorio MD;  Location: [facility] OR     PHACOEMULSIFICATION WITH STANDARD INTRAOCULAR LENS IMPLANT  7/19/2012    Procedure: PHACOEMULSIFICATION WITH  STANDARD INTRAOCULAR LENS IMPLANT;  PHACOEMULSIFICATION WITH STANDARD INTRAOCULAR LENS IMPLANT LEFT EYE;  Surgeon: Gabriel Ross MD;  Location: [facility] OR     PHACOEMULSIFICATION WITH STANDARD INTRAOCULAR LENS IMPLANT  2012    Procedure: PHACOEMULSIFICATION WITH STANDARD INTRAOCULAR LENS IMPLANT;  RIGHT PHACOEMULSIFICATION WITH STANDARD INTRAOCULAR LENS IMPLANT;  Surgeon: Gabriel Ross MD;  Location:  OR     [facility] UGI ENDOSCOPY, SIMPLE EXAM  6/16/10       Social History   I have reviewed this patient's social history and updated it with pertinent information if needed.  Social History     Tobacco Use     Smoking status: Never Smoker     Smokeless tobacco: Never Used   Substance Use Topics     Alcohol use: Not Currently     Alcohol/week: 1.0 standard drinks     Types: 1 Standard drinks or equivalent per week     Comment: beer once a week     Drug use: No       Family History   I have reviewed this patient's family history and updated it with pertinent information if needed.  Family History   Problem Relation Age of Onset     Diabetes Brother      C.A.D. Mother      Diabetes Maternal Grandmother      Circulatory Maternal Grandfather         cerebral aneurysm     Hypertension Father      Cerebrovascular Disease Father      Breast Cancer Sister          x 2     Cancer - colorectal No family hx of        Prior to Admission Medications   Prior to Admission Medications   Prescriptions Last Dose Informant Patient Reported? Taking?   Acetaminophen (TYLENOL PO)   Yes No   Sig: Take 1,000 mg by mouth   Ascorbic Acid (VITAMIN C) 500 MG CHEW   Yes No   Sig: Take  by mouth. 1-3 daily   Bromelains 500 MG TABS   Yes No   Sig: Take 3 tablets by mouth. 3-4 daily   CAPSICUM, CAYENNE, PO   Yes No   Sig: Take 450 mg by mouth daily   COENZYME Q10   Yes No   Si DAILY   LORazepam (ATIVAN) 0.5 MG tablet 2021 at bedtme  No Yes   Sig: Take 1 tablet (0.5 mg) by mouth every 8 hours as needed for anxiety or sleep    Lutein-Zeaxanthin 25-5 MG CAPS 2021 at 1200  Yes Yes   Sig: Take by mouth daily   MELATONIN PO 2021 at bedtme  Yes Yes   Sig: Take 10 mg by mouth At Bedtime   Multiple Vitamins-Minerals (PRESERVISION AREDS 2 PO)   Yes No   Sig: Take by mouth 2 times daily   OMEGA 3 1200 MG OR CAPS   Yes No   Si CAP DAILY   SELENIUM 200 MCG OR CAPS   Yes No   Si CAPSULE DAILY   Saw Palmetto, Serenoa repens, (SAW PALMETTO EXTRACT PO)   Yes No   Sig: Take 1,000 mg by mouth   TURMERIC PO   Yes No   Sig: Take by mouth daily   VITAMIN B12 TR 1000 MCG OR TBCR   Yes No   Si TAB DAILY   ZINC 30 MG OR TABS   Yes No   Si TABLET DAILY   cephALEXin (KEFLEX) 500 MG capsule   No No   Sig: Take 1 capsule (500 mg) by mouth 2 times daily for 7 days   furosemide (LASIX) 20 MG tablet   No No   Sig: Take 1 tablet (20 mg) by mouth daily   Patient not taking: Reported on 2020   guaiFENesin-codeine (ROBITUSSIN AC) 100-10 MG/5ML solution   No No   Sig: Take 5 mLs by mouth every 4 hours as needed for cough   Patient not taking: Reported on 2020   hydrochlorothiazide (HYDRODIURIL) 25 MG tablet   No No   Sig: Take 1 tablet (25 mg) by mouth daily   magnesium oxide 400 MG CAPS   Yes No   Sig: Take by mouth daily   meclizine (ANTIVERT) 25 MG tablet   Yes No   Sig: Take 25 mg by mouth as needed for dizziness   mirtazapine (REMERON) 15 MG tablet   No No   Sig: Take 1 tablet (15 mg) by mouth At Bedtime   simvastatin (ZOCOR) 40 MG tablet 2021 at bedtime  No Yes   Sig: TAKE ONE TABLET BY MOUTH AT BEDTIME   tamsulosin (FLOMAX) 0.4 MG capsule 2021 at bedtime  No Yes   Sig: Take 1 capsule (0.4 mg) by mouth daily   vitamin B complex with vitamin C (VITAMIN  B COMPLEX) tablet   Yes No   Sig: Take 1 tablet by mouth daily   vitamin E 400 units TABS   Yes No   Sig: Take 400 Units by mouth daily   warfarin ANTICOAGULANT (COUMADIN) 5 MG tablet 2021 at Unknown time  No Yes   Sig: Take 7.5 mg Mon, Fri and 5 mg all other days  or as directed by the coumadin clinic.   Patient taking differently: Take 7.5 mg Mon, Fri and 5 mg all other days or as directed by the coumadin clinic.  Takes only 7.5 mg on Mondays.      Facility-Administered Medications: None     Allergies   Allergies   Allergen Reactions     Paroxetine      Other reaction(s): Intolerance     Terfenadine      Other reaction(s): Other  Unable to void       Physical Exam   Vital Signs: Temp: 97.8  F (36.6  C) Temp src: Oral BP: (!) 160/106 Pulse: 86   Resp: 20 SpO2: 90 % O2 Device: None (Room air)    Weight: 194 lbs 14.4 oz    Constitutional: awake, alert, cooperative, no apparent distress, and appears stated age  Eyes: Lids and lashes normal, pupils equal, round and reactive to light, extra ocular muscles intact, sclera clear, conjunctiva normal  ENT: normocepalic, left periorbital contusion noted  Respiratory: No increased work of breathing, good air exchange, clear to auscultation bilaterally, no crackles or wheezing  Cardiovascular: irregularly irregular rhythm  GI: normal bowel sounds, non-distended and non-tender  Skin: normal skin color, texture, turgor  Musculoskeletal: 1-2+ lower extremity edema noted; 5/5 strength in all extremities  Neurologic: Awake, alert, oriented to name, place and time; chronic dysarthria noted from prior CVA; mild confusion noted with patient repeating same question multiple times-baseline per wife; cranial nerves II-XII are grossly intact; Cerebellar finger to nose, heel to shin intact.  Sensory is intact.     Data   Data reviewed today: I reviewed all medications, new labs and imaging results over the last 24 hours.    Recent Labs   Lab 01/23/21  0950 01/21/21  0928 01/17/21  0750   WBC 7.3  --  7.3   HGB 10.7*  --  10.8*   *  --  102*     --  161   INR 2.11* 1.6* 3.06*     --  142   POTASSIUM 4.1 4.0 4.4   CHLORIDE 103  --  108   CO2 27  --  27   BUN 62*  --  56*   CR 1.93* 2.14* 2.02*   ANIONGAP 6  --  7   DELORIS 9.5  --  9.7    *  --  132*   ALBUMIN 3.3*  --   --    PROTTOTAL 7.4  --   --    BILITOTAL 0.8  --   --    ALKPHOS 83  --   --    ALT 30  --   --    AST 31  --   --      Recent Results (from the past 24 hour(s))   CT Head w/o Contrast    Narrative    CT SCAN OF THE HEAD WITHOUT CONTRAST   1/23/2021 11:09 AM     HISTORY: Head trauma, moderate-severe.    TECHNIQUE:  Axial images of the head and coronal reformations without  IV contrast material. Radiation dose for this scan was reduced using  automated exposure control, adjustment of the mA and/or kV according  to patient size, or iterative reconstruction technique.    COMPARISON: Head CT 7/11/2016    FINDINGS:  Moderate volume loss is present. Patchy and confluent white matter  hypoattenuation likely represents advanced chronic small vessel  ischemic change. Parenchyma is otherwise unremarkable. No evidence of  acute ischemia, hemorrhage, mass, mass effect or hydrocephalus. The  visualized calvarium, tympanic cavities, and mastoid cavities are  unremarkable. Mild paranasal sinus mucosal thickening. Nasal septal  defect.      Impression    IMPRESSION:   No acute intracranial abnormality.    MAGGIE PARK MD

## 2021-01-23 NOTE — ED PROVIDER NOTES
"  History     Chief Complaint   Patient presents with     Fall     The history is provided by the patient.     Lori Mehta is a 89 year old male with a history of chronic atrial fibrillation who is on coumadin presents to the er via EMS after falling. Yesterday he was sitting on his bed bending at the waist to put on his socks when he fell forward off of the bed inuring the left side of his face. He reportedly hit the left side of his face on a dresser drawer knob. He did sustain a head injury and did not lose consciousness. No bleeding/lacerations. Bruising is present surrounding the left eye.     Preceding symptoms did not include dizziness, lightheadedness.    Patient lives in his own home. He typically uses a walker to get around, but says for the past few days he has not been up and moving around because he has been getting increasingly more weak. He denies shortness of breath, neck pain, chest pain, abdominal pain, hip pain, back pain or a headache. No recent fevers. He notes starting a \"water pill\" one week ago that improved a lump present to his groin, but did not help otherwise.         Allergies:  Allergies   Allergen Reactions     Paroxetine      Other reaction(s): Intolerance     Terfenadine      Other reaction(s): Other  Unable to void       Problem List:    Patient Active Problem List    Diagnosis Date Noted     Generalized muscle weakness 01/23/2021     Priority: Medium     Contusion of face, scalp and neck, initial encounter 01/23/2021     Priority: Medium     Fall, initial encounter 01/23/2021     Priority: Medium     Nonrheumatic aortic valve stenosis 01/23/2021     Priority: Medium     History of CVA (cerebrovascular accident) 01/23/2021     Priority: Medium     Bifascicular block 01/23/2021     Priority: Medium     Arthritis of shoulder 05/10/2019     Priority: Medium     Rotator cuff arthropathy of right shoulder 04/17/2019     Priority: Medium     Cerebrovascular accident (CVA), " unspecified mechanism (H) 07/19/2016     Priority: Medium     Essential hypertension with goal blood pressure less than 140/90 07/19/2016     Priority: Medium     Diplopia 07/11/2016     Priority: Medium     Gout 07/11/2016     Priority: Medium     Long-term (current) use of anticoagulants [Z79.01] 04/08/2016     Priority: Medium     Chronic atrial fibrillation (HCC) 12/17/2015     Priority: Medium     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 Previously signed by patient and notary on 3/1/1995.  scanned and placed behind media tab on 9/14/11.  Please see advance directive for specifics...Terra Tobin RN         Hyperlipidemia LDL goal <100 10/31/2010     Priority: Medium     Anxiety 12/14/2009     Priority: Medium     Hypertrophy of prostate without urinary obstruction 05/07/2004     Priority: Medium     Problem list name updated by automated process. Provider to review       Esophageal reflux 05/07/2004     Priority: Medium        Past Medical History:    Past Medical History:   Diagnosis Date     Anxiety      Atrial fibrillation (H) 1/9/2009     BPH      GERD (gastroesophageal reflux disease)      Pure hypercholesterolemia      Unspecified essential hypertension        Past Surgical History:    Past Surgical History:   Procedure Laterality Date     ARTHROPLASTY HIP ANTERIOR  11/4/2013    Procedure: ARTHROPLASTY HIP ANTERIOR;   Right Total Hip Arthroplasty - anterior approach;  Surgeon: Mathew Osorio MD;  Location: [facility]     CL AFF SURGICAL PATHOLOGY      nasal polyp removal     COLONOSCOPY  6/16/10     HC REMOVAL GALLBLADDER      Cholecystectomy     HC REMOVAL OF HYDROCELE,TUNICA,UNILAT       HC REPAIR ING HERNIA,5+Y/O,REDUCIBL       LAPAROSCOPIC HERNIORRHAPHY PREPERITONEAL  9/19/2012    Procedure: LAPAROSCOPIC HERNIORRHAPHY PREPERITONEAL;  Laparoscopic Preperitoneal Left Inguinal Hernia Repair;  Surgeon: Errol Navas MD;  Location: [facility]     LASER YAG CAPSULOTOMY Left 4/6/2017    Procedure: LASER YAG CAPSULOTOMY;  Surgeon: Mathew Croft MD;  Location: [facility]     OPEN REDUCTION INTERNAL FIXATION FOREARM Left 12/29/2014    Procedure: OPEN REDUCTION INTERNAL FIXATION FOREARM;  Surgeon: Mathew Osorio MD;  Location: [facility]     PHACOEMULSIFICATION WITH STANDARD INTRAOCULAR LENS IMPLANT  7/19/2012    Procedure: PHACOEMULSIFICATION WITH STANDARD INTRAOCULAR LENS IMPLANT;  PHACOEMULSIFICATION WITH STANDARD INTRAOCULAR LENS IMPLANT LEFT EYE;  Surgeon: Gabriel Ross MD;  Location: [facility]     PHACOEMULSIFICATION WITH STANDARD INTRAOCULAR LENS IMPLANT  8/16/2012    Procedure: PHACOEMULSIFICATION WITH STANDARD INTRAOCULAR LENS IMPLANT;  RIGHT PHACOEMULSIFICATION WITH STANDARD INTRAOCULAR LENS IMPLANT;  Surgeon: Gabriel Ross MD;  Location: [facility]     ZZHC UGI ENDOSCOPY, SIMPLE EXAM  6/16/10       Family History:    Family History   Problem Relation Age of Onset     Diabetes Brother      C.A.D. Mother      Diabetes Maternal Grandmother      Circulatory Maternal Grandfather         cerebral aneurysm     Hypertension Father      Cerebrovascular Disease Father      Breast Cancer Sister          x 2     Cancer - colorectal No family hx of        Social History:  Marital Status:   [2]  Social History     Tobacco Use     Smoking status: Never Smoker     Smokeless tobacco: Never Used   Substance Use Topics     Alcohol  use: Not Currently     Alcohol/week: 1.0 standard drinks     Types: 1 Standard drinks or equivalent per week     Comment: beer once a week     Drug use: No        Medications:         LORazepam (ATIVAN) 0.5 MG tablet       Lutein-Zeaxanthin 25-5 MG CAPS       MELATONIN PO       simvastatin (ZOCOR) 40 MG tablet       tamsulosin (FLOMAX) 0.4 MG capsule       warfarin ANTICOAGULANT (COUMADIN) 5 MG tablet       Acetaminophen (TYLENOL PO)       Ascorbic Acid (VITAMIN C) 500 MG CHEW       Bromelains 500 MG TABS       CAPSICUM, CAYENNE, PO       COENZYME Q10       furosemide (LASIX) 20 MG tablet       guaiFENesin-codeine (ROBITUSSIN AC) 100-10 MG/5ML solution       hydrochlorothiazide (HYDRODIURIL) 25 MG tablet       magnesium oxide 400 MG CAPS       meclizine (ANTIVERT) 25 MG tablet       mirtazapine (REMERON) 15 MG tablet       Multiple Vitamins-Minerals (PRESERVISION AREDS 2 PO)       OMEGA 3 1200 MG OR CAPS       Saw Palmetto, Serenoa repens, (SAW PALMETTO EXTRACT PO)       SELENIUM 200 MCG OR CAPS       TURMERIC PO       vitamin B complex with vitamin C (VITAMIN  B COMPLEX) tablet       VITAMIN B12 TR 1000 MCG OR TBCR       vitamin E 400 units TABS       ZINC 30 MG OR TABS          Review of Systems   All other systems reviewed and are negative.      Physical Exam   BP: (!) 159/80  Pulse: 76  Temp: 97.8  F (36.6  C)  Resp: 24  Weight: 88.4 kg (194 lb 14.4 oz)  SpO2: 92 %      Physical Exam  Vitals signs and nursing note reviewed.   Constitutional:       General: He is not in acute distress.     Appearance: Normal appearance. He is well-developed. He is not diaphoretic.   HENT:      Head: Normocephalic and atraumatic.      Nose: Nose normal.   Eyes:      General: No scleral icterus.     Extraocular Movements: Extraocular movements intact.      Pupils: Pupils are equal, round, and reactive to light.      Comments: Periorbital contusion without tenderness or step off or swelling   Neck:      Musculoskeletal: Normal range  of motion and neck supple.   Cardiovascular:      Rate and Rhythm: Normal rate. Rhythm irregular.   Pulmonary:      Effort: Pulmonary effort is normal.      Breath sounds: Normal breath sounds.   Abdominal:      Tenderness: There is no abdominal tenderness. There is no guarding or rebound.   Musculoskeletal: Normal range of motion.      Right lower leg: Edema present.      Left lower leg: Edema present.   Skin:     General: Skin is warm and dry.      Findings: No rash.   Neurological:      General: No focal deficit present.      Mental Status: He is alert and oriented to person, place, and time.   Psychiatric:         Mood and Affect: Mood normal.         Thought Content: Thought content normal.         ED Course        Procedures               EKG Interpretation:      Interpreted by Freddie Moscoso MD  Time reviewed: 0934  Symptoms at time of EKG: None   Rhythm: atrial fibrillation - controlled  Rate: Normal  Axis: Normal  Ectopy: premature ventricular contractions (unifocal)  Conduction: right bundle branch block (incomplete)  ST Segments/ T Waves: No ST-T wave changes  Q Waves: nonspecific  Comparison to prior: Unchanged    Clinical Impression: atrial fibrillation (chronic)                     Results for orders placed or performed during the hospital encounter of 01/23/21 (from the past 24 hour(s))   CBC with platelets differential   Result Value Ref Range    WBC 7.3 4.0 - 11.0 10e9/L    RBC Count 3.28 (L) 4.4 - 5.9 10e12/L    Hemoglobin 10.7 (L) 13.3 - 17.7 g/dL    Hematocrit 33.3 (L) 40.0 - 53.0 %     (H) 78 - 100 fl    MCH 32.6 26.5 - 33.0 pg    MCHC 32.1 31.5 - 36.5 g/dL    RDW 15.3 (H) 10.0 - 15.0 %    Platelet Count 160 150 - 450 10e9/L    Diff Method Automated Method     % Neutrophils 82.9 %    % Lymphocytes 5.6 %    % Monocytes 10.2 %    % Eosinophils 0.5 %    % Basophils 0.4 %    % Immature Granulocytes 0.4 %    Nucleated RBCs 0 0 /100    Absolute Neutrophil 6.1 1.6 - 8.3 10e9/L    Absolute  Lymphocytes 0.4 (L) 0.8 - 5.3 10e9/L    Absolute Monocytes 0.8 0.0 - 1.3 10e9/L    Absolute Basophils 0.0 0.0 - 0.2 10e9/L    Abs Immature Granulocytes 0.0 0 - 0.4 10e9/L    Absolute Nucleated RBC 0.0    Comprehensive metabolic panel   Result Value Ref Range    Sodium 136 133 - 144 mmol/L    Potassium 4.1 3.4 - 5.3 mmol/L    Chloride 103 94 - 109 mmol/L    Carbon Dioxide 27 20 - 32 mmol/L    Anion Gap 6 3 - 14 mmol/L    Glucose 161 (H) 70 - 99 mg/dL    Urea Nitrogen 62 (H) 7 - 30 mg/dL    Creatinine 1.93 (H) 0.66 - 1.25 mg/dL    GFR Estimate 29 (L) >60 mL/min/[1.73_m2]    GFR Estimate If Black 34 (L) >60 mL/min/[1.73_m2]    Calcium 9.5 8.5 - 10.1 mg/dL    Bilirubin Total 0.8 0.2 - 1.3 mg/dL    Albumin 3.3 (L) 3.4 - 5.0 g/dL    Protein Total 7.4 6.8 - 8.8 g/dL    Alkaline Phosphatase 83 40 - 150 U/L    ALT 30 0 - 70 U/L    AST 31 0 - 45 U/L   INR   Result Value Ref Range    INR 2.11 (H) 0.86 - 1.14   Routine UA with microscopic   Result Value Ref Range    Color Urine Yellow     Appearance Urine Clear     Glucose Urine Negative NEG^Negative mg/dL    Bilirubin Urine Negative NEG^Negative    Ketones Urine Negative NEG^Negative mg/dL    Specific Gravity Urine 1.018 1.003 - 1.035    Blood Urine Negative NEG^Negative    pH Urine 5.0 5.0 - 7.0 pH    Protein Albumin Urine 100 (A) NEG^Negative mg/dL    Urobilinogen mg/dL 0.0 0.0 - 2.0 mg/dL    Nitrite Urine Negative NEG^Negative    Leukocyte Esterase Urine Negative NEG^Negative    Source Midstream Urine     WBC Urine 5 0 - 5 /HPF    RBC Urine 2 0 - 2 /HPF    Mucous Urine Present (A) NEG^Negative /LPF    Hyaline Casts 2 0 - 2 /LPF   CT Head w/o Contrast    Narrative    CT SCAN OF THE HEAD WITHOUT CONTRAST   1/23/2021 11:09 AM     HISTORY: Head trauma, moderate-severe.    TECHNIQUE:  Axial images of the head and coronal reformations without  IV contrast material. Radiation dose for this scan was reduced using  automated exposure control, adjustment of the mA and/or kV  according  to patient size, or iterative reconstruction technique.    COMPARISON: Head CT 7/11/2016    FINDINGS:  Moderate volume loss is present. Patchy and confluent white matter  hypoattenuation likely represents advanced chronic small vessel  ischemic change. Parenchyma is otherwise unremarkable. No evidence of  acute ischemia, hemorrhage, mass, mass effect or hydrocephalus. The  visualized calvarium, tympanic cavities, and mastoid cavities are  unremarkable. Mild paranasal sinus mucosal thickening. Nasal septal  defect.      Impression    IMPRESSION:   No acute intracranial abnormality.    MAGGIE PARK MD   Asymptomatic SARS-CoV-2 COVID-19 Virus (Coronavirus) by PCR    Specimen: Nasopharyngeal   Result Value Ref Range    SARS-CoV-2 Virus Specimen Source Nasopharyngeal     SARS-CoV-2 PCR Result NEGATIVE     SARS-CoV-2 PCR Comment (Note)        Medications - No data to display    Assessments & Plan (with Medical Decision Making)  Lori Mehta is a 89 year old elderly male who presents for evaluation after falling and hitting his head on a dresser drawer one day ago. It is unclear whether the fall was related to a recent UTI diagnosed on 01/17/2021 or the Ativan he was given to help him sleep. Patient is on Keflex for his UTI. Patient is on coumadin for chronic afib. He is afebrile and vitals are stable upon arrival. Physical exam as noted above.  Lab work showed some abnormalities consistent with his underlying chronic kidney disease and chronic afib. Urinalysis showed improvement from previous urinalysis 6 days ago. Head CT was reassuring and negative for acute abnormalities related to his recent fall. EKG showed chronic afib and bundle branch block. See full report above.   I discussed the situation with his wife marilu who said that he has been significantly more weak than usual in the last week, quite anxious and changing him from  remeron to ativan at night she feels has made things worse. She cannot  handle him at home and thinks he cant walk well enough.   D/w Dr. Evans who accepts the patient for admission for observation.      I have reviewed the nursing notes.    I have reviewed the findings, diagnosis, plan and need for follow up with the patient.      New Prescriptions    No medications on file       Final diagnoses:   Fall, initial encounter   Contusion of face, scalp and neck, initial encounter   Generalized muscle weakness       This document serves as a record of services personally performed by Freddie Moscoso MD. It was created on their behalf by Odalys Kramer, a trained medical scribe. The creation of this record is based on the provider's personal observations and the statements of the patient. This document has been checked and approved by the attending provider.    Note: Chart documentation done in part with Dragon Voice Recognition software. Although reviewed after completion, some word and grammatical errors may remain.    1/23/2021   [facility] EMERGENCY DEPT     Freddie Moscoso MD  01/23/21 1209

## 2021-01-24 NOTE — PROVIDER NOTIFICATION
"Texted Provider:   Chest XR is complete. Pt is requesting cough drops. Also seems anxious and a bit restless. He takes Ativan at home per wife it works well but it \"ramps him up during the day\" \" I don't think he should have it\" Do you have recommendations for anxiety?  "

## 2021-01-24 NOTE — PROGRESS NOTES
"Wife was updated via phone.  She brought up a concern regarding patient's new \"anxiety pill\"  She relates that it was started not long ago to help his anxiety and to sleep. According to her it works well for sleep, but \"winds him up all day\" \" I don't think he should have it.\" At this time it is NOT ordered. Writer indicated provider would be updated.  Note placed here and \"MD sticky note placed\"  "

## 2021-01-24 NOTE — PROGRESS NOTES
"S-(situation): shift note     B-(background): fall at home    A-(assessment): vss.  neuros are intact except for garbled rambling speech, no change, speech is logical and pt is oriented x4. Pt is dyspneic with activity, rhonchi heard when restless, otherwise clear. Chest xray done with no new orders, new order for Albuterol neb prn and was given. 1/2L nc O2 applied overnight for O2 sats 87-88% on RA. Tessalon ordered for cough and was helpful.  Pt anxious last evening and first part of night, difficulty to get to sleep, pt states \"always can't sleep\" received melatonin and was unable to sleep until around 0330. Pt up in recliner chair part of night. Tele is Afib with CVR, BBB. HR mostly 70s-90s, occasionally overnight HR 40s-50s for a less than a minute.     R-(recommendations): will continue with plan of care, monitor neuros, tele, vs, repeat head CT today.   "

## 2021-01-24 NOTE — PROGRESS NOTES
Care Management Discharge Note    Discharge Date: 21       Discharge Disposition: Home Care    Discharge Services: None    Discharge DME: None    Discharge Transportation: family or friend will provide    Private pay costs discussed: Not applicable    PAS Confirmation Code:    Patient/family educated on Medicare website which has current facility and service quality ratings: yes    Education Provided on the Discharge Plan:    Persons Notified of Discharge Plans: [place]  Patient/Family in Agreement with the Plan: yes    Handoff Referral Completed: yes    Additional Information:  Pt will be discharged home [facility] (phone: [phone] fax: [phone]) PT and OT services.  Pt has a PCP appt scheduled 2021.       HOME CARE HAND OFF  Patient Name: Lori Mehta    MRN: [id number]    : 1928    Patient Zip Code: [id number]    Admit Diagnosis: Generalized muscle weakness [M62.81];Contusion of face, scalp and neck, initial encounter [S00.83XA, S00.03XA, S10.93XA];Fall, initial encounter [W19.XXXA]      Services Pt Needs at Home: PT and OT    Discharge Support: Family/Friend Support    Living Arrangements: With family     or Address Other Than Pt: Yes: [place]    Wound Care: No    Anticipate DC Date: 2021      Lucy ROBERTSN, RN, PHN, CCM  RN Care Coordinator  Care Transitions Department  [facility] [phone]

## 2021-01-24 NOTE — PLAN OF CARE
S-(situation): Patient discharged to ED entrance via W/C with nursing assistant    B-(background): Observation goals met     A-(assessment): Pt is A & O x 4, sometimes forgetful. VSS, afebrile, on room air, denies pain. 02 sats 93 to 96% on room air. Neuros intact however garbled speech, lung sounds CTA. Bowel normal active. Pt up in chair, good appetite. Voiding well. Moving via stand-by assist with gait belt and walker. A - fib with BBB on telemetry, HR sometimes going into the high 40s but unsustained otherwise HR is in 80s.    R-(recommendations): Discharge instructions reviewed with patient. Listed belongings gathered and returned to patient.  Patient Education resolved: Yes  New medications-Pt. Has been educated about reason of use and side effects Yes  Home and hospital acquired medications returned to patient NA  Medication Bin checked and emptied on discharge Yes

## 2021-01-24 NOTE — PROVIDER NOTIFICATION
HR down to 42 BPM, non sustatined  Appears this is in patient's history and pacemaker has been recommended.   Notified provider.  No new orders received.

## 2021-01-24 NOTE — DISCHARGE SUMMARY
[facility]  Hospitalist Discharge Summary      Date of Admission:  1/23/2021  Date of Discharge:  1/24/2021  Discharging Provider: Asaf Garcia NP      Discharge Diagnoses   Active Problems:    Hyperlipidemia LDL goal <100    CKD (chronic kidney disease) stage 4, GFR 15-29 ml/min (H)    Chronic atrial fibrillation (HCC)    Long-term (current) use of anticoagulants [Z79.01]    Essential hypertension with goal blood pressure less than 140/90    Generalized muscle weakness    Contusion of face, scalp and neck, initial encounter    Fall, initial encounter    Severe aortic stenosis    History of CVA (cerebrovascular accident)    Bifascicular block    Dilated cardiomyopathy (H)      Follow-ups Needed After Discharge   Follow-up Appointments     Follow-up and recommended labs and tests       Follow up with primary care provider, Wale Short, as previously   scheduled on 1/25 at 1100 for hospital follow- up.  No follow up labs or   test are needed.      Follow up with cardiology as previously scheduled     Have INR rechecked as previously scheduled           Unresulted Labs Ordered in the Past 30 Days of this Admission     No orders found for last 31 day(s).      These results will be followed up by N/A    Discharge Disposition   Discharged to home  Condition at discharge: Stable    Hospital Course   Lori Mehta is a 89 year old male with a history of CHF with known EF of 35-40% and severe AS, stage III CKD, prior CVA, atrial fibrillation on chronic warfarin, bifascicular block, GERD, and hyperlipidemia who presented to the ED on 1/23/2021 after a fall at home. Patient noted the day prior to admission, he was sitting on his bed bending at the waist to put on his socks when he fell forward off of the bed and injured the left side of his face. He reportedly hit the left side of his face on a dresser drawer knob. He is unsure if he may have had some dizziness on his way to the ground  but denies losing consciousness. No bleeding/lacerations. Bruising is present surrounding the left eye. Patient denied any dizziness or lightheadedness prior to incident. He typically uses a walker to get around, but stated for the past few days he had not been up and moving around because he has been getting increasingly more weak. He denied shortness of breath, neck pain, chest pain, abdominal pain, hip pain, back pain or a headache. No recent fevers. Of note, patient was seen in the ED 1/17 as he was having difficulty urinating and was started on Keflex for UTI. He was also recently started on low dose lorazepam to help with sleep at night. Patient also is currently following with cardiology for heart failure and severe aortic stenosis with consideration of possible TAVR in the near future. In the ED, patient was afebrile and vitally stable although blood pressure was elevated. Lab work showed chronic kidney disease. UA appeared improved compared to 1/17. Head CT negative for acute pathology. EKG showed chronic atrial fibrillation.  Patient was admitted to observation status for serial neurologic checks, repeat head CT, and PT evaluation. Patient remained neurologically stable overnight and repeat head CT was negative for acute pathology. Patient continues to deny pain. Denies shortness of breath and patient is maintaining oxygen saturations above 90% on room air.  Denies nausea and states he has been eating and drinking well. Denies difficulty with urination or bowel movements. No focal neurologic deficits present on exam. Patient seen by physical therapy who recommended home PT and OT. Patient medically stable for hospital discharge with plans to follow up with his PCP, Dr. Short, tomorrow (1/25). Recommended patient not continue lorazepam after discharge as this may have contributed to patient fall. Home PT and OT order placed. Patient to follow up with cardiology at [place] as previously scheduled for  ongoing TAVR workup. Patient and wife, Fatoumata, updated regarding discharge plan and both are in agreement. Patient's other chronic medical conditions remained stable and prior to admission medications were continued after discharge with the exception of lorazepam as above and Keflex which was completed during admission.     Consultations This Hospital Stay   PHYSICAL THERAPY ADULT IP CONSULT  CARE MANAGEMENT / SOCIAL WORK IP CONSULT    Code Status   Full Code    Time Spent on this Encounter   I, Asaf Garcia NP, personally saw the patient today and spent greater than 30 minutes discharging this patient.       Asaf Garcia NP  [address] SURGICAL  [address] [facility]   [place] [address]  Phone: [phone]  ______________________________________________________________________    Physical Exam   Vital Signs: Temp: 96.6  F (35.9  C) Temp src: Oral BP: (!) 156/92 Pulse: 84   Resp: 20 SpO2: 96 % O2 Device: None (Room air) Oxygen Delivery: 1/2 LPM  Weight: 188 lbs 7.89 oz  Constitutional: awake, alert, cooperative, no apparent distress, and appears stated age  Eyes: Lids and lashes normal, pupils equal, round and reactive to light, extra ocular muscles intact, sclera clear, conjunctiva normal  ENT: normocepalic, left periorbital contusion noted  Respiratory: No increased work of breathing, good air exchange, clear to auscultation bilaterally, no crackles or wheezing  Cardiovascular: irregularly irregular rhythm  GI: normal bowel sounds, non-distended and non-tender  Skin: normal skin color, texture, turgor  Musculoskeletal: 1-2+ lower extremity edema noted; 5/5 strength in all extremities  Neurologic: Awake, alert, oriented to name, place and time; mild chronic dysarthria noted from prior CVA; mild confusion noted with patient repeating same question multiple times-baseline per wife; cranial nerves II-XII are grossly intact; Cerebellar finger to nose, heel to shin intact.  Sensory is  intact.        Primary Care Physician   Wale Short    Discharge Orders      Care Coordination Referral      HOME CARE NURSING REFERRAL      Reason for your hospital stay    You were in the hospital following a fall at home and improved     Follow-up and recommended labs and tests     Follow up with primary care provider, Wale Short, as previously scheduled on 1/25 at 1100 for hospital follow- up.  No follow up labs or test are needed.      Follow up with cardiology as previously scheduled     Have INR rechecked as previously scheduled     Activity    Your activity upon discharge: activity as tolerated     Diet    Follow this diet upon discharge: Orders Placed This Encounter      Combination Diet 2 gm NA Diet       Significant Results and Procedures   Most Recent 3 CBC's:  Recent Labs   Lab Test 01/24/21  0646 01/23/21  0950 01/17/21  0750   WBC 7.4 7.3 7.3   HGB 10.9* 10.7* 10.8*    102* 102*    160 161     Most Recent 3 BMP's:  Recent Labs   Lab Test 01/24/21  0646 01/23/21  0950 01/21/21  0928 01/17/21  0750    136  --  142   POTASSIUM 4.1 4.1 4.0 4.4   CHLORIDE 104 103  --  108   CO2 28 27  --  27   BUN 59* 62*  --  56*   CR 1.86* 1.93* 2.14* 2.02*   ANIONGAP 5 6  --  7   DELORIS 9.7 9.5  --  9.7   * 161*  --  132*   ,   Results for orders placed or performed during the hospital encounter of 01/23/21   CT Head w/o Contrast    Narrative    CT SCAN OF THE HEAD WITHOUT CONTRAST   1/23/2021 11:09 AM     HISTORY: Head trauma, moderate-severe.    TECHNIQUE:  Axial images of the head and coronal reformations without  IV contrast material. Radiation dose for this scan was reduced using  automated exposure control, adjustment of the mA and/or kV according  to patient size, or iterative reconstruction technique.    COMPARISON: Head CT 7/11/2016    FINDINGS:  Moderate volume loss is present. Patchy and confluent white matter  hypoattenuation likely represents advanced chronic small  vessel  ischemic change. Parenchyma is otherwise unremarkable. No evidence of  acute ischemia, hemorrhage, mass, mass effect or hydrocephalus. The  visualized calvarium, tympanic cavities, and mastoid cavities are  unremarkable. Mild paranasal sinus mucosal thickening. Nasal septal  defect.      Impression    IMPRESSION:   No acute intracranial abnormality.    MAGGIE PARK MD   CT Head w/o contrast*    Narrative    CT SCAN OF THE HEAD WITHOUT CONTRAST   1/24/2021 7:42 AM     HISTORY: Head trauma, minor (Age >= 65 years).    TECHNIQUE: Axial images of the head and coronal reformations without  IV contrast material. Radiation dose for this scan was reduced using  automated exposure control, adjustment of the mA and/or kV according  to patient size, or iterative reconstruction technique.    COMPARISON: Head CT 1/23/2021    FINDINGS:  Moderate volume loss is present. White matter hypoattenuation likely  represents moderate chronic small vessel ischemic change. Parenchyma  is otherwise unremarkable. No evidence of acute ischemia, hemorrhage,  mass, mass effect or hydrocephalus. The visualized calvarium, tympanic  cavities, and mastoid cavities are unremarkable. Mild paranasal sinus  mucosal thickening. Nasal septal defect.      Impression    IMPRESSION:   No acute intracranial abnormality.    MAGGIE PARK MD   XR Chest Port 1 View    Narrative    EXAM: XR CHEST PORT 1 VW  LOCATION: [facility]  DATE/TIME: 1/23/2021 9:40 PM    INDICATION: Dyspnea  COMPARISON: 10/15/2014      Impression    IMPRESSION: Increased, moderately enlarged cardiac silhouette. Small bilateral pleural effusions. No pneumothorax. Pulmonary vascular congestion with increased interstitial markings, correlate for component of interstitial pulmonary edema.       Discharge Medications   Current Discharge Medication List      CONTINUE these medications which have CHANGED    Details   acetaminophen (TYLENOL) 325 MG tablet Take 3 tablets  (975 mg) by mouth every 8 hours as needed for mild pain    Associated Diagnoses: Contusion of face, scalp and neck, initial encounter         CONTINUE these medications which have NOT CHANGED    Details   Lutein-Zeaxanthin 25-5 MG CAPS Take by mouth daily      MELATONIN PO Take 10 mg by mouth At Bedtime      simvastatin (ZOCOR) 40 MG tablet TAKE ONE TABLET BY MOUTH AT BEDTIME  Qty: 90 tablet, Refills: 3    Associated Diagnoses: Hyperlipidemia LDL goal <100      tamsulosin (FLOMAX) 0.4 MG capsule Take 1 capsule (0.4 mg) by mouth daily  Qty: 90 capsule, Refills: 3    Associated Diagnoses: Benign non-nodular prostatic hyperplasia with lower urinary tract symptoms      warfarin ANTICOAGULANT (COUMADIN) 5 MG tablet Take 7.5 mg Mon, Fri and 5 mg all other days or as directed by the coumadin clinic.  Qty: 120 tablet, Refills: 1    Associated Diagnoses: Chronic atrial fibrillation (H)      Ascorbic Acid (VITAMIN C) 500 MG CHEW Take  by mouth. 1-3 daily      Bromelains 500 MG TABS Take 3 tablets by mouth. 3-4 daily      CAPSICUM, CAYENNE, PO Take 450 mg by mouth daily      COENZYME Q10 1 DAILY      Multiple Vitamins-Minerals (PRESERVISION AREDS 2 PO) Take by mouth 2 times daily      OMEGA 3 1200 MG OR CAPS 2 CAP DAILY  Refills: 0      Saw Palmetto, Serenoa repens, (SAW PALMETTO EXTRACT PO) Take 1,000 mg by mouth      SELENIUM 200 MCG OR CAPS 1 CAPSULE DAILY      TURMERIC PO Take by mouth daily      vitamin B complex with vitamin C (VITAMIN  B COMPLEX) tablet Take 1 tablet by mouth daily      VITAMIN B12 TR 1000 MCG OR TBCR 1 TAB DAILY  Refills: 0      vitamin E 400 units TABS Take 400 Units by mouth daily      ZINC 30 MG OR TABS 1 TABLET DAILY         STOP taking these medications       cephALEXin (KEFLEX) 500 MG capsule Comments:   Reason for Stopping:         LORazepam (ATIVAN) 0.5 MG tablet Comments:   Reason for Stopping:             Allergies   Allergies   Allergen Reactions     Paroxetine      Other reaction(s):  Intolerance     Terfenadine      Other reaction(s): Other  Unable to void

## 2021-01-24 NOTE — CONSULTS
Care Management Initial Consult    General Information  Assessment completed with: Patient, Spouse or significant other, Wife-Fatoumata  Type of CM/SW Visit: Initial Assessment    Primary Care Provider verified and updated as needed: Yes   Readmission within the last 30 days: no previous admission in last 30 days      Reason for Consult: discharge planning  Advance Care Planning:  na        Communication Assessment  Patient's communication style: spoken language (English or Bilingual)    Hearing Difficulty or Deaf: yes   Wear Glasses or Blind: yes    Cognitive  Cognitive/Neuro/Behavioral: WDL  Level of Consciousness: alert  Arousal Level: opens eyes spontaneously  Orientation: oriented x 4  Mood/Behavior: cooperative  Best Language: 0 - No aphasia  Speech: garbled, rambling, other (see comments)(mumbles)    Living Environment:   People in home: spouse  Fatoumata  Current living Arrangements: house      Able to return to prior arrangements: yes     Family/Social Support:  Care provided by: spouse/significant other  Provides care for: no one, unable/limited ability to care for self  Marital Status:   Children, Wife  Fatoumata       Description of Support System: Supportive, Involved    Support Assessment: Adequate family and caregiver support, Adequate social supports    Current Resources:   Skilled Home Care Services:    Community Resources: None  Equipment currently used at home: cane, straight, walker, standard, grab bar, toilet, grab bar, tub/shower  Supplies currently used at home:      Employment/Financial:  Employment Status: retired        Financial Concerns: insurance, none   Referral to Financial Counselor: No       Lifestyle & Psychosocial Needs:        Socioeconomic History     Marital status:      Spouse name: Not on file     Number of children: Not on file     Years of education: Not on file     Highest education level: Not on file     Tobacco Use     Smoking status: Never Smoker     Smokeless tobacco:  Never Used   Substance and Sexual Activity     Alcohol use: Not Currently     Alcohol/week: 1.0 standard drinks     Types: 1 Standard drinks or equivalent per week     Comment: beer once a week     Drug use: No     Sexual activity: Never       Functional Status:  Prior to admission patient needed assistance: No        Additional Information:  Called and spoke with pt, introduced self and role, also called and spoke with wife Fatoumata.  Fatoumata states they live in a two story 100 year old house that pt grew up in.  States pt does not have to go up or down stair living on main level.  States they do not have any current services coming into the home.  Discussed medicare approved Home care for PT and OT.  Wife has chosen  [facility] (Phone: [phone]).  Wife states one of their daughters will come pick him up today at 11am.  Pt already has a scheduled appt with Dr. Short tomorrow.     Lucy ROBERSTN, RN, PHN, CCM  RN Care Coordinator  Care Transitions Department  [facility] [phone]

## 2021-01-24 NOTE — PROVIDER NOTIFICATION
"S: Patient complain of SOA. Stating \" I am not going to make it through the night\"   B: Admitted after fall  A: Vague complaints, SOA, exertional wheeze, Sats 94% RA.  R: Would neb be appropriate?      Response at 2043: Ordered Chest XR as none on admission.  "

## 2021-01-25 NOTE — PROGRESS NOTES
ANTICOAGULATION MANAGEMENT     Patient Name:  Lori Mehta  Date:  2021    ASSESSMENT /SUBJECTIVE:    Today's INR result of 1.8 is subtherapeutic. Goal INR of 2.0-3.0      Warfarin dose taken: Warfarin taken as instructed    Diet: No new diet changes affecting INR    Medication changes/ interactions: Interaction between Keflex started on 21 and warfarin may be affecting INR    Previous INR: Subtherapeutic     S/S of bleeding or thromboembolism: No    New injury or illness: No    Upcoming surgery, procedure or cardioversion: No    Additional findings: None      PLAN:    Detailed message left for Lori regarding INR result and instructed:     Warfarin Dosing Instructions: Continue your current warfarin dose 7.5 mg Mon and 5 mg all other days    Instructed patient to follow up no later than: 1 week  Left detailed message with recommended recheck date    Education provided: Please call back if any changes to your diet, medications or how you've been taking warfarin      I left a detailed voicemail with the orders below. I have also requested a call back if there have been any missed doses, concerns, illness, fever, or if there have been any changes in medications, activity level, or diet       Instructed to call the Anticoagulation Clinic for any changes, questions or concerns. (#[phone])        Azalia Stallings RN      OBJECTIVE:  Recent labs: (last 7 days)     21  0928 21  0950 21  1153   INR 1.6* 2.11* 1.8*         INR assessment SUB    Recheck INR In: 1 WEEK    INR Location Outside lab      Anticoagulation Summary  As of 2021    INR goal:  2.0-3.0   TTR:  73.6 % (1 y)   INR used for dosin.8 (2021)   Warfarin maintenance plan:  7.5 mg (5 mg x 1.5) every Mon; 5 mg (5 mg x 1) all other days   Full warfarin instructions:  7.5 mg every Mon; 5 mg all other days   Weekly warfarin total:  37.5 mg   No change documented:  Azalia Stallings RN   Plan last modified:   Lakeisha Rodriguez, RN (1/12/2021)   Next INR check:  2/1/2021   Priority:  Maintenance   Target end date:  Indefinite    Indications    Chronic atrial fibrillation (HCC) [I48.20]  Long-term (current) use of anticoagulants [Z79.01] [Z79.01]             Anticoagulation Episode Summary     INR check location:      Preferred lab:      Send INR reminders to:  [facility] [place]    Comments:  5 mg tablets, no bandaid, takes at noon, likes BP done.       Anticoagulation Care Providers     Provider Role Specialty Phone number    Wale Short MD Referring Internal Medicine [phone]

## 2021-01-25 NOTE — PROGRESS NOTES
01/24/21 0845   Quick Adds   Type of Visit Initial PT Evaluation       Present no   Living Environment   People in home spouse   Current Living Arrangements house   Home Accessibility stairs to enter home;stairs within home   Number of Stairs, Main Entrance 3   Stair Railings, Main Entrance railings on both sides of stairs   Number of Stairs, Within Home, Primary   (12)   Stair Railings, Within Home, Primary railings on both sides of stairs   Transportation Anticipated family or friend will provide   Living Environment Comments wife in the home unable to provide significant assistance however is able to place a phone call if needed. Family provides transport to apts and errands. Step over tub. Adjustable bed upstairs, able to sleep on lower level bedroom with flat bed.   Self-Care   Usual Activity Tolerance moderate   Current Activity Tolerance fair   Regular Exercise No   Equipment Currently Used at Home cane, straight;walker, standard;grab bar, toilet;grab bar, tub/shower   Disability/Function   Hearing Difficulty or Deaf yes   Patient's preferred means of communication English speaker with hearing loss, no speech problems.   Describe hearing loss bilateral hearing loss   Use of hearing assistive devices none   Were auxiliary aids offered? no   Hearing Management increased voice volume and proximity   Wear Glasses or Blind yes   Vision Management glasses   Concentrating, Remembering or Making Decisions Difficulty no   Difficulty Communicating no   Difficulty Eating/Swallowing no   Walking or Climbing Stairs Difficulty yes   Walking or Climbing Stairs stair climbing difficulty, requires equipment   Mobility Management cane or railing   Dressing/Bathing Difficulty no   Toileting issues no   Doing Errands Independently Difficulty (such as shopping) no   Fall history within last six months yes   Number of times patient has fallen within last six months 1   Change in Functional Status Since  Onset of Current Illness/Injury yes   General Information   Onset of Illness/Injury or Date of Surgery 01/23/21   Referring Physician Asaf Garcia NP   Patient/Family Therapy Goals Statement (PT) home   Pertinent History of Current Problem (include personal factors and/or comorbidities that impact the POC) Patient is a 89 year old male, registered OBSERVATION status due to fall with left side face impact to dresser without LOC. Patient with a previous medical history of AFib, CVA, gout, CKD, HTN, hyperlipidemia, anxiety and total hip replacement.    Existing Precautions/Restrictions fall   Weight-Bearing Status - LUE full weight-bearing   Weight-Bearing Status - RUE full weight-bearing   Weight-Bearing Status - LLE full weight-bearing   Weight-Bearing Status - RLE full weight-bearing   General Observations PT orders: eval and treat fall for discharge planning. Activity orders: up with assistance.   Cognition   Orientation Status (Cognition) oriented to;person;place;situation   Affect/Mental Status (Cognition) anxious;confused   Follows Commands (Cognition) 50-74% accuracy;follows two-step commands;increased processing time needed;repetition of directions required   Behavioral Issues overwhelmed easily   Safety Deficit (Cognition) impulsivity;moderate deficit;awareness of need for assistance   Cognitive Status Comments Patient's hearing impacting assessment however patient also appears overwhelmed and focusing intermittently on discussion with poor retention of education and poor reliability for concussion questioning.    Pain Assessment   Patient Currently in Pain   (denies)   Integumentary/Edema   Integumentary/Edema Comments eccymosis left orbit and scab above left eyebrow. Left eye drainage throughout visit.   Posture    Posture Forward head position;Protracted shoulders;Kyphosis   Posture Comments poor visual attendance to environment with flexed posture   Range of Motion (ROM)   ROM Comment aron USANDHYA and DILIA  WFL,end range limitations observed   Strength   Strength Comments bilat LEs hip flexion 4+/5,knee extension 4+/5, knee flexion 4+/5, ankle PF 4/5, hip abduction 4/5, hip adduction 4+/5.  strength equal bilat. Shoulder abduction bilat 4/5   Bed Mobility   Bed Mobility rolling right;scooting/bridging;supine-sit;sit-supine   Rolling Right Taylor (Bed Mobility) independent   Scooting/Bridging Taylor (Bed Mobility) independent   Supine-Sit Taylor (Bed Mobility) independent   Sit-Supine Taylor (Bed Mobility) independent   Impairments Contributing to Impaired Bed Mobility impaired balance   Comment (Bed Mobility) patient dizzy with bed mobility, completed quickly with minimal regard for safety and pausing to catching balance until standing. Increased blood pressure observed with this mobilization, however return to baseline once resting in the chair.    Transfers   Transfers bed-chair transfer;sit-stand transfer;transfer safety analysis   Transfer Safety Concerns Noted decreased balance during turns;decreased sequencing ability  (walker at arms length and surface seeking behaviors)   Impairments Contributing to Impaired Transfers impaired balance   Bed-Chair Transfer   Bed-Chair Taylor (Transfers) supervision   Assistive Device (Bed-Chair Transfers) walker, front-wheeled   Bed/Chair Transfer Comments prefers surface seeking   Sit-Stand Transfer   Sit-Stand Taylor (Transfers) supervision   Assistive Device (Sit-Stand Transfers)   (chair arm rests)   Sit/Stand Transfer Comments poor eccentric control, cues to use arm rests    Gait/Stairs (Locomotion)   Taylor Level (Gait) supervision   Assistive Device (Gait) walker, front-wheeled   Distance in Feet (Required for LE Total Joints) 180'   Pattern (Gait) swing-through   Deviations/Abnormal Patterns (Gait) right sided deviations;base of support, wide;festinating/shuffling   Right Sided Gait Deviations Trendelenburg sign;heel strike  decreased   Negotiation (Stairs) stairs independence;handrail location;number of steps;ascending technique;descending technique   [place] Level (Stairs) contact guard   Handrail Location (Stairs) both sides   Number of Steps (Stairs) 4   Ascending Technique (Stairs) step-to-step   Descending Technique (Stairs) step-to-step  (one set completed retro with improved safety)   Comment (Gait/Stairs) patient moves quickly in general, does not heed safety cues to slow down and take his time   Balance   Balance other (describe)   Systems Impairment Contributing to Balance Disturbance visual;musculoskeletal;neuromuscular   Identified Impairments Contributing to Balance Disturbance decreased strength;impaired coordination   Balance Comments 29/56 GARDNER balance assessment. Patient is at a high risk of falling and would benefit from use of assistive device (walker) for safe mobility. Demonstrates preference for surface seeking behaviors.    Balance Quick Add Systems impairment contributing to balance disturbance;Identified impairments contributing to balance disturbance   Sensory Examination   Sensory Perception patient reports no sensory changes   Coordination   Coordination no deficits were identified   Coordination Comments patient with eyes closed tightly throughout assessment until on his feet   Muscle Tone   Muscle Tone no deficits were identified   Clinical Impression   Criteria for Skilled Therapeutic Intervention yes, treatment indicated   PT Diagnosis (PT) impaired balance, impaired gait   Influenced by the following impairments baseline decline, head trauma, fall at home   Functional limitations due to impairments increased risk of falling, use of walker for safe mobility   Clinical Presentation Stable/Uncomplicated   Clinical Presentation Rationale increased BP with inrceased activity, able to return to baseline, impaired balance due to head trauma in addition to weakness and impaired balance resulting in his  fall at home   Clinical Decision Making (Complexity) low complexity   Therapy Frequency (PT) 5x/week   Predicted Duration of Therapy Intervention (days/wks) 1-2 days   Planned Therapy Interventions (PT) balance training;gait training;strengthening;patient/family education;home exercise program   Anticipated Equipment Needs at Discharge (PT)   (walker from home)   Risk & Benefits of therapy have been explained evaluation/treatment results reviewed;care plan/treatment goals reviewed;risks/benefits reviewed;participants included;patient   Clinical Impression Comments Patient presents with impaired balance and impaired gait, likely a result of his fall in addition to his gradual weakness from the recent UTI. Patient is poor insight to his deficits and poor attendance to education. Suspect patient suffered a concussion however when attempting the concussion sympton assessment questions patient denies all symptoms (while resting with his eyes closed tightly and being unsteady during quick transitional movements). Patient able to mobilize without physical assistance, however given GARDNER an recent fall patient would benefit from use of a walker in the home for safety and HHPT to address deficits in order to prevent future falls. He would also benefit from HHOT to assess concussion sysmptoms and cognitive changes in order to provide adequate assistance for safety in the home.    PT Discharge Planning    PT Discharge Recommendation (DC Rec) home with assist;home with home care physical therapy  (and HHOT. Family transport. Walker from home.)   PT Rationale for DC Rec Supervision for bed mobility, transfers, gait using walker and [facility]. Patient able to complete functional mobility similiar to his home environment without physcial assistance. He would benefit from using his walker from home, assistance for safety in the home and sleeping on his lower level bedroom until his symptoms improve. He would  benefit from HHOT and HHPT to address imparied balance and post fall with head trauma symptoms.    Total Evaluation Time   Total Evaluation Time (Minutes) 30     Physical Therapy Discharge Summary    Reason for therapy discharge:    Discharged to home with home therapy.    Progress towards therapy goal(s). See goals on Care Plan in [facility] electronic health record for goal details.  Goals partially met.  Barriers to achieving goals:   discharge on same date as initial evaluation.    Therapy recommendation(s):    Continued therapy is recommended.  Rationale/Recommendations:  see above.     Thank you for your referral.  Linda Andres, PT, DPT, ATC    [facility]  O: [phone]  E: [email or web address]

## 2021-01-25 NOTE — PROGRESS NOTES
Clinic Care Coordination Contact    Clinic Care Coordination Contact  OUTREACH    Referral Information:  Referral Source: IP Handoff    Primary Diagnosis: Injury/Fall    Chief Complaint   Patient presents with     Clinic Care Coordination - Post Hospital     RNCC assessment-Hospital discharge        Universal Utilization:  Clinic Utilization  Difficulty keeping appointments:: No  Compliance Concerns: No  No-Show Concerns: No  No PCP office visit in Past Year: No  Utilization    Last refreshed: 1/25/2021  1:32 PM: Hospital Admissions 1           Last refreshed: 1/25/2021  1:32 PM: ED Visits 3           Last refreshed: 1/25/2021  1:32 PM: No Show Count (past year) 0              Current as of: 1/25/2021  1:32 PM            Clinical Concerns:  Current Medical Concerns:  Called and spoke with pt, introduced self and role.  Patient was in to see his provider today and wife, states things went well.  Patient is feeling better and is I swelling is down and is able to see a little better today.  Care has contacted him but have not scheduled to come out and see him as of yet.  Wife states they need to schedule a Coumadin clinic appointment and wondering what number to call.  Give patient number documented in chart wife will call and schedule appointment.  Offered care coordination services wife did not feel he needed them at this time but will contact me back if she has any questions or further needs.  Current Behavioral Concerns: Current concerns  Education Provided to patient: To use cane first stability when walking  Pain  Pain (GOAL):: No  Health Maintenance Reviewed: Due/Overdue    Clinical Pathway: None    Medication Management:  No questions or concerns reviewed meds with provider today.      Functional Status:  Dependent ADLs:: Ambulation-cane  Bed or wheelchair confined:: No  Mobility Status: Independent w/Device  Fallen 2 or more times in the past year?: Yes  Any fall with injury in the past year?: Yes    Living  Situation:  Current living arrangement:: I live in a private home with spouse  Type of residence:: Private home - stairs    Lifestyle & Psychosocial Needs:       Transportation means:: Family, Regular car     Mental health DX:: No  Mental health management concern (GOAL):: No  Informal Support system:: Spouse, Family, Children   Socioeconomic History     Marital status:      Spouse name: Not on file     Number of children: Not on file     Years of education: Not on file     Highest education level: Not on file     Tobacco Use     Smoking status: Never Smoker     Smokeless tobacco: Never Used   Substance and Sexual Activity     Alcohol use: Not Currently     Alcohol/week: 1.0 standard drinks     Types: 1 Standard drinks or equivalent per week     Comment: beer once a week     Drug use: No     Sexual activity: Never        Resources and Interventions:  Current Resources:   List of home care services:: Physicial Therapy, Occupational Therapy  Community Resources: Home Care     Equipment Currently Used at Home: cane, straight  Employment Status: retired)    Advance Care Plan/Directive  Advanced Care Plans/Directives on file:: No    Referrals Placed: None     Goals: na      Patient/Caregiver understanding: Pt verbalizes understanding of follow up instructions and when scheduled appt date and times.        Future Appointments              In 1 month Wale Short MD [facility]          Plan: no further outreach by RNCC as wife declined. She will keep my number incase needs arise in the future.       Lucy ODEN, RN, PHN, CCM  Primary Clinic Care Coordination    [facility]  Primary Care Clinics  [email or web address]   Office: [phone]  Employed by [facility]

## 2021-01-25 NOTE — PROGRESS NOTES
Assessment & Plan     Fall, subsequent encounter  Patient had a fall when he was putting his socks on at 3:30 in the morning leaned forward on the edge of his bed and fell out hitting his head on the knob of the dresser.  He has a black eye but otherwise he is okay from this.  Possibly related to his lorazepam use he was using for insomnia that has been stopped.    Chronic atrial fibrillation (H)  A. fib is controlled he is on anticoagulation working with cardiology for his valve.    Insomnia due to other mental disorder  Insomnia continues to be an issue.  He was and gets quite anxious.  He will continue melatonin as the lorazepam was working but has significant side effects affecting his coordination and possibly his fall.    Aortic valve stenosis, etiology of cardiac valve disease unspecified  Aortic valve stenosis we had a long discussion on this.  His family including his children and his wife would like him to have his procedure.  He knows that there is a little bit of risk and that it may not have a great effect on him he knows that his chronic kidney disease makes the risk greater.  He is however interested in this.  I did offer him hospice if he did wanted to do nothing but he is not interested in that he like to be more active.    Long term current use of anticoagulant therapy  Continue his Coumadin we will check his INR today.    Stage 3b chronic kidney disease  Chronic kidney disease is stable.  His creatinine is even a little better since being in the hospital.      Review of external notes as documented above                          Return in about 2 weeks (around 2/8/2021), or cardiology.    Wale Short MD  [facility] ANDRADE Sandoval is a 89 year old who presents to clinic today for the following health issues  accompanied by his spouse:    [facility]     Hospital Follow-up Visit:    Hospital/Nursing Home/IP Rehab Facility: [facility]  Date of  Admission: 1/23/21  Date of Discharge: 1/24/21  Reason(s) for Admission: Fall Contusion on face      Was your hospitalization related to COVID-19? No   Problems taking medications regularly:  None  Medication changes since discharge: None  Problems adhering to non-medication therapy:  None    Summary of hospitalization:  [facility] discharge summary reviewed  Diagnostic Tests/Treatments reviewed.  Follow up needed: none  Other Healthcare Providers Involved in Patient s Care:         [facility]   Update since discharge: improved.       Post Discharge Medication Reconciliation: discharge medications reconciled and changed, per note/orders.  Plan of care communicated with patient and family              He fell forward at 3:30 AM putting socks on and maybe worse with the lorazepam and that was stopped.    Had constipation and took stool softener and had a BM 4 times.      Trouble with sleeping at night.      Vision is fine in the left eye.      Weak and hard to get out of a chair.  He needs a cane. Would benefit from a electric lift chair.      Past Medical History:   Diagnosis Date     Anxiety      Atrial fibrillation (H) 1/9/2009     BPH      GERD (gastroesophageal reflux disease)      Pure hypercholesterolemia      Unspecified essential hypertension      Current Outpatient Medications   Medication     acetaminophen (TYLENOL) 325 MG tablet     Ascorbic Acid (VITAMIN C) 500 MG CHEW     Bromelains 500 MG TABS     CAPSICUM, CAYENNE, PO     COENZYME Q10     Lutein-Zeaxanthin 25-5 MG CAPS     MELATONIN PO     Multiple Vitamins-Minerals (PRESERVISION AREDS 2 PO)     OMEGA 3 1200 MG OR CAPS     Saw Palmetto, Serenoa repens, (SAW PALMETTO EXTRACT PO)     SELENIUM 200 MCG OR CAPS     simvastatin (ZOCOR) 40 MG tablet     tamsulosin (FLOMAX) 0.4 MG capsule     TURMERIC PO     vitamin B complex with vitamin C (VITAMIN  B COMPLEX) tablet     VITAMIN B12 TR 1000 MCG OR TBCR     vitamin E 400 units TABS     warfarin  ANTICOAGULANT (COUMADIN) 5 MG tablet     ZINC 30 MG OR TABS     No current facility-administered medications for this visit.      Social History     Tobacco Use     Smoking status: Never Smoker     Smokeless tobacco: Never Used   Substance Use Topics     Alcohol use: Not Currently     Alcohol/week: 1.0 standard drinks     Types: 1 Standard drinks or equivalent per week     Comment: beer once a week     Drug use: No             Review of Systems         Objective    /64   Pulse 94   Temp 98.2  F (36.8  C) (Temporal)   Resp 20   Wt 85.7 kg (189 lb)   SpO2 99%   BMI 29.60 kg/m    Body mass index is 29.6 kg/m .  Physical Exam   No acute distress, easy to talk to with a little bit of hard of hearing  Left eye has reactive pupil, swelling and bruising around the left eye  Lungs are clear  Heart is irregular with a 3 out of 6 systolic murmur at the base  Extremities are 2+ edema midway up the shin bilaterally    Labs are reviewed from the ER and hospital

## 2021-01-25 NOTE — PROGRESS NOTES
Clinic Care Coordination Contact      Pt states they are on their way out the door to his appt with Dr. Short.      RNCC will follow up later today.        Lucy ODEN, RN, PHN, CCM  Primary Clinic Care Coordination    [facility]  Primary Care Clinics  [email or web address]   Office: [phone]  Employed by [facility]

## 2021-01-29 NOTE — PROGRESS NOTES
ANTICOAGULATION MANAGEMENT     Patient Name:  Lori Mehta  Date:  2021    ASSESSMENT /SUBJECTIVE:    Today's INR result of 2.5 is therapeutic. Goal INR of 2.0-3.0      Warfarin dose taken: Warfarin taken as instructed    Diet: No new diet changes affecting INR    Medication changes/ interactions: No new medications/supplements affecting INR    Previous INR: Subtherapeutic     S/S of bleeding or thromboembolism: No    New injury or illness: No    Upcoming surgery, procedure or cardioversion: Yes: Angiogram scheduled at [facility] next  - told to hold his coumadin for 5 days prior     Additional findings: None      PLAN:    Telephone call with home care nurse Temi regarding INR result and instructed:     Warfarin Dosing Instructions: See Calendar    Instructed patient to follow up no later than: 1 week  Orders given to  Homecare nurse/facility to recheck    Education provided: Please call back if any changes to your diet, medications or how you've been taking warfarin and Importance of following up for INR monitoring at instructed interval      Temi verbalizes understanding and agrees to warfarin dosing plan.    Instructed to call the Anticoagulation Clinic for any changes, questions or concerns. (#[phone])        Azalia Stallings RN      OBJECTIVE:  Recent labs: (last 7 days)     21  0950 21  1153 21   INR 2.11* 1.8* 2.5*         No question data found.  Anticoagulation Summary  As of 2021    INR goal:  2.0-3.0   TTR:  73.2 % (1 y)   INR used for dosin.5 (2021)   Warfarin maintenance plan:  7.5 mg (5 mg x 1.5) every Mon; 5 mg (5 mg x 1) all other days   Full warfarin instructions:  : Hold; 2/1: Hold; 2/2: Hold; 2/3: Hold; 2/4: Hold; 2/5: 7.5 mg; Otherwise 7.5 mg every Mon; 5 mg all other days   Weekly warfarin total:  37.5 mg   Plan last modified:  Lakeisha Rodriguez RN (2021)   Next INR check:  2021   Priority:  Maintenance   Target end date:   Indefinite    Indications    Chronic atrial fibrillation (HCC) [I48.20]  Long-term (current) use of anticoagulants [Z79.01] [Z79.01]             Anticoagulation Episode Summary     INR check location:      Preferred lab:      Send INR reminders to:  [facility] [place]    Comments:  5 mg tablets, no bandaid, takes at noon, likes BP done.       Anticoagulation Care Providers     Provider Role Specialty Phone number    Wale Short MD Referring Internal Medicine [phone]

## 2021-01-29 NOTE — TELEPHONE ENCOUNTER
[facility] now requests orders and shares plan of care/discharge summaries for some patients through [employer].  Please REPLY TO THIS MESSAGE OR ROUTE BACK TO THE AUTHOR in order to give authorization for orders when needed.  This is considered a verbal order, you will still receive a faxed copy of orders for signature.  Thank you for your assistance in improving collaboration for our patients.    ORDER  1. SN 1 wk 1, 2 wk 1, 1 wk 1, 3 PRN

## 2021-02-08 NOTE — TELEPHONE ENCOUNTER
Medications reconciled on [facility] form, changes noted on form.  Form to PCP for signature.    Adriana Rao RN  [facility]

## 2021-02-08 NOTE — TELEPHONE ENCOUNTER
Home Health Certification and Plan of Care forms received.     Certification Period from 1/26/2021 to 3/26/2021.    Forms placed in [facility] folder.    ALEJANDRA MachadoN, RN  [facility]

## 2021-02-13 NOTE — TELEPHONE ENCOUNTER
[facility] now requests orders and shares plan of care/discharge summaries for some patients through [employer].  Please REPLY TO THIS MESSAGE OR ROUTE BACK TO THE AUTHOR in order to give authorization for orders when needed.  This is considered a verbal order, you will still receive a faxed copy of orders for signature.  Thank you for your assistance in improving collaboration for our patients.    REQUESTING ORDERS FOR:    -PT eval and treat for strengthening and endurance.  -OT eval and treat for home safety and equipment needs.   -SN 2w2, 1w2, 3 PRN assess and educate cardiac, resp, safety, skin, med manage    MED ISSUE:  Interaction warning between aspirin and warfarin, and cipro and warfarin    WOUND ISSUE:  Groin sites leave open to air.    Thank you,  Lakeisha Galdamez RN   [facility]   [phone]  [email or web address]

## 2021-02-15 NOTE — TELEPHONE ENCOUNTER
ANTICOAGULATION  MANAGEMENT: Discharge Review    Lori DM Wrendeneen chart reviewed for anticoagulation continuity of care    Hospital Admission on 2/4-2/11 for Dyspnea, TAVR (transcatheter aortic valve replacement) 02/09/2021 .    Discharge disposition: Home with Home Care Spoke with [facility] COTYKareem Membreno will be  ([phone])    Results:    No results for input(s): INR, [id number], AXA in the last 168 hours.  Anticoagulation inpatient management:     home regimen continued Need to verify    Anticoagulation discharge instructions:     Warfarin dosing: home regimen continued and Next INR Tues 2/16 at PCP visit, Then home care   Bridging: No   INR goal change: No      Medication changes affecting anticoagulation: Yes: Cirpo finished 2/14    Additional factors affecting anticoagulation: No    Plan     Agree with discharge plan for follow up on 2/16    Spoke with HC- will need to call nurse above with plan for next HC INR check. They will be seeing him this week Wed and fri    Anticoagulation Calendar updated    Cynthia Bernal RN

## 2021-02-16 NOTE — TELEPHONE ENCOUNTER
Fatoumata, his wife is calling. Consent on file.    Hard time sleeping. Taking valerian root for sleep. Wondering if this is ok with Coumadin.  Can he take this with the Coumadin?  I advised her that Valerian is ok to take with Coumadin. There is no known interaction, but that I would still send a message to his PCP for follow up to see if it is ok for him to take, and then we will call her back.    Would like a copy of his lab results mailed to them.    Message from Dr Short regarding his lab results, reviewed with his wife. She is wondering if that message could be printed out as well and mailed with his lab results. They like to keep a copy of all of his test results and messages.    I advised her that I would send a message to his PCP and care team, and have them contact her back.    Additional Information    Negative: Drug overdose and triager unable to answer question    Negative: Caller requesting information unrelated to medicine    Negative: Caller requesting a prescription for Strep throat and has a positive culture result    Negative: Rash while taking a medication or within 3 days of stopping it    Negative: Immunization reaction suspected    Negative: Asthma and having symptoms of asthma (cough, wheezing, etc.)    Negative: Breastfeeding questions about mother's medicines and diet    Negative: MORE THAN A DOUBLE DOSE of a prescription or over-the-counter (OTC) drug    Negative: DOUBLE DOSE (an extra dose or lesser amount) of over-the-counter (OTC) drug and any symptoms (e.g., dizziness, nausea, pain, sleepiness)    Negative: DOUBLE DOSE (an extra dose or lesser amount) of prescription drug and any symptoms (e.g., dizziness, nausea, pain, sleepiness)    Negative: Took another person's prescription drug    Negative: DOUBLE DOSE (an extra dose or lesser amount) of prescription drug and NO symptoms (Exception: a double dose of antibiotics)    Negative: Diabetes drug error or overdose (e.g., took wrong type of  insulin or took extra dose)    Caller has medication question about med not prescribed by PCP and triager unable to answer question (e.g., compatibility with other med, storage)    Protocols used: MEDICATION QUESTION CALL-A-OH    Shelby Chan RN  [facility] Nurse Advisor  2/16/2021 at 3:46 PM

## 2021-02-16 NOTE — PROGRESS NOTES
ANTICOAGULATION MANAGEMENT     Patient Name:  Lori Mehta  Date:  2021    ASSESSMENT /SUBJECTIVE:    Today's INR result of 1.97 is therapeutic. Goal INR of 2.0-3.0      Warfarin dose taken: See calendar- HC nurse will verify tomorrow his dosing since being home. dosing from hosp in calendar    Diet: No new diet changes affecting INR    Medication changes/ interactions: No new medications/supplements affecting INR    Previous INR: Subtherapeutic     S/S of bleeding or thromboembolism: No    New injury or illness: No    Upcoming surgery, procedure or cardioversion: No    Additional findings: None      PLAN:    Telephone call with home care nurse [facility] [phone] regarding INR result and instructed:     Warfarin Dosing Instructions: Take 5 mg TU, 7.5 mg Wed and 5 mg TH    Instructed patient to follow up no later than: 3 days  Orders given to  Homecare nurse/facility to recheck    Education provided: None required        Instructed to call the Anticoagulation Clinic for any changes, questions or concerns. (#[phone])        Cynthia Bernal RN      OBJECTIVE:  Recent labs: (last 7 days)     21  1143   INR 1.97*         INR assessment THER    Recheck INR In: 3 DAYS    INR Location Outside lab      Anticoagulation Summary  As of 2021    INR goal:  2.0-3.0   TTR:  75.9 % (1 y)   INR used for dosin.97 (2021)   Warfarin maintenance plan:  7.5 mg (5 mg x 1.5) every Mon; 5 mg (5 mg x 1) all other days   Full warfarin instructions:  : 7.5 mg; Otherwise 7.5 mg every Mon; 5 mg all other days   Weekly warfarin total:  37.5 mg   Plan last modified:  Lakeisha Rodriguez RN (2021)   Next INR check:  2021   Priority:  Maintenance   Target end date:  Indefinite    Indications    Chronic atrial fibrillation (HCC) [I48.20]  Long-term (current) use of anticoagulants [Z79.01] [Z79.01]             Anticoagulation Episode Summary     INR check location:      Preferred lab:      Send  INR reminders to:  [facility] [place]    Comments:  5 mg tabs, Noon dose      Anticoagulation Care Providers     Provider Role Specialty Phone number    Wale Short MD Referring Internal Medicine [phone]

## 2021-02-16 NOTE — PROGRESS NOTES
Assessment & Plan     S/P TAVR (transcatheter aortic valve replacement)  Patient actually did amazingly well with the TAVR procedure.  He is breathing okay, he still has A. fib he should continue to get stronger and have better endurance.  He will follow-up with an echocardiogram and see cardiology on 18 March.  He has a video checkup with them later this week.  - CBC with platelets  - Comprehensive metabolic panel    Chronic atrial fibrillation (H)  Chronic A. fib he is on Coumadin we will check his INR today he is rate controlled with a rate of 78 today.  - Comprehensive metabolic panel  - INR    Urinary frequency  Urinary symptoms he is in frequency he was given ciprofloxacin for a protease UTI.  This is most likely from his catheterization we will repeat his urine today and treat if needed.  - *UA reflex to Microscopic and Culture ([place]; [facility]; [facility]; [facility]; [facility]; [facility]; [place]; [place])    Urinary tract infection due to Proteus  See above  - *UA reflex to Microscopic and Culture ([facility]; [facility]; [facility]; [facility]; [facility]; [place]; [place])    CKD (chronic kidney disease) stage 4, GFR 15-29 ml/min (H)  Chronic kidney disease stage IV his creatinine was stable at 2.3 came down into the 1 range we will recheck today.  - Comprehensive metabolic panel    Review of prior external note(s) from - [facility] information from [facility] reviewed       See me in one month      No follow-ups on file.    Wale Short MD  [facility] 89 [facility] who presents for the following health issues  accompanied by his spouse:    HPI     Chief Complaint   Patient presents with     RECHECK     f/u from TAVR     Patient had a TAVR procedure earlier this month, was discharged, did quite well.  He did have a Proteus UTI but otherwise no major issues.  He is feeling okay not overly  improved but at least stable with eating.    Had a urine tract infection, still feels urgency and small amounts. Was given cipro for 3 days. Grew proteus which was cipro sensitive.     Kidneys did ok with the procedure.     Wife is helping his medications.      homecare is coming and therapy.     Little lump in the groin.    Weight is down from previous here about 10 pounds.      Constipated and not going much.     Past Medical History:   Diagnosis Date     Anxiety      Atrial fibrillation (H) 1/9/2009     BPH      GERD (gastroesophageal reflux disease)      Pure hypercholesterolemia      Unspecified essential hypertension      Current Outpatient Medications   Medication     aspirin (ASPIRIN) 81 MG EC tablet     Bromelains 500 MG TABS     CAPSICUM, CAYENNE, PO     CHELATED MAGNESIUM PO     COENZYME Q10     FERROUS FUMARATE PO     furosemide (LASIX) 20 MG tablet     GARLIC PO     hydrALAZINE (APRESOLINE) 10 MG tablet     isosorbide dinitrate (ISORDIL) 10 MG tablet     Lutein-Zeaxanthin 25-5 MG CAPS     Lycopene 10 MG CAPS     MELATONIN PO     Misc Natural Products (PUMPKIN SEED OIL) CAPS     OMEGA 3 1200 MG OR CAPS     potassium 99 MG TABS     Saw Palmetto, Serenoa repens, (SAW PALMETTO EXTRACT PO)     SELENIUM 200 MCG OR CAPS     simvastatin (ZOCOR) 40 MG tablet     tamsulosin (FLOMAX) 0.4 MG capsule     TURMERIC PO     vitamin B complex with vitamin C (VITAMIN  B COMPLEX) tablet     vitamin C (ASCORBIC ACID) 1000 MG TABS     vitamin E 400 units TABS     warfarin ANTICOAGULANT (COUMADIN) 5 MG tablet     ZINC 30 MG OR TABS     acetaminophen (TYLENOL) 325 MG tablet     No current facility-administered medications for this visit.      Social History     Tobacco Use     Smoking status: Never Smoker     Smokeless tobacco: Never Used   Substance Use Topics     Alcohol use: Not Currently     Alcohol/week: 1.0 standard drinks     Types: 1 Standard drinks or equivalent per week     Comment: beer once a week     Drug use: No            Review of Systems   CONSTITUTIONAL: Weight is down from before his procedure  INTEGUMENTARY/SKIN: NEGATIVE for worrisome rashes, moles or lesions--bruising on his legs where the catheters were placed   EYES: NEGATIVE for vision changes or irritation  ENT/MOUTH: NEGATIVE for ear, mouth and throat problems  RESP: NEGATIVE for significant cough or SOB  CV: NEGATIVE for chest pain, palpitations or peripheral edema  GI: NEGATIVE for nausea, abdominal pain, heartburn, or change in bowel habits   male : Urinary frequency and small amounts  MUSCULOSKELETAL: NEGATIVE for significant arthralgias or myalgia  NEURO: NEGATIVE for weakness, dizziness or paresthesias  ENDOCRINE: NEGATIVE for temperature intolerance, skin/hair changes  HEME: NEGATIVE for bleeding problems  PSYCHIATRIC: NEGATIVE for changes in mood or affect      Objective    /58   Pulse 78   Temp 98.6  F (37  C) (Temporal)   Resp 16   Wt 80.3 kg (177 lb)   SpO2 97%   BMI 27.72 kg/m    Body mass index is 27.72 kg/m .  Physical Exam   No acute distress  The patient is able to walk quite steady with his cane  His neck is supple  His heart is irregular but no longer has a murmur  His lungs have decreased breath sounds bilaterally  Abdomen is benign soft with positive bowel sounds  His suprapubic area has a little swelling  Both groins are bruised with gravity making the bruises bigger down his legs, no signs of hematomas, chest wound site is healing  Well legs have 1+ pitting edema in both feet      Reviewed his echocardiogram, chest x-ray, CBC, chemistry panel and urinary culture from [place]

## 2021-02-16 NOTE — TELEPHONE ENCOUNTER
Call back to Fatoumata.    I advised her that it is ok to take the Valerian.     Shelby Chan RN  [facility] Nurse Advisor  2/16/2021 at 3:55 PM

## 2021-02-16 NOTE — PROGRESS NOTES
Fatoumata, pt's wife, states that she needs to know what to sent up for Kareem's Coumadin. I informed her that the HC nurse was informed as well.   I advised 5 mg on Tues, Thurs and 7.5 mg Wed, and HC will check Fri  Lakeisha Rodriguez RN

## 2021-02-19 NOTE — TELEPHONE ENCOUNTER
"Spoke with home care nurse Beckie and patient has gained 2 lbs over night and went from 171 lbs to 178 lbs since Wednesday. Lori has +4 pitting edema and his lungs have rhonchi sounds to Beckie.  Patient had been feeling \"worse each day this week\" and Beckie wanted patient seen today. Scheduled patient for visit today with his PCP.     Next 5 appointments (look out 90 days)    Feb 19, 2021  4:30 PM  SHORT with Wale Short MD  [facility] ([facility] ) [address] [place] [id number] [phone]   Mar 16, 2021  9:30 AM  PHYSICAL with Wale Short MD  [facility] ([facility] ) [address] [place] [id number] [phone]              Shawnee Duckworth RN, BSN, PHN      "

## 2021-02-19 NOTE — TELEPHONE ENCOUNTER
Leti from [facility] [phone] is calling to speak with Dr Short regarding patients recent procedure and follow up prior to his appointment today.

## 2021-02-19 NOTE — PROGRESS NOTES
Assessment & Plan     Acute congestive heart failure, unspecified heart failure type (H)  - torsemide (DEMADEX) 10 MG tablet; Take 1 tablet (10 mg) by mouth 2 times daily  - metoprolol succinate ER (TOPROL-XL) 25 MG 24 hr tablet; Take 0.5 tablets (12.5 mg) by mouth daily  - Basic metabolic panel  (Ca, Cl, CO2, Creat, Gluc, K, Na, BUN)  - BNP-N terminal pro    S/P TAVR (transcatheter aortic valve replacement)    Chronic atrial fibrillation (H)  - metoprolol succinate ER (TOPROL-XL) 25 MG 24 hr tablet; Take 0.5 tablets (12.5 mg) by mouth daily    CKD (chronic kidney disease) stage 4, GFR 15-29 ml/min (H)  - torsemide (DEMADEX) 10 MG tablet; Take 1 tablet (10 mg) by mouth 2 times daily  - Basic metabolic panel  (Ca, Cl, CO2, Creat, Gluc, K, Na, BUN)    Discussion of management or test interpretation with external physician/other qualified healthcare professional/appropriate source - cardiology     89-year-old gentleman with history of chronic kidney disease stage IV.  He also has chronic A. fib.  He has aortic stenosis and recently had a transcatheter aortic valve replacement done at [place].  Unfortunately has had a little bit more swelling since then I saw him 3 days ago with mild swelling in his ankles.  Now today he is up 2 pounds he has a few crackles in the left lung he has increased swelling.  After discussion with cardiology team that did his valve replacement we will stop his furosemide replace with torsemide 10 mg twice a day.  We will also start him on some metoprolol 25 mg he will take half a tablet daily.  I am going to check his basic metabolic panel today and his creatinine is stable.  Hopefully his weight will come down, breathing will improve, urination will improve.  I will see him next Tuesday at 11:00 when there and further eye exam repeat his weight, lab tests, exam.             No follow-ups on file.    Wale Short MD  [facility]    Dorothea Sandoval is a 89  year old who presents for the following health issues  accompanied by his spouse:    HPI       Chief Complaint   Patient presents with     Edema     worsening edema per home care nurse     Patient who comes in for worsening edema, some crackles on lung exam with the home care nurse.  His weight is up 2 pounds.    I got a call from his physician assistant of cardiology at [facility] where he had his aortic valve replaced.  They are concerned about him being in heart failure.  Worry about his chronic kidney disease his creatinine is been stable at around 2.    He is been taking Lasix 40 mg in the morning 20 at noon but this is not working well is not making a lot of urine.      Review of Systems         Objective    /70   Pulse 84   Temp 98.4  F (36.9  C) (Temporal)   Resp 16   Wt 81.2 kg (179 lb)   SpO2 97%   BMI 28.04 kg/m    Body mass index is 28.04 kg/m .  Physical Exam   No acute distress he appears comfortable.  His heart is regular with his new valve sound  His lungs have slight crackles in the left base right is pretty clear  His extremities do have 1+ pitting edema up two thirds of his calves which is more than at discharge.    Recent labs on the 16th showed his creatinine at 2.1.  Weight was 177 now up to 179.

## 2021-02-19 NOTE — PROGRESS NOTES
ANTICOAGULATION MANAGEMENT     Patient Name:  Lori Mehta  Date:  2021    ASSESSMENT /SUBJECTIVE:    Today's INR result of 2.5 is therapeutic. Goal INR of 2.0-3.0      Warfarin dose taken: Warfarin taken as instructed    Diet: No new diet changes affecting INR    Medication changes/ interactions: No new medications/supplements affecting INR    Previous INR: Subtherapeutic     S/S of bleeding or thromboembolism: No    New injury or illness: No    Upcoming surgery, procedure or cardioversion: No    Additional findings: None      PLAN:    Telephone call with home care nurse [facility] regarding INR result and instructed:     Warfarin Dosing Instructions: Take 7.5 mg Mon, Wed and 5 mg all other days    Instructed patient to follow up no later than: 6 days  Orders given to  Homecare nurse/facility to recheck    Education provided: None required      Instructed to call the Anticoagulation Clinic for any changes, questions or concerns. (#[phone])        Cynthia Bernal RN      OBJECTIVE:  Recent labs: (last 7 days)     21  1143 21   INR 1.97* 2.5*         INR assessment THER    Recheck INR In: 6 DAYS    INR Location Outside lab      Anticoagulation Summary  As of 2021    INR goal:  2.0-3.0   TTR:  76.6 % (1 y)   INR used for dosin.5 (2021)   Warfarin maintenance plan:  7.5 mg (5 mg x 1.5) every Mon, Wed; 5 mg (5 mg x 1) all other days   Full warfarin instructions:  7.5 mg every Mon, Wed; 5 mg all other days   Weekly warfarin total:  40 mg   Plan last modified:  Cynthia Bernal, RN (2021)   Next INR check:  2021   Priority:  Maintenance   Target end date:  Indefinite    Indications    Chronic atrial fibrillation (HCC) [I48.20]  Long-term (current) use of anticoagulants [Z79.01] [Z79.01]             Anticoagulation Episode Summary     INR check location:      Preferred lab:      Send INR reminders to:  [facility] [place]    Comments:  5 mg tabs, Noon dose       Anticoagulation Care Providers     Provider Role Specialty Phone number    Wale Short MD Referring Internal Medicine [phone]

## 2021-02-19 NOTE — TELEPHONE ENCOUNTER
Called by cardiology at [facility].     Diuresed in the hospital with lasix.  May need torsemide instead.     Holter was ok, no further block, pulse of 73.     They wonder about swelling going up.  At discharge was only on his feet. May need some metoprolol and torsemide instead.

## 2021-02-22 NOTE — TELEPHONE ENCOUNTER
Ok to hold the metoprolol  Needs to take his torsemide as we prescribed.  He should be on mirtazapine at night, that worked in the past. Try that tonight if she has any left from earlier prescriptions.

## 2021-02-22 NOTE — TELEPHONE ENCOUNTER
[facility] for [facility] tomorrow, 2/23.    Patient and spouse are reporting increased anxiety/nervousness since starting Torsemide and Metoprolol on Friday, 2/19. Patient is not sleeping because he is anxious, but doesn't know what he is anxious about. HR irregular today. /52. No increase in SOB. They would like to discuss this [facility] tomorrow.       Next 5 appointments (look out 90 days)    Feb 23, 2021 11:00 AM  Office Visit with Wale Short MD  [facility] ([facility] ) [address] [place] [id number]  [phone]   Mar 16, 2021  9:30 AM  PHYSICAL with Wale Short MD  [facility] ([facility] ) [address] [place] [id number]  [phone]        [facility] now requests orders and shares plan of care/discharge summaries for some patients through [facility].  Please REPLY TO THIS MESSAGE OR ROUTE BACK TO THE AUTHOR in order to give authorization for orders when needed.  This is considered a verbal order, you will still receive a faxed copy of orders for signature.  Thank you for your assistance in improving collaboration for our patients.    Buffy Frederick RN, BSN  [phone]

## 2021-02-22 NOTE — TELEPHONE ENCOUNTER
"S-(situation): Wife calling stating that since Lori started the metoprolol on 2/19 he has been restless at night and his mind isn't right. RN spoke to Lori and he states every night gets worse. He reports nausea and \"nerves\", \"just up and down all night long.\"    B-(background): known cardiac patient. Seen [facility] on 2/19   See plan from visit.      A-(assessment): Legs are somewhat less swollen. Reports weight at 176 the last couple days.  Troubles urinating at night, only going a teaspoon at a time.     No SOB or chest pain. No troubles breathing at night. Taking torsemide as newly ordered BID.      R-(recommendations): Hold metoprolol am dose until seen at scheduled visit. Sending to PCP for review and possible advisement.     -Also wondering about sleep aid?   "

## 2021-02-22 NOTE — TELEPHONE ENCOUNTER
[phone] Fatoumata is calling back. She would like to speak with the RN again. Would not say what for

## 2021-02-23 NOTE — PROGRESS NOTES
Assessment & Plan     Insomnia, unspecified type  Patient having a lot of issues with insomnia he is anxious.  We tried mirtazapine which tried melatonin.  He needs to sleep , I am going to give him some alprazolam he used to have this in 2000-10-11 and 12 low-dose 0.25 mg.  His wife let me know if not working will double the dose.  - ALPRAZolam (XANAX) 0.25 MG tablet; Take 1 tablet (0.25 mg) by mouth nightly as needed for anxiety    CKD (chronic kidney disease) stage 4, GFR 15-29 ml/min (H)  With his diuretics we need to follow his kidney disease closely we will recheck his basic panel today.  - Basic metabolic panel  (Ca, Cl, CO2, Creat, Gluc, K, Na, BUN)  - torsemide (DEMADEX) 10 MG tablet; 2 in AM and 1 at noon.    Acute congestive heart failure, unspecified heart failure type (H)  Congestive heart failure his weight is going up another 2 pounds.  We switched him from Lasix to torsemide 10 mg twice a day we will go to 20 mg in the morning 10 at noon recheck with him next week they will continue to do daily weights at home.  He does have 2-3+ pitting edema in his lower extremities but his lungs appear clear still.  - Basic metabolic panel  (Ca, Cl, CO2, Creat, Gluc, K, Na, BUN)  - torsemide (DEMADEX) 10 MG tablet; 2 in AM and 1 at noon.    S/P TAVR (transcatheter aortic valve replacement)  Status post TAVR, not getting better.  He is in close contact with his cardiology group.  We will check his labs today try to increase his diuretics and hopefully get him better with some sleep.                   No follow-ups on file.    Wale Short MD  [facility]    Dorothea Sandoval is a 89 year old who presents for the following health issues     HPI       Chief Complaint   Patient presents with     Heart Failure     f/u     Tried metoprolol half dose, but seems to make him anxious, so stopped that today.      Weight is going up, 177, 179, 182 here.  Home is up from 171 to 176. No real  difference with the torsemide.      Urinates small amounts, no pain, some urgency.      Trouble at night.      Review of Systems         Objective    /60   Pulse 96   Temp 98.5  F (36.9  C) (Temporal)   Resp 16   Wt 82.6 kg (182 lb)   SpO2 97%   BMI 28.51 kg/m    Body mass index is 28.51 kg/m .  Physical Exam   Is okay with slight shortness of breath, grumpy  Heart is irregular  Lungs are clear  Extremities have 3+ pitting edema up to his knees.    Labs reviewed.

## 2021-02-26 NOTE — PROGRESS NOTES
ANTICOAGULATION MANAGEMENT     Patient Name:  Lori Mehta  Date:  2021    ASSESSMENT /SUBJECTIVE:    Today's INR result of 4.7 is therapeutic. Goal INR of 2.0-3.0      Warfarin dose taken: Warfarin taken as instructed    Diet: No new diet changes affecting INR    Medication changes/ interactions: No interaction expected between Torsemide  and warfarin  - per Mircomedex, can increase INR    Previous INR: Subtherapeutic     S/S of bleeding or thromboembolism: No    New injury or illness: No    Upcoming surgery, procedure or cardioversion: No    Additional findings: None      PLAN:    Telephone call with home care nurse Buffy Accent [phone] regarding INR result and instructed:     Warfarin Dosing Instructions: Hold Fri and Sat. 5 mg Sun and Tues and 7.5 mg Mon    Instructed patient to follow up no later than: 5 days  Orders given to  Homecare nurse/facility to recheck    Education provided: Potential interaction between warfarin and tosemide       Buffy verbalizes understanding and agrees to warfarin dosing plan.    Instructed to call the Anticoagulation Clinic for any changes, questions or concerns. (#[phone])        Lakeisha Rodriguez RN      OBJECTIVE:  Recent labs: (last 7 days)     21   INR 4.7*         INR assessment SUPRA    Recheck INR In: 5 DAYS    INR Location Homecare INR      Anticoagulation Summary  As of 2021    INR goal:  2.0-3.0   TTR:  72.8 % (1 y)   INR used for dosin.7 (2021)   Warfarin maintenance plan:  7.5 mg (5 mg x 1.5) every Mon, Wed; 5 mg (5 mg x 1) all other days   Full warfarin instructions:  : Hold; : Hold; Otherwise 7.5 mg every Mon, Wed; 5 mg all other days   Weekly warfarin total:  40 mg   Plan last modified:  Cynthia Bernal, RN (2021)   Next INR check:  3/3/2021   Priority:  Maintenance   Target end date:  Indefinite    Indications    Chronic atrial fibrillation (HCC) [I48.20]  Long-term (current) use of anticoagulants [Z79.01]  [Z79.01]             Anticoagulation Episode Summary     INR check location:      Preferred lab:      Send INR reminders to:  [facility]    Comments:  5 mg tabs, Noon dose      Anticoagulation Care Providers     Provider Role Specialty Phone number    Wale Short MD Referring Internal Medicine [phone]

## 2021-02-26 NOTE — TELEPHONE ENCOUNTER
Spoke with pt's wife Fatomuata.  She states he pretty much the same.  His INR is up and nurse spoke with coumadin clinic, weight is 176lbs and still has leg swelling but she states he doesn't elevated them.  He tried the xanax the one night and he had vivid dreams and was out of it the next morning, she hasn't given it to since.  Spoke with Dr. Short and since nurse is there he was wondering if she could draw BMP.   Order pending

## 2021-02-26 NOTE — TELEPHONE ENCOUNTER
BMP drawn per orders of Dr. Short and dropped off at [facility].   Patient was previously taking two tablets of Flomax up until 12/18/20 when it looks like his refill was sent in at one tablet per day. Since December, he has been taking the one tablet per day. He would like to go back up to the two tablets - please send new prescription to [facility] in [place] if appropriate.     Buffy Frederick RN, BSN  [phone]

## 2021-03-02 NOTE — PROGRESS NOTES
Assessment & Plan     Acute congestive heart failure, unspecified heart failure type (H)  - Basic metabolic panel  (Ca, Cl, CO2, Creat, Gluc, K, Na, BUN)  - CBC with platelets  - BNP-N terminal pro    CKD (chronic kidney disease) stage 4, GFR 15-29 ml/min (H)  - Basic metabolic panel  (Ca, Cl, CO2, Creat, Gluc, K, Na, BUN)  - CBC with platelets  - BNP-N terminal pro    Long-term (current) use of anticoagulants [Z79.01]  - INR point of care    Chronic atrial fibrillation (H)  - INR point of care    Anemia due to stage 4 chronic kidney disease (H)    89-year-old patient who had a TAVR procedure on February 4.  He has chronic kidney disease stage IV.  He is having acute worsening of his heart failure with fluid retention in his legs and abdomen.  He has anemic which is new for him dropping his hemoglobin from 10-8.1.  With his heart failure, kidney disease, chronic A. fib and anticoagulation I am worried about his anemia.  He says he had some black stools no overt bleeding.    We have been seeing him twice a week following his kidney function and trying to diurese him with oral pills of Lasix and then torsemide.  Unfortunately his weight is stayed stable but not gone down is actually gone up a pound.  I did call over to the cardiology group who put in his TAVR.  I was transferred to the admission team, discussed with admitting physician and he will transfer over by private car since his vital signs are stable and be admitted for IV diuretic therapy, anemia work-up and possible transfusion is.  He will be seen by the cardiology team as well.    He did have a little epistaxis but this appears to be stable his INR is 3.1 today.            60 minutes spent on the date of the encounter doing chart review, history and exam, documentation and further activities as noted above including three different calls to [facility] today and discussion with cardiology team, admission supervisor and hospitalist.              No  follow-ups on file.    Wale Short MD  [facility] ANDRADE Sandoval is a 89 year old who presents for the following health issues  accompanied by his spouse:    HPI       Chief Complaint   Patient presents with     RECHECK     f/u from last week     Legs are swollen on both sides.  Tries to work on the legs.  Weight is up a pound.  Can't sleep at home. Alprazolam didn't do much.  Melatonin helped him some.      Bowels have been black and constipated.      Not making much urine despite more diuretics.      Left nares has had a nose bleed, has cotton in it today.    INR was up to 4.        Past Medical History:   Diagnosis Date     Anxiety      Atrial fibrillation (H) 1/9/2009     BPH      GERD (gastroesophageal reflux disease)      Pure hypercholesterolemia      Unspecified essential hypertension      Current Outpatient Medications   Medication     acetaminophen (TYLENOL) 325 MG tablet     aspirin (ASPIRIN) 81 MG EC tablet     Bromelains 500 MG TABS     CAPSICUM, CAYENNE, PO     CHELATED MAGNESIUM PO     COENZYME Q10     FERROUS FUMARATE PO     furosemide (LASIX) 20 MG tablet     GARLIC PO     hydrALAZINE (APRESOLINE) 10 MG tablet     isosorbide dinitrate (ISORDIL) 10 MG tablet     Lutein-Zeaxanthin 25-5 MG CAPS     Lycopene 10 MG CAPS     MELATONIN PO     Misc Natural Products (PUMPKIN SEED OIL) CAPS     OMEGA 3 1200 MG OR CAPS     potassium 99 MG TABS     Saw Palmetto, Serenoa repens, (SAW PALMETTO EXTRACT PO)     SELENIUM 200 MCG OR CAPS     simvastatin (ZOCOR) 40 MG tablet     tamsulosin (FLOMAX) 0.4 MG capsule     torsemide (DEMADEX) 10 MG tablet     TURMERIC PO     vitamin B complex with vitamin C (VITAMIN  B COMPLEX) tablet     vitamin C (ASCORBIC ACID) 1000 MG TABS     vitamin E 400 units TABS     warfarin ANTICOAGULANT (COUMADIN) 5 MG tablet     ALPRAZolam (XANAX) 0.25 MG tablet     ZINC 30 MG OR TABS     No current facility-administered medications for this visit.       Social History     Tobacco Use     Smoking status: Never Smoker     Smokeless tobacco: Never Used   Substance Use Topics     Alcohol use: Not Currently     Alcohol/week: 1.0 standard drinks     Types: 1 Standard drinks or equivalent per week     Comment: beer once a week     Drug use: No       Review of Systems   Weak,  Sob   Can't sleep  Swollen legs       Objective    /76   Pulse 88   Temp 98  F (36.7  C) (Temporal)   Resp 16   Wt 83 kg (183 lb)   SpO2 99%   BMI 28.66 kg/m    Body mass index is 28.66 kg/m .  Physical Exam   Upset, weak  Heart is regular  Lungs are decreased breath sounds.   Ext 3+ pitting edema.     Labs reviewed

## 2021-03-02 NOTE — PROGRESS NOTES
ANTICOAGULATION MANAGEMENT     Patient Name:  Lori Mehta  Date:  3/2/2021    ASSESSMENT /SUBJECTIVE:    Today's INR result of 3.1 is supratherapeutic. Goal INR of 2.0-3.0      Warfarin dose taken: REHANA left    Diet: Vm left    Medication changes/ interactions: Vm left    Previous INR: Supratherapeutic     S/S of bleeding or thromboembolism: VM left    New injury or illness: VM left    Upcoming surgery, procedure or cardioversion: Vm left    Additional findings: Vm left      PLAN:    Detailed message left for Lori regarding INR result and instructed:     Warfarin Dosing Instructions: take 2.5 mg tonight     Instructed patient to follow up no later than: 1 days via HC nurse as discussed last week  Orders given to  Homecare nurse/facility to recheck    Education provided: Please call back if any changes to your diet, medications or how you've been taking warfarin      I left a detailed voicemail with the orders reflected in flowsheet. I have also requested a call back if there have been any missed doses, concerns, illness, fever, or if there have been any changes in medications, activity level, or diet    Instructed to call the Anticoagulation Clinic for any changes, questions or concerns. (#[phone])        Lakeisha Rodriguez RN      OBJECTIVE:  Recent labs: (last 7 days)     02/26/21 03/02/21  1202   INR 4.7* 3.1*         INR assessment SUPRA    Recheck INR In: 1 DAY    INR Location Outside lab      Anticoagulation Summary  As of 3/2/2021    INR goal:  2.0-3.0   TTR:  71.6 % (1 y)   INR used for dosing:  3.1 (3/2/2021)   Warfarin maintenance plan:  7.5 mg (5 mg x 1.5) every Mon, Wed; 5 mg (5 mg x 1) all other days   Full warfarin instructions:  3/2: 2.5 mg; Otherwise 7.5 mg every Mon, Wed; 5 mg all other days   Weekly warfarin total:  40 mg   Plan last modified:  Cynthia Bernal, RN (2/19/2021)   Next INR check:  3/3/2021   Priority:  Maintenance   Target end date:  Indefinite    Indications    Chronic  atrial fibrillation (HCC) [I48.20]  Long-term (current) use of anticoagulants [Z79.01] [Z79.01]             Anticoagulation Episode Summary     INR check location:      Preferred lab:      Send INR reminders to:  [facility]    Comments:  5 mg tabs, Noon dose      Anticoagulation Care Providers     Provider Role Specialty Phone number    Wale Short MD Referring Internal Medicine [phone]

## 2021-03-10 NOTE — TELEPHONE ENCOUNTER
Patient's wife is calling and is requesting Dr. Han MD review patient's chart from hospital admission in [facility] at this time.  She stated patient is going to be released tomorrow, 3/11/2021, and the only facility available at this time to help him with therapy is at the [facility] in [place].  She states it is on the 3rd floor, and he will have to quarantine for 14 days prior to having visitors.  Patient's wife stated much concern that she is unable to be with him, visit him and he will be all alone at the facility.  Fatoumata stated she did not believe it would even take that long to finish treatment plan and therapy.  Fatoumata verbalized questions, if Dr. Han MD after reviewing patient's chart,  would be able to have patient admitted to [facility] for final treatments and therapy.  She states she is very sad that he would be placed somewhere without the ability to see her for 2 weeks.      Informed Fatoumata this message would be sent to Dr. Han MD for review and possible alternatives that he may have access to.  Patient's wife stated understanding.    Will forward to PCP for review.    Dina Hester RN

## 2021-03-12 PROBLEM — I34.0 MODERATE MITRAL REGURGITATION: Status: ACTIVE | Noted: 2021-01-01

## 2021-03-12 PROBLEM — I50.23 ACUTE ON CHRONIC SYSTOLIC HEART FAILURE (H): Status: ACTIVE | Noted: 2021-01-01

## 2021-03-12 PROBLEM — R06.00 DYSPNEA: Status: ACTIVE | Noted: 2021-01-01

## 2021-03-12 PROBLEM — I50.21: Status: ACTIVE | Noted: 2021-01-01

## 2021-03-12 PROBLEM — N40.0 HYPERTROPHY OF PROSTATE WITHOUT URINARY OBSTRUCTION AND OTHER LOWER URINARY TRACT SYMPTOMS (LUTS): Status: ACTIVE | Noted: 2021-01-01

## 2021-03-12 PROBLEM — J33.9 NASAL POLYP: Status: ACTIVE | Noted: 2021-01-01

## 2021-03-12 PROBLEM — I27.20 PULMONARY HYPERTENSION (H): Status: ACTIVE | Noted: 2021-01-01

## 2021-03-12 PROBLEM — E87.70 FLUID OVERLOAD: Status: ACTIVE | Noted: 2021-01-01

## 2021-03-12 PROBLEM — H90.3 SENSORINEURAL HEARING LOSS, BILATERAL: Status: ACTIVE | Noted: 2021-01-01

## 2021-03-12 PROBLEM — I49.8 OTHER SPECIFIED CARDIAC DYSRHYTHMIAS: Status: ACTIVE | Noted: 2021-01-01

## 2021-03-12 PROBLEM — Z95.2 S/P TAVR (TRANSCATHETER AORTIC VALVE REPLACEMENT): Status: ACTIVE | Noted: 2021-01-01

## 2021-03-12 PROBLEM — D64.9 ANEMIA: Status: ACTIVE | Noted: 2021-01-01

## 2021-03-12 PROBLEM — Z46.1 ENCOUNTER FOR FITTING AND ADJUSTMENT OF HEARING AID: Status: ACTIVE | Noted: 2021-01-01

## 2021-03-12 PROBLEM — G43.909 MIGRAINE: Status: ACTIVE | Noted: 2021-01-01

## 2021-03-12 PROBLEM — I51.89 CHRONIC SYSTOLIC DYSFUNCTION OF RIGHT VENTRICLE: Status: ACTIVE | Noted: 2021-01-01

## 2021-03-12 PROBLEM — R97.20 HIGH PROSTATE SPECIFIC ANTIGEN (PSA): Status: ACTIVE | Noted: 2021-01-01

## 2021-03-12 NOTE — TELEPHONE ENCOUNTER
"I spoke with Fatoumata, pt's wife, who stated pt is in the [facility] and will be \"for a while\". I let her know that the care team there would manage Kareem's INR in the meantime.   Lakeisha Rodriguez, RN    "

## 2021-03-12 NOTE — PROGRESS NOTES
Clinic Care Coordination Contact  Community Health Worker Initial Outreach       The CHW received a referral for CCC. The patient was discharge to a Skilled Nursing Facility/Swing Bed.     Upon chart review the patient's wife stated the patient is at [facility].    CHW was able to schedule a TCU follow up with the RN.

## 2021-03-12 NOTE — PROGRESS NOTES
[facility]  PRIMARY CARE PROVIDER AND CLINIC:  Wale Short MD, [address] [facility] [facility] / [facility] [address]  Chief Complaint   Patient presents with     Hospital F/U     [facility] Medical Record Number:  [id number]  Place of Service where encounter took place:  [facility] ([facility]) [[id number]]    Lori Mehta  is a 89 year old  (6/27/1928), admitted to the above facility from  [facility]. Hospital stay 3/2/21 through 3/11/21..  Admitted to this facility for  rehab, medical management and nursing care.    HPI:    HPI information obtained from: facility chart records, facility staff, patient report and Care Everywhere Epic chart review.   Brief Summary of Hospital Course: Lori transferred from [facility] to [facility] fur acute systolic heart failure.  Chest x-ray showed pulmonary vascular congestion on 3/9/21.  He had a recent TAVR on 2/9/21 due to severe aortic stenosis.  Kareem is on Warfarin for Atrial Fib which was initially held due to being anemic at 7.8.  Upon admission he had Epistaxis in which ENT found a perforation of the septal.  Recommendation to follow up with ENT upon discharge.    During the hospital stay found to have urinary retention and hematuria.  Urology consulted and donaldson catheter placed with follow up outpatient for voiding trial.    During his stay, he lost 8.8L of fluid.  Started at 177 lbs and down to 149 at discharge.  Did have some elevation of his Cr but referred to primary care team if a outpatient renal consult would be in his best interest.      Updates on Status Since Skilled nursing Admission: meeting Kareem today in his room.  He was up in the recliner.  Alert and appeared tired as he is attending therapies.  Oxygen at 2L/min via NC.  INR done today and so addressed as it will be managed here.  Nursing handed this NP his admission packet as diagnoses needed for most of his medications which mainly is herbal medications  and vitamins.  Only on a few medications prescribed.    Did come with a catheter and so a proper diagnosis needed for medicare management.      CODE STATUS/ADVANCE DIRECTIVES DISCUSSION:   DNR / DNI  Patient's living condition: lives with spouse  ALLERGIES: Amlodipine, Paroxetine, and Terfenadine  PAST MEDICAL HISTORY:  has a past medical history of Anxiety, Atrial fibrillation (H) (1/9/2009), BPH, GERD (gastroesophageal reflux disease), Pure hypercholesterolemia, and Unspecified essential hypertension. He also has no past medical history of Difficult intubation, Malignant hyperthermia, PONV (postoperative nausea and vomiting), or Spinal headache.  PAST SURGICAL HISTORY:   has a past surgical history that includes REMOVAL GALLBLADDER; REPAIR ING HERNIA,5+Y/O,REDUCIBL; SURGICAL PATHOLOGY; REMOVAL OF HYDROCELE,TUNICA,UNILAT; UPPER GI ENDOSCOPY,EXAM (6/16/10); colonoscopy (6/16/10); Phacoemulsification with standard intraocular lens implant (7/19/2012); Phacoemulsification with standard intraocular lens implant (8/16/2012); Laparoscopic herniorrhaphy preperitoneal (9/19/2012); Arthroplasty Hip Anterior (11/4/2013); Open reduction internal fixation forearm (Left, 12/29/2014); and Laser YAG capsulotomy (Left, 4/6/2017).  FAMILY HISTORY: family history includes Breast Cancer in his sister; C.A.D. in his mother; Cerebrovascular Disease in his father; Circulatory in his maternal grandfather; Diabetes in his brother and maternal grandmother; Hypertension in his father.  SOCIAL HISTORY:   reports that he has never smoked. He has never used smokeless tobacco. He reports previous alcohol use of about 1.0 standard drinks of alcohol per week. He reports that he does not use drugs.    Post Discharge Medication Reconciliation Status: discharge medications reconciled and changed, per note/orders    Current Outpatient Medications   Medication Sig Dispense Refill     acetaminophen (TYLENOL) 500 MG tablet Take 1,000 mg by mouth every 6  hours as needed for mild pain       aspirin (ASPIRIN) 81 MG EC tablet Take 81 mg by mouth daily       Bromelains 500 MG TABS Take 1 tablet by mouth 3 times daily (with meals)        CAPSICUM, CAYENNE, PO Take 450 mg by mouth 3 times daily (with meals)        CHELATED MAGNESIUM PO Take 100 mg by mouth 3 times daily (with meals) Three tablets daily        cholecalciferol 25 MCG (1000 UT) TABS Take 1 tablet by mouth daily       COENZYME Q10 Take 200 mg by mouth 2 times daily        cyanocobalamin (VITAMIN B-12) 1000 MCG tablet Take 1,000 mcg by mouth daily       Ferrous Sulfate 324 (65 Fe) MG TBEC Take 324 mg by mouth 2 times daily       GARLIC PO Take 600 mg by mouth 3 times daily Kyolic aged garlic       hydrALAZINE (APRESOLINE) 25 MG tablet Take 25 mg by mouth 3 times daily       isosorbide dinitrate (ISORDIL) 10 MG tablet Take 10 mg by mouth 3 times daily       Lutein-Zeaxanthin 25-5 MG CAPS Take by mouth daily       Lycopene 10 MG CAPS Take by mouth daily       MELATONIN PO Take 10 mg by mouth At Bedtime       metoprolol succinate ER (TOPROL-XL) 25 MG 24 hr tablet Take 12.5 mg by mouth daily       OMEGA 3 1200 MG OR CAPS Take by mouth 3 times daily (with meals)   0     potassium 99 MG TABS Take by mouth daily       Saw Palmetto, Serenoa repens, (SAW PALMETTO EXTRACT PO) Take 450 mg by mouth daily        SELENIUM 200 MCG OR CAPS 1 CAPSULE DAILY       simvastatin (ZOCOR) 40 MG tablet TAKE ONE TABLET BY MOUTH AT BEDTIME 90 tablet 3     sodium chloride (OCEAN) 0.65 % nasal spray Spray 2 sprays into both nostrils every 4 hours as needed for congestion       tamsulosin (FLOMAX) 0.4 MG capsule Take 2 capsules (0.8 mg) by mouth daily 180 capsule 3     torsemide (DEMADEX) 10 MG tablet Take 40 mg by mouth daily       TURMERIC PO Take 750 mg by mouth daily        vitamin B complex with vitamin C (VITAMIN  B COMPLEX) tablet Take 1 tablet by mouth daily       vitamin C (ASCORBIC ACID) 1000 MG TABS Take 1,000 mg by mouth 2  "times daily        vitamin E 400 units TABS Take 400 Units by mouth daily       warfarin ANTICOAGULANT (COUMADIN) 1 MG tablet Take by mouth daily Per INR results       zinc gluconate 50 MG tablet Take 50 mg by mouth daily       acetaminophen (TYLENOL) 325 MG tablet Take 3 tablets (975 mg) by mouth every 8 hours as needed for mild pain       ALPRAZolam (XANAX) 0.25 MG tablet Take 1 tablet (0.25 mg) by mouth nightly as needed for anxiety (Patient not taking: Reported on 3/2/2021) 30 tablet 0     FERROUS FUMARATE PO Take 18 mg by mouth as needed       furosemide (LASIX) 20 MG tablet Take 20 mg by mouth daily       hydrALAZINE (APRESOLINE) 10 MG tablet Take 10 mg by mouth 3 times daily       Misc Natural Products (PUMPKIN SEED OIL) CAPS Take 1,000 mg by mouth 2 times daily       torsemide (DEMADEX) 10 MG tablet 2 in AM and 1 at noon. 60 tablet 1     warfarin ANTICOAGULANT (COUMADIN) 5 MG tablet Take 7.5 mg Mon, Fri and 5 mg all other days or as directed by the coumadin clinic. (Patient taking differently: Take 7.5 mg Mon, Fri and 5 mg all other days or as directed by the coumadin clinic.  Takes only 7.5 mg on Mondays.) 120 tablet 1     ZINC 30 MG OR TABS 1 TABLET DAILY           ROS:  10 point ROS of systems including Constitutional, Eyes, Respiratory, Cardiovascular, Gastroenterology, Genitourinary, Integumentary, Musculoskeletal, Psychiatric were all negative except for pertinent positives noted in my HPI.    Vitals:  BP 98/54   Pulse 50   Temp 98.2  F (36.8  C)   Resp 16   Ht 1.702 m (5' 7\")   Wt 69.2 kg (152 lb 8 oz)   SpO2 97%   BMI 23.88 kg/m    Exam:  GENERAL APPEARANCE:  Alert, in no distress, cooperative, cognitive impairment noted  EYES:  Conjunctiva and lids normal  RESP:  respiratory effort and palpation of chest normal, lungs clear to auscultation , no respiratory distress, oxygen at 2L/min  CV:  Palpation and auscultation of heart done , no edema, heart rate regular/irregular  ABDOMEN:  normal bowel " sounds, soft, nontender, no hepatosplenomegaly or other masses, no guarding or rebound  M/S:   Gait and station abnormal assist of one with transfers, has w/c in room  SKIN:  pale, warm, frail and dry.  bruising noted on his arms  PSYCH:  oriented to person and place, time is vague, memory impaired , affect and mood normal    Lab/Diagnostic data:  3/11/21  INR = 3.2  3/11/21  Mg ++ = 2.0  3/4/21 HgB = 9.1    BMP 3/11/21  Component Value Ref Range Performed At [facility] Signature   Sodium 142 136 - 146 mmol/L [facility]     Potassium 3.4 (L) 3.5 - 5.1 mmol/L [facility]     Chloride 91 (L) 98 - 107 mmol/L [facility]     CO2 38 (H) 22 - 29 mmol/L [facility]     Anion Gap 16.4 10.0 - 20.0 mmol/L [facility]     Creatinine 2.92 (H) 0.72 - 1.25 mg/dL [facility]     Blood Urea Nitrogen 100.7 (H) 10.0 - 20.0 mg/dL [facility]     BUN/Creatinine Ratio 34.49 (H) 11.70 - 22.90 ratio [facility]     Calcium 9.2 8.6 - 10.5 mg/dL [facility]       Component      Latest Ref Rng & Units 3/12/2021   INR      0.86 - 1.14 3.19 (H)         ASSESSMENT/PLAN:  CHF (congestive heart failure), NYHA class IV, acute, systolic (H)  Severe aortic stenosis  S/P TAVR (transcatheter aortic valve replacement)  Pulmonary hypertension (H)  - currently on O2 at 2L/min.  No respiratory distress.  No cough noted.  Will stay on Torsemide 40mg po daily and potassium 99mg daily with meal.    Is on fluid restriction of 2000ml daily.    1.  Will order daily weights.      As for blood pressure medications and other medications, Kareem takes ASA 81mg daily, CoQ10 200mg daily, Fish Oil 1200mg with meals, hydralazine 25mg TID,  Isosorbide 10mg TID, toprol XL 12.5mg daily plus other herbals that he came with.      Vitals daily and continue to monitor his B/P's.      Controlled atrial fibrillation (H)  Anticoagulation goal of INR 2 to 3  Encounter for therapeutic drug monitoring  - currently on Warfarin 4mg po daily.  1.  Lower Warfarin to 3.5mg po daily and recheck on 3/17/21.  Monitor for bleeding.    Benign prostatic hyperplasia with urinary obstruction  Donaldson catheter in place  - donaldson to straight drainage.  Dark in color.  Will be keeping in catheter and suggestion was to follow up with urology outpatient.  Will have to coordinate with spouse for a visit to a urologist of their choice.    Does receive Flomax 0.8mg daily and is on Saw palmetto 450mg daily.    Iron deficiency anemia, unspecified iron deficiency anemia type  Came with order for FeSO 325mg po daily and Vitamin C 1000mg twice a day.  No changes.    Sleep disturbances  Staff will monitor his sleep with a sleep log but is on melatonin 10mg at HS.         Orders written by provider at facility  INR = 3.19  Decrease Warfarin to 3.5mg po daily  INR on 3/17/21 for Atrial Fib    1.  Weight daily for heart failure  2.  O2 at 1-3L/min via NC to maintain O2 sats >90%  3.  Please see packet for dx for medications for multiple herbal and vitamins that all are not listed above.    4.  Catheter for obstructive uropathy, BPH with obstruction.    Total time spent with patient visit at the [facility] nursing facility was 35 min including patient visit and review of past records. Greater than 50% of total time spent with counseling and coordinating care due to gathering information, discussing plan of care as he thinks he is here long term, and anticipation of discharge and what patient needs to accomplish to return home safely with his spouse.     Electronically signed by:  FORREST Zuniga CNP

## 2021-03-12 NOTE — LETTER
3/12/2021        RE: Lori Mehta  [address]  [facility] [address]        [place] [facility]  PRIMARY CARE PROVIDER AND CLINIC:  Wale Short MD, [address] [facility] / [facility] [address]  Chief Complaint   Patient presents with     Hospital F/U     [place] Medical Record Number:  [id number]  Place of Service where encounter took place:  [facility] ([facility]) [[id number]]    Lori Mehta  is a 89 year old  (6/27/1928), admitted to the above facility from  [facility]. Hospital stay 3/2/21 through 3/11/21..  Admitted to this facility for  rehab, medical management and nursing care.    HPI:    HPI information obtained from: facility chart records, facility staff, patient report and Care Everywhere Epic chart review.   Brief Summary of Hospital Course: Lori transferred from [facility] to [facility] fur acute systolic heart failure.  Chest x-ray showed pulmonary vascular congestion on 3/9/21.  He had a recent TAVR on 2/9/21 due to severe aortic stenosis.  Kareem is on Warfarin for Atrial Fib which was initially held due to being anemic at 7.8.  Upon admission he had Epistaxis in which ENT found a perforation of the septal.  Recommendation to follow up with ENT upon discharge.    During the hospital stay found to have urinary retention and hematuria.  Urology consulted and donaldson catheter placed with follow up outpatient for voiding trial.    During his stay, he lost 8.8L of fluid.  Started at 177 lbs and down to 149 at discharge.  Did have some elevation of his Cr but referred to primary care team if a outpatient renal consult would be in his best interest.      Updates on Status Since Skilled nursing Admission: meeting Kareem today in his room.  He was up in the recliner.  Alert and appeared tired as he is attending therapies.  Oxygen at 2L/min via NC.  INR done today and so addressed as it will be managed here.  Nursing handed this NP his admission  packet as diagnoses needed for most of his medications which mainly is herbal medications and vitamins.  Only on a few medications prescribed.    Did come with a catheter and so a proper diagnosis needed for medicare management.      CODE STATUS/ADVANCE DIRECTIVES DISCUSSION:   DNR / DNI  Patient's living condition: lives with spouse  ALLERGIES: Amlodipine, Paroxetine, and Terfenadine  PAST MEDICAL HISTORY:  has a past medical history of Anxiety, Atrial fibrillation (H) (1/9/2009), BPH, GERD (gastroesophageal reflux disease), Pure hypercholesterolemia, and Unspecified essential hypertension. He also has no past medical history of Difficult intubation, Malignant hyperthermia, PONV (postoperative nausea and vomiting), or Spinal headache.  PAST SURGICAL HISTORY:   has a past surgical history that includes REMOVAL GALLBLADDER; REPAIR ING HERNIA,5+Y/O,REDUCIBL; SURGICAL PATHOLOGY; REMOVAL OF HYDROCELE,TUNICA,UNILAT; UPPER GI ENDOSCOPY,EXAM (6/16/10); colonoscopy (6/16/10); Phacoemulsification with standard intraocular lens implant (7/19/2012); Phacoemulsification with standard intraocular lens implant (8/16/2012); Laparoscopic herniorrhaphy preperitoneal (9/19/2012); Arthroplasty Hip Anterior (11/4/2013); Open reduction internal fixation forearm (Left, 12/29/2014); and Laser YAG capsulotomy (Left, 4/6/2017).  FAMILY HISTORY: family history includes Breast Cancer in his sister; C.A.D. in his mother; Cerebrovascular Disease in his father; Circulatory in his maternal grandfather; Diabetes in his brother and maternal grandmother; Hypertension in his father.  SOCIAL HISTORY:   reports that he has never smoked. He has never used smokeless tobacco. He reports previous alcohol use of about 1.0 standard drinks of alcohol per week. He reports that he does not use drugs.    Post Discharge Medication Reconciliation Status: discharge medications reconciled and changed, per note/orders    Current Outpatient Medications   Medication  Sig Dispense Refill     acetaminophen (TYLENOL) 500 MG tablet Take 1,000 mg by mouth every 6 hours as needed for mild pain       aspirin (ASPIRIN) 81 MG EC tablet Take 81 mg by mouth daily       Bromelains 500 MG TABS Take 1 tablet by mouth 3 times daily (with meals)        CAPSICUM, CAYENNE, PO Take 450 mg by mouth 3 times daily (with meals)        CHELATED MAGNESIUM PO Take 100 mg by mouth 3 times daily (with meals) Three tablets daily        cholecalciferol 25 MCG (1000 UT) TABS Take 1 tablet by mouth daily       COENZYME Q10 Take 200 mg by mouth 2 times daily        cyanocobalamin (VITAMIN B-12) 1000 MCG tablet Take 1,000 mcg by mouth daily       Ferrous Sulfate 324 (65 Fe) MG TBEC Take 324 mg by mouth 2 times daily       GARLIC PO Take 600 mg by mouth 3 times daily Kyolic aged garlic       hydrALAZINE (APRESOLINE) 25 MG tablet Take 25 mg by mouth 3 times daily       isosorbide dinitrate (ISORDIL) 10 MG tablet Take 10 mg by mouth 3 times daily       Lutein-Zeaxanthin 25-5 MG CAPS Take by mouth daily       Lycopene 10 MG CAPS Take by mouth daily       MELATONIN PO Take 10 mg by mouth At Bedtime       metoprolol succinate ER (TOPROL-XL) 25 MG 24 hr tablet Take 12.5 mg by mouth daily       OMEGA 3 1200 MG OR CAPS Take by mouth 3 times daily (with meals)   0     potassium 99 MG TABS Take by mouth daily       Saw Palmetto, Serenoa repens, (SAW PALMETTO EXTRACT PO) Take 450 mg by mouth daily        SELENIUM 200 MCG OR CAPS 1 CAPSULE DAILY       simvastatin (ZOCOR) 40 MG tablet TAKE ONE TABLET BY MOUTH AT BEDTIME 90 tablet 3     sodium chloride (OCEAN) 0.65 % nasal spray Spray 2 sprays into both nostrils every 4 hours as needed for congestion       tamsulosin (FLOMAX) 0.4 MG capsule Take 2 capsules (0.8 mg) by mouth daily 180 capsule 3     torsemide (DEMADEX) 10 MG tablet Take 40 mg by mouth daily       TURMERIC PO Take 750 mg by mouth daily        vitamin B complex with vitamin C (VITAMIN  B COMPLEX) tablet Take 1  "tablet by mouth daily       vitamin C (ASCORBIC ACID) 1000 MG TABS Take 1,000 mg by mouth 2 times daily        vitamin E 400 units TABS Take 400 Units by mouth daily       warfarin ANTICOAGULANT (COUMADIN) 1 MG tablet Take by mouth daily Per INR results       zinc gluconate 50 MG tablet Take 50 mg by mouth daily       acetaminophen (TYLENOL) 325 MG tablet Take 3 tablets (975 mg) by mouth every 8 hours as needed for mild pain       ALPRAZolam (XANAX) 0.25 MG tablet Take 1 tablet (0.25 mg) by mouth nightly as needed for anxiety (Patient not taking: Reported on 3/2/2021) 30 tablet 0     FERROUS FUMARATE PO Take 18 mg by mouth as needed       furosemide (LASIX) 20 MG tablet Take 20 mg by mouth daily       hydrALAZINE (APRESOLINE) 10 MG tablet Take 10 mg by mouth 3 times daily       Misc Natural Products (PUMPKIN SEED OIL) CAPS Take 1,000 mg by mouth 2 times daily       torsemide (DEMADEX) 10 MG tablet 2 in AM and 1 at noon. 60 tablet 1     warfarin ANTICOAGULANT (COUMADIN) 5 MG tablet Take 7.5 mg Mon, Fri and 5 mg all other days or as directed by the coumadin clinic. (Patient taking differently: Take 7.5 mg Mon, Fri and 5 mg all other days or as directed by the coumadin clinic.  Takes only 7.5 mg on Mondays.) 120 tablet 1     ZINC 30 MG OR TABS 1 TABLET DAILY           ROS:  10 point ROS of systems including Constitutional, Eyes, Respiratory, Cardiovascular, Gastroenterology, Genitourinary, Integumentary, Musculoskeletal, Psychiatric were all negative except for pertinent positives noted in my HPI.    Vitals:  BP 98/54   Pulse 50   Temp 98.2  F (36.8  C)   Resp 16   Ht 1.702 m (5' 7\")   Wt 69.2 kg (152 lb 8 oz)   SpO2 97%   BMI 23.88 kg/m    Exam:  GENERAL APPEARANCE:  Alert, in no distress, cooperative, cognitive impairment noted  EYES:  Conjunctiva and lids normal  RESP:  respiratory effort and palpation of chest normal, lungs clear to auscultation , no respiratory distress, oxygen at 2L/min  CV:  Palpation and " auscultation of heart done , no edema, heart rate regular/irregular  ABDOMEN:  normal bowel sounds, soft, nontender, no hepatosplenomegaly or other masses, no guarding or rebound  M/S:   Gait and station abnormal assist of one with transfers, has w/c in room  SKIN:  pale, warm, frail and dry.  bruising noted on his arms  PSYCH:  oriented to person and place, time is vague, memory impaired , affect and mood normal    Lab/Diagnostic data:  3/11/21  INR = 3.2  3/11/21  Mg ++ = 2.0  3/4/21 HgB = 9.1    BMP 3/11/21  Component Value Ref Range Performed At [facility]   Sodium 142 136 - 146 mmol/L [facility]     Potassium 3.4 (L) 3.5 - 5.1 mmol/L [facility]     Chloride 91 (L) 98 - 107 mmol/L [facility]     CO2 38 (H) 22 - 29 mmol/L [facility]     Anion Gap 16.4 10.0 - 20.0 mmol/L [facility]     Creatinine 2.92 (H) 0.72 - 1.25 mg/dL [facility]     Blood Urea Nitrogen 100.7 (H) 10.0 - 20.0 mg/dL [facility]     BUN/Creatinine Ratio 34.49 (H) 11.70 - 22.90 ratio [facility]     Calcium 9.2 8.6 - 10.5 mg/dL [facility]       Component      Latest Ref Rng & Units 3/12/2021   INR      0.86 - 1.14 3.19 (H)         ASSESSMENT/PLAN:  CHF (congestive heart failure), NYHA class IV, acute, systolic (H)  Severe aortic stenosis  S/P TAVR (transcatheter aortic valve replacement)  Pulmonary hypertension (H)  - currently on O2 at 2L/min.  No respiratory distress.  No cough noted.  Will stay on Torsemide 40mg po daily and potassium 99mg daily with meal.    Is on fluid restriction of 2000ml daily.    1.  Will order daily weights.      As for blood pressure medications and other medications, Kareem  takes ASA 81mg daily, CoQ10 200mg daily, Fish Oil 1200mg with meals, hydralazine 25mg TID, Isosorbide 10mg TID, toprol XL 12.5mg daily plus other herbals that he came with.      Vitals daily and continue to monitor his B/P's.      Controlled atrial fibrillation (H)  Anticoagulation goal of INR 2 to 3  Encounter for therapeutic drug monitoring  - currently on Warfarin 4mg po daily.  1.  Lower Warfarin to 3.5mg po daily and recheck on 3/17/21.  Monitor for bleeding.    Benign prostatic hyperplasia with urinary obstruction  Donaldson catheter in place  - donaldson to straight drainage.  Dark in color.  Will be keeping in catheter and suggestion was to follow up with urology outpatient.  Will have to coordinate with spouse for a visit to a urologist of their choice.    Does receive Flomax 0.8mg daily and is on Saw palmetto 450mg daily.    Iron deficiency anemia, unspecified iron deficiency anemia type  Came with order for FeSO 325mg po daily and Vitamin C 1000mg twice a day.  No changes.    Sleep disturbances  Staff will monitor his sleep with a sleep log but is on melatonin 10mg at .         Orders written by provider at facility  INR = 3.19  Decrease Warfarin to 3.5mg po daily  INR on 3/17/21 for Atrial Fib    1.  Weight daily for heart failure  2.  O2 at 1-3L/min via NC to maintain O2 sats >90%  3.  Please see packet for dx for medications for multiple herbal and vitamins that all are not listed above.    4.  Catheter for obstructive uropathy, BPH with obstruction.    Total time spent with patient visit at the skilled nursing facility was 35 min including patient visit and review of past records. Greater than 50% of total time spent with counseling and coordinating care due to gathering information, discussing plan of care as he thinks he is here long term, and anticipation of discharge and what patient needs to accomplish to return home safely with his spouse.     Electronically signed by:  FORREST Zuniga  CNP                           Sincerely,        FORREST Zuniga CNP     17-Sep-2017 12:08

## 2021-03-15 NOTE — TELEPHONE ENCOUNTER
I spoke with Fatoumata who is looking into replacing all of Kareem's vitamins (A, C, C, D, etc) with one multivitamin but noticed that it contains 30 mcg of Marly K. She will order the multivitamin and will bring it to Kareem who is staying at the [facility] for now. I did advise her that it can have an affect on the INR but the staff at the [facility] can check INR and manage the Coumadin dose       She reported understanding  Lakeisha Rodriguez RN

## 2021-03-15 NOTE — TELEPHONE ENCOUNTER
Patients wife is calling to talk to coumadin nurse. She has questions about other medications if ok to take with this.     [phone] - Fatoumata is wife

## 2021-03-17 NOTE — PROGRESS NOTES
" [facility]  Lori Mehta is being evaluated via a billable video.   The patient has been notified of following:  \"This video visit will be conducted via a call between you and your provider. We have found that certain health care needs can be provided without the need for an in-person physical exam.  This service lets us provide the care you need with a video conversation. If during the course of the call the provider feels a video visit is not appropriate, you will not be charged for this service.\"   The provider has received verbal consent for a Video Visit from the patient and or first contact? Yes  Patient/facility staff would like the video invitation sent by: N/A   Video Start Time: 12:47  Which Facility the Patient is at during the time of visit: [facility]   Received verbal consent to use Care Everywhere in order to access labs, documents, histories, and all other needed information to provide care at current facility.  PRIMARY CARE PROVIDER AND CLINIC:  Wale Short MD, [address] [facility] [address]  Chief Complaint   Patient presents with     Hospital F/U     [place] Medical Record Number:  [id number]  Lori Mehta  is a 89 year old  (6/27/1928), admitted to the above facility from  [facility]. Hospital stay 3/2/21 through 3/11/21..  Admitted to this facility for  rehab, medical management and nursing care.  HPI:    HPI information obtained from: facility chart records, facility staff, patient report, [facility] chart review, Care Everywhere [facility] chart review and GNP.   Brief Summary of Hospital Course:   -pt with PMH notable HFrEF, A-fib,  portal stenosis, status post recent TAVR [2/29/21],  hospitalized with CHF exacerbation.  Echo revealed EF 35%, managed with Lasix drip, Transitioned to p.o.  Negative fluid balance of 8.8 L  - was found to have hemoglobin at 7.8, Coumadin held temporarily.    -Hospitalization complicated with:  * urine " "retention, urology consulted, Bowens's catheter placed.    * Epistaxis secondary to nasal septal perforation, ENT follow-up with outpatient basis.    * worsening creatinine, felt to be related to cardiorenal physiology.       Today:  - Pt seen in the presence of RN who graciously assisted with the virtual visit  - CHF:  Reports same like when came here, feels ok if he is on O2, GNP reports pt is on 3 liter of O2. Pt reports SOB with exertions, otherwise no. Endorses last night was the first night slept well, GNP reports the head side of the  Bed is elevated at 30 degreed and has 2 pillows. Reports at homes sleep on 2-3 pillows. Pt reports feet and legs are still swollen. GNP reports 3+ pitting edema in calves.  \" I wish I did not get the procedure done, now I am more tired\".   - Urinary Retention: GNP reports urine color is scottie and dark urine.   - Epistaxis: pt reports no more bleeding from nose.   - Rehab:  Tiring.     ======================================    CODE STATUS/ADVANCE DIRECTIVES DISCUSSION:   DNR / DNI  Patient's living condition: lives with spouse  ALLERGIES: Amlodipine, Paroxetine, and Terfenadine  PAST MEDICAL HISTORY:  has a past medical history of Anxiety, Atrial fibrillation (H) (1/9/2009), BPH, GERD (gastroesophageal reflux disease), Pure hypercholesterolemia, and Unspecified essential hypertension. He also has no past medical history of Difficult intubation, Malignant hyperthermia, PONV (postoperative nausea and vomiting), or Spinal headache.  PAST SURGICAL HISTORY:   has a past surgical history that includes REMOVAL GALLBLADDER; REPAIR ING HERNIA,5+Y/O,REDUCIBL; SURGICAL PATHOLOGY; REMOVAL OF HYDROCELE,TUNICA,UNILAT; UPPER GI ENDOSCOPY,EXAM (6/16/10); colonoscopy (6/16/10); Phacoemulsification with standard intraocular lens implant (7/19/2012); Phacoemulsification with standard intraocular lens implant (8/16/2012); Laparoscopic herniorrhaphy preperitoneal (9/19/2012); Arthroplasty Hip Anterior " (11/4/2013); Open reduction internal fixation forearm (Left, 12/29/2014); and Laser YAG capsulotomy (Left, 4/6/2017).  FAMILY HISTORY: family history includes Breast Cancer in his sister; C.A.D. in his mother; Cerebrovascular Disease in his father; Circulatory in his maternal grandfather; Diabetes in his brother and maternal grandmother; Hypertension in his father.  SOCIAL HISTORY:   reports that he has never smoked. He has never used smokeless tobacco. He reports previous alcohol use of about 1.0 standard drinks of alcohol per week. He reports that he does not use drugs.  Current Outpatient Medications   Medication Sig Dispense Refill     acetaminophen (TYLENOL) 325 MG tablet Take 3 tablets (975 mg) by mouth every 8 hours as needed for mild pain       acetaminophen (TYLENOL) 500 MG tablet Take 1,000 mg by mouth every 6 hours as needed for mild pain       ALPRAZolam (XANAX) 0.25 MG tablet Take 1 tablet (0.25 mg) by mouth nightly as needed for anxiety (Patient not taking: Reported on 3/2/2021) 30 tablet 0     aspirin (ASPIRIN) 81 MG EC tablet Take 81 mg by mouth daily       Bromelains 500 MG TABS Take 1 tablet by mouth 3 times daily (with meals)        CAPSICUM, CAYENNE, PO Take 450 mg by mouth 3 times daily (with meals)        CHELATED MAGNESIUM PO Take 100 mg by mouth 3 times daily (with meals) Three tablets daily        cholecalciferol 25 MCG (1000 UT) TABS Take 1 tablet by mouth daily       COENZYME Q10 Take 200 mg by mouth 2 times daily        cyanocobalamin (VITAMIN B-12) 1000 MCG tablet Take 1,000 mcg by mouth daily       FERROUS FUMARATE PO Take 18 mg by mouth as needed       Ferrous Sulfate 324 (65 Fe) MG TBEC Take 324 mg by mouth 2 times daily       furosemide (LASIX) 20 MG tablet Take 20 mg by mouth daily       GARLIC PO Take 600 mg by mouth 3 times daily Kyolic aged garlic       hydrALAZINE (APRESOLINE) 10 MG tablet Take 10 mg by mouth 3 times daily       hydrALAZINE (APRESOLINE) 25 MG tablet Take 25 mg  by mouth 3 times daily       isosorbide dinitrate (ISORDIL) 10 MG tablet Take 10 mg by mouth 3 times daily       Lutein-Zeaxanthin 25-5 MG CAPS Take by mouth daily       Lycopene 10 MG CAPS Take by mouth daily       MELATONIN PO Take 10 mg by mouth At Bedtime       metoprolol succinate ER (TOPROL-XL) 25 MG 24 hr tablet Take 12.5 mg by mouth daily       Misc Natural Products (PUMPKIN SEED OIL) CAPS Take 1,000 mg by mouth 2 times daily       OMEGA 3 1200 MG OR CAPS Take by mouth 3 times daily (with meals)   0     potassium 99 MG TABS Take by mouth daily       Saw Palmetto, Serenoa repens, (SAW PALMETTO EXTRACT PO) Take 450 mg by mouth daily        SELENIUM 200 MCG OR CAPS 1 CAPSULE DAILY       simvastatin (ZOCOR) 40 MG tablet TAKE ONE TABLET BY MOUTH AT BEDTIME 90 tablet 3     sodium chloride (OCEAN) 0.65 % nasal spray Spray 2 sprays into both nostrils every 4 hours as needed for congestion       tamsulosin (FLOMAX) 0.4 MG capsule Take 2 capsules (0.8 mg) by mouth daily 180 capsule 3     torsemide (DEMADEX) 10 MG tablet Take 40 mg by mouth daily       torsemide (DEMADEX) 10 MG tablet 2 in AM and 1 at noon. 60 tablet 1     TURMERIC PO Take 750 mg by mouth daily        vitamin B complex with vitamin C (VITAMIN  B COMPLEX) tablet Take 1 tablet by mouth daily       vitamin C (ASCORBIC ACID) 1000 MG TABS Take 1,000 mg by mouth 2 times daily        vitamin E 400 units TABS Take 400 Units by mouth daily       warfarin ANTICOAGULANT (COUMADIN) 1 MG tablet Take by mouth daily Per INR results       warfarin ANTICOAGULANT (COUMADIN) 5 MG tablet Take 7.5 mg Mon, Fri and 5 mg all other days or as directed by the coumadin clinic. (Patient taking differently: Take 7.5 mg Mon, Fri and 5 mg all other days or as directed by the coumadin clinic.  Takes only 7.5 mg on Mondays.) 120 tablet 1     ZINC 30 MG OR TABS 1 TABLET DAILY       zinc gluconate 50 MG tablet Take 50 mg by mouth daily        discharge medications reconciled and  "changed, per note/orders    ROS: 10 point ROS of systems including Constitutional, Eyes, Respiratory, Cardiovascular, Gastroenterology, Genitourinary, Integumentary, Musculoskeletal, Psychiatric were all negative except for pertinent positives noted in my HPI.  Vitals:/49   Pulse 67   Temp 98.2  F (36.8  C)   Resp 18   Ht 1.702 m (5' 7\")   Wt 71 kg (156 lb 8 oz)   SpO2 98%   BMI 24.51 kg/m     Limited Visit Exam done given COVID-19 precautions:  GENERAL APPEARANCE:  in no distress. Tired looking  RESP:  unlabored breathing. NC in place.   M/S:   no joint deformity noted  SKIN:  no rash noted  NEURO:   no purposeful movement in upper and lower extremities  PSYCH:  affect and mood normal      Lab/Diagnostic data: .Reviewed in the chart and EHR.        ASSESSMENT/PLAN:  -----------------------------  HFrEF with recent exacerbation (H)  Recent S/P TAVR  Permanent Atrial fibrillation on coumadin (H):   - compensating  -  on coumadin with INR followed closely  BP Readings from Last 3 Encounters:   03/17/21 112/49   03/17/21 112/49   03/12/21 98/54   - will need cardiology close follow up. High risk for rehospitalization.   - consider diuretics prn weight gain.   - strict charting I/O if possible.       Recent Acute Respiratory hypoxic failure: ongoing, still on O2. Will continue, wean off as tolerate.     Urine Retention: - will try another PVR here, if fails will reinsert. Has a follow up with Urology on April 14th, with Dr. De Jesus.     Epistaxis and nasal septal perforation: stable now, has a follow up with ENT on 4/12.     Polypharmacy: on multiple OTC. .    Order: See above, otherwise, continue the rest of the current POC.       Electronically signed by:  Nancy Kearney MD     Video-Visit Details  Type of service:  Video Visit  Video End Time (time video stopped): 12:58  Distant Location (provider location):  [place] [facility]             "

## 2021-03-17 NOTE — LETTER
"    3/17/2021        RE: Lori Mehta  [address] [facility] [address]        [place] [facility]  [facility] Record Number:  [id number]  Place of Service where encounter took place: [facility] ([facility]) [[id number]]    HPI:    Lori Mehta is a 89 year old  (6/27/1928), who is being seen today for an episodic care visit at the above location.   HPI information obtained from: facility chart records, facility staff, patient report and [facility] chart review. Today's concern is INR/Coumadin management for A. Fib    ROS/Subjective:  Bleeding Signs/Symptoms:  None  Thromboembolic Signs/Symptoms:  None  Medication Changes:  No  Dietary Changes:  No  Activity Changes: Yes: attending therapies  Bacterial/Viral Infection:  No  Missed Coumadin Doses:  None  On ASA: No  Other Concerns:  Yes: frail gentleman with heart failure    OBJECTIVE:  /49   Pulse 67   Temp 98.2  F (36.8  C)   Resp 18   Ht 1.702 m (5' 7\")   Wt 71 kg (156 lb 8 oz)   SpO2 98%   BMI 24.51 kg/m       Component      Latest Ref Rng & Units 3/12/2021 3/17/2021   INR      0.86 - 1.14 3.19 (H) 2.44 (H)       Current Dose:  Warfarin 3.5mg po daily  written on PO sheets    ASSESSMENT:  1. Controlled atrial fibrillation (H)    2. Anticoagulation goal of INR 2 to 3    3. Encounter for therapeutic drug monitoring      No complaints of chest palpitations  Therapeutic INR for goal of 2-3    PLAN:   Orders written by provider at facility  New Dose: No Change    Next INR: 1 week      Electronically signed by:  FORREST Zuniga CNP             Sincerely,        FORREST Zuniga CNP    "

## 2021-03-17 NOTE — PROGRESS NOTES
"[place] [facility]  [facility] Record Number:  [id number]  Place of Service where encounter took place[facility] [[id number]]    HPI:    Lori Mehta is a 89 year old  (6/27/1928), who is being seen today for an episodic care visit at the above location.   HPI information obtained from: facility chart records, facility staff, patient report and [facility] chart review. Today's concern is INR/Coumadin management for A. Fib    ROS/Subjective:  Bleeding Signs/Symptoms:  None  Thromboembolic Signs/Symptoms:  None  Medication Changes:  No  Dietary Changes:  No  Activity Changes: Yes: attending therapies  Bacterial/Viral Infection:  No  Missed Coumadin Doses:  None  On ASA: No  Other Concerns:  Yes: frail gentleman with heart failure    OBJECTIVE:  /49   Pulse 67   Temp 98.2  F (36.8  C)   Resp 18   Ht 1.702 m (5' 7\")   Wt 71 kg (156 lb 8 oz)   SpO2 98%   BMI 24.51 kg/m       Component      Latest Ref Rng & Units 3/12/2021 3/17/2021   INR      0.86 - 1.14 3.19 (H) 2.44 (H)       Current Dose:  Warfarin 3.5mg po daily  written on PO sheets    ASSESSMENT:  1. Controlled atrial fibrillation (H)    2. Anticoagulation goal of INR 2 to 3    3. Encounter for therapeutic drug monitoring      No complaints of chest palpitations  Therapeutic INR for goal of 2-3    PLAN:   Orders written by provider at facility  New Dose: No Change    Next INR: 1 week      Electronically signed by:  FORREST Zuniga CNP       "

## 2021-03-18 NOTE — LETTER
"    3/18/2021        RE: Lori Mehta  [address] [facility] [address]         [place] [facility]  Lori Mehta is being evaluated via a billable video.   The patient has been notified of following:  \"This video visit will be conducted via a call between you and your provider. We have found that certain health care needs can be provided without the need for an in-person physical exam.  This service lets us provide the care you need with a video conversation. If during the course of the call the provider feels a video visit is not appropriate, you will not be charged for this service.\"   The provider has received verbal consent for a Video Visit from the patient and or first contact? Yes  Patient/facility staff would like the video invitation sent by: N/A   Video Start Time: 12:47  Which Facility the Patient is at during the time of visit: [facility]   Received verbal consent to use Care Everywhere in order to access labs, documents, histories, and all other needed information to provide care at current facility.  PRIMARY CARE PROVIDER AND CLINIC:  Wale Short MD, [address] [facility] [address]  Chief Complaint   Patient presents with     Hospital F/U     [facility] Record Number:  [id number]  Lori Mehta  is a 89 year old  (6/27/1928), admitted to the above facility from  [facility]. Hospital stay 3/2/21 through 3/11/21..  Admitted to this facility for  rehab, medical management and nursing care.  HPI:    HPI information obtained from: facility chart records, facility staff, patient report, [facility] chart review, Care Everywhere [facility] chart review and GNP.   Brief Summary of Hospital Course:   -pt with PMH notable HFrEF, A-fib,  portal stenosis, status post recent TAVR [2/29/21],  hospitalized with CHF exacerbation.  Echo revealed EF 35%, managed with Lasix drip, Transitioned to p.o.  Negative fluid balance of 8.8 L  - was found to have hemoglobin " "at 7.8, Coumadin held temporarily.    -Hospitalization complicated with:  * urine retention, urology consulted, Bowens's catheter placed.    * Epistaxis secondary to nasal septal perforation, ENT follow-up with outpatient basis.    * worsening creatinine, felt to be related to cardiorenal physiology.       Today:  - Pt seen in the presence of RN who graciously assisted with the virtual visit  - CHF:  Reports same like when came here, feels ok if he is on O2, GNP reports pt is on 3 liter of O2. Pt reports SOB with exertions, otherwise no. Endorses last night was the first night slept well, GNP reports the head side of the  Bed is elevated at 30 degreed and has 2 pillows. Reports at homes sleep on 2-3 pillows. Pt reports feet and legs are still swollen. GNP reports 3+ pitting edema in calves.  \" I wish I did not get the procedure done, now I am more tired\".   - Urinary Retention: GNP reports urine color is scottie and dark urine.   - Epistaxis: pt reports no more bleeding from nose.   - Rehab:  Tiring.     ======================================    CODE STATUS/ADVANCE DIRECTIVES DISCUSSION:   DNR / DNI  Patient's living condition: lives with spouse  ALLERGIES: Amlodipine, Paroxetine, and Terfenadine  PAST MEDICAL HISTORY:  has a past medical history of Anxiety, Atrial fibrillation (H) (1/9/2009), BPH, GERD (gastroesophageal reflux disease), Pure hypercholesterolemia, and Unspecified essential hypertension. He also has no past medical history of Difficult intubation, Malignant hyperthermia, PONV (postoperative nausea and vomiting), or Spinal headache.  PAST SURGICAL HISTORY:   has a past surgical history that includes REMOVAL GALLBLADDER; REPAIR ING HERNIA,5+Y/O,REDUCIBL; SURGICAL PATHOLOGY; REMOVAL OF HYDROCELE,TUNICA,UNILAT; UPPER GI ENDOSCOPY,EXAM (6/16/10); colonoscopy (6/16/10); Phacoemulsification with standard intraocular lens implant (7/19/2012); Phacoemulsification with standard intraocular lens implant " (8/16/2012); Laparoscopic herniorrhaphy preperitoneal (9/19/2012); Arthroplasty Hip Anterior (11/4/2013); Open reduction internal fixation forearm (Left, 12/29/2014); and Laser YAG capsulotomy (Left, 4/6/2017).  FAMILY HISTORY: family history includes Breast Cancer in his sister; C.A.D. in his mother; Cerebrovascular Disease in his father; Circulatory in his maternal grandfather; Diabetes in his brother and maternal grandmother; Hypertension in his father.  SOCIAL HISTORY:   reports that he has never smoked. He has never used smokeless tobacco. He reports previous alcohol use of about 1.0 standard drinks of alcohol per week. He reports that he does not use drugs.  Current Outpatient Medications   Medication Sig Dispense Refill     acetaminophen (TYLENOL) 325 MG tablet Take 3 tablets (975 mg) by mouth every 8 hours as needed for mild pain       acetaminophen (TYLENOL) 500 MG tablet Take 1,000 mg by mouth every 6 hours as needed for mild pain       ALPRAZolam (XANAX) 0.25 MG tablet Take 1 tablet (0.25 mg) by mouth nightly as needed for anxiety (Patient not taking: Reported on 3/2/2021) 30 tablet 0     aspirin (ASPIRIN) 81 MG EC tablet Take 81 mg by mouth daily       Bromelains 500 MG TABS Take 1 tablet by mouth 3 times daily (with meals)        CAPSICUM, CAYENNE, PO Take 450 mg by mouth 3 times daily (with meals)        CHELATED MAGNESIUM PO Take 100 mg by mouth 3 times daily (with meals) Three tablets daily        cholecalciferol 25 MCG (1000 UT) TABS Take 1 tablet by mouth daily       COENZYME Q10 Take 200 mg by mouth 2 times daily        cyanocobalamin (VITAMIN B-12) 1000 MCG tablet Take 1,000 mcg by mouth daily       FERROUS FUMARATE PO Take 18 mg by mouth as needed       Ferrous Sulfate 324 (65 Fe) MG TBEC Take 324 mg by mouth 2 times daily       furosemide (LASIX) 20 MG tablet Take 20 mg by mouth daily       GARLIC PO Take 600 mg by mouth 3 times daily Kyolic aged garlic       hydrALAZINE (APRESOLINE) 10 MG  tablet Take 10 mg by mouth 3 times daily       hydrALAZINE (APRESOLINE) 25 MG tablet Take 25 mg by mouth 3 times daily       isosorbide dinitrate (ISORDIL) 10 MG tablet Take 10 mg by mouth 3 times daily       Lutein-Zeaxanthin 25-5 MG CAPS Take by mouth daily       Lycopene 10 MG CAPS Take by mouth daily       MELATONIN PO Take 10 mg by mouth At Bedtime       metoprolol succinate ER (TOPROL-XL) 25 MG 24 hr tablet Take 12.5 mg by mouth daily       Misc Natural Products (PUMPKIN SEED OIL) CAPS Take 1,000 mg by mouth 2 times daily       OMEGA 3 1200 MG OR CAPS Take by mouth 3 times daily (with meals)   0     potassium 99 MG TABS Take by mouth daily       Saw Palmetto, Serenoa repens, (SAW PALMETTO EXTRACT PO) Take 450 mg by mouth daily        SELENIUM 200 MCG OR CAPS 1 CAPSULE DAILY       simvastatin (ZOCOR) 40 MG tablet TAKE ONE TABLET BY MOUTH AT BEDTIME 90 tablet 3     sodium chloride (OCEAN) 0.65 % nasal spray Spray 2 sprays into both nostrils every 4 hours as needed for congestion       tamsulosin (FLOMAX) 0.4 MG capsule Take 2 capsules (0.8 mg) by mouth daily 180 capsule 3     torsemide (DEMADEX) 10 MG tablet Take 40 mg by mouth daily       torsemide (DEMADEX) 10 MG tablet 2 in AM and 1 at noon. 60 tablet 1     TURMERIC PO Take 750 mg by mouth daily        vitamin B complex with vitamin C (VITAMIN  B COMPLEX) tablet Take 1 tablet by mouth daily       vitamin C (ASCORBIC ACID) 1000 MG TABS Take 1,000 mg by mouth 2 times daily        vitamin E 400 units TABS Take 400 Units by mouth daily       warfarin ANTICOAGULANT (COUMADIN) 1 MG tablet Take by mouth daily Per INR results       warfarin ANTICOAGULANT (COUMADIN) 5 MG tablet Take 7.5 mg Mon, Fri and 5 mg all other days or as directed by the coumadin clinic. (Patient taking differently: Take 7.5 mg Mon, Fri and 5 mg all other days or as directed by the coumadin clinic.  Takes only 7.5 mg on Mondays.) 120 tablet 1     ZINC 30 MG OR TABS 1 TABLET DAILY       zinc  "gluconate 50 MG tablet Take 50 mg by mouth daily        discharge medications reconciled and changed, per note/orders    ROS: 10 point ROS of systems including Constitutional, Eyes, Respiratory, Cardiovascular, Gastroenterology, Genitourinary, Integumentary, Musculoskeletal, Psychiatric were all negative except for pertinent positives noted in my HPI.  Vitals:/49   Pulse 67   Temp 98.2  F (36.8  C)   Resp 18   Ht 1.702 m (5' 7\")   Wt 71 kg (156 lb 8 oz)   SpO2 98%   BMI 24.51 kg/m     Limited Visit Exam done given COVID-19 precautions:  GENERAL APPEARANCE:  in no distress. Tired looking  RESP:  unlabored breathing. NC in place.   M/S:   no joint deformity noted  SKIN:  no rash noted  NEURO:   no purposeful movement in upper and lower extremities  PSYCH:  affect and mood normal      Lab/Diagnostic data: .Reviewed in the chart and EHR.        ASSESSMENT/PLAN:  -----------------------------  HFrEF with recent exacerbation (H)  Recent S/P TAVR  Permanent Atrial fibrillation on coumadin (H):   - compensating  -  on coumadin with INR followed closely  BP Readings from Last 3 Encounters:   03/17/21 112/49   03/17/21 112/49   03/12/21 98/54   - will need cardiology close follow up. High risk for rehospitalization.   - consider diuretics prn weight gain.   - strict charting I/O if possible.       Recent Acute Respiratory hypoxic failure: ongoing, still on O2. Will continue, wean off as tolerate.     Urine Retention: - will try another PVR here, if fails will reinsert. Has a follow up with Urology on April 14th, with Dr. De Jesus.     Epistaxis and nasal septal perforation: stable now, has a follow up with ENT on 4/12.     Polypharmacy: on multiple OTC. .    Order: See above, otherwise, continue the rest of the current POC.       Electronically signed by:  Nancy Kearney MD     Video-Visit Details  Type of service:  Video Visit  Video End Time (time video stopped): 12:58  Distant Location (provider location):  " [place] [facility]                   Sincerely,        Nancy Kearney MD

## 2021-03-23 PROBLEM — N40.1 BENIGN PROSTATIC HYPERPLASIA WITH URINARY OBSTRUCTION: Status: ACTIVE | Noted: 2021-01-01

## 2021-03-23 PROBLEM — G47.9 SLEEP DISTURBANCES: Status: ACTIVE | Noted: 2021-01-01

## 2021-03-23 PROBLEM — D50.9 IRON DEFICIENCY ANEMIA, UNSPECIFIED IRON DEFICIENCY ANEMIA TYPE: Status: ACTIVE | Noted: 2021-01-01

## 2021-03-23 PROBLEM — Z51.81 ANTICOAGULATION GOAL OF INR 2 TO 3: Status: ACTIVE | Noted: 2021-01-01

## 2021-03-23 PROBLEM — Z97.8 FOLEY CATHETER IN PLACE: Status: ACTIVE | Noted: 2021-01-01

## 2021-03-23 PROBLEM — N13.8 BENIGN PROSTATIC HYPERPLASIA WITH URINARY OBSTRUCTION: Status: ACTIVE | Noted: 2021-01-01

## 2021-03-23 PROBLEM — Z79.01 ANTICOAGULATION GOAL OF INR 2 TO 3: Status: ACTIVE | Noted: 2021-01-01

## 2021-03-23 PROBLEM — I48.91 CONTROLLED ATRIAL FIBRILLATION (H): Status: ACTIVE | Noted: 2021-01-01

## 2021-03-23 NOTE — PROGRESS NOTES
Resident seen today but note needs to be opened.  Ordered comfort medications and dc'ed most all other medications.    Electronically signed by Amanda Castro RN, CNP

## 2021-03-23 NOTE — LETTER
"    3/23/2021        RE: Lori Mehta  [address]  [facility] [address]        [place] [facility]  [facility] Record Number:  [id number]  Place of Service where encounter took place:  [facility] ([facility]) [[id number]]  Chief Complaint   Patient presents with     RECHECK       HPI:    Lori Mehta  is a 89 year old (6/27/1928), who is being seen today for an episodic care visit.  HPI information obtained from: facility chart records, facility staff, patient report, [facility] chart review and family/first contact Fatoumata (spouse) report. Today's concern is:    1. CHF (congestive heart failure), NYHA class IV, acute, systolic (H)    2. Pulmonary hypertension (H)    3. Rate controlled atrial fibrillation (H)    4. Long-term (current) use of anticoagulants [Z79.01]    5. Failure to thrive in adult    6. Uropathy, obstructive    7. Benign prostatic hyperplasia with urinary obstruction    8. Indwelling Bowens catheter present      This NP came into the building for other reasons and staff updated that there has been a change in condition with Kareem and not knowing what path to take with him.      He is not eating, not keeping oxygen on- will fight to take it off, oxygen saturations in the 70's, \"I am dead\", \"let me die\", \"I am 89 years old, I want to die\".  Several different staff have come to talk with him but can not get him to change his mind.    Nurse manager and this NP called spouse to tell her the findings and asking for guidance of what the family would like the facility to do for Kareem.  Either send to hospital (which he does not want), add psych medication to hopefully change his thinking/calm him down, or discontinue majority of his medications, make comfort care and add comfort medications.    Family has a scheduled visit for Friday but asked the spouse to come in today if able to see Kareem.  She was able to come today and would bring her daughter with to see how he " "was doing and then maybe easier to make decisions.    This NP went in to see Kareem and he was sitting in a comfortable chair in room, arms stretched out on the arm rests and feet elevated.  \"I am dead\".  Negative comments.  \"I am 89 years old, let me die\".  Went to touch the top of his left foot and he pulled away.  \"Do not give me a shot\".  Explained that just doing a brief visit and checking the swelling.  Kept his eyes closed.  Oxygen tubing on his lap.      Past Medical and Surgical History reviewed in Epic today.    MEDICATIONS:    Current Outpatient Medications   Medication Sig Dispense Refill     acetaminophen (TYLENOL) 500 MG tablet Take 1,000 mg by mouth every 6 hours as needed for mild pain       LORazepam (ATIVAN) 2 MG/ML (HIGH CONC) solution Take 0.25 mLs (0.5 mg) by mouth every 4 hours as needed for anxiety 30 mL 0     MELATONIN PO Take 10 mg by mouth At Bedtime       morphine sulfate, high concentrate, (ROXANOL-CONCENTRATED) 20 MG/ML concentrated solution Take 0.25 mLs (5 mg) by mouth every hour as needed for shortness of breath / dyspnea or breakthrough pain 30 mL 0     sodium chloride (OCEAN) 0.65 % nasal spray Spray 2 sprays into both nostrils every 4 hours as needed for congestion       REVIEW OF SYSTEMS:  Did not want to participate in a review of systems.  His mind is set on dying.    Objective:  /58   Pulse 58   Temp 98.3  F (36.8  C)   Resp 18   Wt 71 kg (156 lb 8 oz)   SpO2 97%   BMI 24.51 kg/m    Exam:  Kareem is in the recliner but the back is up and not leaning back.  No acute respiratory distress.  Answering questions on occasion.  Knows he is 89 years old and he wants to die.  Coloring is pale/pink.  Skin is warm and dry.  Bruising on his forearms.  Catheter is present with good amount of clear yellow urine present in bag.  No sediment.    Wearing Tubi  socks.  +1+2 edema present on top of feet.    No coughing.  No apnea.  Oxygen at 3L/min via NC if he will wear it.  Speech " is clear for him.  No congestion detected.    Radial pulse regular/irregular.    Assist of one with mobility as well as ADLs.      Blood pressures range from 110-120's/50-60's.  Weights are around 156-157 lbs.      Labs:     Most Recent 3 CBC's:  Recent Labs   Lab Test 03/02/21  1054 02/16/21  1143 01/24/21  0646   WBC 8.6 7.6 7.4   HGB 8.1* 10.5* 10.9*   * 105* 100   * 116* 150     Most Recent 3 BMP's:  Recent Labs   Lab Test 03/02/21  1054 02/26/21  1015 02/23/21  1135    140 136   POTASSIUM 4.4 4.6 5.7*   CHLORIDE 101 106 104   CO2 28 27 26   BUN 95* 94* 92*   CR 2.69* 2.61* 2.54*   ANIONGAP 8 7 6   DELORIS 9.0 9.2 9.1   * 121* 109*     Most Recent 3 INR's:  Recent Labs   Lab Test 03/17/21  0700 03/12/21  0708 03/02/21  1202   INR 2.44* 3.19* 3.1*       ASSESSMENT/PLAN:  1. CHF (congestive heart failure), NYHA class IV, acute, systolic (H)    2. Pulmonary hypertension (H)    3. Rate controlled atrial fibrillation (H)    4. Long-term (current) use of anticoagulants [Z79.01]    5. Failure to thrive in adult    6. Uropathy, obstructive    7. Benign prostatic hyperplasia with urinary obstruction    8. Indwelling Bowens catheter present      Family came in to see Kareem and he told them the same feelings he is having.  Daughter told her mother that this is what he wants and no one will be mad if decision is to make him comfortable.    Decision was made to become comfort care.  Did not talk about hospice.  Will be keeping him here for now.  Family can stay with him for now and not be rushed out.    Kareem is on many many nutritional supplements.  Nurse manager and this NP reviewed each medication and decision made to discontinue all of them except nasal spray, melatonin, and PRN Tylenol.    Will discontinue the Flomax and keep the catheter present due to his BPH with obstruction.  Does have coumadin and due for INR tomorrow which will be discontinued.  Feel that he has started to refuse medications  but in the long run, the medications will affect his INR and it will be all over the place most likely.  Will discontinue the Warfarin as well.    He is on a small dose of Metoprolol and overall B/P's are stable.  Will remove the beta blocker.    Will keep the oxygen but for now discontinue the diuretic since he is refusing medications.  Goal is comfort and do not want to irritate him by offering medications.      Will order Roxanol 20mg/ml 5mg SL every 1 hour prn  Will order Lorazepam 2mg/ml 0.5mg SL every 5 hours prn.  Did send scripts over to pharmacy but due to his discomfort now, staff want to give Roxanol.  Call placed to pharmacy to remove Roxanol syringes from [employer] and given direction that staff can remove 4 syringes and pharmacy will only send 28ml of roxanol.        Orders written by  nurse:  1.  Discontinue all medications except ocean nasal spray, melatonin and Tylenol PRN.  2.  Discontinue INR for tomorrow  3.  Keep catheter in for BPH with obstruction.    4.  Roxanol 20mg/ml 5mg SL every 1 hour prn  5.  Lorazepam 2mg/ml 0.5mg SL every 5 hours prn.      Total time spent with patient visit at the [facility] nursing facility was 35 min including patient visit, review of past records, review of medications, phone call to patient contact and family visit. Greater than 50% of total time spent with counseling and coordinating care due to change in status and refusing medications, oxygen and voicing his desire to wanting of death. calling family to come in for compassion visit so they can see what condition he is in and speak with him if he would change his mind.  Also putting in place comfort measures.    Electronically signed by:  FORREST Zuniga CNP                 Sincerely,        FORREST Zuniga CNP

## 2021-03-23 NOTE — PROGRESS NOTES
"[facility]  [facility] Record Number:  [id number]  Place of Service where encounter took place[facility] [[id number]]  Chief Complaint   Patient presents with     RECHECK       HPI:    Lori Mehta  is a 89 year old (6/27/1928), who is being seen today for an episodic care visit.  HPI information obtained from: facility chart records, facility staff, patient report, [facility] chart review and family/first contact Fatoumata (spouse) report. Today's concern is:    1. CHF (congestive heart failure), NYHA class IV, acute, systolic (H)    2. Pulmonary hypertension (H)    3. Rate controlled atrial fibrillation (H)    4. Long-term (current) use of anticoagulants [Z79.01]    5. Failure to thrive in adult    6. Uropathy, obstructive    7. Benign prostatic hyperplasia with urinary obstruction    8. Indwelling Bowens catheter present      This NP came into the building for other reasons and staff updated that there has been a change in condition with Kareem and not knowing what path to take with him.      He is not eating, not keeping oxygen on- will fight to take it off, oxygen saturations in the 70's, \"I am dead\", \"let me die\", \"I am 89 years old, I want to die\".  Several different staff have come to talk with him but can not get him to change his mind.    Nurse manager and this NP called spouse to tell her the findings and asking for guidance of what the family would like the facility to do for Kareem.  Either send to hospital (which he does not want), add psych medication to hopefully change his thinking/calm him down, or discontinue majority of his medications, make comfort care and add comfort medications.    Family has a scheduled visit for Friday but asked the spouse to come in today if able to see Kareem.  She was able to come today and would bring her daughter with to see how he was doing and then maybe easier to make decisions.    This NP went in to see Kareem and he " "was sitting in a comfortable chair in room, arms stretched out on the arm rests and feet elevated.  \"I am dead\".  Negative comments.  \"I am 89 years old, let me die\".  Went to touch the top of his left foot and he pulled away.  \"Do not give me a shot\".  Explained that just doing a brief visit and checking the swelling.  Kept his eyes closed.  Oxygen tubing on his lap.      Past Medical and Surgical History reviewed in Epic today.    MEDICATIONS:    Current Outpatient Medications   Medication Sig Dispense Refill     acetaminophen (TYLENOL) 500 MG tablet Take 1,000 mg by mouth every 6 hours as needed for mild pain       LORazepam (ATIVAN) 2 MG/ML (HIGH CONC) solution Take 0.25 mLs (0.5 mg) by mouth every 4 hours as needed for anxiety 30 mL 0     MELATONIN PO Take 10 mg by mouth At Bedtime       morphine sulfate, high concentrate, (ROXANOL-CONCENTRATED) 20 MG/ML concentrated solution Take 0.25 mLs (5 mg) by mouth every hour as needed for shortness of breath / dyspnea or breakthrough pain 30 mL 0     sodium chloride (OCEAN) 0.65 % nasal spray Spray 2 sprays into both nostrils every 4 hours as needed for congestion       REVIEW OF SYSTEMS:  Did not want to participate in a review of systems.  His mind is set on dying.    Objective:  /58   Pulse 58   Temp 98.3  F (36.8  C)   Resp 18   Wt 71 kg (156 lb 8 oz)   SpO2 97%   BMI 24.51 kg/m    Exam:  Kareem is in the recliner but the back is up and not leaning back.  No acute respiratory distress.  Answering questions on occasion.  Knows he is 89 years old and he wants to die.  Coloring is pale/pink.  Skin is warm and dry.  Bruising on his forearms.  Catheter is present with good amount of clear yellow urine present in bag.  No sediment.    Wearing Tubi  socks.  +1+2 edema present on top of feet.    No coughing.  No apnea.  Oxygen at 3L/min via NC if he will wear it.  Speech is clear for him.  No congestion detected.    Radial pulse regular/irregular.    Assist of " one with mobility as well as ADLs.      Blood pressures range from 110-120's/50-60's.  Weights are around 156-157 lbs.      Labs:     Most Recent 3 CBC's:  Recent Labs   Lab Test 03/02/21  1054 02/16/21  1143 01/24/21  0646   WBC 8.6 7.6 7.4   HGB 8.1* 10.5* 10.9*   * 105* 100   * 116* 150     Most Recent 3 BMP's:  Recent Labs   Lab Test 03/02/21  1054 02/26/21  1015 02/23/21  1135    140 136   POTASSIUM 4.4 4.6 5.7*   CHLORIDE 101 106 104   CO2 28 27 26   BUN 95* 94* 92*   CR 2.69* 2.61* 2.54*   ANIONGAP 8 7 6   DELORIS 9.0 9.2 9.1   * 121* 109*     Most Recent 3 INR's:  Recent Labs   Lab Test 03/17/21  0700 03/12/21  0708 03/02/21  1202   INR 2.44* 3.19* 3.1*       ASSESSMENT/PLAN:  1. CHF (congestive heart failure), NYHA class IV, acute, systolic (H)    2. Pulmonary hypertension (H)    3. Rate controlled atrial fibrillation (H)    4. Long-term (current) use of anticoagulants [Z79.01]    5. Failure to thrive in adult    6. Uropathy, obstructive    7. Benign prostatic hyperplasia with urinary obstruction    8. Indwelling Bowens catheter present      Family came in to see Kareem and he told them the same feelings he is having.  Daughter told her mother that this is what he wants and no one will be mad if decision is to make him comfortable.    Decision was made to become comfort care.  Did not talk about hospice.  Will be keeping him here for now.  Family can stay with him for now and not be rushed out.    Kareem is on many many nutritional supplements.  Nurse manager and this NP reviewed each medication and decision made to discontinue all of them except nasal spray, melatonin, and PRN Tylenol.    Will discontinue the Flomax and keep the catheter present due to his BPH with obstruction.  Does have coumadin and due for INR tomorrow which will be discontinued.  Feel that he has started to refuse medications but in the long run, the medications will affect his INR and it will be all over the place  most likely.  Will discontinue the Warfarin as well.    He is on a small dose of Metoprolol and overall B/P's are stable.  Will remove the beta blocker.    Will keep the oxygen but for now discontinue the diuretic since he is refusing medications.  Goal is comfort and do not want to irritate him by offering medications.      Will order Roxanol 20mg/ml 5mg SL every 1 hour prn  Will order Lorazepam 2mg/ml 0.5mg SL every 5 hours prn.  Did send scripts over to pharmacy but due to his discomfort now, staff want to give Roxanol.  Call placed to pharmacy to remove Roxanol syringes from [employer] and given direction that staff can remove 4 syringes and pharmacy will only send 28ml of roxanol.        Orders written by  nurse:  1.  Discontinue all medications except ocean nasal spray, melatonin and Tylenol PRN.  2.  Discontinue INR for tomorrow  3.  Keep catheter in for BPH with obstruction.    4.  Roxanol 20mg/ml 5mg SL every 1 hour prn  5.  Lorazepam 2mg/ml 0.5mg SL every 5 hours prn.      Total time spent with patient visit at the [facility] was 35 min including patient visit, review of past records, review of medications, phone call to patient contact and family visit. Greater than 50% of total time spent with counseling and coordinating care due to change in status and refusing medications, oxygen and voicing his desire to wanting of death. calling family to come in for compassion visit so they can see what condition he is in and speak with him if he would change his mind.  Also putting in place comfort measures.    Electronically signed by:  FORREST Zuniga CNP

## 2021-03-29 ENCOUNTER — TELEPHONE (OUTPATIENT)
Dept: INTERNAL MEDICINE | Facility: CLINIC | Age: 86
End: 2021-03-29

## 2021-03-29 DIAGNOSIS — Z53.9 DIAGNOSIS NOT YET DEFINED: Primary | ICD-10-CM

## 2021-03-29 PROCEDURE — G0180 MD CERTIFICATION HHA PATIENT: HCPCS | Performed by: INTERNAL MEDICINE

## 2021-03-29 NOTE — TELEPHONE ENCOUNTER
Home Health Certification and Plan of Care forms received.     Certification Period from 1/26/2021 to 3/26/2021.    Forms given to provider.     ALEJANDRA MachadoN, RN  [facility]

## 2021-03-29 NOTE — TELEPHONE ENCOUNTER
Patient .     Forms given to PCP to sign.    AKSHAT Machado, RN  [facility]

## 2021-10-25 NOTE — TELEPHONE ENCOUNTER
Reason for Call:  Other appointment    Detailed comments: Patients wife Fatoumata states Bernice's shoulder is still in a lot of pain. Looking for a shoulder specialist.      Phone Number Patient can be reached at: Home number on file [phone] (home)    Best Time: any     Can we leave a detailed message on this number? YES    Call taken on 6/4/2019 at 1:07 PM by Leeanna Armenta     [email or web address]